# Patient Record
Sex: FEMALE | Race: BLACK OR AFRICAN AMERICAN | NOT HISPANIC OR LATINO | Employment: OTHER | URBAN - METROPOLITAN AREA
[De-identification: names, ages, dates, MRNs, and addresses within clinical notes are randomized per-mention and may not be internally consistent; named-entity substitution may affect disease eponyms.]

---

## 2017-01-03 ENCOUNTER — THERAPY VISIT (OUTPATIENT)
Dept: PHYSICAL THERAPY | Facility: CLINIC | Age: 62
End: 2017-01-03
Payer: COMMERCIAL

## 2017-01-03 ENCOUNTER — OFFICE VISIT (OUTPATIENT)
Dept: INTERNAL MEDICINE | Facility: CLINIC | Age: 62
End: 2017-01-03

## 2017-01-03 VITALS
WEIGHT: 202.3 LBS | SYSTOLIC BLOOD PRESSURE: 124 MMHG | DIASTOLIC BLOOD PRESSURE: 81 MMHG | BODY MASS INDEX: 35.84 KG/M2 | HEART RATE: 75 BPM

## 2017-01-03 DIAGNOSIS — F51.01 PRIMARY INSOMNIA: ICD-10-CM

## 2017-01-03 DIAGNOSIS — Z13.5 SCREENING FOR DIABETIC RETINOPATHY: Primary | ICD-10-CM

## 2017-01-03 DIAGNOSIS — M54.42 BILATERAL LOW BACK PAIN WITH LEFT-SIDED SCIATICA: Primary | ICD-10-CM

## 2017-01-03 PROCEDURE — 97110 THERAPEUTIC EXERCISES: CPT | Mod: GP | Performed by: PHYSICAL THERAPIST

## 2017-01-03 PROCEDURE — 97140 MANUAL THERAPY 1/> REGIONS: CPT | Mod: GP | Performed by: PHYSICAL THERAPIST

## 2017-01-03 ASSESSMENT — PAIN SCALES - GENERAL: PAINLEVEL: NO PAIN (0)

## 2017-01-03 NOTE — MR AVS SNAPSHOT
After Visit Summary   1/3/2017    Kian Cortez    MRN: [id number]           Patient Information     Date Of Birth          1955        Visit Information        Provider Department      1/3/2017 12:45 PM Tori Ramirez APRN CNP; [facility]        Today's Diagnoses     Screening for diabetic retinopathy    -  1     Primary insomnia           Care Instructions    Primary Care Center Medication Refill Request Information:  * Please contact your pharmacy regarding ANY request for medication refills.  ** [facility] Prescription Fax = [phone]  * Please allow 3 business days for routine medication refills.  * Please allow 5 business days for controlled substance medication refills.     Primary Care Center Test Result notification information:  *You will be notified with in 7-10 days of your appointment day regarding the results of your test.  If you are on MyChart you will be notified as soon as the provider has reviewed the results and signed off on them.      Ophthalmology (Eye) [phone]       Insomnia  Insomnia refers to a difficulty going to sleep or staying asleep, or both. Insomnia has many causes, including anxiety, stress, depression, chronic pain, sleeping cycles out of balance due to working night shifts or excess napping during the day, and a condition called  sleep apnea . Insomnia can be a side effect from stimulant medicines such as decongestants, asthma inhalers and pills, diet pills, and illegal drugs such as speed, crank, crack, and PCP.  Home Care:  1. Review your medicines with your doctor or pharmacist to find out if they can cause insomnia.  2. Caffeine, smoking and alcohol also affect sleep. Limit your daily use and do not use these before bedtime. Alcohol may make you sleepy at first, but as its effects wear off, you may awaken a few hours later and have trouble returning to sleep.  3. Do not exercise, eat or drink large  amounts of liquid within 2 hours of your bedtime.  4. Improve your sleep habits. Have a fixed bed and wake-up time. Try to keep noise, light and heat in your bedroom at a comfortable level. Try using earplugs or eyeshades if needed.   5. Avoid watching TV in bed.  6. If you do not fall asleep within 30 minutes, try to relax by reading or listening to soft music.  7. Limit daytime napping to one 30 minute period, early in the day.  8. Get regular exercise. Find other ways to lessen your stress level.  9. If a medicine was prescribed to help reset your sleep patterns, take it as directed. Sleeping pills are intended for short-term use, only. If taken for too long, the effect wears off while the risk of physical addiction and psychological dependence increases.  Follow-Up  with your doctor or as directed by our staff if you feel that your insomnia is not responding to the above measures.  Get Prompt Medical Attention  if any of the following occur:    Extreme restlessness or irritability    Confusion or hallucinations (seeing or hearing things that are not there)    Anxiety, depression    Several days without sleeping    1872-2921 [employer]. [address] [place] [address]. All rights reserved. This information is not intended as a substitute for professional medical care. Always follow your healthcare professional's instructions.              Follow-ups after your visit        Additional Services     OPHTHALMOLOGY ADULT REFERRAL       Your provider has referred you to: [facility]: Eye Clinic [facility] [phone]   [email or web address]    Please be aware that coverage of these services is subject to the terms and limitations of your health insurance plan.  Call member services at your health plan with any benefit or coverage questions.      Please bring the following with you to your appointment:    (1) Any X-Rays, CTs or MRIs which have been performed.  Contact the  facility where they were done to arrange for  prior to your scheduled appointment.    (2) List of current medications  (3) This referral request   (4) Any documents/labs given to you for this referral                  Your next 10 appointments already scheduled     Jan 03, 2017  3:30 PM   EMELY Spine with Pato Orr, PT   [facility] ([facility])    [address] [facility] [place] [place]  [address]  [facility] [address]   [phone]            Jan 09, 2017  3:20 PM   EMELY Extremity with Gregory Irene, PT   [facility] ([facility])    [address] [facility] [address]   [phone]            Jan 12, 2017  9:20 AM   (Arrive by 9:05 AM)   TORO FLORES with Tammie Valero OD   [facility] ([facility])    [address] [place] [id number] [phone]              Who to contact     Please call your clinic at [phone] to:    Ask questions about your health    Make or cancel appointments    Discuss your medicines    Learn about your test results    Speak to your doctor   If you have compliments or concerns about an experience at your clinic, or if you wish to file a complaint, please contact [facility] Patient Relations at [phone] or email us at [email or web address]         Additional Information About Your Visit        MyChart Information     [employer] is an electronic gateway that provides easy, online access to your medical records. With [employer], you can request a clinic appointment, read your test results, renew a prescription or communicate with your care team.     To sign up for [employer] visit the website at [email or web address]   You will be asked to enter the access code listed below, as well as some personal information. Please follow the directions to create your username and password.     Your access code is:  [email or web address]  Expires: 3/2/2017  9:38 AM     Your access code will  in 90 days. If you need help or a new code, please contact your [facility] or call [phone] for assistance.        Care EveryWhere ID     This is your Care EveryWhere ID. This could be used by other organizations to access your [facility] medical records  [id number]        Your Vitals Were     Pulse Breastfeeding?                75 No           Blood Pressure from Last 3 Encounters:   17 124/81   16 115/78   16 106/56    Weight from Last 3 Encounters:   17 91.763 kg (202 lb 4.8 oz)   16 93.713 kg (206 lb 9.6 oz)   16 92.08 kg (203 lb)              We Performed the Following     OPHTHALMOLOGY ADULT REFERRAL          Today's Medication Changes          These changes are accurate as of: 1/3/17  1:52 PM.  If you have any questions, ask your nurse or doctor.               Start taking these medicines.        Dose/Directions    melatonin 3 MG Caps   Used for:  Primary insomnia   Started by:  Tori Ramirez APRN CNP        Dose:  3 mg   Take 3 mg by mouth At Bedtime   Quantity:  60 capsule   Refills:  1            Where to get your medicines      These medications were sent to [facility] [place], [place] - [address]  [address], [facility] [address]     Phone:  [phone]    - melatonin 3 MG Caps             Primary Care Provider    [facility] Other Clinic                Thank you!     Thank you for choosing [facility]  for your care. Our goal is always to provide you with excellent care. Hearing back from our patients is one way we can continue to improve our services. Please take a few minutes to complete the written survey that you may receive in the mail after your visit with us. Thank you!             Your Updated Medication List - Protect others around you: Learn how to safely use, store and throw  away your medicines at www.disposemymeds.org.          This list is accurate as of: 1/3/17  1:52 PM.  Always use your most recent med list.                   Brand Name Dispense Instructions for use    calcium carbonate 500 MG chewable tablet    TUMS    180 tablet    Take 1 tablet (500 mg) by mouth 2 times daily       CANE, ANY MATERIAL     1 Device    Use with ambulation       diclofenac 1 % Gel topical gel    VOLTAREN    100 g    Apply 4 grams to knees four times daily using enclosed dosing card.       FISH OIL + D3 PO      Take  by mouth daily.       meclizine 12.5 MG tablet    ANTIVERT    30 tablet    Take 4 tablets (50 mg) by mouth 4 times daily as needed for dizziness       melatonin 3 MG Caps     60 capsule    Take 3 mg by mouth At Bedtime       omeprazole 20 MG CR capsule    priLOSEC    60 capsule    Take 1 capsule (20 mg) by mouth daily       polyethylene glycol powder    MIRALAX    500 g    Take 17 g (1 capful) by mouth daily       vitamin D 1000 UNITS capsule      Take 1 capsule by mouth daily.

## 2017-01-03 NOTE — PATIENT INSTRUCTIONS
Primary Care Center Medication Refill Request Information:  * Please contact your pharmacy regarding ANY request for medication refills.  ** [facility] Prescription Fax = [phone]  * Please allow 3 business days for routine medication refills.  * Please allow 5 business days for controlled substance medication refills.     Primary Care Center Test Result notification information:  *You will be notified with in 7-10 days of your appointment day regarding the results of your test.  If you are on MyChart you will be notified as soon as the provider has reviewed the results and signed off on them.      Ophthalmology (Eye) [phone]       Insomnia  Insomnia refers to a difficulty going to sleep or staying asleep, or both. Insomnia has many causes, including anxiety, stress, depression, chronic pain, sleeping cycles out of balance due to working night shifts or excess napping during the day, and a condition called  sleep apnea . Insomnia can be a side effect from stimulant medicines such as decongestants, asthma inhalers and pills, diet pills, and illegal drugs such as speed, crank, crack, and PCP.  Home Care:  1. Review your medicines with your doctor or pharmacist to find out if they can cause insomnia.  2. Caffeine, smoking and alcohol also affect sleep. Limit your daily use and do not use these before bedtime. Alcohol may make you sleepy at first, but as its effects wear off, you may awaken a few hours later and have trouble returning to sleep.  3. Do not exercise, eat or drink large amounts of liquid within 2 hours of your bedtime.  4. Improve your sleep habits. Have a fixed bed and wake-up time. Try to keep noise, light and heat in your bedroom at a comfortable level. Try using earplugs or eyeshades if needed.   5. Avoid watching TV in bed.  6. If you do not fall asleep within 30 minutes, try to relax by reading or listening to soft music.  7. Limit daytime napping to one 30 minute period, early in the day.  8. Get  regular exercise. Find other ways to lessen your stress level.  9. If a medicine was prescribed to help reset your sleep patterns, take it as directed. Sleeping pills are intended for short-term use, only. If taken for too long, the effect wears off while the risk of physical addiction and psychological dependence increases.  Follow-Up  with your doctor or as directed by our staff if you feel that your insomnia is not responding to the above measures.  Get Prompt Medical Attention  if any of the following occur:    Extreme restlessness or irritability    Confusion or hallucinations (seeing or hearing things that are not there)    Anxiety, depression    Several days without sleeping    [id number] [employer]. [address] [place] [address], [place] [address]. All rights reserved. This information is not intended as a substitute for professional medical care. Always follow your healthcare professional's instructions.

## 2017-01-03 NOTE — PROGRESS NOTES
Kian Cortez is a 61 year old female who comes in for    CC: Insomnia  HPI:  Ms. Cortez reports trouble with insomnia, x4 days. Instead of feeling tired, she feels energetic. Tries to sleep with closing her eyes, dozes briefly and then wakes up from dreams. When she starts to feel sleepy, then she suddenly feels awake, out of nowhere.   Denies change in environment--no new lights, sounds, or bed/bedding. Sleeps with --no changes, he is not keeping her awake. She tried sleeping in a different room, but this did not make any difference.  Lays in bed for hours before falling asleep.   Caffeine--1 cup of coffee or tea in the morning.   Denies anxiety or stress--no major changes, but is moving and selling the house, but is excited about this and has been enjoying this process. She was worried about the dizziness that she had been experiencing, but this has improved and she has stopped taking the Meclizine.  Is wondering if she should keep her PT appointment.    Other issues discussed today:     Patient Active Problem List   Diagnosis     Advanced directives, counseling/discussion     CARDIOVASCULAR SCREENING; LDL GOAL LESS THAN 130     Pseudoexfoliation syndrome, right eye     GERD (gastroesophageal reflux disease)     Osteopenia     Constipation, unspecified constipation type     Morbid obesity, unspecified obesity type (H)     Vertigo       Current Outpatient Prescriptions   Medication Sig Dispense Refill     polyethylene glycol (MIRALAX) powder Take 17 g (1 capful) by mouth daily 500 g 1     meclizine (ANTIVERT) 12.5 MG tablet Take 4 tablets (50 mg) by mouth 4 times daily as needed for dizziness 30 tablet 0     calcium carbonate (TUMS) 500 MG chewable tablet Take 1 tablet (500 mg) by mouth 2 times daily 180 tablet 0     omeprazole (PRILOSEC) 20 MG CR capsule Take 1 capsule (20 mg) by mouth daily 60 capsule 1     diclofenac (VOLTAREN) 1 % GEL topical gel Apply 4 grams to knees four times daily using enclosed  dosing card. 100 g 1     CANE, ANY MATERIAL Use with ambulation 1 Device 0     Cholecalciferol (VITAMIN D) 1000 UNITS capsule Take 1 capsule by mouth daily.       Fish Oil-Cholecalciferol (FISH OIL + D3 PO) Take  by mouth daily.           ALLERGIES: Review of patient's allergies indicates no known allergies.    PAST MEDICAL HX:   Past Medical History   Diagnosis Date     Osteopenia 4/27/2015     Osteoarthritis of right knee        PAST SURGICAL HX:   Past Surgical History   Procedure Laterality Date     Eye surgery       eye duct repair 10+ years  ago       IMMUNIZATION HX:   Immunization History   Administered Date(s) Administered     IPV 01/27/2006     Influenza (IIV3) 01/27/2006, 02/22/2012, 02/15/2013, 12/30/2016     MMR 12/19/1997     Meningococcal (Menomune ) 01/27/2006     TD (ADULT, 7+) 12/19/1997, 01/27/2006     TDAP (BOOSTRIX AGES 10-64) 05/02/2016     Typhoid IM 01/27/2006     Yellow Fever 01/27/2006       SOCIAL HX:   Social History     Social History Narrative    Originally from [facility].       ROS:   CONSTITUTIONAL: no fatigue, no unexpected change in weight  SKIN: no worrisome rashes, no worrisome moles, no worrisome lesions  EYES: no acute vision problems or changes  ENT: no ear problems, no mouth problems, no throat problems  RESP: no significant cough, no shortness of breath  CV: no chest pain, no palpitations, no new or worsening peripheral edema  GI: no nausea, no vomiting, no constipation, no diarrhea  NEURO: no weakness, no dizziness, no syncope, mild rare headaches    OBJECTIVE:  /81 mmHg  Pulse 75  Wt 91.763 kg (202 lb 4.8 oz)  Breastfeeding? No   Wt Readings from Last 1 Encounters:   01/03/17 91.763 kg (202 lb 4.8 oz)     Constitutional: no distress, comfortable, pleasant, well-groomed  Cardiovascular: regular rate and rhythm, normal S1 and S2, no murmurs, rubs or gallops  Respiratory: clear to auscultation with good air movement bilaterally, no wheezes or crackles,  non-labored  Neurological: cranial nerves grossly intact, gait is steady with intact balance, speech is clear, no tremor   Psychological: appropriate mood, demonstrates intact judgment and logical thought process      ASSESSMENT/PLAN:    1. Screening for diabetic retinopathy  Will schedule eye exam today.  - OPHTHALMOLOGY ADULT REFERRAL    2. Primary insomnia  Reviewed good sleep hygiene techniques. Recommended quiet activity 30-60 min before bedtime, no TV/cellphone in the bedroom, bed is only for sleep and sex, and to get out of bed if having difficulty falling asleep. Maintain a comfortable room temperature, dark and quiet environment. No day-time napping. Will try melatonin to help regulate sleep patterns. Reviewed no caffeine in the afternoon.   - melatonin 3 MG CAPS; Take 3 mg by mouth At Bedtime  Dispense: 60 capsule; Refill: 1    FOLLOW UP: If not improving or if worsening, or as needed for any concerns.    EFRAIN Jesus CNP

## 2017-01-08 ENCOUNTER — PRE VISIT (OUTPATIENT)
Dept: GASTROENTEROLOGY | Facility: CLINIC | Age: 62
End: 2017-01-08

## 2017-01-10 ENCOUNTER — THERAPY VISIT (OUTPATIENT)
Dept: PHYSICAL THERAPY | Facility: CLINIC | Age: 62
End: 2017-01-10
Payer: COMMERCIAL

## 2017-01-10 DIAGNOSIS — M54.42 BILATERAL LOW BACK PAIN WITH LEFT-SIDED SCIATICA: Primary | ICD-10-CM

## 2017-01-10 DIAGNOSIS — H81.10 BPPV (BENIGN PAROXYSMAL POSITIONAL VERTIGO), UNSPECIFIED LATERALITY: Primary | ICD-10-CM

## 2017-01-10 PROCEDURE — 95992 CANALITH REPOSITIONING PROC: CPT | Mod: GP | Performed by: PHYSICAL THERAPIST

## 2017-01-10 PROCEDURE — 97161 PT EVAL LOW COMPLEX 20 MIN: CPT | Mod: GP | Performed by: PHYSICAL THERAPIST

## 2017-01-10 PROCEDURE — 97110 THERAPEUTIC EXERCISES: CPT | Mod: GP | Performed by: PHYSICAL THERAPIST

## 2017-01-10 NOTE — PROGRESS NOTES
[facility] Initial Evaluation        S:  Kian Cortez is a 62 year old patient complaining of spinning and nausea.  Onset of symptoms: 1 week.  Woke up with sx.  Reports no sx for the last week.  MD referral 12/30/2016.  Is this a recurrent problem: 1 year ago, but very slight at that time.  Progression since onset: Past week has been good  Frequency of episodes/time of day: none recently  Duration of episodes: 30 sec to 1 min  Exacerbating factors: moving head  Relieving factors: hold head still  Previous treatments:  meclizine  Special tests/diagnostics performed: last year - all negative  Significant medical hx: Knee OA, over weight, migraines  Meds: see in chart  Occupation: none   Work requirements: general housework  General health reported by patient: good  Red Flags: none    Eye surgery 15 years ago      O:  Cervical ROM screen: full CROM without sx  Oculomotor/gaze stability screen: all tests negative for nystagmus - slight sx only but not spinning  Vertebral artery test: negative  Hallpike-Isma maneuver:  R: negative  L: negative  Horizontal canal test:  R: NT  L: NT  Balance testing:  NT        Subjective:    HPI                    Objective:    System    Physical Exam    General     ROS    Assessment/Plan:      Patient is a 62 year old female with dizziness complaints.    Patient has the following significant findings with corresponding treatment plan.                Diagnosis 1:  BPPV  Decreased proprioception - neuro re-education and therapeutic activities  Decreased function - therapeutic activities    Therapy Evaluation Codes:   1) History comprised of:   Personal factors that impact the plan of care:      None.    Comorbidity factors that impact the plan of care are:      None.     Medications impacting care: meclizine.  2) Examination of Body Systems comprised of:   Body structures and functions that impact the plan of care:      dizziness.   Activity limitations that impact the  plan of care are:      Laying down.  3) Clinical presentation characteristics are:   Stable/Uncomplicated.  4) Decision-Making    Low complexity using standardized patient assessment instrument and/or measureable assessment of functional outcome.  Cumulative Therapy Evaluation is: Low complexity.    Previous and current functional limitations:  No previous reports of dizziness.  Currently has no dizzy sx   Short term and Long term goals: Pt will call if sx increase again.      Communication ability:  Patient appears to be able to clearly communicate and understand verbal and written communication and follow directions correctly.  Treatment Explanation - The following has been discussed with the patient:   RX ordered/plan of care  Anticipated outcomes  Possible risks and side effects  This patient would benefit from PT intervention to resume normal activities.   Rehab potential is good.    Frequency:  1 X week, once daily  Duration:  for 1 weeks  Discharge Plan:  Achieve all LTG.  Independent in home treatment program.  Reach maximal therapeutic benefit.    Pt will call to schedule additional visits if sx return.    Please refer to the daily flowsheet for treatment today, total treatment time and time spent performing 1:1 timed codes.

## 2017-02-09 DIAGNOSIS — M17.0 PRIMARY OSTEOARTHRITIS OF BOTH KNEES: ICD-10-CM

## 2017-02-09 DIAGNOSIS — M25.552 HIP PAIN, LEFT: Primary | ICD-10-CM

## 2017-02-09 RX ORDER — NAPROXEN SODIUM 220 MG
220 TABLET ORAL 2 TIMES DAILY WITH MEALS
Qty: 60 TABLET | Refills: 1 | Status: SHIPPED | OUTPATIENT
Start: 2017-02-09 | End: 2017-05-16

## 2017-03-10 ENCOUNTER — OFFICE VISIT (OUTPATIENT)
Dept: INTERNAL MEDICINE | Facility: CLINIC | Age: 62
End: 2017-03-10

## 2017-03-10 VITALS — RESPIRATION RATE: 18 BRPM | DIASTOLIC BLOOD PRESSURE: 83 MMHG | HEART RATE: 79 BPM | SYSTOLIC BLOOD PRESSURE: 136 MMHG

## 2017-03-10 DIAGNOSIS — G89.29 CHRONIC LEFT HIP PAIN: Primary | ICD-10-CM

## 2017-03-10 DIAGNOSIS — E66.01 MORBID OBESITY DUE TO EXCESS CALORIES (H): ICD-10-CM

## 2017-03-10 DIAGNOSIS — M25.552 CHRONIC LEFT HIP PAIN: Primary | ICD-10-CM

## 2017-03-10 ASSESSMENT — PAIN SCALES - GENERAL: PAINLEVEL: SEVERE PAIN (6)

## 2017-03-10 NOTE — NURSING NOTE
Chief Complaint   Patient presents with     Leg Pain     Patient is here to discuss leg pain     Kristy Barboza CMA 1:50 PM on 3/10/2017.

## 2017-03-10 NOTE — PATIENT INSTRUCTIONS
Primary Care Center Medication Refill Request Information:  * Please contact your pharmacy regarding ANY request for medication refills.  ** [facility] Prescription Fax = [phone]  * Please allow 3 business days for routine medication refills.  * Please allow 5 business days for controlled substance medication refills.     Primary Care Center Test Result notification information:  *You will be notified with in 7-10 days of your appointment day regarding the results of your test.  If you are on MyChart you will be notified as soon as the provider has reviewed the results and signed off on them.      4 Steps for Eating Healthier  Changing the way you eat can improve your health. It can lower your cholesterol and blood pressure, and help you stay at a healthy weight. Your diet doesn t have to be bland and boring to be healthy. Just watch your calories and follow these steps:    1. Eat fewer unhealthy fats    Choose more fish and lean meats instead of fatty cuts of meat.    Skip butter and lard, and use less margarine.    Pass on foods that have palm, coconut, or hydrogenated oils.    Eat fewer high-fat dairy foods like cheese, ice cream, and whole milk.    Get a heart-healthy cookbook and try some low-fat recipes.  2. Go light on salt    Keep the saltshaker off the table.    Limit high-salt ingredients, such as soy sauce, bouillon, and garlic salt.    Instead of adding salt when cooking, season your food with herbs and flavorings. Try lemon, garlic, and onion.    Limit convenience foods, such as boxed or canned foods and restaurant food.    Read food labels and choose lower-sodium options.  3. Limit sugar    Pause before you add sugars to pancakes, cereal, coffee, or tea. This includes white and brown table sugar, syrup, honey, and molasses. Cut your usual amount by half.    Use non-sugar sweeteners. Stevia, aspartame, and sucralose can satisfy a sweet tooth without adding calories.    Swap out sugar-filled soda and other  drinks. Buy sugar-free or low-calorie beverages. Remember water is always the best choice.    Read labels and choose foods with less added sugar. Keep in mind that dairy foods and foods with fruit will have some natural sugar.    Cut the sugar in recipes by 1/3 to 1/2. Boost the flavor with extracts like almond, vanilla, or orange. Or add spices such as cinnamon or nutmeg.  4. Eat more fiber    Eat fresh fruits and vegetables every day.    Boost your diet with whole grains. Go for oats, whole-grain rice, and bran.    Add beans and lentils to your meals.    Drink more water to match your fiber increase. This is to help prevent constipation.    9760-9663 [employer]. [address] [place] [address], [place] [address]. All rights reserved. This information is not intended as a substitute for professional medical care. Always follow your healthcare professional's instructions.

## 2017-03-10 NOTE — MR AVS SNAPSHOT
After Visit Summary   3/10/2017    Kian Cortez    MRN: [id number]           Patient Information     Date Of Birth          1955        Visit Information        Provider Department      3/10/2017 1:15 PM Open, Assignments; Tori Ramirez APRN [facility]        Today's Diagnoses     Chronic left hip pain    -  1    Morbid obesity due to excess calories (H)          Care Instructions    Primary Care Center Medication Refill Request Information:  * Please contact your pharmacy regarding ANY request for medication refills.  ** [facility] Prescription Fax = [phone]  * Please allow 3 business days for routine medication refills.  * Please allow 5 business days for controlled substance medication refills.     Primary Care Center Test Result notification information:  *You will be notified with in 7-10 days of your appointment day regarding the results of your test.  If you are on MyChart you will be notified as soon as the provider has reviewed the results and signed off on them.      4 Steps for Eating Healthier  Changing the way you eat can improve your health. It can lower your cholesterol and blood pressure, and help you stay at a healthy weight. Your diet doesn t have to be bland and boring to be healthy. Just watch your calories and follow these steps:    1. Eat fewer unhealthy fats    Choose more fish and lean meats instead of fatty cuts of meat.    Skip butter and lard, and use less margarine.    Pass on foods that have palm, coconut, or hydrogenated oils.    Eat fewer high-fat dairy foods like cheese, ice cream, and whole milk.    Get a heart-healthy cookbook and try some low-fat recipes.  2. Go light on salt    Keep the saltshaker off the table.    Limit high-salt ingredients, such as soy sauce, bouillon, and garlic salt.    Instead of adding salt when cooking, season your food with herbs and flavorings. Try lemon, garlic, and onion.    Limit convenience foods, such  as boxed or canned foods and restaurant food.    Read food labels and choose lower-sodium options.  3. Limit sugar    Pause before you add sugars to pancakes, cereal, coffee, or tea. This includes white and brown table sugar, syrup, honey, and molasses. Cut your usual amount by half.    Use non-sugar sweeteners. Stevia, aspartame, and sucralose can satisfy a sweet tooth without adding calories.    Swap out sugar-filled soda and other drinks. Buy sugar-free or low-calorie beverages. Remember water is always the best choice.    Read labels and choose foods with less added sugar. Keep in mind that dairy foods and foods with fruit will have some natural sugar.    Cut the sugar in recipes by 1/3 to 1/2. Boost the flavor with extracts like almond, vanilla, or orange. Or add spices such as cinnamon or nutmeg.  4. Eat more fiber    Eat fresh fruits and vegetables every day.    Boost your diet with whole grains. Go for oats, whole-grain rice, and bran.    Add beans and lentils to your meals.    Drink more water to match your fiber increase. This is to help prevent constipation.    1108-3287 [employer]. [address] [place] [address]. All rights reserved. This information is not intended as a substitute for professional medical care. Always follow your healthcare professional's instructions.              Follow-ups after your visit        Additional Services     ORTHOPEDICS ADULT REFERRAL       Your provider has referred you to: [facility] [place]: Orthopaedic Clinic [facility] [phone]   [email or web address]      Please be aware that coverage of these services is subject to the terms and limitations of your health insurance plan.  Call member services at your health plan with any benefit or coverage questions.      Please bring the following to your appointment:    >>   Any x-rays, CTs or MRIs which have been performed.  Contact the facility where they were done to  arrange for  prior to your scheduled appointment.    >>   List of current medications   >>   This referral request   >>   Any documents/labs given to you for this referral            WEIGHT MANAGEMENT/ [facility] LIFESTYLE PROGRAM REFERRAL       To schedule an appointment, please call the [facility] at [phone].                  Your next 10 appointments already scheduled     Mar 13, 2017  2:30 PM CDT   (Arrive by 2:15 PM)   New Patient Visit with Tan Hodges MD   [facility] Sports Medicine ([facility])    [address] [place] [address]  [address] [facility] [address] [phone]            Mar 21, 2017  2:20 PM CDT   (Arrive by 2:05 PM)   New Patient Visit with Lora Land MD   [facility] Weight Management ([facility])    [address] [place] [address]  4th [facility] [address] [phone]              Who to contact     Please call your clinic at [phone] to:    Ask questions about your health    Make or cancel appointments    Discuss your medicines    Learn about your test results    Speak to your doctor   If you have compliments or concerns about an experience at your clinic, or if you wish to file a complaint, please contact [facility] Patient Relations at [phone] or email us at [email or web address]         Additional Information About Your Visit        MyChart Information     [employer] is an electronic gateway that provides easy, online access to your medical records. With [employer], you can request a clinic appointment, read your test results, renew a prescription or communicate with your care team.     To sign up for [employer]101[employer] visit the website at [email or web address]   You will be asked to enter the access code listed below, as well as some personal information. Please follow the directions to create your username and password.     Your access code is:  [email or web address]  Expires: 2017  6:30 AM     Your access code will  in 90 days. If you need help or a new code, please contact your [facility] or call [phone] for assistance.        Care EveryWhere ID     This is your Care EveryWhere ID. This could be used by other organizations to access your [place] medical records  [id number]        Your Vitals Were     Pulse Respirations Breastfeeding?             79 18 No          Blood Pressure from Last 3 Encounters:   03/10/17 136/83   17 124/81   16 115/78    Weight from Last 3 Encounters:   17 91.8 kg (202 lb 4.8 oz)   16 93.7 kg (206 lb 9.6 oz)   16 92.1 kg (203 lb)              We Performed the Following     ORTHOPEDICS ADULT REFERRAL     WEIGHT MANAGEMENT/ [facility] LIFESTYLE PROGRAM REFERRAL        Primary Care Provider    Md Other Clinic                Thank you!     Thank you for choosing [facility]  for your care. Our goal is always to provide you with excellent care. Hearing back from our patients is one way we can continue to improve our services. Please take a few minutes to complete the written survey that you may receive in the mail after your visit with us. Thank you!             Your Updated Medication List - Protect others around you: Learn how to safely use, store and throw away your medicines at www.disposemymeds.org.          This list is accurate as of: 3/10/17  2:40 PM.  Always use your most recent med list.                   Brand Name Dispense Instructions for use    calcium carbonate 500 MG chewable tablet    TUMS    180 tablet    Take 1 tablet (500 mg) by mouth 2 times daily       CANE, ANY MATERIAL     1 Device    Use with ambulation       diclofenac 1 % Gel topical gel    VOLTAREN    100 g    Apply 4 grams to knees four times daily using enclosed dosing card.       FISH OIL + D3 PO      Take  by mouth daily.       meclizine 12.5 MG tablet    ANTIVERT    30 tablet     Take 4 tablets (50 mg) by mouth 4 times daily as needed for dizziness       melatonin 3 MG Caps     60 capsule    Take 3 mg by mouth At Bedtime       naproxen sodium 220 MG tablet    ANAPROX    60 tablet    Take 1 tablet (220 mg) by mouth 2 times daily (with meals)       omeprazole 20 MG CR capsule    priLOSEC    60 capsule    Take 1 capsule (20 mg) by mouth daily       polyethylene glycol powder    MIRALAX    500 g    Take 17 g (1 capful) by mouth daily       vitamin D 1000 UNITS capsule      Take 1 capsule by mouth daily.

## 2017-03-13 ENCOUNTER — OFFICE VISIT (OUTPATIENT)
Dept: ORTHOPEDICS | Facility: CLINIC | Age: 62
End: 2017-03-13
Attending: NURSE PRACTITIONER

## 2017-03-13 VITALS
DIASTOLIC BLOOD PRESSURE: 76 MMHG | BODY MASS INDEX: 38.16 KG/M2 | SYSTOLIC BLOOD PRESSURE: 134 MMHG | WEIGHT: 207.4 LBS | HEIGHT: 62 IN

## 2017-03-13 DIAGNOSIS — G89.29 CHRONIC LEFT-SIDED LOW BACK PAIN WITH LEFT-SIDED SCIATICA: ICD-10-CM

## 2017-03-13 DIAGNOSIS — M54.42 CHRONIC LEFT-SIDED LOW BACK PAIN WITH LEFT-SIDED SCIATICA: ICD-10-CM

## 2017-03-13 DIAGNOSIS — M54.10 RADICULAR SYNDROME OF LEFT LEG: Primary | ICD-10-CM

## 2017-03-13 NOTE — MR AVS SNAPSHOT
After Visit Summary   3/13/2017    Kian Cortez    MRN: [id number]           Patient Information     Date Of Birth          1955        Visit Information        Provider Department      3/13/2017 2:15 PM Tan Hodges MD; [employer] [facility] Sports Medicine        Today's Diagnoses     Radicular syndrome of left leg    -  1    Chronic left-sided low back pain with left-sided sciatica           Follow-ups after your visit        Your next 10 appointments already scheduled     Mar 21, 2017  2:20 PM CDT   (Arrive by 2:05 PM)   New Patient Visit with Lora Land MD   [facility] Medical Weight Management ([facility])    [address]  4th Floor  [facility] [address] [phone]            Mar 21, 2017  3:15 PM CDT   (Arrive by 3:00 PM)   MR LUMBAR SPINE W/O CONTRAST with [id number]   [facility] MRI ([facility])    [address]  [address]st [facility] [address] [phone]           Take your medicines as usual, unless your doctor tells you not to. Bring a list of your current medicines to your exam (including vitamins, minerals and over-the-counter drugs). Also bring the results of similar scans you may have had.  Please remove any body piercings and hair extensions before you arrive.  Follow your doctor s orders. If you do not, we may have to postpone your exam.  You will not have contrast for this exam. You do not need to do anything special to prepare.  The MRI machine uses a strong magnet. Please wear clothes without metal (snaps, zippers). A sweatsuit works well, or we may give you a hospital gown.   **IMPORTANT** THE INSTRUCTIONS BELOW ARE ONLY FOR THOSE PATIENTS WHO HAVE BEEN TOLD THEY WILL RECEIVE SEDATION OR GENERAL ANESTHESIA DURING THEIR MRI PROCEDURE:  IF YOU WILL RECEIVE SEDATION (take medicine to help you relax during your exam):   You must get the medicine from your  doctor before you arrive. Bring the medicine to the exam. Do not take it at home.   Arrive one hour early. Bring someone who can take you home after the test. Your medicine will make you sleepy. After the exam, you may not drive, take a bus or take a taxi by yourself.   No eating 8 hours before your exam. You may have clear liquids up until 4 hours before your exam. (Clear liquids include water, clear tea, black coffee and fruit juice without pulp.)  IF YOU WILL RECEIVE ANESTHESIA (be asleep for your exam):   Arrive 1 1/2 hours early. Bring someone who can take you home after the test. You may not drive, take a bus or take a taxi by yourself.   No eating 8 hours before your exam. You may have clear liquids up until 4 hours before your exam. (Clear liquids include water, clear tea, black coffee and fruit juice without pulp.)   You will spend four to five hours in the recovery room.  Please call the Imaging Department at your exam site with any questions.            Mar 23, 2017 11:30 AM CDT   (Arrive by 11:15 AM)   Return Visit with Tan Hodges MD   [facility] Sports Medicine ([facility])    [address] [place] [address] [phone]              Future tests that were ordered for you today     Open Future Orders        Priority Expected Expires Ordered    MRI Lumbar spine w/o contrast Routine  3/13/2018 3/13/2017            Who to contact     Please call your clinic at [phone] to:    Ask questions about your health    Make or cancel appointments    Discuss your medicines    Learn about your test results    Speak to your doctor   If you have compliments or concerns about an experience at your clinic, or if you wish to file a complaint, please contact [facility] Patient Relations at [phone] or email us at [email or web address]         Additional Information About Your Visit        MyChart Information      "[employer] is an electronic gateway that provides easy, online access to your medical records. With [employer], you can request a clinic appointment, read your test results, renew a prescription or communicate with your care team.     To sign up for [employer] visit the website at [email or web address]   You will be asked to enter the access code listed below, as well as some personal information. Please follow the directions to create your username and password.     Your access code is: [email or web address]  Expires: 2017  7:30 AM     Your access code will  in 90 days. If you need help or a new code, please contact your [facility] or call [phone] for assistance.        Care EveryWhere ID     This is your Care EveryWhere ID. This could be used by other organizations to access your [place] medical records  [id number]        Your Vitals Were     Height BMI (Body Mass Index)                5' 2\" (1.575 m) 37.93 kg/m2           Blood Pressure from Last 3 Encounters:   17 134/76   03/10/17 136/83   17 124/81    Weight from Last 3 Encounters:   17 207 lb 6.4 oz (94.1 kg)   17 202 lb 4.8 oz (91.8 kg)   16 206 lb 9.6 oz (93.7 kg)               Primary Care Provider    Md Other Clinic                Thank you!     Thank you for choosing [facility]  for your care. Our goal is always to provide you with excellent care. Hearing back from our patients is one way we can continue to improve our services. Please take a few minutes to complete the written survey that you may receive in the mail after your visit with us. Thank you!             Your Updated Medication List - Protect others around you: Learn how to safely use, store and throw away your medicines at www.disposemymeds.org.          This list is accurate as of: 3/13/17  2:54 PM.  Always use your most recent med list.                   Brand Name Dispense Instructions for use    calcium " carbonate 500 MG chewable tablet    TUMS    180 tablet    Take 1 tablet (500 mg) by mouth 2 times daily       CANE, ANY MATERIAL     1 Device    Use with ambulation       diclofenac 1 % Gel topical gel    VOLTAREN    100 g    Apply 4 grams to knees four times daily using enclosed dosing card.       FISH OIL + D3 PO      Take  by mouth daily.       meclizine 12.5 MG tablet    ANTIVERT    30 tablet    Take 4 tablets (50 mg) by mouth 4 times daily as needed for dizziness       melatonin 3 MG Caps     60 capsule    Take 3 mg by mouth At Bedtime       naproxen sodium 220 MG tablet    ANAPROX    60 tablet    Take 1 tablet (220 mg) by mouth 2 times daily (with meals)       omeprazole 20 MG CR capsule    priLOSEC    60 capsule    Take 1 capsule (20 mg) by mouth daily       polyethylene glycol powder    MIRALAX    500 g    Take 17 g (1 capful) by mouth daily       vitamin D 1000 UNITS capsule      Take 1 capsule by mouth daily.

## 2017-03-13 NOTE — LETTER
"  3/13/2017      RE: Kian Cortez  [address]  [facility] [address]       Sports Medicine Clinic Visit    PCP: [facility]    Kian Cortez is a 62 year old female who is seen  as self referral presenting with left hip pain. She reports the hip pain started 6 months ago. The pain initially started in her lower back.  Seen at request of Tori Ramirez CNP in consultation.    Injury: NA    Location of Pain: left hip  Duration of Pain: 4-5 month(s)  Rating of Pain: 5/10  Pain is better with: Naproxen  Pain is worse with: walking, nights  Additional Features: NA  Treatment so far consists of: Nothing  Prior History of related problems:     /76  Ht 5' 2\" (1.575 m)  Wt 207 lb 6.4 oz (94.1 kg)  BMI 37.93 kg/m2       PMH:  Past Medical History   Diagnosis Date     Osteoarthritis of right knee      Osteopenia 4/27/2015       Active problem list:  Patient Active Problem List   Diagnosis     Advanced directives, counseling/discussion     CARDIOVASCULAR SCREENING; LDL GOAL LESS THAN 130     Pseudoexfoliation syndrome, right eye     GERD (gastroesophageal reflux disease)     Osteopenia     Constipation, unspecified constipation type     Morbid obesity, unspecified obesity type (H)     Vertigo     BPPV (benign paroxysmal positional vertigo), unspecified laterality       FH:  Family History   Problem Relation Age of Onset     Other Cancer Brother      CANCER Brother      Glaucoma No family hx of      Macular Degeneration No family hx of      DIABETES No family hx of      Hypertension No family hx of      CEREBROVASCULAR DISEASE No family hx of      Thyroid Disease No family hx of        SH:  Social History     Social History     Marital status:      Spouse name: N/A     Number of children: N/A     Years of education: N/A     Occupational History     Not on file.     Social History Main Topics     Smoking status: Never Smoker     Smokeless tobacco: Never Used     Alcohol use No     Drug use: No     Sexual activity: " Yes     Partners: Male     Other Topics Concern     Parent/Sibling W/ Cabg, Mi Or Angioplasty Before 65f 55m? No     Social History Narrative    Originally from [place].       MEDS:  See EMR, reviewed  ALL:  See EMR, reviewed    REVIEW OF SYSTEMS:  CONSTITUTIONAL:NEGATIVE for fever, chills, change in weight  INTEGUMENTARY/SKIN: NEGATIVE for worrisome rashes, moles or lesions  EYES: NEGATIVE for vision changes or irritation  ENT/MOUTH: NEGATIVE for ear, mouth and throat problems  RESP:NEGATIVE for significant cough or SOB  BREAST: NEGATIVE for masses, tenderness or discharge  CV: NEGATIVE for chest pain, palpitations or peripheral edema  GI: NEGATIVE for nausea, abdominal pain, heartburn, or change in bowel habits  :NEGATIVE for frequency, dysuria, or hematuria  :NEGATIVE for frequency, dysuria, or hematuria  NEURO: NEGATIVE for weakness, dizziness or paresthesias  ENDOCRINE: NEGATIVE for temperature intolerance, skin/hair changes  HEME/ALLERGY/IMMUNE: NEGATIVE for bleeding problems  PSYCHIATRIC: NEGATIVE for changes in mood or affect          SUBJECTIVE:  Over the last 6 months, this 62-year-old [place] female has been seeing Physical Therapy consistently for what they felt was radicular discomfort into her leg.  Physical therapy has not helped.  She will notice centralized low back discomfort that she feels to the left buttock, down the left thigh, especially about the lateral aspect towards the knee.  It is associated with a numb and tingling sensation and the longer she stands and the more she walks the worse the back pain and the left leg become.  It does not bother her with long periods of sitting.  She can think of no recent injury.  It does not extend below the knee to the foot.  When she looks back over the last 5 years, she has had a tendency to have multiple episodes of back pain but they have never lasted this long.  Many of the interventions in Physical Therapy were poorly tolerated and they just  did not seem to help the problem.  She has had x-rays of the hips that show no bony abnormality.      OBJECTIVE:  Above ideal weight.  Internal and external rotation of the hips is well tolerated.  Abduction is well tolerated.  She is nontender over the greater trochanter on the left, nontender over the sacroiliac joints.  Strength is intact bilaterally at the hip, knee, ankle and foot including toe strength and foot evertor strength.  Distal pulses and sensation are intact.  Abdomen is obese and difficult to examine.  She is without tenderness in the lower lumbar spine.      ASSESSMENT:  Left radicular leg discomfort, suspect degenerative disk disease of the lumbar spine.      PLAN:  She is having pain even at night.  She has not improved with physical therapy.  An MRI is pending to rule out impingement involving the left leg.  She will follow up with me after the MRI.  We talked through the  about the possibility of an epidural steroid injection if appropriate according to the MRI.       Tan Hodges MD

## 2017-03-13 NOTE — PROGRESS NOTES
"Sports Medicine Clinic Visit    PCP: Other Clinic, [facility]E Cortez is a 62 year old female who is seen  as self referral presenting with left hip pain. She reports the hip pain started 6 months ago. The pain initially started in her lower back.  Seen at request of Tori Ramirez CNP in consultation.    Injury: NA    Location of Pain: left hip  Duration of Pain: 4-5 month(s)  Rating of Pain: 5/10  Pain is better with: Naproxen  Pain is worse with: walking, nights  Additional Features: NA  Treatment so far consists of: Nothing  Prior History of related problems:     /76  Ht 5' 2\" (1.575 m)  Wt 207 lb 6.4 oz (94.1 kg)  BMI 37.93 kg/m2       PMH:  Past Medical History   Diagnosis Date     Osteoarthritis of right knee      Osteopenia 4/27/2015       Active problem list:  Patient Active Problem List   Diagnosis     Advanced directives, counseling/discussion     CARDIOVASCULAR SCREENING; LDL GOAL LESS THAN 130     Pseudoexfoliation syndrome, right eye     GERD (gastroesophageal reflux disease)     Osteopenia     Constipation, unspecified constipation type     Morbid obesity, unspecified obesity type (H)     Vertigo     BPPV (benign paroxysmal positional vertigo), unspecified laterality       FH:  Family History   Problem Relation Age of Onset     Other Cancer Brother      CANCER Brother      Glaucoma No family hx of      Macular Degeneration No family hx of      DIABETES No family hx of      Hypertension No family hx of      CEREBROVASCULAR DISEASE No family hx of      Thyroid Disease No family hx of        SH:  Social History     Social History     Marital status:      Spouse name: N/A     Number of children: N/A     Years of education: N/A     Occupational History     Not on file.     Social History Main Topics     Smoking status: Never Smoker     Smokeless tobacco: Never Used     Alcohol use No     Drug use: No     Sexual activity: Yes     Partners: Male     Other Topics Concern     Parent/Sibling W/ " Cabg, Mi Or Angioplasty Before 65f 55m? No     Social History Narrative    Originally from [facility].       MEDS:  See EMR, reviewed  ALL:  See EMR, reviewed    REVIEW OF SYSTEMS:  CONSTITUTIONAL:NEGATIVE for fever, chills, change in weight  INTEGUMENTARY/SKIN: NEGATIVE for worrisome rashes, moles or lesions  EYES: NEGATIVE for vision changes or irritation  ENT/MOUTH: NEGATIVE for ear, mouth and throat problems  RESP:NEGATIVE for significant cough or SOB  BREAST: NEGATIVE for masses, tenderness or discharge  CV: NEGATIVE for chest pain, palpitations or peripheral edema  GI: NEGATIVE for nausea, abdominal pain, heartburn, or change in bowel habits  :NEGATIVE for frequency, dysuria, or hematuria  :NEGATIVE for frequency, dysuria, or hematuria  NEURO: NEGATIVE for weakness, dizziness or paresthesias  ENDOCRINE: NEGATIVE for temperature intolerance, skin/hair changes  HEME/ALLERGY/IMMUNE: NEGATIVE for bleeding problems  PSYCHIATRIC: NEGATIVE for changes in mood or affect          SUBJECTIVE:  Over the last 6 months, this 62-year-old [place] female has been seeing Physical Therapy consistently for what they felt was radicular discomfort into her leg.  Physical therapy has not helped.  She will notice centralized low back discomfort that she feels to the left buttock, down the left thigh, especially about the lateral aspect towards the knee.  It is associated with a numb and tingling sensation and the longer she stands and the more she walks the worse the back pain and the left leg become.  It does not bother her with long periods of sitting.  She can think of no recent injury.  It does not extend below the knee to the foot.  When she looks back over the last 5 years, she has had a tendency to have multiple episodes of back pain but they have never lasted this long.  Many of the interventions in Physical Therapy were poorly tolerated and they just did not seem to help the problem.  She has had x-rays of the hips that  show no bony abnormality.      OBJECTIVE:  Above ideal weight.  Internal and external rotation of the hips is well tolerated.  Abduction is well tolerated.  She is nontender over the greater trochanter on the left, nontender over the sacroiliac joints.  Strength is intact bilaterally at the hip, knee, ankle and foot including toe strength and foot evertor strength.  Distal pulses and sensation are intact.  Abdomen is obese and difficult to examine.  She is without tenderness in the lower lumbar spine.      ASSESSMENT:  Left radicular leg discomfort, suspect degenerative disk disease of the lumbar spine.      PLAN:  She is having pain even at night.  She has not improved with physical therapy.  An MRI is pending to rule out impingement involving the left leg.  She will follow up with me after the MRI.  We talked through the  about the possibility of an epidural steroid injection if appropriate according to the MRI.

## 2017-03-13 NOTE — Clinical Note
Thank you for allowing me to see your patient in Sports Medicine Clinic.  Please see the attached copy of our visit.  Sincerely,  Tan Hodges MD

## 2017-03-21 ENCOUNTER — OFFICE VISIT (OUTPATIENT)
Dept: ENDOCRINOLOGY | Facility: CLINIC | Age: 62
End: 2017-03-21
Attending: NURSE PRACTITIONER

## 2017-03-21 VITALS
BODY MASS INDEX: 37.06 KG/M2 | HEIGHT: 62 IN | TEMPERATURE: 97.4 F | OXYGEN SATURATION: 98 % | WEIGHT: 201.4 LBS | DIASTOLIC BLOOD PRESSURE: 66 MMHG | SYSTOLIC BLOOD PRESSURE: 128 MMHG | HEART RATE: 73 BPM

## 2017-03-21 DIAGNOSIS — E66.9 OBESITY, UNSPECIFIED OBESITY SEVERITY, UNSPECIFIED OBESITY TYPE: Primary | ICD-10-CM

## 2017-03-21 ASSESSMENT — ENCOUNTER SYMPTOMS: ARTHRALGIAS: 1

## 2017-03-21 ASSESSMENT — PAIN SCALES - GENERAL: PAINLEVEL: SEVERE PAIN (6)

## 2017-03-21 NOTE — MR AVS SNAPSHOT
After Visit Summary   3/21/2017    Kian Cortez    MRN: [id number]           Patient Information     Date Of Birth          1955        Visit Information        Provider Department      3/21/2017 2:05 PM Parker Humphries; Lora Land MD [facility]        Care Instructions    1,000 calorie meal plan  Use meal replacements such as Selina's meals, Lean Cuisines, Healthy Choice, Smart Ones, Weight Watchers Meals, and Slim Fast and Glucerna shakes and supplement with fresh fruits and vegetables  Please drink a lot of water daily. Most people typically need about 2 liters of water daily and more if they are exercising, have a large weight, or have a fever or illness. Add Crystal Light for flavoring if desired. But no pop with calories in it.  Please keep a food journal of what you eat, calories in what you eat, and mood and bring the journal with you to your next appointment.  Consider using applications for smart phones such as Pro.com, SecondMarket, Next Thing CoRecipes, LoseIt, Tap&Track, and RelaxM.  Focus on wet volumetrics, meaning, eat more foods that are high in water and fiber such as fruits and vegetables in order to get that full feeling. These are also good for your overall health as well.  Check out Dr. Netta Dawson from [facility] - she has cookbooks with low calorie volumetric recipes  You can try Let's Dish to help you prepare meals for you and your family. Often times, the caloric and nutrition data and serving sizes are available for this food. This can be a time saving maneuver. The website can give you more information http://www.[employer]/  [employer] has Let's Dish & fresh low calorie salads  Check out Hello Fresh at https://www.[employer]/food-boxes/classic-box/  Try Cooking Light recipes for low calorie meal preparation and planning  Other food plan options you can search for on the internet and check out include: Amira DASILVA, [place]  Link  Please consider health psychologist to discuss how depression and/or anxiety impact your eating.  Progressively increase physical activity to 60 minutes, including combination of cardio & resistance training x 5 days weekly.  Plan to go to the [place] [facility] to get an exercise assessment and recommendation. You can either plan to complete the entire program there or take your new skills to the gym of your choice. If you do not hear from an exercise professional from the program within a week, please call our clinic nurse at [phone].  Consider hiring a  to develop a structured progressive workout plan that includes both cardio and resistance training. Obesity is a disease according to the IRS, so you may be able to use some pre-tax dollars from your medical flexible spending account if you get a letter from your doctor and/or fill out a plan-specific form.          Follow-ups after your visit        Follow-up notes from your care team     Return in about 3 months (around 6/21/2017).      Your next 10 appointments already scheduled     Mar 21, 2017  3:00 PM CDT   MR LUMBAR SPINE W/O CONTRAST with Parker Humphries [id number]   [facility] ([facility])    [address] [address] [phone]           Take your medicines as usual, unless your doctor tells you not to. Bring a list of your current medicines to your exam (including vitamins, minerals and over-the-counter drugs). Also bring the results of similar scans you may have had.  Please remove any body piercings and hair extensions before you arrive.  Follow your doctor s orders. If you do not, we may have to postpone your exam.  You will not have contrast for this exam. You do not need to do anything special to prepare.  The MRI machine uses a strong magnet. Please wear clothes without metal (snaps, zippers). A sweatsuit works well, or we may  give you a hospital gown.   **IMPORTANT** THE INSTRUCTIONS BELOW ARE ONLY FOR THOSE PATIENTS WHO HAVE BEEN TOLD THEY WILL RECEIVE SEDATION OR GENERAL ANESTHESIA DURING THEIR MRI PROCEDURE:  IF YOU WILL RECEIVE SEDATION (take medicine to help you relax during your exam):   You must get the medicine from your doctor before you arrive. Bring the medicine to the exam. Do not take it at home.   Arrive one hour early. Bring someone who can take you home after the test. Your medicine will make you sleepy. After the exam, you may not drive, take a bus or take a taxi by yourself.   No eating 8 hours before your exam. You may have clear liquids up until 4 hours before your exam. (Clear liquids include water, clear tea, black coffee and fruit juice without pulp.)  IF YOU WILL RECEIVE ANESTHESIA (be asleep for your exam):   Arrive 1 1/2 hours early. Bring someone who can take you home after the test. You may not drive, take a bus or take a taxi by yourself.   No eating 8 hours before your exam. You may have clear liquids up until 4 hours before your exam. (Clear liquids include water, clear tea, black coffee and fruit juice without pulp.)   You will spend four to five hours in the recovery room.  Please call the Imaging Department at your exam site with any questions.            Mar 23, 2017 11:15 AM CDT   Return Visit with Tan Hodges MD, [place] [employer]   [facility] ([facility])    [address] [place] [address] [id number] [phone]              Who to contact     Please call your clinic at [phone] to:    Ask questions about your health    Make or cancel appointments    Discuss your medicines    Learn about your test results    Speak to your doctor   If you have compliments or concerns about an experience at your clinic, or if you wish to file a complaint, please contact [facility] Patient Relations at [phone]  "or email us at [email or web address]         Additional Information About Your Visit        Dotflux Information     Dotflux is an electronic gateway that provides easy, online access to your medical records. With Dotflux, you can request a clinic appointment, read your test results, renew a prescription or communicate with your care team.     To sign up for Dotflux visit the website at [email or web address]   You will be asked to enter the access code listed below, as well as some personal information. Please follow the directions to create your username and password.     Your access code is: [email or web address]  Expires: 2017  7:30 AM     Your access code will  in 90 days. If you need help or a new code, please contact your [facility] or call [phone] for assistance.        Care EveryWhere ID     This is your Care EveryWhere ID. This could be used by other organizations to access your [facility] medical records  [id number]        Your Vitals Were     Pulse Temperature Height Pulse Oximetry BMI (Body Mass Index)       73 97.4  F (36.3  C) 1.575 m (5' 2\") 98% 36.84 kg/m2        Blood Pressure from Last 3 Encounters:   17 128/66   17 134/76   03/10/17 136/83    Weight from Last 3 Encounters:   17 91.4 kg (201 lb 6.4 oz)   17 94.1 kg (207 lb 6.4 oz)   17 91.8 kg (202 lb 4.8 oz)              Today, you had the following     No orders found for display       Primary Care Provider    [facility] Other Clinic                Thank you!     Thank you for choosing [facility]  for your care. Our goal is always to provide you with excellent care. Hearing back from our patients is one way we can continue to improve our services. Please take a few minutes to complete the written survey that you may receive in the mail after your visit with us. Thank you!             Your Updated Medication List - Protect others around you: Learn " how to safely use, store and throw away your medicines at www.disposemymeds.org.          This list is accurate as of: 3/21/17  2:45 PM.  Always use your most recent med list.                   Brand Name Dispense Instructions for use    calcium carbonate 500 MG chewable tablet    TUMS    180 tablet    Take 1 tablet (500 mg) by mouth 2 times daily       CANE, ANY MATERIAL     1 Device    Use with ambulation       diclofenac 1 % Gel topical gel    VOLTAREN    100 g    Apply 4 grams to knees four times daily using enclosed dosing card.       FISH OIL + D3 PO      Take  by mouth daily.       meclizine 12.5 MG tablet    ANTIVERT    30 tablet    Take 4 tablets (50 mg) by mouth 4 times daily as needed for dizziness       melatonin 3 MG Caps     60 capsule    Take 3 mg by mouth At Bedtime       naproxen sodium 220 MG tablet    ANAPROX    60 tablet    Take 1 tablet (220 mg) by mouth 2 times daily (with meals)       omeprazole 20 MG CR capsule    priLOSEC    60 capsule    Take 1 capsule (20 mg) by mouth daily       polyethylene glycol powder    MIRALAX    500 g    Take 17 g (1 capful) by mouth daily       vitamin D 1000 UNITS capsule      Take 1 capsule by mouth daily.

## 2017-03-21 NOTE — LETTER
"3/21/2017       RE: Kian Cortez  [address]  [facility] [address]     Dear Colleague,    Thank you for referring your patient, Kian Cortez, to the [facility] MEDICAL WEIGHT MANAGEMENT at [facility]. Please see a copy of my visit note below.        New Medical Weight Management Consult    PATIENT:  Kian Cortez  MRN:         [id number]  :         1955  MASSIMO:         3/21/2017    Dear Other Clinic, Md,    I had the pleasure of seeing your patient, Kian Cortez.  Full intake/assessment done to determine barriers to weight loss success and develop a treatment plan.  Kian Cortez is a 62 year old female interested in treatment of medical problems associated with weight.  Her weight today is 201 lbs 6.4 oz, Body mass index is 36.84 kg/(m^2)., and she has the following co-morbidities:     3/21/2017   I have the following co-morbidities associated with obesity: Weight Bearing Joint Pain       Patient Goals Reviewed With Patient 3/21/2017   I am interested in attaining a healthier weight to diminish current health problems related to co-morbid conditions: Yes       Referring Provider 3/21/2017   Please name the provider who referred you to Medical Weight Management.  If you do not know, please answer: \"I Don't Know\". I don't know.       Wt Readings from Last 4 Encounters:   17 91.4 kg (201 lb 6.4 oz)   17 94.1 kg (207 lb 6.4 oz)   17 91.8 kg (202 lb 4.8 oz)   16 93.7 kg (206 lb 9.6 oz)       Weight History Reviewed With Patient 3/21/2017   How concerned are you about your weight? Very Concerned   Would you describe your weight gain as gradual? Yes   I became overweight: As an Adult   I have tried the following methods to lose weight: Watching Portions or Calories, Exercise   I have the following family history of obesity/being overweight:  My mother is overwieght, One or more of my siblings are overweight   Has anyone in your family had weight loss surgery? No "       Diet Recall Reviewed With Patient 3/21/2017   How many glasses of juice do you drink in a typical day? 0   How many of glasses of milk do you drink in a typical day? 1   How many 8oz glasses of sugar containing drinks such as Maciej-Aid/sweet tea do you drink in a day? 1   How many cans/bottles of sugar pop/soda/tea/sports drinks do you drink in a day? 0   How many cans/bottles of diet pop/soda/tea or sports drink do you drink in a day? 0   How often do you have a drink of alcohol? Never       Eating Habits Reviewed With Patient 3/21/2017   Generally, my meals include foods like these: bread, pasta, rice, potatoes, corn, crackers, sweet dessert, pop, or juice. Never   Generally, my meals include foods like these: fried meats, brats, burgers, french fries, pizza, cheese, chips, or ice cream. Never   Eat fast food (like Medopad, UberMedia, Taco Bell). Never   Eat at a buffet or sit-down restaurant. A Few Times a Week   Eat most of my meals in front of the TV or computer. Never   Often skip meals, eat at random times, have no regular eating times. A Few Times a Week   Rarely sit down for a meal but snack or graze throughout.  Never   Eat extra snacks between meals. A Few Times a Week   Eat most of my food at the end of the day. Never   Eat in the middle of the night or wake up at night to eat. Never   Eat extra snacks to prevent or correct low blood sugar. Never   Eat to prevent acid reflux or stomach pain. Never   Worry about not having enough food to eat. Never   Have you been to the food shelf at least a few times this year? No   I eat when I am depressed, stressed, anxious, or bored. Never   I eat when I am happy or as a reward. Never   I feel hungry all the time even if I just have eaten. Never   Feeling full is important to me. Never   Once I start eating, it is hard to stop. Never   I finish all the food on my plate even if I am already full. Half of the Week   I can't resist eating delicious food or  walk past the good food/smell. Never   I eat/snack without noticing that I am eating. Never   I eat when I am preparing the meal. A Few Times a Week   I eat more than usual when I see others eating. Never   I have trouble not eating sweets, ice cream, cookies, or chips if they are around the house. Never   I think about food all day. Never   Please list any other foods you crave? none   I feel out of control when eating. Never   I eat a large amount of food, like a loaf of bread, a box of cookies, a pint/quart of ice cream, all at once. Never   I eat a large amount of food even when I am not hungry. Never   I eat rapidly. Never   I eat alone because I feel embarrassed and do not want others to see how much I have eaten. Never   I eat until I am uncomfortably full. Never   I feel bad, disgusted, or guilty after I overeat. Never   I make myself vomit what I have eaten or use laxatives to get rid of food. Never       Activity/Exercise History Reviewed With Patient 3/21/2017   How much of a typical 12 hour day do you spend sitting? Half the Day   How much of a typical 12 hour day do you spend lying down? Less Than Half the Day   How much of a typical day do you spend walking/standing? Half the Day   How many hours (not including work) do you spend on the TV/Video Games/Computer/Tablet/Phone? 1 Hour or Less   How many times a week are you active for the purpose of exercise? 2-3 Times a Week   How many total minutes do you spend doing some activity for the purpose of exercising when you exercise? 15-30 Minutes   What keeps you from being more active? Pain, Too tired       ROS    PAST MEDICAL HISTORY:  Past Medical History   Diagnosis Date     Osteoarthritis of right knee      Osteopenia 4/27/2015       Work/Social History Reviewed With Patient 3/21/2017   My employment status is: Unemployed   What is your marital status? /In a Relationship   If in a relationship, is your significant other overweight? No   Do you  have children? Yes   If you have children, are they overweight? No       Mental Health History Reviewed With Patient 3/21/2017   Have you ever been physically or sexually abused? No   How often in the past 2 weeks have you felt little interest or pleasure in doing things? Not at all   Over the past 2 weeks how often have you felt down, depressed, or hopeless? Not at all       Sleep History Reviewed With Patient 3/21/2017   How many hours do you sleep at night? 6   Do you think that you snore loudly or has anybody ever heard you snore loudly (louder than talking or so loud it can be heard behind a shut door)? No   Has anyone seen or heard you stop breathing during your sleep? No   Do you often feel tired, fatigued, or sleepy during the day? No       MEDICATIONS:   Current Outpatient Prescriptions   Medication Sig Dispense Refill     naproxen sodium (ANAPROX) 220 MG tablet Take 1 tablet (220 mg) by mouth 2 times daily (with meals) 60 tablet 1     melatonin 3 MG CAPS Take 3 mg by mouth At Bedtime 60 capsule 1     polyethylene glycol (MIRALAX) powder Take 17 g (1 capful) by mouth daily 500 g 1     meclizine (ANTIVERT) 12.5 MG tablet Take 4 tablets (50 mg) by mouth 4 times daily as needed for dizziness 30 tablet 0     calcium carbonate (TUMS) 500 MG chewable tablet Take 1 tablet (500 mg) by mouth 2 times daily 180 tablet 0     omeprazole (PRILOSEC) 20 MG CR capsule Take 1 capsule (20 mg) by mouth daily 60 capsule 1     diclofenac (VOLTAREN) 1 % GEL topical gel Apply 4 grams to knees four times daily using enclosed dosing card. 100 g 1     CANE, ANY MATERIAL Use with ambulation 1 Device 0     Cholecalciferol (VITAMIN D) 1000 UNITS capsule Take 1 capsule by mouth daily.       Fish Oil-Cholecalciferol (FISH OIL + D3 PO) Take  by mouth daily.         ALLERGIES:   No Known Allergies    PHYSICAL EXAM:  /66 (BP Location: Left arm, Patient Position: Chair, Cuff Size: Adult Large)  Pulse 73  Temp 97.4  F (36.3  C)  Ht 1.575  "m (5' 2\")  Wt 91.4 kg (201 lb 6.4 oz)  SpO2 98%  BMI 36.84 kg/m2   A & O x 3  HEENT: NCAT, mucous membranes moist  Respirations unlabored  Location of obesity: Mixed Obesity    ASSESSMENT:  Kian is a patient with mature onset obesity with significant element of familial/genetic influence and with current health consequences. She does need aggressive weight loss plan due to BMI>35 and co-morbid condition of pain in weight bearing joints.  Kian Cortez has a disorganized meal pattern.    Her problem is complicated by poor lifestyle choices    Her ability to lose weight is impacted by lack of education on nutrition and dietary needs.    PLAN:    Programmatic/Healthy Living  Ancillary testing:  N/A.  Food Plan:  Volumetrics.  Activity Plan:  Exercise after meals.  Supplementary:   N/A.    Medication:  None.    1,000 calorie meal plan  Use meal replacements such as Selina's meals, Lean Cuisines, Healthy Choice, Smart Ones, Weight Watchers Meals, and Slim Fast and Glucerna shakes and supplement with fresh fruits and vegetables  Please drink a lot of water daily. Most people typically need about 2 liters of water daily and more if they are exercising, have a large weight, or have a fever or illness. Add Crystal Light for flavoring if desired. But no pop with calories in it.  Please keep a food journal of what you eat, calories in what you eat, and mood and bring the journal with you to your next appointment.  Consider using applications for smart phones such as Innovaci, Juesheng.com, Blackberry, LoseIt, Tap&Track, and RelaxM.  Focus on wet volumetrics, meaning, eat more foods that are high in water and fiber such as fruits and vegetables in order to get that full feeling. These are also good for your overall health as well.  Check out Dr. Netta Dawson from [facility] - she has cookbooks with low calorie volumetric recipes  You can try Let's Dish to help you prepare meals for you and your family. Often times, the caloric " and nutrition data and serving sizes are available for this food. This can be a time saving maneuver. The website can give you more information http://www.Unique [employer].Pryv/  [employer] has Let's Dish & fresh low calorie salads  Check out Hello Fresh at https://www.Cinegif/food-boxes/classic-box/  Try Cooking Light recipes for low calorie meal preparation and planning  Other food plan options you can search for on the internet and check out include: Amira DASILVA, [facility]  Please consider health psychologist to discuss how depression and/or anxiety impact your eating.  Progressively increase physical activity to 60 minutes, including combination of cardio & resistance training x 5 days weekly.  Plan to go to the [facility] to get an exercise assessment and recommendation. You can either plan to complete the entire program there or take your new skills to the gym of your choice. If you do not hear from an exercise professional from the program within a week, please call our clinic nurse at [phone].  Consider hiring a  to develop a structured progressive workout plan that includes both cardio and resistance training. Obesity is a disease according to the IRS, so you may be able to use some pre-tax dollars from your medical flexible spending account if you get a letter from your doctor and/or fill out a plan-specific form.    RTC:    3 months    TIME: 30 min spent on evaluation, management, counseling, education, & motivational interviewing with greater than 50 % of the total time was spent on counseling and coordinating care    Sincerely,    Lora Land MD

## 2017-03-21 NOTE — NURSING NOTE
"Chief Complaint   Patient presents with     Consult     new consultation       Vitals:    03/21/17 1417   BP: 128/66   BP Location: Left arm   Patient Position: Chair   Cuff Size: Adult Large   Pulse: 73   Temp: 97.4  F (36.3  C)   SpO2: 98%   Weight: 91.4 kg (201 lb 6.4 oz)   Height: 1.575 m (5' 2\")       Body mass index is 36.84 kg/(m^2).    Yoly Katz"

## 2017-03-21 NOTE — PROGRESS NOTES
"    New Medical Weight Management Consult    PATIENT:  Kian Cortez  MRN:         [id number]  :         1955  MASSIMO:         3/21/2017    Dear Other Clinic, Md,    I had the pleasure of seeing your patient, Kian Cortez.  Full intake/assessment done to determine barriers to weight loss success and develop a treatment plan.  Kian Cortez is a 62 year old female interested in treatment of medical problems associated with weight.  Her weight today is 201 lbs 6.4 oz, Body mass index is 36.84 kg/(m^2)., and she has the following co-morbidities:     3/21/2017   I have the following co-morbidities associated with obesity: Weight Bearing Joint Pain       Patient Goals Reviewed With Patient 3/21/2017   I am interested in attaining a healthier weight to diminish current health problems related to co-morbid conditions: Yes       Referring Provider 3/21/2017   Please name the provider who referred you to Medical Weight Management.  If you do not know, please answer: \"I Don't Know\". I don't know.       Wt Readings from Last 4 Encounters:   17 91.4 kg (201 lb 6.4 oz)   17 94.1 kg (207 lb 6.4 oz)   17 91.8 kg (202 lb 4.8 oz)   16 93.7 kg (206 lb 9.6 oz)       Weight History Reviewed With Patient 3/21/2017   How concerned are you about your weight? Very Concerned   Would you describe your weight gain as gradual? Yes   I became overweight: As an Adult   I have tried the following methods to lose weight: Watching Portions or Calories, Exercise   I have the following family history of obesity/being overweight:  My mother is overwieght, One or more of my siblings are overweight   Has anyone in your family had weight loss surgery? No       Diet Recall Reviewed With Patient 3/21/2017   How many glasses of juice do you drink in a typical day? 0   How many of glasses of milk do you drink in a typical day? 1   How many 8oz glasses of sugar containing drinks such as Maciej-Aid/sweet tea do you drink in a day? 1 "   How many cans/bottles of sugar pop/soda/tea/sports drinks do you drink in a day? 0   How many cans/bottles of diet pop/soda/tea or sports drink do you drink in a day? 0   How often do you have a drink of alcohol? Never       Eating Habits Reviewed With Patient 3/21/2017   Generally, my meals include foods like these: bread, pasta, rice, potatoes, corn, crackers, sweet dessert, pop, or juice. Never   Generally, my meals include foods like these: fried meats, brats, burgers, french fries, pizza, cheese, chips, or ice cream. Never   Eat fast food (like Souktel, Zoodak, Taco Bell). Never   Eat at a buffet or sit-down restaurant. A Few Times a Week   Eat most of my meals in front of the TV or computer. Never   Often skip meals, eat at random times, have no regular eating times. A Few Times a Week   Rarely sit down for a meal but snack or graze throughout.  Never   Eat extra snacks between meals. A Few Times a Week   Eat most of my food at the end of the day. Never   Eat in the middle of the night or wake up at night to eat. Never   Eat extra snacks to prevent or correct low blood sugar. Never   Eat to prevent acid reflux or stomach pain. Never   Worry about not having enough food to eat. Never   Have you been to the food shelf at least a few times this year? No   I eat when I am depressed, stressed, anxious, or bored. Never   I eat when I am happy or as a reward. Never   I feel hungry all the time even if I just have eaten. Never   Feeling full is important to me. Never   Once I start eating, it is hard to stop. Never   I finish all the food on my plate even if I am already full. Half of the Week   I can't resist eating delicious food or walk past the good food/smell. Never   I eat/snack without noticing that I am eating. Never   I eat when I am preparing the meal. A Few Times a Week   I eat more than usual when I see others eating. Never   I have trouble not eating sweets, ice cream, cookies, or chips if they  are around the house. Never   I think about food all day. Never   Please list any other foods you crave? none   I feel out of control when eating. Never   I eat a large amount of food, like a loaf of bread, a box of cookies, a pint/quart of ice cream, all at once. Never   I eat a large amount of food even when I am not hungry. Never   I eat rapidly. Never   I eat alone because I feel embarrassed and do not want others to see how much I have eaten. Never   I eat until I am uncomfortably full. Never   I feel bad, disgusted, or guilty after I overeat. Never   I make myself vomit what I have eaten or use laxatives to get rid of food. Never       Activity/Exercise History Reviewed With Patient 3/21/2017   How much of a typical 12 hour day do you spend sitting? Half the Day   How much of a typical 12 hour day do you spend lying down? Less Than Half the Day   How much of a typical day do you spend walking/standing? Half the Day   How many hours (not including work) do you spend on the TV/Video Games/Computer/Tablet/Phone? 1 Hour or Less   How many times a week are you active for the purpose of exercise? 2-3 Times a Week   How many total minutes do you spend doing some activity for the purpose of exercising when you exercise? 15-30 Minutes   What keeps you from being more active? Pain, Too tired       ROS    PAST MEDICAL HISTORY:  Past Medical History   Diagnosis Date     Osteoarthritis of right knee      Osteopenia 4/27/2015       Work/Social History Reviewed With Patient 3/21/2017   My employment status is: Unemployed   What is your marital status? /In a Relationship   If in a relationship, is your significant other overweight? No   Do you have children? Yes   If you have children, are they overweight? No       Mental Health History Reviewed With Patient 3/21/2017   Have you ever been physically or sexually abused? No   How often in the past 2 weeks have you felt little interest or pleasure in doing things? Not at  "all   Over the past 2 weeks how often have you felt down, depressed, or hopeless? Not at all       Sleep History Reviewed With Patient 3/21/2017   How many hours do you sleep at night? 6   Do you think that you snore loudly or has anybody ever heard you snore loudly (louder than talking or so loud it can be heard behind a shut door)? No   Has anyone seen or heard you stop breathing during your sleep? No   Do you often feel tired, fatigued, or sleepy during the day? No       MEDICATIONS:   Current Outpatient Prescriptions   Medication Sig Dispense Refill     naproxen sodium (ANAPROX) 220 MG tablet Take 1 tablet (220 mg) by mouth 2 times daily (with meals) 60 tablet 1     melatonin 3 MG CAPS Take 3 mg by mouth At Bedtime 60 capsule 1     polyethylene glycol (MIRALAX) powder Take 17 g (1 capful) by mouth daily 500 g 1     meclizine (ANTIVERT) 12.5 MG tablet Take 4 tablets (50 mg) by mouth 4 times daily as needed for dizziness 30 tablet 0     calcium carbonate (TUMS) 500 MG chewable tablet Take 1 tablet (500 mg) by mouth 2 times daily 180 tablet 0     omeprazole (PRILOSEC) 20 MG CR capsule Take 1 capsule (20 mg) by mouth daily 60 capsule 1     diclofenac (VOLTAREN) 1 % GEL topical gel Apply 4 grams to knees four times daily using enclosed dosing card. 100 g 1     CANE, ANY MATERIAL Use with ambulation 1 Device 0     Cholecalciferol (VITAMIN D) 1000 UNITS capsule Take 1 capsule by mouth daily.       Fish Oil-Cholecalciferol (FISH OIL + D3 PO) Take  by mouth daily.         ALLERGIES:   No Known Allergies    PHYSICAL EXAM:  /66 (BP Location: Left arm, Patient Position: Chair, Cuff Size: Adult Large)  Pulse 73  Temp 97.4  F (36.3  C)  Ht 1.575 m (5' 2\")  Wt 91.4 kg (201 lb 6.4 oz)  SpO2 98%  BMI 36.84 kg/m2   A & O x 3  HEENT: NCAT, mucous membranes moist  Respirations unlabored  Location of obesity: Mixed Obesity    ASSESSMENT:  Kian is a patient with mature onset obesity with significant element of familial/genetic " influence and with current health consequences. She does need aggressive weight loss plan due to BMI>35 and co-morbid condition of pain in weight bearing joints.  Kian Cortez has a disorganized meal pattern.    Her problem is complicated by poor lifestyle choices    Her ability to lose weight is impacted by lack of education on nutrition and dietary needs.    PLAN:    Programmatic/Healthy Living  Ancillary testing:  N/A.  Food Plan:  Volumetrics.  Activity Plan:  Exercise after meals.  Supplementary:   N/A.    Medication:  None.    1,000 calorie meal plan  Use meal replacements such as Selina's meals, Lean Cuisines, Healthy Choice, Smart Ones, Weight Watchers Meals, and Slim Fast and Glucerna shakes and supplement with fresh fruits and vegetables  Please drink a lot of water daily. Most people typically need about 2 liters of water daily and more if they are exercising, have a large weight, or have a fever or illness. Add Crystal Light for flavoring if desired. But no pop with calories in it.  Please keep a food journal of what you eat, calories in what you eat, and mood and bring the journal with you to your next appointment.  Consider using applications for smart phones such as A[employer] System Integration, FiveStars, Mercury Continuity, LoseIt, Tap&Track, and RelaxM.  Focus on wet volumetrics, meaning, eat more foods that are high in water and fiber such as fruits and vegetables in order to get that full feeling. These are also good for your overall health as well.  Check out Dr. Netta Dawson from [facility] - she has cookbooks with low calorie volumetric recipes  You can try Let's Dish to help you prepare meals for you and your family. Often times, the caloric and nutrition data and serving sizes are available for this food. This can be a time saving maneuver. The website can give you more information http://www.[employer].All4Staff/  [employer] has Let's Dish & fresh low calorie salads  Check out Hello Fresh at  https://www.[employer] Butler/food-boxes/classic-box/  Try Cooking Light recipes for low calorie meal preparation and planning  Other food plan options you can search for on the internet and check out include: Amira DASILVA, [facility]  Please consider health psychologist to discuss how depression and/or anxiety impact your eating.  Progressively increase physical activity to 60 minutes, including combination of cardio & resistance training x 5 days weekly.  Plan to go to the [facility] to get an exercise assessment and recommendation. You can either plan to complete the entire program there or take your new skills to the gym of your choice. If you do not hear from an exercise professional from the program within a week, please call our clinic nurse at [phone].  Consider hiring a  to develop a structured progressive workout plan that includes both cardio and resistance training. Obesity is a disease according to the IRS, so you may be able to use some pre-tax dollars from your medical flexible spending account if you get a letter from your doctor and/or fill out a plan-specific form.    RTC:    3 months    TIME: 30 min spent on evaluation, management, counseling, education, & motivational interviewing with greater than 50 % of the total time was spent on counseling and coordinating care    Sincerely,    Lora Land MD

## 2017-03-21 NOTE — PATIENT INSTRUCTIONS
1,000 calorie meal plan  Use meal replacements such as Selina's meals, Lean Cuisines, Healthy Choice, Smart Ones, Weight Watchers Meals, and Slim Fast and Glucerna shakes and supplement with fresh fruits and vegetables  Please drink a lot of water daily. Most people typically need about 2 liters of water daily and more if they are exercising, have a large weight, or have a fever or illness. Add Crystal Light for flavoring if desired. But no pop with calories in it.  Please keep a food journal of what you eat, calories in what you eat, and mood and bring the journal with you to your next appointment.  Consider using applications for smart phones such as RelayFoods, Worlize, LimboReciPreo, LoseIt, Tap&Track, and RelaxM.  Focus on wet volumetrics, meaning, eat more foods that are high in water and fiber such as fruits and vegetables in order to get that full feeling. These are also good for your overall health as well.  Check out Dr. Netta Dawson from [facility] - she has cookbooks with low calorie volumetric recipes  You can try Let's Dish to help you prepare meals for you and your family. Often times, the caloric and nutrition data and serving sizes are available for this food. This can be a time saving maneuver. The website can give you more information http://www.[employer]/  Cob[employer] Delivers has Let's Dish & fresh low calorie salads  Check out Hello Fresh at https://www.STEARCLEAR.Moglue/food-boxes/classic-box/  Try Cooking Light recipes for low calorie meal preparation and planning  Other food plan options you can search for on the internet and check out include: Amira DASILVA, [place]  Please consider health psychologist to discuss how depression and/or anxiety impact your eating.  Progressively increase physical activity to 60 minutes, including combination of cardio & resistance training x 5 days weekly.  Plan to go to the [place] [facility] to get an exercise assessment and recommendation. You can  either plan to complete the entire program there or take your new skills to the gym of your choice. If you do not hear from an exercise professional from the program within a week, please call our clinic nurse at [phone].  Consider hiring a  to develop a structured progressive workout plan that includes both cardio and resistance training. Obesity is a disease according to the IRS, so you may be able to use some pre-tax dollars from your medical flexible spending account if you get a letter from your doctor and/or fill out a plan-specific form.

## 2017-03-23 ENCOUNTER — OFFICE VISIT (OUTPATIENT)
Dept: ORTHOPEDICS | Facility: CLINIC | Age: 62
End: 2017-03-23

## 2017-03-23 VITALS — HEIGHT: 62 IN | BODY MASS INDEX: 36.99 KG/M2 | WEIGHT: 201 LBS

## 2017-03-23 DIAGNOSIS — G57.12 MERALGIA PARESTHETICA OF LEFT SIDE: Primary | ICD-10-CM

## 2017-03-23 DIAGNOSIS — R93.89 THICKENED ENDOMETRIUM: ICD-10-CM

## 2017-03-23 RX ORDER — GABAPENTIN 100 MG/1
CAPSULE ORAL
Qty: 90 CAPSULE | Refills: 1 | Status: SHIPPED | OUTPATIENT
Start: 2017-03-23 | End: 2017-05-23 | Stop reason: DRUGHIGH

## 2017-03-23 NOTE — PROGRESS NOTES
March 23, 2017: Kian Cortez is a 62 year old female who is seen in f/ up for her lumbar MRI that was performed on 3/21/17. She still reports discomfort and numbness and tingling into the left leg.         PMH:  Past Medical History:   Diagnosis Date     Osteoarthritis of right knee      Osteopenia 4/27/2015       Active problem list:  Patient Active Problem List   Diagnosis     Advanced directives, counseling/discussion     CARDIOVASCULAR SCREENING; LDL GOAL LESS THAN 130     Pseudoexfoliation syndrome, right eye     GERD (gastroesophageal reflux disease)     Osteopenia     Constipation, unspecified constipation type     Morbid obesity, unspecified obesity type (H)     Vertigo     BPPV (benign paroxysmal positional vertigo), unspecified laterality       FH:  Family History   Problem Relation Age of Onset     Other Cancer Brother      CANCER Brother      Glaucoma No family hx of      Macular Degeneration No family hx of      DIABETES No family hx of      Hypertension No family hx of      CEREBROVASCULAR DISEASE No family hx of      Thyroid Disease No family hx of        SH:  Social History     Social History     Marital status:      Spouse name: N/A     Number of children: N/A     Years of education: N/A     Occupational History     Not on file.     Social History Main Topics     Smoking status: Never Smoker     Smokeless tobacco: Never Used     Alcohol use No     Drug use: No     Sexual activity: Yes     Partners: Male     Other Topics Concern     Parent/Sibling W/ Cabg, Mi Or Angioplasty Before 65f 55m? No     Social History Narrative    Originally from [place].       MEDS:  See EMR, reviewed  ALL:  See EMR, reviewed    REVIEW OF SYSTEMS:  CONSTITUTIONAL:NEGATIVE for fever, chills, change in weight  INTEGUMENTARY/SKIN: NEGATIVE for worrisome rashes, moles or lesions  EYES: NEGATIVE for vision changes or irritation  ENT/MOUTH: NEGATIVE for ear, mouth and throat problems  RESP:NEGATIVE for significant cough  or SOB  BREAST: NEGATIVE for masses, tenderness or discharge  CV: NEGATIVE for chest pain, palpitations or peripheral edema  GI: NEGATIVE for nausea, abdominal pain, heartburn, or change in bowel habits  :NEGATIVE for frequency, dysuria, or hematuria  :NEGATIVE for frequency, dysuria, or hematuria  NEURO: NEGATIVE for weakness, dizziness or paresthesias  ENDOCRINE: NEGATIVE for temperature intolerance, skin/hair changes  HEME/ALLERGY/IMMUNE: NEGATIVE for bleeding problems  PSYCHIATRIC: NEGATIVE for changes in mood or affect              SUBJECTIVE:  This 62-year-old Vincentian female is seen in followup for her MRI results.  The MRI showed no signs of significant degenerative joint disease in the lumbar spine and no specific left-sided disk impingement.  There were no signs of any significant foraminal or central stenosis.  She has also had a normal x-ray of the hips.  She is able to be more specific about her pain today.  She draws an area on the lateral side of her left hip and the anterior thigh in the distribution of meralgia paresthetica.  She talks about that the pain has in fact been present for 5 years or longer and it is the thing that caused her to discontinue a job that she was employed in about 4 years ago.  That job was sitting all the time.  She will notice numbness and tingling and burning sensation in this distribution as well as discomfort.  It does not go below the knee.  She denies it directly at the buttock today.  We looked online at pictures of the distribution of meralgia paresthetica and she confirms through the  that this is her area of distribution of pain.      OBJECTIVE:  She has normal range of motion at the hip.  Strength is intact at the hip, knee, ankle and foot to flexion and extension.  Distal pulses are intact.  Sensation is intact.  She is nontender over the sacroiliac joints.      ASSESSMENT:  Meralgia paresthetica.      PLAN:  We talked about options.  She  understands that conservative options including avoid constricting garments especially undergarments that crease across the inguinal area and avoid excessive long periods of sitting.  She indicates that out of a normal day she might spend 4 hours sitting.  She does so often working on knitting.  We talked about doing this on a stool or breaking up her sessions into smaller sessions where she is not sitting for extended periods of time.  We talked about doing more physical activity like walking or being in a pool and she declined these options.  If necessary, she knows that an injection could be done by the radiologist at the pelvic brim for the ilioinguinal nerve branch to see if it would relieve her symptoms at least for a short period of time.  Sometimes Neurontin can be helpful.  We talked about this medicine and possible side effects including intolerance due to sleepiness or cognitive intolerance.  Of these options, she wanted to try avoiding restrictive apartments, changing her seated positions, doing more walking and trying the Neurontin in a graduated dose.  We will start with a low dose of 100 mg each day at bedtime and increase to 300 mg 3 times a day.  Potentially higher doses could be considered but first she needs to see if she tolerates it.  She will notify me if she wants to proceed with an injection.  Finally, the MRI suggested thickening of her endometrium and possible adenomyosis.  I discussed this with her through the .  She did have a normal Pap smear and pelvic exam within the last year and a CT scan of the abdomen and pelvis in 2015 showed a normal-appearing womb.  She does not have bleeding currently.  I asked her to go back to her primary provider, Karma Burger, who just did her pelvic exam within the last year and explain that the next step would be confirming that this is not a worrisome finding by doing an ultrasound of the womb which would be more specific.  The patient  agrees to do so.

## 2017-03-23 NOTE — Clinical Note
Karma, this patient agrees to come back to see you regarding the endometrial thickening and possible adenomyosis seen on her lumbar spine MRI imaging.  She knows she may need an ultrasound study.    Thanks  Dr. Hodges

## 2017-03-23 NOTE — MR AVS SNAPSHOT
After Visit Summary   3/23/2017    Kian Cortez    MRN: [id number]           Patient Information     Date Of Birth          1955        Visit Information        Provider Department      3/23/2017 11:15 AM Tan Hodges MD; [place] [employer] [facility]        Today's Diagnoses     Meralgia paresthetica of left side    -  1    Thickened endometrium           Follow-ups after your visit        Your next 10 appointments already scheduled     Mar 31, 2017  1:00 PM CDT   US PELVIC COMPLETE W TRANSVAGINAL with FKUS1   [facility]    [address] [place] [id number]   [phone]           Please bring a list of your medicines (including vitamins, minerals and over-the-counter drugs). Also, tell your doctor about any allergies you may have. Wear comfortable clothes and leave your valuables at home.  Adults: Drink six 8-ounce glasses of fluid one hour before your exam. Do NOT empty your bladder.  If you need to empty your bladder before your exam, try to release only a little bit of urine. Then, drink another 8oz glass of fluid.  Children: Children who are potty trained should drink at least 4 cups (32 oz) of liquid 45 minutes to one hour prior to the exam. The child s bladder must be full in order to achieve a diagnostic exam. If your child is very uncomfortable or has an urgent need to pee, please notify a technologist; they will try to find out how much longer the wait may be and provide instructions to help relieve the pressure. Occasionally it is medically necessary to insert a urinary catheter to fill the bladder.  Please call the Imaging Department at your exam site with any questions.            Mar 31, 2017  1:45 PM CDT   MA SCREENING DIGITAL BILATERAL with FKMA1   [facility]    [address] [place] [id number] [phone]           Do not use any  powder, lotion or deodorant under your arms or on your breast. If you do, we will ask you to remove it before your exam.  Wear comfortable, two-piece clothing.  If you have any allergies, tell your care team.  Bring any previous mammograms from other facilities or have them mailed to the breast center.            May 18, 2017  2:15 PM CDT   (Arrive by 2:00 PM)   Return Weight Management Visit with Deanna Hess PA-C   [facility] ([facility])    [address] [place] [id number] [phone]            May 22, 2017 11:20 AM CDT   (Arrive by 11:05 AM)   Return Visit with Lora aLnd MD   [facility])    [address] [place] [id number] [phone]              Who to contact     Please call your clinic at [phone] to:    Ask questions about your health    Make or cancel appointments    Discuss your medicines    Learn about your test results    Speak to your doctor   If you have compliments or concerns about an experience at your clinic, or if you wish to file a complaint, please contact [facility] Patient Relations at [phone] or email us at [email or web address]         Additional Information About Your Visit        MyChart Information     60mot is an electronic gateway that provides easy, online access to your medical records. With Boond, you can request a clinic appointment, read your test results, renew a prescription or communicate with your care team.     To sign up for 60mot visit the website at [email or web address]   You will be asked to enter the access code listed below, as well as some personal information. Please follow the directions to create your username and password.     Your access code is: [email or web address]  Expires: 2017  7:30 AM     Your access code will   "in 90 days. If you need help or a new code, please contact your [facility] or call [phone] for assistance.        Care EveryWhere ID     This is your Care EveryWhere ID. This could be used by other organizations to access your [place] medical records  [id number]        Your Vitals Were     Height BMI (Body Mass Index)                5' 2\" (1.575 m) 36.76 kg/m2           Blood Pressure from Last 3 Encounters:   03/24/17 114/75   03/21/17 128/66   03/13/17 134/76    Weight from Last 3 Encounters:   03/24/17 199 lb (90.3 kg)   03/23/17 201 lb (91.2 kg)   03/21/17 201 lb 6.4 oz (91.4 kg)              Today, you had the following     No orders found for display         Today's Medication Changes          These changes are accurate as of: 3/23/17 11:59 PM.  If you have any questions, ask your nurse or doctor.               Start taking these medicines.        Dose/Directions    gabapentin 100 MG capsule   Commonly known as:  NEURONTIN   Used for:  Meralgia paresthetica of left side        Start at low dose (100 mg one pill before bed) and slowly increase to dose of 300 mg three times daily over the course of 2-3 weeks:   day 1-2: 100 mg nightly; day 3-4: 100 mg twice daily; day 5-7: 100 mg three times daily; day 8 onwards: 200 mg three times daily and the following week 300 mg three times daily, as tolerated   Quantity:  90 capsule   Refills:  1            Where to get your medicines      Some of these will need a paper prescription and others can be bought over the counter.  Ask your nurse if you have questions.     Bring a paper prescription for each of these medications     gabapentin 100 MG capsule                Primary Care Provider    [facility] Other Clinic                Thank you!     Thank you for choosing [facility]  for your care. Our goal is always to provide you with excellent care. Hearing back from our patients is one way we can continue to improve our " services. Please take a few minutes to complete the written survey that you may receive in the mail after your visit with us. Thank you!             Your Updated Medication List - Protect others around you: Learn how to safely use, store and throw away your medicines at www.disposemymeds.org.          This list is accurate as of: 3/23/17 11:59 PM.  Always use your most recent med list.                   Brand Name Dispense Instructions for use    calcium carbonate 500 MG chewable tablet    TUMS    180 tablet    Take 1 tablet (500 mg) by mouth 2 times daily       CANE, ANY MATERIAL     1 Device    Use with ambulation       FISH OIL + D3 PO      Take  by mouth daily.       gabapentin 100 MG capsule    NEURONTIN    90 capsule    Start at low dose (100 mg one pill before bed) and slowly increase to dose of 300 mg three times daily over the course of 2-3 weeks:   day 1-2: 100 mg nightly; day 3-4: 100 mg twice daily; day 5-7: 100 mg three times daily; day 8 onwards: 200 mg three times daily and the following week 300 mg three times daily, as tolerated       naproxen sodium 220 MG tablet    ANAPROX    60 tablet    Take 1 tablet (220 mg) by mouth 2 times daily (with meals)       omeprazole 20 MG CR capsule    priLOSEC    60 capsule    Take 1 capsule (20 mg) by mouth daily       polyethylene glycol powder    MIRALAX    500 g    Take 17 g (1 capful) by mouth daily       vitamin D 1000 UNITS capsule      Take 1 capsule by mouth daily.

## 2017-03-24 ENCOUNTER — OFFICE VISIT (OUTPATIENT)
Dept: FAMILY MEDICINE | Facility: CLINIC | Age: 62
End: 2017-03-24
Payer: COMMERCIAL

## 2017-03-24 VITALS
DIASTOLIC BLOOD PRESSURE: 75 MMHG | TEMPERATURE: 97.7 F | WEIGHT: 199 LBS | HEIGHT: 60 IN | BODY MASS INDEX: 39.07 KG/M2 | HEART RATE: 70 BPM | SYSTOLIC BLOOD PRESSURE: 114 MMHG | OXYGEN SATURATION: 98 %

## 2017-03-24 DIAGNOSIS — Z12.31 VISIT FOR SCREENING MAMMOGRAM: ICD-10-CM

## 2017-03-24 DIAGNOSIS — R93.89 THICKENED ENDOMETRIUM: Primary | ICD-10-CM

## 2017-03-24 PROCEDURE — 99213 OFFICE O/P EST LOW 20 MIN: CPT | Performed by: PHYSICIAN ASSISTANT

## 2017-03-24 NOTE — NURSING NOTE
"No chief complaint on file.      Initial /75 (BP Location: Right arm, Patient Position: Chair, Cuff Size: Adult Large)  Pulse 70  Temp 97.7  F (36.5  C) (Oral)  Ht 4' 11.92\" (1.522 m)  Wt 199 lb (90.3 kg)  SpO2 98%  Breastfeeding? No  BMI 38.97 kg/m2 Estimated body mass index is 38.97 kg/(m^2) as calculated from the following:    Height as of this encounter: 4' 11.92\" (1.522 m).    Weight as of this encounter: 199 lb (90.3 kg).  Medication Reconciliation: complete   Cassy Da Silva CMA      "

## 2017-03-24 NOTE — MR AVS SNAPSHOT
After Visit Summary   3/24/2017    Kian Cortez    MRN: [id number]           Patient Information     Date Of Birth          1955        Visit Information        Provider Department      3/24/2017 1:20 PM Karma Burger PA-C; [place] [employer] [facility]        Today's Diagnoses     Thickened endometrium    -  1    Visit for screening mammogram           Follow-ups after your visit        Your next 10 appointments already scheduled     Mar 31, 2017  1:00 PM CDT   US PELVIC COMPLETE W TRANSVAGINAL with FKUS[facility]    [address] [place] [phone]   [phone]           Please bring a list of your medicines (including vitamins, minerals and over-the-counter drugs). Also, tell your doctor about any allergies you may have. Wear comfortable clothes and leave your valuables at home.  Adults: Drink six 8-ounce glasses of fluid one hour before your exam. Do NOT empty your bladder.  If you need to empty your bladder before your exam, try to release only a little bit of urine. Then, drink another 8oz glass of fluid.  Children: Children who are potty trained should drink at least 4 cups (32 oz) of liquid 45 minutes to one hour prior to the exam. The child s bladder must be full in order to achieve a diagnostic exam. If your child is very uncomfortable or has an urgent need to pee, please notify a technologist; they will try to find out how much longer the wait may be and provide instructions to help relieve the pressure. Occasionally it is medically necessary to insert a urinary catheter to fill the bladder.  Please call the Imaging Department at your exam site with any questions.            Mar 31, 2017  1:45 PM CDT   MA SCREENING DIGITAL BILATERAL with FKMA1   [facility]    [address] [place] [id number] [phone]           Do not  use any powder, lotion or deodorant under your arms or on your breast. If you do, we will ask you to remove it before your exam.  Wear comfortable, two-piece clothing.  If you have any allergies, tell your care team.  Bring any previous mammograms from other facilities or have them mailed to the breast center.            May 18, 2017  2:15 PM CDT   (Arrive by 2:00 PM)   Return Weight Management Visit with Deanna Hess PA-C   [facility] ([facility])    [address] [place] [phone]   [phone]            May 22, 2017 11:20 AM CDT   (Arrive by 11:05 AM)   Return Visit with Lora Land MD   [facility])    [address] [place] [phone]   [phone]              Future tests that were ordered for you today     Open Future Orders        Priority Expected Expires Ordered    US Pelvic Complete with Transvaginal Routine 6/22/2017 9/20/2017 3/24/2017    MA Screening Digital Bilateral Routine  3/24/2018 3/24/2017            Who to contact     If you have questions or need follow up information about today's clinic visit or your schedule please contact [facility] directly at [phone].  Normal or non-critical lab and imaging results will be communicated to you by [employer] SEOhart, letter or phone within 4 business days after the clinic has received the results. If you do not hear from us within 7 days, please contact the clinic through [employer] or phone. If you have a critical or abnormal lab result, we will notify you by phone as soon as possible.  Submit refill requests through [employer] or call your pharmacy and they will forward the refill request to us. Please allow 3 business days for your refill to be completed.          Additional Information About Your Visit        [employer] Information     [employer] lets you send  "messages to your doctor, view your test results, renew your prescriptions, schedule appointments and more. To sign up, go to [email or web address] . Click on \"Log in\" on the left side of the screen, which will take you to the Welcome page. Then click on \"Sign up Now\" on the right side of the page.     You will be asked to enter the access code listed below, as well as some personal information. Please follow the directions to create your username and password.     Your access code is: [email or web address]  Expires: 2017  7:30 AM     Your access code will  in 90 days. If you need help or a new code, please call your [place] clinic or [phone].        Care EveryWhere ID     This is your Care EveryWhere ID. This could be used by other organizations to access your [place] medical records  [id number]        Your Vitals Were     Pulse Temperature Height Pulse Oximetry Breastfeeding? BMI (Body Mass Index)    70 97.7  F (36.5  C) (Oral) 4' 11.92\" (1.522 m) 98% No 38.97 kg/m2       Blood Pressure from Last 3 Encounters:   17 114/75   17 128/66   17 134/76    Weight from Last 3 Encounters:   17 199 lb (90.3 kg)   17 201 lb (91.2 kg)   17 201 lb 6.4 oz (91.4 kg)               Primary Care Provider    M[facility] Other Clinic                Thank you!     Thank you for choosing [facility]  for your care. Our goal is always to provide you with excellent care. Hearing back from our patients is one way we can continue to improve our services. Please take a few minutes to complete the written survey that you may receive in the mail after your visit with us. Thank you!             Your Updated Medication List - Protect others around you: Learn how to safely use, store and throw away your medicines at www.disposemymeds.org.          This list is accurate as of: 3/24/17  2:08 PM.  Always use your most recent med list.                   Brand Name Dispense Instructions for " use    calcium carbonate 500 MG chewable tablet    TUMS    180 tablet    Take 1 tablet (500 mg) by mouth 2 times daily       CANE, ANY MATERIAL     1 Device    Use with ambulation       FISH OIL + D3 PO      Take  by mouth daily.       gabapentin 100 MG capsule    NEURONTIN    90 capsule    Start at low dose (100 mg one pill before bed) and slowly increase to dose of 300 mg three times daily over the course of 2-3 weeks:   day 1-2: 100 mg nightly; day 3-4: 100 mg twice daily; day 5-7: 100 mg three times daily; day 8 onwards: 200 mg three times daily and the following week 300 mg three times daily, as tolerated       naproxen sodium 220 MG tablet    ANAPROX    60 tablet    Take 1 tablet (220 mg) by mouth 2 times daily (with meals)       omeprazole 20 MG CR capsule    priLOSEC    60 capsule    Take 1 capsule (20 mg) by mouth daily       polyethylene glycol powder    MIRALAX    500 g    Take 17 g (1 capful) by mouth daily       vitamin D 1000 UNITS capsule      Take 1 capsule by mouth daily.

## 2017-03-24 NOTE — PROGRESS NOTES
SUBJECTIVE:                                                    Kian Cortez is a 62 year old female who presents to clinic today for the following health issues:      Pt states she was seen at [facility] 3/21/17  for left sided lumbar sciatic pain had a MRI. Per the recommendations of the physican she seen patient was to see PCP for an order to have a ultrasound done.  MRI showed a thickened endometrium.    Pt saw sports medicine for her back/side pain and they informed her that the MRI of her back did show thickened endometrium.    No period over 10 years.  No vaginal bleeding.    No family history of ovarian or uterine cancer.  No pelvic pain.          Problem list and histories reviewed & adjusted, as indicated.  Additional history: as documented    Patient Active Problem List   Diagnosis     Advanced directives, counseling/discussion     CARDIOVASCULAR SCREENING; LDL GOAL LESS THAN 130     Pseudoexfoliation syndrome, right eye     GERD (gastroesophageal reflux disease)     Osteopenia     Constipation, unspecified constipation type     Morbid obesity, unspecified obesity type (H)     Vertigo     BPPV (benign paroxysmal positional vertigo), unspecified laterality     Past Surgical History:   Procedure Laterality Date     EYE SURGERY      eye duct repair 10+ years  ago       Social History   Substance Use Topics     Smoking status: Never Smoker     Smokeless tobacco: Never Used     Alcohol use No     Family History   Problem Relation Age of Onset     Other Cancer Brother      CANCER Brother      Glaucoma No family hx of      Macular Degeneration No family hx of      DIABETES No family hx of      Hypertension No family hx of      CEREBROVASCULAR DISEASE No family hx of      Thyroid Disease No family hx of            Reviewed and updated as needed this visit by clinical staff  Tobacco  Allergies  Med Hx  Surg Hx  Fam Hx  Soc Hx      Reviewed and updated as needed this visit by Provider         ROS:  As  "above    OBJECTIVE:                                                    /75 (BP Location: Right arm, Patient Position: Chair, Cuff Size: Adult Large)  Pulse 70  Temp 97.7  F (36.5  C) (Oral)  Ht 4' 11.92\" (1.522 m)  Wt 199 lb (90.3 kg)  SpO2 98%  Breastfeeding? No  BMI 38.97 kg/m2  Body mass index is 38.97 kg/(m^2).  GENERAL: healthy, alert and no distress  RESP: lungs clear to auscultation - no rales, rhonchi or wheezes  CV: regular rates and rhythm, normal S1 S2, no S3 or S4 and no murmur, click or rub  ABDOMEN: soft, nontender, without hepatosplenomegaly or masses and bowel sounds normal    Diagnostic Test Results:  none      ASSESSMENT/PLAN:                                                    1. Thickened endometrium  Get ultrasound to further evaluated  - US Pelvic Complete with Transvaginal; Future    2. Visit for screening mammogram  Follow up as needed  - MA Screening Digital Bilateral; Future    Will call with results.     Karma Burger PA-C  [facility]    "

## 2017-03-31 ENCOUNTER — RADIANT APPOINTMENT (OUTPATIENT)
Dept: ULTRASOUND IMAGING | Facility: CLINIC | Age: 62
End: 2017-03-31
Attending: PHYSICIAN ASSISTANT
Payer: COMMERCIAL

## 2017-03-31 ENCOUNTER — RADIANT APPOINTMENT (OUTPATIENT)
Dept: MAMMOGRAPHY | Facility: CLINIC | Age: 62
End: 2017-03-31
Attending: PHYSICIAN ASSISTANT
Payer: COMMERCIAL

## 2017-03-31 DIAGNOSIS — Z12.31 VISIT FOR SCREENING MAMMOGRAM: ICD-10-CM

## 2017-03-31 DIAGNOSIS — R93.89 THICKENED ENDOMETRIUM: ICD-10-CM

## 2017-03-31 PROCEDURE — 76830 TRANSVAGINAL US NON-OB: CPT

## 2017-03-31 PROCEDURE — 76856 US EXAM PELVIC COMPLETE: CPT

## 2017-03-31 PROCEDURE — G0202 SCR MAMMO BI INCL CAD: HCPCS | Mod: TC

## 2017-04-03 ENCOUNTER — TELEPHONE (OUTPATIENT)
Dept: FAMILY MEDICINE | Facility: CLINIC | Age: 62
End: 2017-04-03

## 2017-04-03 DIAGNOSIS — R93.89 THICKENED ENDOMETRIUM: Primary | ICD-10-CM

## 2017-04-03 NOTE — TELEPHONE ENCOUNTER
Called via [facility] , advised of message as below.  She agrees and understands plan of care.  She will call and schedule appt.    Cassandra Holcomb RN  [facility]

## 2017-04-03 NOTE — TELEPHONE ENCOUNTER
Please call pt and let her know her endometrium is thickened and needs to see gyn for a biopsy.    Karma Burger PA-C

## 2017-04-19 ENCOUNTER — OFFICE VISIT (OUTPATIENT)
Dept: OBGYN | Facility: CLINIC | Age: 62
End: 2017-04-19
Payer: COMMERCIAL

## 2017-04-19 VITALS
SYSTOLIC BLOOD PRESSURE: 127 MMHG | BODY MASS INDEX: 38.93 KG/M2 | HEART RATE: 70 BPM | WEIGHT: 198.8 LBS | OXYGEN SATURATION: 99 % | DIASTOLIC BLOOD PRESSURE: 78 MMHG

## 2017-04-19 DIAGNOSIS — R93.89 ENDOMETRIAL THICKENING ON ULTRA SOUND: Primary | ICD-10-CM

## 2017-04-19 PROCEDURE — 88305 TISSUE EXAM BY PATHOLOGIST: CPT | Performed by: OBSTETRICS & GYNECOLOGY

## 2017-04-19 PROCEDURE — 58100 BIOPSY OF UTERUS LINING: CPT | Performed by: OBSTETRICS & GYNECOLOGY

## 2017-04-19 PROCEDURE — 99243 OFF/OP CNSLTJ NEW/EST LOW 30: CPT | Mod: 25 | Performed by: OBSTETRICS & GYNECOLOGY

## 2017-04-19 NOTE — MR AVS SNAPSHOT
After Visit Summary   4/19/2017    Kian Cortez    MRN: [id number]           Patient Information     Date Of Birth          1955        Visit Information        Provider Department      4/19/2017 2:30 PM Srinath Campbell MD; [employer] [facility]        Today's Diagnoses     Endometrial thickening on ultra sound    -  1       Follow-ups after your visit        Your next 10 appointments already scheduled     May 18, 2017  2:15 PM CDT   (Arrive by 2:00 PM)   Return Weight Management Visit with Deanna Hess PA-C   [facility] ([facility])    [address] [place] [id number] [phone]            May 22, 2017 11:20 AM CDT   (Arrive by 11:05 AM)   Return Visit with Lora Land MD   [facility] ([facility])    [address] [place] [id number] [phone]              Who to contact     If you have questions or need follow up information about today's clinic visit or your schedule please contact [facility] directly at [phone].  Normal or non-critical lab and imaging results will be communicated to you by MyChart, letter or phone within 4 business days after the clinic has received the results. If you do not hear from us within 7 days, please contact the clinic through MyChart or phone. If you have a critical or abnormal lab result, we will notify you by phone as soon as possible.  Submit refill requests through [employer] or call your pharmacy and they will forward the refill request to us. Please allow 3 business days for your refill to be completed.          Additional Information About Your Visit        [employer] Information     [employer] lets you send messages to your doctor, view your test results, renew your prescriptions, schedule appointments and more. To sign up, go to  [email or web address] . Click on \"Log in\" on the left side of the screen, which will take you to the Welcome page. Then click on \"Sign up Now\" on the right side of the page.     You will be asked to enter the access code listed below, as well as some personal information. Please follow the directions to create your username and password.     Your access code is: [email or web address]  Expires: 2017  7:30 AM     Your access code will  in 90 days. If you need help or a new code, please call your [place] clinic or [phone].        Care EveryWhere ID     This is your Care EveryWhere ID. This could be used by other organizations to access your [place] medical records  [id number]        Your Vitals Were     Pulse Last Period Pulse Oximetry BMI (Body Mass Index)          70 2006 99% 38.93 kg/m2         Blood Pressure from Last 3 Encounters:   17 127/78   17 114/75   17 128/66    Weight from Last 3 Encounters:   17 198 lb 12.8 oz (90.2 kg)   17 199 lb (90.3 kg)   17 201 lb (91.2 kg)              We Performed the Following     ENDOMETRIAL BIOPSY W/O CERVICAL DILATION     Surgical pathology exam        Primary Care Provider    [facility] Other Clinic                Thank you!     Thank you for choosing [facility]  for your care. Our goal is always to provide you with excellent care. Hearing back from our patients is one way we can continue to improve our services. Please take a few minutes to complete the written survey that you may receive in the mail after your visit with us. Thank you!             Your Updated Medication List - Protect others around you: Learn how to safely use, store and throw away your medicines at www.disposemymeds.org.          This list is accurate as of: 17 11:59 PM.  Always use your most recent med list.                   Brand Name Dispense Instructions for use    calcium carbonate 500 MG chewable tablet    TUMS    180 tablet    Take 1 " tablet (500 mg) by mouth 2 times daily       CANE, ANY MATERIAL     1 Device    Use with ambulation       FISH OIL + D3 PO      Take  by mouth daily.       gabapentin 100 MG capsule    NEURONTIN    90 capsule    Start at low dose (100 mg one pill before bed) and slowly increase to dose of 300 mg three times daily over the course of 2-3 weeks:   day 1-2: 100 mg nightly; day 3-4: 100 mg twice daily; day 5-7: 100 mg three times daily; day 8 onwards: 200 mg three times daily and the following week 300 mg three times daily, as tolerated       naproxen sodium 220 MG tablet    ANAPROX    60 tablet    Take 1 tablet (220 mg) by mouth 2 times daily (with meals)       omeprazole 20 MG CR capsule    priLOSEC    60 capsule    Take 1 capsule (20 mg) by mouth daily       polyethylene glycol powder    MIRALAX    500 g    Take 17 g (1 capful) by mouth daily       vitamin D 1000 UNITS capsule      Take 1 capsule by mouth daily.

## 2017-04-19 NOTE — NURSING NOTE
"Chief Complaint   Patient presents with     Consult     Thickened endometrium seen on MRI and US per Dr. Burger needs EMB       Initial /78 (BP Location: Right arm, Cuff Size: Adult Regular)  Pulse 70  Wt 198 lb 12.8 oz (90.2 kg)  LMP 04/19/2006  SpO2 99%  BMI 38.93 kg/m2 Estimated body mass index is 38.93 kg/(m^2) as calculated from the following:    Height as of 3/24/17: 4' 11.92\" (1.522 m).    Weight as of this encounter: 198 lb 12.8 oz (90.2 kg).  Medication Reconciliation: complete   GLENIS Morocho 4/19/2017         "

## 2017-04-19 NOTE — PROGRESS NOTES
Kian is a 62 year old  female .  I have been asked to see patient in consultation by Karma Burger regarding endometrial thickening on CT scan and on ultrasound. She is unaware of how long she might have had it.    She has not had any bleeding.  She has not had any pelvic pain.    She knows on no aggravating or alleviating factors.      The patient and I reviewed the ultrasound films and the ultrasound report.     PELVIC ULTRASOUND WITH ENDOVAGINAL TRANSDUCER 3/31/2017 1:28 PM   HISTORY: Abnormal findings on diagnostic imaging of other specified body structures.   TECHNIQUE: Endovaginal sonography was added to the transabdominal exam to better evaluate the uterus and ovaries.  COMPARISON: None.  FINDINGS: Endometrium is 18 mm thick. This appears focally thickened distally. Uterus is 7.4 x 4.5 x 4.4 cm. No focal myometrial lesion is otherwise identified. The right ovary appears normal. The left ovary is not able to be clearly identified. There is no free fluid.  IMPRESSION:  1. Abnormal focal thickening of the distal endometrium that is 18 mm.Cannot exclude an underlying neoplasm. Recommend consideration for  endometrial sampling.  2. Left ovary not seen and therefore cannot be assessed. The right ovary appears normal.       MR LUMBAR SPINE W/O CONTRAST 3/21/2017 3:47 PM   History: Left-sided lumbar sciatic pain  Comparison: Possible study to compare findings  Impression:   1. Disc bulge with a posterior annular fissure at L5-S1 with mild indentation on the ventral thecal sac. There is no significant evidence of neural impingement.  2. Thickened appearance of the endometrium. This could be within normal limits for a menstruating female near the end of her cycle, but if this patient is postmenopausal, dedicated ultrasonographic evaluation of the pelvis is recommended.   3. Adenomyosis         Past Medical History:   Diagnosis Date     Diverticulosis 2015    on CT scan     Osteoarthritis of right knee       Osteopenia 2015       Past Surgical History:   Procedure Laterality Date     DILATION AND CURETTAGE SUCTION       EYE SURGERY      eye duct repair 10+ years  ago       Obstetric History       T0      TAB2   SAB0   E0   M0   L7       # Outcome Date GA Lbr Jagdish/2nd Weight Sex Delivery Anes PTL Lv   9             8             7             6             5             4             3             2 TAB            1 TAB               Obstetric Comments   All        Gynecological History         Patient's last menstrual period was 2006.     no STD/no PID/she has had an IUD in the pastd    no abnormal pap smear       see above HPI        No Known Allergies      Current Outpatient Prescriptions   Medication Sig Dispense Refill     gabapentin (NEURONTIN) 100 MG capsule Start at low dose (100 mg one pill before bed) and slowly increase to dose of 300 mg three times daily over the course of 2-3 weeks:   day 1-2: 100 mg nightly; day 3-4: 100 mg twice daily; day 5-7: 100 mg three times daily; day 8 onwards: 200 mg three times daily and the following week 300 mg three times daily, as tolerated 90 capsule 1     naproxen sodium (ANAPROX) 220 MG tablet Take 1 tablet (220 mg) by mouth 2 times daily (with meals) 60 tablet 1     polyethylene glycol (MIRALAX) powder Take 17 g (1 capful) by mouth daily 500 g 1     calcium carbonate (TUMS) 500 MG chewable tablet Take 1 tablet (500 mg) by mouth 2 times daily 180 tablet 0     omeprazole (PRILOSEC) 20 MG CR capsule Take 1 capsule (20 mg) by mouth daily 60 capsule 1     CANE, ANY MATERIAL Use with ambulation 1 Device 0     Cholecalciferol (VITAMIN D) 1000 UNITS capsule Take 1 capsule by mouth daily.       Fish Oil-Cholecalciferol (FISH OIL + D3 PO) Take  by mouth daily.         Social History     Social History     Marital status:      Spouse name: N/A     Number of children: 7     Years of education: N/A      Occupational History     Not on file.     Social History Main Topics     Smoking status: Never Smoker     Smokeless tobacco: Never Used     Alcohol use No     Drug use: No     Sexual activity: Yes     Partners: Male     Other Topics Concern     Parent/Sibling W/ Cabg, Mi Or Angioplasty Before 65f 55m? No     Social History Narrative    Originally from [place].       Family History   Problem Relation Age of Onset     Other Cancer Brother      CANCER Brother      Glaucoma No family hx of      Macular Degeneration No family hx of      DIABETES No family hx of      Hypertension No family hx of      CEREBROVASCULAR DISEASE No family hx of      Thyroid Disease No family hx of        Review of Systems:  10 point ROS of systems including Constitutional, Eyes, Respiratory, Cardiovascular, Gastroenterology, Genitourinary, Integumentary, Muscularskeletal, Psychiatric were all negative except for pertinent positives noted in my HPI and in the PMH.        EXAM:  /78 (BP Location: Right arm, Cuff Size: Adult Regular)  Pulse 70  Wt 198 lb 12.8 oz (90.2 kg)  LMP 04/19/2006  SpO2 99%  BMI 38.93 kg/m2  Body mass index is 38.93 kg/(m^2).  General:  WNWD female, NAD  Alert  Oriented x 3  Gait:  Normal  Skin:  Normal skin turgor  HEENT:  NC/AT, EOMI  Neck:  No masses  Lungs:  Good respiratory effort   Abdomen:  Non-tender, non-distended.  Vulva: No external lesions, normal hair distribution, no adenopathy  BUS:  Normal, no masses noted  Urethra:  No hypermobility noted   Urethral meatus:  No masses or lesions seen.  Vagina: Moist, pink, no abnormal discharge, well rugated, no lesions  Cervix: Smooth, pink, no visible lesions  Uterus: Normal size, anteverted, non-tender, mobile  Ovaries: No mass, non-tender, mobile  Perianal: no masses or lesions seen.    Extremities:  No clubbing, cyanosis or edema.       ASSESSMENT:  Endometrial thickening on ultrasound.       PLAN  We discussed the ultrasound findings, in the absence  of bleeding.  The endometrial thickening does not always need evaluation if there is no bleeding.  She does have a risk factor with the increased BMI.  The endometrium was 18 mm thick.  She would like further evaluation.  We reviewed the options.  After the discussion, with the  present, she decided that she would like to have the endometrial biopsy.   Depending upon results, further management might be needed.     Srinath Campbell MD      PROCEDURE NOTE:  The procedure was reviewed with patient.  After consenting to the procedure she was placed into the dorsal lithotomy position.  The examination was performed.  The speculum was placed into the vagina.  The cervix was prepped with Betadine.  The anterior lip of the cervix was grasped with the Allis tenaculum.  The uterus was sounded to 8 cm.  The Pipelle was used to sample the endometrium with 3 passes.     Instruments were removed and the specimen was sent to pathology.  Results to the patient in 1-2 weeks when they are returned.    Srinath Campbell MD

## 2017-04-24 LAB — COPATH REPORT: NORMAL

## 2017-05-16 ENCOUNTER — OFFICE VISIT (OUTPATIENT)
Dept: URGENT CARE | Facility: URGENT CARE | Age: 62
End: 2017-05-16
Payer: COMMERCIAL

## 2017-05-16 VITALS
TEMPERATURE: 97.8 F | SYSTOLIC BLOOD PRESSURE: 134 MMHG | WEIGHT: 197.6 LBS | HEART RATE: 88 BPM | BODY MASS INDEX: 38.69 KG/M2 | OXYGEN SATURATION: 98 % | DIASTOLIC BLOOD PRESSURE: 86 MMHG

## 2017-05-16 DIAGNOSIS — M54.50 ACUTE LEFT-SIDED LOW BACK PAIN WITHOUT SCIATICA: Primary | ICD-10-CM

## 2017-05-16 DIAGNOSIS — G57.12 MERALGIA PARESTHETICA, LEFT: ICD-10-CM

## 2017-05-16 PROCEDURE — 99214 OFFICE O/P EST MOD 30 MIN: CPT | Performed by: PHYSICIAN ASSISTANT

## 2017-05-16 RX ORDER — NAPROXEN 500 MG/1
500 TABLET ORAL 2 TIMES DAILY PRN
Qty: 30 TABLET | Refills: 1 | Status: SHIPPED | OUTPATIENT
Start: 2017-05-16 | End: 2017-05-23 | Stop reason: SINTOL

## 2017-05-16 RX ORDER — GABAPENTIN 100 MG/1
100 CAPSULE ORAL 3 TIMES DAILY
Qty: 90 CAPSULE | Refills: 0 | Status: SHIPPED | OUTPATIENT
Start: 2017-05-16 | End: 2017-05-23

## 2017-05-16 NOTE — MR AVS SNAPSHOT
"              After Visit Summary   2017    Kian Cortez    MRN: [id number]           Patient Information     Date Of Birth          1955        Visit Information        Provider Department      2017 5:10 PM Mabel Rosenberg PA-C [facility]        Today's Diagnoses     Acute left-sided low back pain without sciatica    -  1    Meralgia paresthetica, left           Follow-ups after your visit        Who to contact     If you have questions or need follow up information about today's clinic visit or your schedule please contact [facility] directly at [phone].  Normal or non-critical lab and imaging results will be communicated to you by In[employer]ReD[employer], letter or phone within 4 business days after the clinic has received the results. If you do not hear from us within 7 days, please contact the clinic through In[employer] or phone. If you have a critical or abnormal lab result, we will notify you by phone as soon as possible.  Submit refill requests through [employer] or call your pharmacy and they will forward the refill request to us. Please allow 3 business days for your refill to be completed.          Additional Information About Your Visit        MyChart Information     [employer] lets you send messages to your doctor, view your test results, renew your prescriptions, schedule appointments and more. To sign up, go to [email or web address] . Click on \"Log in\" on the left side of the screen, which will take you to the Welcome page. Then click on \"Sign up Now\" on the right side of the page.     You will be asked to enter the access code listed below, as well as some personal information. Please follow the directions to create your username and password.     Your access code is: [email or web address]  Expires: 2017  7:30 AM     Your access code will  in 90 days. If you need help or a new code, please call your [facility] or [phone].        Care EveryWhere ID     This is " your Care EveryWhere ID. This could be used by other organizations to access your [place] medical records  [id number]        Your Vitals Were     Pulse Temperature Last Period Pulse Oximetry BMI (Body Mass Index)       88 97.8  F (36.6  C) (Oral) 04/19/2006 98% 38.69 kg/m2        Blood Pressure from Last 3 Encounters:   05/16/17 134/86   04/19/17 127/78   03/24/17 114/75    Weight from Last 3 Encounters:   05/16/17 197 lb 9.6 oz (89.6 kg)   04/19/17 198 lb 12.8 oz (90.2 kg)   03/24/17 199 lb (90.3 kg)              Today, you had the following     No orders found for display         Today's Medication Changes          These changes are accurate as of: 5/16/17 10:02 PM.  If you have any questions, ask your nurse or doctor.               Start taking these medicines.        Dose/Directions    naproxen 500 MG tablet   Commonly known as:  NAPROSYN   Used for:  Acute left-sided low back pain without sciatica   Started by:  Mabel Rosenberg PA-C        Dose:  500 mg   Take 1 tablet (500 mg) by mouth 2 times daily as needed for moderate pain   Quantity:  30 tablet   Refills:  1         These medicines have changed or have updated prescriptions.        Dose/Directions    * gabapentin 100 MG capsule   Commonly known as:  NEURONTIN   This may have changed:  Another medication with the same name was added. Make sure you understand how and when to take each.   Used for:  Meralgia paresthetica of left side   Changed by:  Tan Hodges MD        Start at low dose (100 mg one pill before bed) and slowly increase to dose of 300 mg three times daily over the course of 2-3 weeks:   day 1-2: 100 mg nightly; day 3-4: 100 mg twice daily; day 5-7: 100 mg three times daily; day 8 onwards: 200 mg three times daily and the following week 300 mg three times daily, as tolerated   Quantity:  90 capsule   Refills:  1       * gabapentin 100 MG capsule   Commonly known as:  NEURONTIN   This may have changed:  You were already taking  a medication with the same name, and this prescription was added. Make sure you understand how and when to take each.   Used for:  Acute left-sided low back pain without sciatica   Changed by:  Mabel Rosenberg PA-C        Dose:  100 mg   Take 1 capsule (100 mg) by mouth 3 times daily   Quantity:  90 capsule   Refills:  0       * Notice:  This list has 2 medication(s) that are the same as other medications prescribed for you. Read the directions carefully, and ask your doctor or other care provider to review them with you.         Where to get your medicines      These medications were sent to [facility] [address] [facility] [address]  [address] [address] [address]     Phone:  [phone]     gabapentin 100 MG capsule    naproxen 500 MG tablet                Primary Care Provider    M[facility] Other Clinic                Thank you!     Thank you for choosing [facility]  for your care. Our goal is always to provide you with excellent care. Hearing back from our patients is one way we can continue to improve our services. Please take a few minutes to complete the written survey that you may receive in the mail after your visit with us. Thank you!             Your Updated Medication List - Protect others around you: Learn how to safely use, store and throw away your medicines at www.disposemymeds.org.          This list is accurate as of: 5/16/17 10:02 PM.  Always use your most recent med list.                   Brand Name Dispense Instructions for use    calcium carbonate 500 MG chewable tablet    TUMS    180 tablet    Take 1 tablet (500 mg) by mouth 2 times daily       CANE, ANY MATERIAL     1 Device    Use with ambulation       FISH OIL + D3 PO      Take  by mouth daily.       * gabapentin 100 MG capsule    NEURONTIN    90 capsule    Start at low dose (100 mg one pill before bed) and slowly increase to dose of 300 mg three times daily over the course of 2-3  weeks:   day 1-2: 100 mg nightly; day 3-4: 100 mg twice daily; day 5-7: 100 mg three times daily; day 8 onwards: 200 mg three times daily and the following week 300 mg three times daily, as tolerated       * gabapentin 100 MG capsule    NEURONTIN    90 capsule    Take 1 capsule (100 mg) by mouth 3 times daily       naproxen 500 MG tablet    NAPROSYN    30 tablet    Take 1 tablet (500 mg) by mouth 2 times daily as needed for moderate pain       omeprazole 20 MG CR capsule    priLOSEC    60 capsule    Take 1 capsule (20 mg) by mouth daily       polyethylene glycol powder    MIRALAX    500 g    Take 17 g (1 capful) by mouth daily       vitamin D 1000 UNITS capsule      Take 1 capsule by mouth daily.       * Notice:  This list has 2 medication(s) that are the same as other medications prescribed for you. Read the directions carefully, and ask your doctor or other care provider to review them with you.

## 2017-05-16 NOTE — NURSING NOTE
"Chief Complaint   Patient presents with     Back Pain       Initial /86  Pulse 88  Temp 97.8  F (36.6  C) (Oral)  Wt 197 lb 9.6 oz (89.6 kg)  LMP 04/19/2006  SpO2 98%  BMI 38.69 kg/m2 Estimated body mass index is 38.69 kg/(m^2) as calculated from the following:    Height as of 3/24/17: 4' 11.92\" (1.522 m).    Weight as of this encounter: 197 lb 9.6 oz (89.6 kg).  BP completed using cuff size: keyon [place] CMA ([facility])  5:15 PM 5/16/2017    "

## 2017-05-16 NOTE — PROGRESS NOTES
SUBJECTIVE:                                                    Kian Cortez is a 62 year old female who presents to clinic today for the following health issues:      Back Pain      Duration: 1 day        Specific cause: none    Description:   Location of pain: low back left  Character of pain: sharp and dull ache  Pain radiation:none  New numbness or weakness in legs, not attributed to pain:  no     Intensity: Currently 9/10    History:   Pain interferes with job: No,   History of back problems: no prior back problems  Any previous MRI or X-rays: None  Sees a specialist for back pain:  No  Therapies tried without relief: none    Alleviating factors:   Improved by: none      Precipitating factors:  Worsened by: Nothing    Functional and Psychosocial Screen (Janelle STarT Back):      Not performed today        Chart reviewed.  Chronic left hip pain/paresthesia. Recurrent flare ups of left LBP with it. Seen by Ortho and diagnosed with meralgia paresthetica. Had lumbar MRI - DDD L5-S1 with disc bulge no nerve impingementet. Hips xrays were nl. Onset left low back pain after moving a treadmill last night. No bowel/bladder trouble. Had pelvic US and negative endometrial bx as part of work up too. Given Neurontin but does not take it.          No Known Allergies    Past Medical History:   Diagnosis Date     Diverticulosis 11/2015    on CT scan     Osteoarthritis of right knee      Osteopenia 4/27/2015         Current Outpatient Prescriptions on File Prior to Visit:  gabapentin (NEURONTIN) 100 MG capsule Start at low dose (100 mg one pill before bed) and slowly increase to dose of 300 mg three times daily over the course of 2-3 weeks:   day 1-2: 100 mg nightly; day 3-4: 100 mg twice daily; day 5-7: 100 mg three times daily; day 8 onwards: 200 mg three times daily and the following week 300 mg three times daily, as tolerated   calcium carbonate (TUMS) 500 MG chewable tablet Take 1 tablet (500 mg) by mouth 2 times daily    omeprazole (PRILOSEC) 20 MG CR capsule Take 1 capsule (20 mg) by mouth daily   Cholecalciferol (VITAMIN D) 1000 UNITS capsule Take 1 capsule by mouth daily.   Fish Oil-Cholecalciferol (FISH OIL + D3 PO) Take  by mouth daily.   polyethylene glycol (MIRALAX) powder Take 17 g (1 capful) by mouth daily (Patient not taking: Reported on 5/16/2017)   CANE, ANY MATERIAL Use with ambulation     No current facility-administered medications on file prior to visit.     Past Surgical History:   Procedure Laterality Date     DILATION AND CURETTAGE SUCTION       EYE SURGERY      eye duct repair 10+ years  ago       Social History     Social History     Marital status:      Spouse name: N/A     Number of children: 7     Years of education: N/A     Occupational History     Not on file.     Social History Main Topics     Smoking status: Never Smoker     Smokeless tobacco: Never Used     Alcohol use No     Drug use: No     Sexual activity: Yes     Partners: Male     Other Topics Concern     Parent/Sibling W/ Cabg, Mi Or Angioplasty Before 65f 55m? No     Social History Narrative    Originally from [facility].       REVIEW OF SYSTEMS  General: negative for fever  Resp: negative for chest pain   CV: negative for chest pain  : negative for dysuria , incontinence, frequency  Musculoskeletal: as above  Neurologic: negative for ataxia, saddle anesthesia, fecal incontinence, one sided weakness,  paresthesias    Physical Exam:  Vitals: /86  Pulse 88  Temp 97.8  F (36.6  C) (Oral)  Wt 197 lb 9.6 oz (89.6 kg)  LMP 04/19/2006  SpO2 98%  BMI 38.69 kg/m2  BMI= Body mass index is 38.69 kg/(m^2).  Constitutional: healthy, alert. Slow antalgic gait.   CARDIO: RRR, no MRG  RESP: lungs CTA, NAD  NEURO: Patellar reflexes intact and equal b/l  BACK:  Mild left lumbar muscle tenderness over post. Sup. Iliac spine. Left trochanteric area tender but daughter says it always is. Strength intact and equal b/l lower  extremities  Psychiatric: mentation appears normal and affect normal/bright      Impression:     ICD-10-CM    1. Acute left-sided low back pain without sciatica M54.5 naproxen (NAPROSYN) 500 MG tablet     gabapentin (NEURONTIN) 100 MG capsule       Plan: Try Neurontin, start out one at bedtime and gradually increase. Take Naprosyn. Ice. If no improvement f/u Primary Care Provider. Consider injection for meralgia of the pelvic rim as Ortho discussed      Mabel Rosenberg PA-C

## 2017-05-23 ENCOUNTER — OFFICE VISIT (OUTPATIENT)
Dept: FAMILY MEDICINE | Facility: CLINIC | Age: 62
End: 2017-05-23
Payer: COMMERCIAL

## 2017-05-23 VITALS — BODY MASS INDEX: 39.46 KG/M2 | HEIGHT: 60 IN | WEIGHT: 201 LBS | TEMPERATURE: 97.3 F

## 2017-05-23 DIAGNOSIS — M54.50 ACUTE LEFT-SIDED LOW BACK PAIN WITHOUT SCIATICA: ICD-10-CM

## 2017-05-23 DIAGNOSIS — M17.0 PRIMARY OSTEOARTHRITIS OF BOTH KNEES: ICD-10-CM

## 2017-05-23 DIAGNOSIS — K21.9 GASTROESOPHAGEAL REFLUX DISEASE WITHOUT ESOPHAGITIS: Primary | ICD-10-CM

## 2017-05-23 DIAGNOSIS — K59.00 CONSTIPATION, UNSPECIFIED CONSTIPATION TYPE: ICD-10-CM

## 2017-05-23 PROCEDURE — 99214 OFFICE O/P EST MOD 30 MIN: CPT | Performed by: PHYSICIAN ASSISTANT

## 2017-05-23 RX ORDER — NAPROXEN SODIUM 220 MG
220 TABLET ORAL 2 TIMES DAILY WITH MEALS
Qty: 60 TABLET | Refills: 1 | Status: SHIPPED | OUTPATIENT
Start: 2017-05-23 | End: 2017-11-28

## 2017-05-23 RX ORDER — GABAPENTIN 100 MG/1
100 CAPSULE ORAL 3 TIMES DAILY
Qty: 90 CAPSULE | Refills: 1 | Status: SHIPPED | OUTPATIENT
Start: 2017-05-23 | End: 2017-11-28

## 2017-05-23 RX ORDER — METHOCARBAMOL 500 MG/1
1000 TABLET, FILM COATED ORAL 3 TIMES DAILY PRN
Qty: 60 TABLET | Refills: 1 | Status: SHIPPED | OUTPATIENT
Start: 2017-05-23 | End: 2017-11-28

## 2017-05-23 NOTE — MR AVS SNAPSHOT
After Visit Summary   5/23/2017    Kian Cortez    MRN: [id number]           Patient Information     Date Of Birth          1955        Visit Information        Provider Department      5/23/2017 3:40 PM Karma Burger PA-C; [employer] [facility]        Today's Diagnoses     Gastroesophageal reflux disease without esophagitis    -  1    Acute left-sided low back pain without sciatica        Constipation, unspecified constipation type        Primary osteoarthritis of both knees          Care Instructions    Try stopping the omeprazole.  Take every other day for a week then every few days then stop   Use zantac as needed if you get heartburn.    Follow up when you return for [place]   Avoid oily food and naproxen if able   Schedule endoscopy when you return from your trip if still having heartburn   Try robaxin for your back-can make you drowsy         Follow-ups after your visit        Additional Services     GASTROENTEROLOGY ADULT REF PROCEDURE ONLY       Last Lab Result: Creatinine (mg/dL)       Date                     Value                 12/25/2016               0.70             ----------  Body mass index is 39.36 kg/(m^2).      Patient will be contacted to schedule procedure.     Please be aware that coverage of these services is subject to the terms and limitations of your health insurance plan.  Call member services at your health plan with any benefit or coverage questions.  Any procedures must be performed at a [facility] facility OR coordinated by your clinic's referral office.    Please bring the following with you to your appointment:    (1) Any X-Rays, CTs or MRIs which have been performed.  Contact the facility where they were done to arrange for  prior to your scheduled appointment.    (2) List of current medications   (3) This referral request   (4) Any documents/labs given to you for this referral                  Who to  "contact     If you have questions or need follow up information about today's clinic visit or your schedule please contact [facility] directly at [phone].  Normal or non-critical lab and imaging results will be communicated to you by MyChart, letter or phone within 4 business days after the clinic has received the results. If you do not hear from us within 7 days, please contact the clinic through MyChart or phone. If you have a critical or abnormal lab result, we will notify you by phone as soon as possible.  Submit refill requests through [employer] or call your pharmacy and they will forward the refill request to us. Please allow 3 business days for your refill to be completed.          Additional Information About Your Visit        [employer] Information     [employer] lets you send messages to your doctor, view your test results, renew your prescriptions, schedule appointments and more. To sign up, go to [email or web address] . Click on \"Log in\" on the left side of the screen, which will take you to the Welcome page. Then click on \"Sign up Now\" on the right side of the page.     You will be asked to enter the access code listed below, as well as some personal information. Please follow the directions to create your username and password.     Your access code is: [email or web address]  Expires: 2017  7:30 AM     Your access code will  in 90 days. If you need help or a new code, please call your [place] clinic or [phone].        Care EveryWhere ID     This is your Care EveryWhere ID. This could be used by other organizations to access your [facility] medical records  [id number]        Your Vitals Were     Temperature Height Last Period Breastfeeding? BMI (Body Mass Index)       97.3  F (36.3  C) (Oral) 4' 11.92\" (1.522 m) 2006 No 39.36 kg/m2        Blood Pressure from Last 3 Encounters:   17 134/86   17 127/78   17 114/75    Weight from Last 3 Encounters: "   05/23/17 201 lb (91.2 kg)   05/16/17 197 lb 9.6 oz (89.6 kg)   04/19/17 198 lb 12.8 oz (90.2 kg)              We Performed the Following     GASTROENTEROLOGY ADULT REF PROCEDURE ONLY          Today's Medication Changes          These changes are accurate as of: 5/23/17  4:35 PM.  If you have any questions, ask your nurse or doctor.               Start taking these medicines.        Dose/Directions    diclofenac 1 % Gel topical gel   Commonly known as:  VOLTAREN   Used for:  Primary osteoarthritis of both knees        Apply 4 grams to knees four times daily using enclosed dosing card.   Quantity:  100 g   Refills:  1       methocarbamol 500 MG tablet   Commonly known as:  ROBAXIN   Used for:  Acute left-sided low back pain without sciatica        Dose:  1000 mg   Take 2 tablets (1,000 mg) by mouth 3 times daily as needed for muscle spasms   Quantity:  60 tablet   Refills:  1       ranitidine 150 MG tablet   Commonly known as:  ZANTAC   Used for:  Gastroesophageal reflux disease without esophagitis        Dose:  150 mg   Take 1 tablet (150 mg) by mouth 2 times daily   Quantity:  60 tablet   Refills:  1         These medicines have changed or have updated prescriptions.        Dose/Directions    gabapentin 100 MG capsule   Commonly known as:  NEURONTIN   This may have changed:    - additional instructions  - Another medication with the same name was removed. Continue taking this medication, and follow the directions you see here.   Used for:  Acute left-sided low back pain without sciatica        Dose:  100 mg   Take 1 capsule (100 mg) by mouth 3 times daily Ok to fill early-pt is leaving on vacation for 2 months   Quantity:  90 capsule   Refills:  1         Stop taking these medicines if you haven't already. Please contact your care team if you have questions.     naproxen 500 MG tablet   Commonly known as:  NAPROSYN                Where to get your medicines      Some of these will need a paper prescription and  others can be bought over the counter.  Ask your nurse if you have questions.     Bring a paper prescription for each of these medications     diclofenac 1 % Gel topical gel    gabapentin 100 MG capsule    methocarbamol 500 MG tablet    naproxen sodium 220 MG tablet    ranitidine 150 MG tablet                Primary Care Provider    [facility] Other Clinic                Thank you!     Thank you for choosing [facility]  for your care. Our goal is always to provide you with excellent care. Hearing back from our patients is one way we can continue to improve our services. Please take a few minutes to complete the written survey that you may receive in the mail after your visit with us. Thank you!             Your Updated Medication List - Protect others around you: Learn how to safely use, store and throw away your medicines at www.disposemymeds.org.          This list is accurate as of: 5/23/17  4:35 PM.  Always use your most recent med list.                   Brand Name Dispense Instructions for use    calcium carbonate 500 MG chewable tablet    TUMS    180 tablet    Take 1 tablet (500 mg) by mouth 2 times daily       CANE, ANY MATERIAL     1 Device    Use with ambulation       diclofenac 1 % Gel topical gel    VOLTAREN    100 g    Apply 4 grams to knees four times daily using enclosed dosing card.       FISH OIL + D3 PO      Take  by mouth daily.       gabapentin 100 MG capsule    NEURONTIN    90 capsule    Take 1 capsule (100 mg) by mouth 3 times daily Ok to fill early-pt is leaving on vacation for 2 months       methocarbamol 500 MG tablet    ROBAXIN    60 tablet    Take 2 tablets (1,000 mg) by mouth 3 times daily as needed for muscle spasms       naproxen sodium 220 MG tablet    ALEVE    60 tablet    Take 1 tablet (220 mg) by mouth 2 times daily (with meals)       omeprazole 20 MG CR capsule    priLOSEC    60 capsule    Take 1 capsule (20 mg) by mouth daily       polyethylene glycol powder     MIRALAX    500 g    Take 17 g (1 capful) by mouth daily       ranitidine 150 MG tablet    ZANTAC    60 tablet    Take 1 tablet (150 mg) by mouth 2 times daily       vitamin D 1000 UNITS capsule      Take 1 capsule by mouth daily.

## 2017-05-23 NOTE — PROGRESS NOTES
SUBJECTIVE:                                                    Kian Cortez is a 62 year old female who presents to clinic today for the following health issues:      Back Pain Follow Up       Description:   Location of pain:  Left low back  Character of pain: cramping  Pain radiation: Does not radiate  Since last visit, pain is:  improved  New numbness or weakness in legs, not attributed to pain:  no     Intensity: Currently 4/10    History:   Pain interferes with job: Not applicable  Therapies tried without relief: none  Therapies tried with relief: Gabapentin helped hip pain. Not taking the Naproxen 500 because it caused an upset stomach, aleve 220 ok.  Restarted gabapentin last week for hip    Started last week after moving a treadmill.    No red flags of weight loss, loss of bladder or bowel control and numbness in groin         Amount of exercise or physical activity: None    Problems taking medications regularly: No    Medication side effects: none    Diet: regular (no restrictions)    Has burning in chest and pain in am but taking omeprazole makes her eat a lot.  Has been on it for more than 2 years.  If she takes medications she has normal bm.  Last bm was today.   Hasn't noticed any change with GERD with constipation.  Even before taking nsaids had problems with her GERD.  No vomiting.  No weight loss.  Oily food makes it worse.          Problem list and histories reviewed & adjusted, as indicated.  Additional history: as documented    Patient Active Problem List   Diagnosis     Advanced directives, counseling/discussion     CARDIOVASCULAR SCREENING; LDL GOAL LESS THAN 130     Pseudoexfoliation syndrome, right eye     GERD (gastroesophageal reflux disease)     Osteopenia     Constipation, unspecified constipation type     Morbid obesity, unspecified obesity type (H)     Vertigo     BPPV (benign paroxysmal positional vertigo), unspecified laterality     Past Surgical History:   Procedure Laterality Date  "    DILATION AND CURETTAGE SUCTION       EYE SURGERY      eye duct repair 10+ years  ago       Social History   Substance Use Topics     Smoking status: Never Smoker     Smokeless tobacco: Never Used     Alcohol use No     Family History   Problem Relation Age of Onset     Other Cancer Brother      CANCER Brother      Glaucoma No family hx of      Macular Degeneration No family hx of      DIABETES No family hx of      Hypertension No family hx of      CEREBROVASCULAR DISEASE No family hx of      Thyroid Disease No family hx of            Reviewed and updated as needed this visit by clinical staff  Allergies       Reviewed and updated as needed this visit by Provider         ROS:  As above    OBJECTIVE:                                                    Temp 97.3  F (36.3  C) (Oral)  Ht 4' 11.92\" (1.522 m)  Wt 201 lb (91.2 kg)  LMP 04/19/2006  Breastfeeding? No  BMI 39.36 kg/m2  Body mass index is 39.36 kg/(m^2).  GENERAL: alert, no distress and obese  RESP: lungs clear to auscultation - no rales, rhonchi or wheezes  CV: regular rates and rhythm, normal S1 S2, no S3 or S4 and no murmur, click or rub  ABDOMEN: soft, nontender, no hepatosplenomegaly, no masses and bowel sounds normal  Comprehensive back pain exam:  No tenderness, Range of motion not limited by pain, Lower extremity strength functional and equal on both sides, diminished patellar reflexes bilateraly  and Straight leg raise negative bilaterally    Diagnostic Test Results:  none      ASSESSMENT/PLAN:                                                      1. Gastroesophageal reflux disease without esophagitis  Attempt to get her off this medication.  Follow up when she is back from her trip in 2 months.  Consider scope when she returns  - ranitidine (ZANTAC) 150 MG tablet; Take 1 tablet (150 mg) by mouth 2 times daily  Dispense: 60 tablet; Refill: 1  - GASTROENTEROLOGY ADULT REF PROCEDURE ONLY    2. Acute left-sided low back pain without sciatica  Try " muscle relaxant.  Avoid nsaids if able.    - methocarbamol (ROBAXIN) 500 MG tablet; Take 2 tablets (1,000 mg) by mouth 3 times daily as needed for muscle spasms  Dispense: 60 tablet; Refill: 1  - naproxen sodium (ALEVE) 220 MG tablet; Take 1 tablet (220 mg) by mouth 2 times daily (with meals)  Dispense: 60 tablet; Refill: 1  - gabapentin (NEURONTIN) 100 MG capsule; Take 1 capsule (100 mg) by mouth 3 times daily Ok to fill early-pt is leaving on vacation for 2 months  Dispense: 90 capsule; Refill: 1    3. Constipation, unspecified constipation type  Continue to keep herself regular    4. Primary osteoarthritis of both knees  Refilled if insurance will cover  - diclofenac (VOLTAREN) 1 % GEL topical gel; Apply 4 grams to knees four times daily using enclosed dosing card.  Dispense: 100 g; Refill: 1    Patient Instructions   Try stopping the omeprazole.  Take every other day for a week then every few days then stop   Use zantac as needed if you get heartburn.    Follow up when you return for [place]   Avoid oily food and naproxen if able   Schedule endoscopy when you return from your trip if still having heartburn   Try robaxin for your back-can make you drowsy       Karma Burger PA-C  [facility]

## 2017-05-23 NOTE — NURSING NOTE
"Chief Complaint   Patient presents with     Back Pain       Initial Temp 97.3  F (36.3  C) (Oral)  Ht 4' 11.92\" (1.522 m)  Wt 201 lb (91.2 kg)  LMP 04/19/2006  Breastfeeding? No  BMI 39.36 kg/m2 Estimated body mass index is 39.36 kg/(m^2) as calculated from the following:    Height as of this encounter: 4' 11.92\" (1.522 m).    Weight as of this encounter: 201 lb (91.2 kg).  Medication Reconciliation: complete   Kristy Hussein CMA       "

## 2017-05-23 NOTE — PATIENT INSTRUCTIONS
Try stopping the omeprazole.  Take every other day for a week then every few days then stop   Use zantac as needed if you get heartburn.    Follow up when you return for [place]   Avoid oily food and naproxen if able   Schedule endoscopy when you return from your trip if still having heartburn   Try robaxin for your back-can make you drowsy

## 2017-10-17 ENCOUNTER — OFFICE VISIT (OUTPATIENT)
Dept: INTERNAL MEDICINE | Facility: CLINIC | Age: 62
End: 2017-10-17

## 2017-10-17 VITALS
HEART RATE: 73 BPM | BODY MASS INDEX: 39.55 KG/M2 | OXYGEN SATURATION: 97 % | WEIGHT: 202 LBS | SYSTOLIC BLOOD PRESSURE: 117 MMHG | TEMPERATURE: 97.9 F | RESPIRATION RATE: 16 BRPM | DIASTOLIC BLOOD PRESSURE: 80 MMHG

## 2017-10-17 DIAGNOSIS — K21.9 GASTROESOPHAGEAL REFLUX DISEASE, ESOPHAGITIS PRESENCE NOT SPECIFIED: ICD-10-CM

## 2017-10-17 DIAGNOSIS — Z00.00 PREVENTATIVE HEALTH CARE: Primary | ICD-10-CM

## 2017-10-17 ASSESSMENT — ENCOUNTER SYMPTOMS
COUGH: 0
SPUTUM PRODUCTION: 1
CHILLS: 0
FEVER: 0
WEIGHT LOSS: 0
DIARRHEA: 0
CONSTIPATION: 1
BLOOD IN STOOL: 0

## 2017-10-17 ASSESSMENT — PAIN SCALES - GENERAL: PAINLEVEL: EXTREME PAIN (8)

## 2017-10-17 NOTE — PROGRESS NOTES
PRIMARY CARE CENTER         HPI:       Kian Cortez is a 62 year old female with PMHx of GERD, constipation, and obesity who presents for burning abdominal pain and nausea and vomiting that is worsened with eating.     The patient is having burning and vomiting with eating. She says that whenever she eats fatty food, she has more aggressive pain, she feels nausea whenever she eats something with cramping. She says this happens mostly with fatty foods, but occurs with any type of food. This has been going on for 10 years, but it is more persistent now, for the past year. She says that when she takes the ranitidine prior to meals it causes her to eat more, and she has gained more weight, so she stopped taking it. The pain is epigastric and is burning pain with nausea. She rates the pain a 7-8/10, eating makes it worse, and eating oatmeal or not eating makes it better. She is not taking any medications right now. She does not eat vegetables or fruit since she cannot digest (she thinks it stays longer in her stomach and she feels like either the food is refluxing or she will have vomiting. The following foods make her symptoms worse: high fatty foods, meats, vegetables and fruit. She vomits daily. And she spits up food daily. This usually happens 50 minutes after eating. She has not lost weight. No fevers or chills. No cough. No diarrhea. She is normally constipated. She has one bowel movement per day. She does not take any medications for constipation. She does not have dysphagia.     She has not taken any medications for three months. The vomiting is worse since she stopped taking the omeprazole and ranitidine.     She drinks coffee, one cup per day. She does not eat chocolate, or tomatoes, or oranges. She sometimes will have peppermint.   Sometimes it is worse with laying down at night. And it will wake her up at times, but she has not woken up to vomit.   She does not have blood in vomit or blood in stools.     No  past abdominal surgeries. She had a colonoscopy in 2011 that she reports was normal. She has not had an EGD in the past.    Problem, Medication and Allergy Lists were reviewed and are current.  Patient is an established patient of this clinic.         Review of Systems:   Review of Systems     Constitutional:  Negative for fever, chills and weight loss.   Respiratory:   Positive for sputum production. Negative for cough.    Gastrointestinal:  Positive for constipation. Negative for diarrhea, blood in stool and change in stool.     I have personally reviewed and updated the complete ROS on the day of the visit.           Physical Exam:   /80 (BP Location: Right arm, Patient Position: Chair, Cuff Size: Adult Regular)  Pulse 73  Temp 97.9  F (36.6  C) (Oral)  Resp 16  Wt 91.6 kg (202 lb)  LMP 04/19/2006  SpO2 97%  Breastfeeding? No  BMI 39.55 kg/m2  Body mass index is 39.55 kg/(m^2).  Vitals were reviewed       GENERAL APPEARANCE: healthy, alert and no distress     EYES: EOMI, PERRL     HENT: Moist mouth without ulcers or lesions     NECK: normal ROM     RESP: lungs clear to auscultation - no rales, rhonchi or wheezes     CV: regular rates and rhythm, normal S1 S2, no S3 or S4 and no murmur, click or rub     ABDOMEN:  soft, tender to palpation epigastrically but not in the other quadrants, no HSM or masses and bowel sounds normal     MS: extremities normal- no gross deformities noted, FROM in all extremities.     SKIN: no suspicious lesions or rashes     NEURO: Normal strength and tone, sensory exam grossly normal, mentation intact and speech normal     PSYCH: mentation appears normal. and affect normal/bright      Results:   No lab results within the past 24 hours.    Assessment and Plan   Kian Cortez is a 62 year old female with PMHx of GERD, constipation, and obesity who presents for burning abdominal pain and nausea and vomiting that is worsened with eating.     Differential diagnosis includes GERD  and/or H. pylori infection vs pancreatitis or pain due to gallstones.  Pt's story is consistent with GERD symptoms: epigastric pain/burning worsened with eating with vomiting (both spitting up food and nadege vomiting. The patient has not had weight loss, but has had GERD for close to 10 years. She does not have fevers or chills or night sweats. She does not complain of dysphagia. Pain more consistent with GERD than with pancreatitis or gallstone pain.    Plan:  -EGD to assess for both H. Pylori and or other cause for ulcer and for inflammatory change due to persistent GERD; this can also assess for anatomic abnormality that could predispose her to more severe GERD, such as hiatal hernia.  -start omeprazole 20 mg daily starting today today, and obtain EGD as soon as possible. Although it would be better to abstain from PPI for H pylori testing, this will not treat the infection, therefore, she should still have H. Pylor present on biopsy if she has an active infection.   -Pt will f/u within the month to assess for symptom change and to go over results of EGD    She obtained the influenza vaccine in clinic today.    Options for treatment and follow-up care were reviewed with the patient. Kian Cortez engaged in the decision making process and verbalized understanding of the options discussed and agreed with the final plan.    Violeta Bell MD  Oct 17, 2017    Pt was seen and plan of care discussed with Dr. Ruiz.     Attending Addendum:  Patient seen and examined with resident in clinic today.  Pertinent portions of history and exam were independently verified by myself.  I agree with the exam and plan as outlined above with the following modifications: none.  Manuela Gimenez MD  Internal Medicine

## 2017-10-17 NOTE — MR AVS SNAPSHOT
After Visit Summary   10/17/2017    Kian Cortez    MRN: [id number]           Patient Information     Date Of Birth          1955        Visit Information        Provider Department      10/17/2017 12:55 PM Violeta Bell MD; [employer] [facility]        Today's Diagnoses     Preventative health care    -  1    Gastroesophageal reflux disease, esophagitis presence not specified          Care Instructions    Primary Care Center Medication Refill Request Information:  * Please contact your pharmacy regarding ANY request for medication refills.  ** [facility] Prescription Fax = [phone]  * Please allow 3 business days for routine medication refills.  * Please allow 5 business days for controlled substance medication refills.     Primary Care Center Test Result notification information:  *You will be notified with in 7-10 days of your appointment day regarding the results of your test.  If you are on MyChart you will be notified as soon as the provider has reviewed the results and signed off on them.    Primary Care Center [phone]     Take Omeprazole 20 mg once per day.  Please schedule endoscopy.       Lifestyle Changes for Controlling GERD  When you have GERD, stomach acid feels as if it s backing up toward your mouth. Whether or not you take medicine to control your GERD, your symptoms can often be improved with lifestyle changes. Talk to your healthcare provider about the following suggestions. These suggestions may help you get relief from your symptoms.      Raise your head  Reflux is more likely to strike when you re lying down flat, because stomach fluid can flow backward more easily. Raising the head of your bed 4 to 6 inches can help. To do this:    Slide blocks or books under the legs at the head of your bed. Or, place a wedge under the mattress. [employer] can make a suitable wedge for you. The wedge should run from your waist to the top of  your head.    Don t just prop your head on several pillows. This increases pressure on your stomach. It can make GERD worse.  Watch your eating habits  Certain foods may increase the acid in your stomach or relax the lower esophageal sphincter. This makes GERD more likely. It s best to avoid the following if they cause you symptoms:    Coffee, tea, and carbonated drinks (with and without caffeine)    Fatty, fried, or spicy food    Mint, chocolate, onions, and tomatoes    Peppermint    Any other foods that seem to irritate your stomach or cause you pain  Relieve the pressure  Tips include the following:    Eat smaller meals, even if you have to eat more often.    Don t lie down right after you eat. Wait a few hours for your stomach to empty.    Avoid tight belts and tight-fitting clothes.    Lose excess weight.  Tobacco and alcohol  Avoid smoking tobacco and drinking alcohol. They can make GERD symptoms worse.  Date Last Reviewed: 7/1/2016 2000-2017 [employer]. [address] [place] [address]. All rights reserved. This information is not intended as a substitute for professional medical care. Always follow your healthcare professional's instructions.                Follow-ups after your visit        Additional Services     GASTROENTEROLOGY ADULT REFERRAL       Preferred Location: [facility] [phone]      Please be aware that coverage of these services is subject to the terms and limitations of your health insurance plan.  Call member services at your health plan with any benefit or coverage questions.  Any procedures must be performed at a [facility] facility OR coordinated by your clinic's referral office.    Please bring the following with you to your appointment:    (1) Any X-Rays, CTs or MRIs which have been performed.  Contact the facility where they were done to arrange for  prior to your scheduled appointment.    (2) List of current medications   (3) This referral  request   (4) Any documents/labs given to you for this referral                  Who to contact     Please call your clinic at [phone] to:    Ask questions about your health    Make or cancel appointments    Discuss your medicines    Learn about your test results    Speak to your doctor   If you have compliments or concerns about an experience at your clinic, or if you wish to file a complaint, please contact [facility] Patient Relations at [phone] or email us at [email or web address]         Additional Information About Your Visit        [employer] Information     [employer] is an electronic gateway that provides easy, online access to your medical records. With [employer], you can request a clinic appointment, read your test results, renew a prescription or communicate with your care team.     To sign up for [employer] visit the website at [email or web address]   You will be asked to enter the access code listed below, as well as some personal information. Please follow the directions to create your username and password.     Your access code is: [email or web address]  Expires: 2018  6:31 AM     Your access code will  in 90 days. If you need help or a new code, please contact your [facility] or call [phone] for assistance.        Care EveryWhere ID     This is your Care EveryWhere ID. This could be used by other organizations to access your [facility] medical records  [id number]        Your Vitals Were     Pulse Temperature Respirations Last Period Pulse Oximetry Breastfeeding?    73 97.9  F (36.6  C) (Oral) 16 2006 97% No    BMI (Body Mass Index)                   39.55 kg/m2            Blood Pressure from Last 3 Encounters:   10/17/17 117/80   17 134/86   17 127/78    Weight from Last 3 Encounters:   10/17/17 91.6 kg (202 lb)   17 91.2 kg (201 lb)   17 89.6 kg (197 lb 9.6 oz)              We Performed the  Following     FLU VACCINE HIGH DOSE PRESERVATIVE FREE, AGE =>65 YR     GASTROENTEROLOGY ADULT REFERRAL          Today's Medication Changes          These changes are accurate as of: 10/17/17  2:23 PM.  If you have any questions, ask your nurse or doctor.               These medicines have changed or have updated prescriptions.        Dose/Directions    * omeprazole 20 MG CR capsule   Commonly known as:  priLOSEC   This may have changed:  Another medication with the same name was added. Make sure you understand how and when to take each.   Used for:  Gastroesophageal reflux disease, esophagitis presence not specified   Changed by:  Tori Ramirez APRN CNP        Dose:  20 mg   Take 1 capsule (20 mg) by mouth daily   Quantity:  60 capsule   Refills:  1       * omeprazole 20 MG CR capsule   Commonly known as:  priLOSEC   This may have changed:  You were already taking a medication with the same name, and this prescription was added. Make sure you understand how and when to take each.   Used for:  Gastroesophageal reflux disease, esophagitis presence not specified   Changed by:  Violeta Bell MD        Dose:  20 mg   Take 1 capsule (20 mg) by mouth daily   Quantity:  30 capsule   Refills:  1       * Notice:  This list has 2 medication(s) that are the same as other medications prescribed for you. Read the directions carefully, and ask your doctor or other care provider to review them with you.         Where to get your medicines      These medications were sent to [address] [address]    Hours:  TRANSPLANT PHONE NUMBER [phone] Phone:  [phone]     omeprazole 20 MG CR capsule                Primary Care Provider    None Specified       No primary provider on file.        Equal Access to Services     [facility] KENIA AH: Beverly Vásquez, sally wasserman, rao ludwig  konrad jackfarnaz pineda'aan ah. [facility] [phone].    ATENCIÓN: Si mula valeri, tiene a bassett disposición servicios gratuitos de asistencia lingüística. Nazia al [phone].    We comply with applicable federal civil rights laws and [place] laws. We do not discriminate on the basis of race, color, national origin, age, disability, sex, sexual orientation, or gender identity.            Thank you!     Thank you for choosing [facility]  for your care. Our goal is always to provide you with excellent care. Hearing back from our patients is one way we can continue to improve our services. Please take a few minutes to complete the written survey that you may receive in the mail after your visit with us. Thank you!             Your Updated Medication List - Protect others around you: Learn how to safely use, store and throw away your medicines at www.disposemymeds.org.          This list is accurate as of: 10/17/17  2:23 PM.  Always use your most recent med list.                   Brand Name Dispense Instructions for use Diagnosis    calcium carbonate 500 MG chewable tablet    TUMS    180 tablet    Take 1 tablet (500 mg) by mouth 2 times daily    Gastroesophageal reflux disease, esophagitis presence not specified       CANE, ANY MATERIAL     1 Device    Use with ambulation    Right knee pain       diclofenac 1 % Gel topical gel    VOLTAREN    100 g    Apply 4 grams to knees four times daily using enclosed dosing card.    Primary osteoarthritis of both knees       FISH OIL + D3 PO      Take  by mouth daily.        gabapentin 100 MG capsule    NEURONTIN    90 capsule    Take 1 capsule (100 mg) by mouth 3 times daily Ok to fill early-pt is leaving on vacation for 2 months    Acute left-sided low back pain without sciatica       methocarbamol 500 MG tablet    ROBAXIN    60 tablet    Take 2 tablets (1,000 mg) by mouth 3 times daily as needed for muscle spasms    Acute left-sided  low back pain without sciatica       naproxen sodium 220 MG tablet    ALEVE    60 tablet    Take 1 tablet (220 mg) by mouth 2 times daily (with meals)    Acute left-sided low back pain without sciatica       * omeprazole 20 MG CR capsule    priLOSEC    60 capsule    Take 1 capsule (20 mg) by mouth daily    Gastroesophageal reflux disease, esophagitis presence not specified       * omeprazole 20 MG CR capsule    priLOSEC    30 capsule    Take 1 capsule (20 mg) by mouth daily    Gastroesophageal reflux disease, esophagitis presence not specified       polyethylene glycol powder    MIRALAX    500 g    Take 17 g (1 capful) by mouth daily    Slow transit constipation       ranitidine 150 MG tablet    ZANTAC    60 tablet    Take 1 tablet (150 mg) by mouth 2 times daily    Gastroesophageal reflux disease without esophagitis       vitamin D 1000 UNITS capsule      Take 1 capsule by mouth daily.    Osteoporosis, post-menopausal       * Notice:  This list has 2 medication(s) that are the same as other medications prescribed for you. Read the directions carefully, and ask your doctor or other care provider to review them with you.

## 2017-10-17 NOTE — PATIENT INSTRUCTIONS
[facility] Medication Refill Request Information:  * Please contact your pharmacy regarding ANY request for medication refills.  ** [facility] Prescription Fax = [phone]  * Please allow 3 business days for routine medication refills.  * Please allow 5 business days for controlled substance medication refills.     [facility] Test Result notification information:  *You will be notified with in 7-10 days of your appointment day regarding the results of your test.  If you are on MyChart you will be notified as soon as the provider has reviewed the results and signed off on them.    [facility] [phone]     Take Omeprazole 20 mg once per day.  Please schedule endoscopy.       Lifestyle Changes for Controlling GERD  When you have GERD, stomach acid feels as if it s backing up toward your mouth. Whether or not you take medicine to control your GERD, your symptoms can often be improved with lifestyle changes. Talk to your healthcare provider about the following suggestions. These suggestions may help you get relief from your symptoms.      Raise your head  Reflux is more likely to strike when you re lying down flat, because stomach fluid can flow backward more easily. Raising the head of your bed 4 to 6 inches can help. To do this:    Slide blocks or books under the legs at the head of your bed. Or, place a wedge under the mattress. [employer] can make a suitable wedge for you. The wedge should run from your waist to the top of your head.    Don t just prop your head on several pillows. This increases pressure on your stomach. It can make GERD worse.  Watch your eating habits  Certain foods may increase the acid in your stomach or relax the lower esophageal sphincter. This makes GERD more likely. It s best to avoid the following if they cause you symptoms:    Coffee, tea, and carbonated drinks (with and without caffeine)    Fatty, fried, or spicy food    Mint, chocolate, onions, and  tomatoes    Peppermint    Any other foods that seem to irritate your stomach or cause you pain  Relieve the pressure  Tips include the following:    Eat smaller meals, even if you have to eat more often.    Don t lie down right after you eat. Wait a few hours for your stomach to empty.    Avoid tight belts and tight-fitting clothes.    Lose excess weight.  Tobacco and alcohol  Avoid smoking tobacco and drinking alcohol. They can make GERD symptoms worse.  Date Last Reviewed: 7/1/2016 2000-2017 [employer]. [address] [place] [address], [place] [address]. All rights reserved. This information is not intended as a substitute for professional medical care. Always follow your healthcare professional's instructions.

## 2017-10-17 NOTE — NURSING NOTE
Chief Complaint   Patient presents with     Abdominal Pain     Here for GI abdominal pain     Nausea     Also here for Nausea     Rajiv Mckeon CMA (AAMA) at 1:25 PM on 10/17/2017

## 2017-10-17 NOTE — NURSING NOTE
"Injectable Influenza Immunization Documentation    1.  Has the patient received the information for the injectable influenza vaccine? YES     2. Is the patient 6 months of age or older? YES     3. Does the patient have any of the following contraindications?         Severe allergy to eggs? No     Severe allergic reaction to previous influenza vaccines? No   Severe allergy to latex? No       History of Guillain-Nazareth syndrome? No     Currently have a temperature greater than 100.4F? No        4.  Severely egg allergic patients should have flu vaccine eligibility assessed by an MD, RN, or pharmacist, and those who received flu vaccine should be observed for 15 min by an MD, RN, Pharmacist, Medical Technician, or member of clinic staff.\": YES    5. Latex-allergic patients should be given latex-free influenza vaccine Yes. Please reference the Vaccine latex table to determine if your clinic s product is latex-containing.       Administered Influenza Fluzone Quadrivalent (see Immunizations in Chart Review). Patient tolerated well.        Rajiv Mckeon CMA at 2:35 PM on 10/17/2017         "

## 2017-10-31 ENCOUNTER — TELEPHONE (OUTPATIENT)
Dept: GASTROENTEROLOGY | Facility: OUTPATIENT CENTER | Age: 62
End: 2017-10-31

## 2017-10-31 ENCOUNTER — TELEPHONE (OUTPATIENT)
Dept: INTERNAL MEDICINE | Facility: CLINIC | Age: 62
End: 2017-10-31

## 2017-10-31 NOTE — TELEPHONE ENCOUNTER
Patient taking any blood thinners ? no    Heart disease ? denies    Lung disease ? denies      Sleep apnea ? denies    Diabetic ? denies    Kidney disease ? denies    Dialysis ? n/a    Electronic implanted medical devices ? denies    Are you taking any narcotic pain medication ?no   What is your daily dosage ?    PTSD ? n/a    Prep instructions reviewed with patient ? Instructions,  policy, MAC sedation plan reviewed through [employer] . Advised patient to have someone stay with her post exam    Pharmacy : n/a    Indication for procedure : Gastroesophageal reflux disease without esophagitis [K21.9]     Referring provider :Karma Burger PA-C

## 2017-10-31 NOTE — TELEPHONE ENCOUNTER
I believe patient may have had incorrect order for GI referral, when we really wanted her to do an EGD.  Can our clinic or GI reach out to her about scheduling an EGD for her symptoms, as we discussed in clinic?  Thanks,  Manuela Gimenez MD  Internal Medicine

## 2017-11-01 ENCOUNTER — TELEPHONE (OUTPATIENT)
Dept: GASTROENTEROLOGY | Facility: CLINIC | Age: 62
End: 2017-11-01

## 2017-11-01 NOTE — TELEPHONE ENCOUNTER
Message left for patient through [employer]  to call back to schedule EGD. See order.    Manuela Guillory RN

## 2017-11-07 ENCOUNTER — DOCUMENTATION ONLY (OUTPATIENT)
Dept: GASTROENTEROLOGY | Facility: OUTPATIENT CENTER | Age: 62
End: 2017-11-07

## 2017-11-07 ENCOUNTER — TRANSFERRED RECORDS (OUTPATIENT)
Dept: HEALTH INFORMATION MANAGEMENT | Facility: CLINIC | Age: 62
End: 2017-11-07

## 2017-11-08 ENCOUNTER — APPOINTMENT (OUTPATIENT)
Dept: OPTOMETRY | Facility: CLINIC | Age: 62
End: 2017-11-08
Payer: COMMERCIAL

## 2017-11-08 ENCOUNTER — OFFICE VISIT (OUTPATIENT)
Dept: OPTOMETRY | Facility: CLINIC | Age: 62
End: 2017-11-08
Attending: NURSE PRACTITIONER
Payer: COMMERCIAL

## 2017-11-08 DIAGNOSIS — H52.03 HYPEROPIA OF BOTH EYES WITH ASTIGMATISM: Primary | ICD-10-CM

## 2017-11-08 DIAGNOSIS — H52.203 HYPEROPIA OF BOTH EYES WITH ASTIGMATISM: Primary | ICD-10-CM

## 2017-11-08 DIAGNOSIS — H25.13 NUCLEAR SCLEROSIS OF BOTH EYES: ICD-10-CM

## 2017-11-08 DIAGNOSIS — H52.4 PRESBYOPIA: ICD-10-CM

## 2017-11-08 DIAGNOSIS — H26.8 PSEUDOEXFOLIATION SYNDROME: ICD-10-CM

## 2017-11-08 PROCEDURE — 92341 FIT SPECTACLES BIFOCAL: CPT | Performed by: OPTOMETRIST

## 2017-11-08 PROCEDURE — 92014 COMPRE OPH EXAM EST PT 1/>: CPT | Performed by: OPTOMETRIST

## 2017-11-08 PROCEDURE — 92015 DETERMINE REFRACTIVE STATE: CPT | Performed by: OPTOMETRIST

## 2017-11-08 ASSESSMENT — VISUAL ACUITY
OS_CC+: -2
OD_SC: 20/150
OD_CC: 20/30
OS_CC: 20/30
OS_CC: 20/60
OD_CC+: -2
OS_SC: 20/150
METHOD: SNELLEN - LINEAR
OD_CC: 20/40
CORRECTION_TYPE: GLASSES

## 2017-11-08 ASSESSMENT — REFRACTION_MANIFEST
OS_AXIS: 005
OS_ADD: +2.25
OS_SPHERE: +1.50
OD_SPHERE: +1.25
OD_AXIS: 156
OD_ADD: +2.25
OS_SPHERE: +1.75
OS_CYLINDER: +0.25
METHOD_AUTOREFRACTION: 1
OD_SPHERE: +1.75
OD_CYLINDER: +1.00
OS_CYLINDER: +0.50
OD_AXIS: 164
OD_CYLINDER: +0.75
OS_AXIS: 35

## 2017-11-08 ASSESSMENT — REFRACTION_WEARINGRX
SPECS_TYPE: BIFOCAL
OS_SPHERE: +2.50
OD_AXIS: 170
OS_ADD: +2.50
OD_SPHERE: +2.50
OD_ADD: +2.50
OS_CYLINDER: +0.25
OD_CYLINDER: +0.50
OS_AXIS: 180

## 2017-11-08 ASSESSMENT — KERATOMETRY
OS_AXISANGLE_DEGREES: 92
OS_K2POWER_DIOPTERS: 45.12
OS_K1POWER_DIOPTERS: 44.50
METHOD_AUTO_MANUAL: AUTOMATED
OD_K2POWER_DIOPTERS: 45.00
OD_AXISANGLE_DEGREES: 101
OD_K1POWER_DIOPTERS: 44.75
OD_AXISANGLE2_DEGREES: 11
OS_AXISANGLE2_DEGREES: 2

## 2017-11-08 ASSESSMENT — EXTERNAL EXAM - RIGHT EYE: OD_EXAM: NORMAL

## 2017-11-08 ASSESSMENT — EXTERNAL EXAM - LEFT EYE: OS_EXAM: NORMAL

## 2017-11-08 ASSESSMENT — SLIT LAMP EXAM - LIDS
COMMENTS: NORMAL
COMMENTS: NORMAL

## 2017-11-08 ASSESSMENT — CONF VISUAL FIELD
OS_NORMAL: 1
OD_NORMAL: 1

## 2017-11-08 ASSESSMENT — CUP TO DISC RATIO
OD_RATIO: 0.35
OS_RATIO: 0.4

## 2017-11-08 ASSESSMENT — TONOMETRY: IOP_METHOD: APPLANATION

## 2017-11-08 NOTE — PROGRESS NOTES
Chief Complaint   Patient presents with     COMPREHENSIVE EYE EXAM     Routine        Last Eye Exam: 2yrs  Dilated Previously: Yes    What are you currently using to see?  glasses       Distance Vision Acuity: Satisfied with vision    Near Vision Acuity: Not satisfied     Eye Comfort: good  Do you use eye drops? : No  Occupation or Hobbies: Everyday              Medical, surgical and family histories reviewed and updated 11/8/2017.       OBJECTIVE: See Ophthalmology exam    ASSESSMENT:    ICD-10-CM    1. Hyperopia of both eyes with astigmatism H52.03     H52.203    2. Presbyopia H52.4    3. Nuclear sclerosis of both eyes H25.13    4. 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

## 2017-11-08 NOTE — MR AVS SNAPSHOT
After Visit Summary   11/8/2017    Kian Cortez    MRN: [id number]           Patient Information     Date Of Birth          1955        Visit Information        Provider Department      11/8/2017 12:30 PM Fabby Jose, OD; [employer] [facility]        Today's Diagnoses     Hyperopia of both eyes with astigmatism    -  1    Presbyopia          Care Instructions     DRY EYE TREATMENT    I recommend using artificial tears for your dry eye. There are over the counter drops that work well and may be used up to 4x daily. ( systane balance, refresh optive, soothe xp)   If you need more than 4 drops daily, use a preservative free product which come in individual vials which may be used for 24 hours and discarded.     Artificial tears work best as a preventative and not as well after your eyes are starting to bother you.  It may take 4- 6 weeks of using the drops before you notice improvement.  If after that time you are still having problems schedule an appointment for an evaluation and discussion of different treatments.  Dry eyes are a chronic condition and you may have more symptoms at certain times of the year.      Additional recommended treatment:  Warm compresses once to twice daily for 5-10 minutes    Directions for warm soaks  There are few methods for hot compresses. Moisten a washcloth with hot water, or microwave for 10 seconds, being careful to not get the cloth too hot.   Then put the washcloth onto your eyelids for 5 minutes. It will cool quickly so a rice pack or eyemask that can be heated and laid on top of the washcloth will help retain the heat.          Omega 3 fatty acid supplements taken 1-2x daily            Follow-ups after your visit        Your next 10 appointments already scheduled     Jan 08, 2018 10:00 AM CST   (Arrive by 9:45 AM)   New Patient Visit with Julien Long MD   [facility] ([facility]    [address]  [address] [facility] [address] [phone]              Who to contact     If you have questions or need follow up information about today's clinic visit or your schedule please contact [facility] directly at [phone].  Normal or non-critical lab and imaging results will be communicated to you by MyChart, letter or phone within 4 business days after the clinic has received the results. If you do not hear from us within 7 days, please contact the clinic through MyChart or phone. If you have a critical or abnormal lab result, we will notify you by phone as soon as possible.  Submit refill requests through [employer] or call your pharmacy and they will forward the refill request to us. Please allow 3 business days for your refill to be completed.          Additional Information About Your Visit        MyChart Information     [employer] lets you send messages to your doctor, view your test results, renew your prescriptions, schedule appointments and more. To sign up, go to [email or web address] . Click on \"Log in\" on the left side of the screen, which will take you to the Welcome page. Then click on \"Sign up Now\" on the right side of the page.     You will be asked to enter the access code listed below, as well as some personal information. Please follow the directions to create your username and password.     Your access code is: [email or web address]  Expires: 2018  5:31 AM     Your access code will  in 90 days. If you need help or a new code, please call your [place] clinic or [phone].        Care EveryWhere ID     This is your Care EveryWhere ID. This could be used by other organizations to access your [place] medical records  [id number]        Your Vitals Were     Last Period                   2006            Blood Pressure from Last 3 Encounters:   10/17/17 117/80   17 134/86   17 127/78    Weight from Last 3 " Encounters:   10/17/17 91.6 kg (202 lb)   05/23/17 91.2 kg (201 lb)   05/16/17 89.6 kg (197 lb 9.6 oz)              Today, you had the following     No orders found for display       Primary Care Provider    None Specified       No primary provider on file.        Equal Access to Services     BETSYINOCENCIA KENIA : Hadii leila ku hadayeo Sotannaali, waaxda luqadaha, qaybta kaalmada cliffsharronedwina, konrad vilarickeyrandy fam. So [facility] [phone].    ATENCIÓN: Si habla español, tiene a bassett disposición servicios gratuitos de asistencia lingüística. Llame al [phone].    We comply with applicable federal civil rights laws and [place] laws. We do not discriminate on the basis of race, color, national origin, age, disability, sex, sexual orientation, or gender identity.            Thank you!     Thank you for choosing [facility]  for your care. Our goal is always to provide you with excellent care. Hearing back from our patients is one way we can continue to improve our services. Please take a few minutes to complete the written survey that you may receive in the mail after your visit with us. Thank you!             Your Updated Medication List - Protect others around you: Learn how to safely use, store and throw away your medicines at www.disposemymeds.org.          This list is accurate as of: 11/8/17  1:37 PM.  Always use your most recent med list.                   Brand Name Dispense Instructions for use Diagnosis    calcium carbonate 500 MG chewable tablet    TUMS    180 tablet    Take 1 tablet (500 mg) by mouth 2 times daily    Gastroesophageal reflux disease, esophagitis presence not specified       CANE, ANY MATERIAL     1 Device    Use with ambulation    Right knee pain       diclofenac 1 % Gel topical gel    VOLTAREN    100 g    Apply 4 grams to knees four times daily using enclosed dosing card.    Primary osteoarthritis of both knees       FISH OIL + D3 PO      Take  by mouth daily.         gabapentin 100 MG capsule    NEURONTIN    90 capsule    Take 1 capsule (100 mg) by mouth 3 times daily Ok to fill early-pt is leaving on vacation for 2 months    Acute left-sided low back pain without sciatica       methocarbamol 500 MG tablet    ROBAXIN    60 tablet    Take 2 tablets (1,000 mg) by mouth 3 times daily as needed for muscle spasms    Acute left-sided low back pain without sciatica       naproxen sodium 220 MG tablet    ALEVE    60 tablet    Take 1 tablet (220 mg) by mouth 2 times daily (with meals)    Acute left-sided low back pain without sciatica       * omeprazole 20 MG CR capsule    priLOSEC    60 capsule    Take 1 capsule (20 mg) by mouth daily    Gastroesophageal reflux disease, esophagitis presence not specified       * omeprazole 20 MG CR capsule    priLOSEC    30 capsule    Take 1 capsule (20 mg) by mouth daily    Gastroesophageal reflux disease, esophagitis presence not specified       polyethylene glycol powder    MIRALAX    500 g    Take 17 g (1 capful) by mouth daily    Slow transit constipation       ranitidine 150 MG tablet    ZANTAC    60 tablet    Take 1 tablet (150 mg) by mouth 2 times daily    Gastroesophageal reflux disease without esophagitis       vitamin D 1000 UNITS capsule      Take 1 capsule by mouth daily.    Osteoporosis, post-menopausal       * Notice:  This list has 2 medication(s) that are the same as other medications prescribed for you. Read the directions carefully, and ask your doctor or other care provider to review them with you.

## 2017-11-08 NOTE — PATIENT INSTRUCTIONS
DRY EYE TREATMENT    I recommend using artificial tears for your dry eye. There are over the counter drops that work well and may be used up to 4x daily. ( systane balance, refresh optive, soothe xp)   If you need more than 4 drops daily, use a preservative free product which come in individual vials which may be used for 24 hours and discarded.     Artificial tears work best as a preventative and not as well after your eyes are starting to bother you.  It may take 4- 6 weeks of using the drops before you notice improvement.  If after that time you are still having problems schedule an appointment for an evaluation and discussion of different treatments.  Dry eyes are a chronic condition and you may have more symptoms at certain times of the year.      Additional recommended treatment:  Warm compresses once to twice daily for 5-10 minutes    Directions for warm soaks  There are few methods for hot compresses. Moisten a washcloth with hot water, or microwave for 10 seconds, being careful to not get the cloth too hot.   Then put the washcloth onto your eyelids for 5 minutes. It will cool quickly so a rice pack or eyemask that can be heated and laid on top of the washcloth will help retain the heat.          Omega 3 fatty acid supplements taken 1-2x daily

## 2017-11-10 LAB — COPATH REPORT: NORMAL

## 2017-11-28 ENCOUNTER — OFFICE VISIT (OUTPATIENT)
Dept: FAMILY MEDICINE | Facility: CLINIC | Age: 62
End: 2017-11-28
Payer: COMMERCIAL

## 2017-11-28 VITALS
TEMPERATURE: 97.4 F | HEART RATE: 86 BPM | DIASTOLIC BLOOD PRESSURE: 80 MMHG | BODY MASS INDEX: 39.59 KG/M2 | SYSTOLIC BLOOD PRESSURE: 117 MMHG | WEIGHT: 202.2 LBS

## 2017-11-28 DIAGNOSIS — E66.01 MORBID OBESITY, UNSPECIFIED OBESITY TYPE (H): ICD-10-CM

## 2017-11-28 DIAGNOSIS — K21.9 GASTROESOPHAGEAL REFLUX DISEASE WITHOUT ESOPHAGITIS: Primary | ICD-10-CM

## 2017-11-28 LAB
CHOLEST SERPL-MCNC: 177 MG/DL
GLUCOSE BLD-MCNC: 86 MG/DL (ref 70–99)
HDLC SERPL-MCNC: 68 MG/DL
LDLC SERPL CALC-MCNC: 94 MG/DL
NONHDLC SERPL-MCNC: 109 MG/DL
TRIGL SERPL-MCNC: 75 MG/DL

## 2017-11-28 PROCEDURE — 36415 COLL VENOUS BLD VENIPUNCTURE: CPT | Performed by: PHYSICIAN ASSISTANT

## 2017-11-28 PROCEDURE — 80061 LIPID PANEL: CPT | Performed by: PHYSICIAN ASSISTANT

## 2017-11-28 PROCEDURE — 82947 ASSAY GLUCOSE BLOOD QUANT: CPT | Performed by: PHYSICIAN ASSISTANT

## 2017-11-28 PROCEDURE — 99214 OFFICE O/P EST MOD 30 MIN: CPT | Performed by: PHYSICIAN ASSISTANT

## 2017-11-28 NOTE — LETTER
[facility]  [address] [place]  [address]  [phone]        November 29, 2017    Kian Cortez  [address]  [facility] [address]        Dear Kian,    Your cholesterol and blood sugar is normal.  NO concerns.     Results for orders placed or performed in visit on 11/28/17   Glucose, whole blood   Result Value Ref Range    Glucose Whole Blood 86 70 - 99 mg/dL   Lipid panel reflex to direct LDL Fasting   Result Value Ref Range    Cholesterol 177 <200 mg/dL    Triglycerides 75 <150 mg/dL    HDL Cholesterol 68 >49 mg/dL    LDL Cholesterol Calculated 94 <100 mg/dL    Non HDL Cholesterol 109 <130 mg/dL       If you have any questions please call the clinic at [phone].    Sincerely,    Karma VERONICA

## 2017-11-28 NOTE — NURSING NOTE
"Chief Complaint   Patient presents with     RECHECK     blood pressure     [facility] F/U     [facility], 11/11/2017       Initial /80 (BP Location: Right arm, Patient Position: Chair, Cuff Size: Adult Large)  Pulse 86  Temp 97.4  F (36.3  C) (Oral)  Wt 202 lb 3.2 oz (91.7 kg)  LMP 04/19/2006  Breastfeeding? No  BMI 39.59 kg/m2 Estimated body mass index is 39.59 kg/(m^2) as calculated from the following:    Height as of 5/23/17: 4' 11.92\" (1.522 m).    Weight as of this encounter: 202 lb 3.2 oz (91.7 kg).  Medication Reconciliation: complete     RUBÉN Reynoso MA      "

## 2017-11-28 NOTE — PATIENT INSTRUCTIONS
Avoid greasy fatty foods, spicy food, tomatoes, chocolate, tea, coffee.     Eating smaller frequent meals might help.    Continue omeprazole for a month to heal the stomach lining.  I have reordered it for you   Continue to avoid ibuprofen or aleve but can use tylenol   Avoid becoming constipated   If pain is not better in one month return to clinic.  If pain returns after stopping omeprazole return to clinic.

## 2017-11-28 NOTE — MR AVS SNAPSHOT
After Visit Summary   11/28/2017    Kian Cortez    MRN: [id number]           Patient Information     Date Of Birth          1955        Visit Information        Provider Department      11/28/2017 10:20 AM Karma Burger PA-C; [employer] [facility]        Today's Diagnoses     Gastroesophageal reflux disease without esophagitis    -  1    Morbid obesity, unspecified obesity type (H)        Gastroesophageal reflux disease, esophagitis presence not specified          Care Instructions    Avoid greasy fatty foods, spicy food, tomatoes, chocolate, tea, coffee.     Eating smaller frequent meals might help.    Continue omeprazole for a month to heal the stomach lining.  I have reordered it for you   Continue to avoid ibuprofen or aleve but can use tylenol   Avoid becoming constipated   If pain is not better in one month return to clinic.  If pain returns after stopping omeprazole return to clinic.            Follow-ups after your visit        Your next 10 appointments already scheduled     Jan 08, 2018 10:00 AM CST   (Arrive by 9:45 AM)   New Patient Visit with Julien Long MD   [facility] ([facility])    [address] [place] [address] [phone]              Who to contact     If you have questions or need follow up information about today's clinic visit or your schedule please contact [facility] directly at [phone].  Normal or non-critical lab and imaging results will be communicated to you by MyChart, letter or phone within 4 business days after the clinic has received the results. If you do not hear from us within 7 days, please contact the clinic through MyChart or phone. If you have a critical or abnormal lab result, we will notify you by phone as soon as possible.  Submit refill requests through [employer] or call your pharmacy and  "they will forward the refill request to us. Please allow 3 business days for your refill to be completed.          Additional Information About Your Visit        MyChart Information     [employer] lets you send messages to your doctor, view your test results, renew your prescriptions, schedule appointments and more. To sign up, go to [email or web address] . Click on \"Log in\" on the left side of the screen, which will take you to the Welcome page. Then click on \"Sign up Now\" on the right side of the page.     You will be asked to enter the access code listed below, as well as some personal information. Please follow the directions to create your username and password.     Your access code is: [email or web address]  Expires: 2018  5:31 AM     Your access code will  in 90 days. If you need help or a new code, please call your [place] clinic or [phone].        Care EveryWhere ID     This is your Care EveryWhere ID. This could be used by other organizations to access your [place] medical records  [id number]        Your Vitals Were     Pulse Temperature Last Period Breastfeeding? BMI (Body Mass Index)       86 97.4  F (36.3  C) (Oral) 2006 No 39.59 kg/m2        Blood Pressure from Last 3 Encounters:   17 117/80   10/17/17 117/80   17 134/86    Weight from Last 3 Encounters:   17 202 lb 3.2 oz (91.7 kg)   10/17/17 202 lb (91.6 kg)   17 201 lb (91.2 kg)              We Performed the Following     Glucose, whole blood     Lipid panel reflex to direct LDL Fasting          Where to get your medicines      These medications were sent to [facility] - [place], [place] - [address]  [address], [place] [address]     Phone:  [phone]     omeprazole 20 MG CR capsule          Primary Care Provider Fax #    Physician No Ref-Primary [phone]       No address on file        Equal Access to Services     GAYLE AQUINO AH: Arleenii leila hayes " jyothi Vásquez, warosa elenada luqadaha, qaybta kanolanda guero, konrad larson clifffarnaz cadenceterri larebecarosio cristel. So [facility] [phone].    ATENCIÓN: Si habla español, tiene a bassett disposición servicios gratuitos de asistencia lingüística. Nazia al [phone].    We comply with applicable federal civil rights laws and [place] laws. We do not discriminate on the basis of race, color, national origin, age, disability, sex, sexual orientation, or gender identity.            Thank you!     Thank you for choosing [facility]  for your care. Our goal is always to provide you with excellent care. Hearing back from our patients is one way we can continue to improve our services. Please take a few minutes to complete the written survey that you may receive in the mail after your visit with us. Thank you!             Your Updated Medication List - Protect others around you: Learn how to safely use, store and throw away your medicines at www.disposemymeds.org.          This list is accurate as of: 11/28/17 11:00 AM.  Always use your most recent med list.                   Brand Name Dispense Instructions for use Diagnosis    calcium carbonate 500 MG chewable tablet    TUMS    180 tablet    Take 1 tablet (500 mg) by mouth 2 times daily    Gastroesophageal reflux disease, esophagitis presence not specified       CANE, ANY MATERIAL     1 Device    Use with ambulation    Right knee pain       diclofenac 1 % Gel topical gel    VOLTAREN    100 g    Apply 4 grams to knees four times daily using enclosed dosing card.    Primary osteoarthritis of both knees       FISH OIL + D3 PO      Take  by mouth daily.        omeprazole 20 MG CR capsule    priLOSEC    30 capsule    Take 1 capsule (20 mg) by mouth daily    Gastroesophageal reflux disease, esophagitis presence not specified       polyethylene glycol powder    MIRALAX    500 g    Take 17 g (1 capful) by mouth daily    Slow transit constipation       TYLENOL PO            vitamin D 1000 UNITS capsule      Take 1 capsule by mouth daily.    Osteoporosis, post-menopausal

## 2017-11-28 NOTE — PROGRESS NOTES
SUBJECTIVE:   Kian Cortez is a 62 year old female who presents to clinic today for the following health issues:      ED/UC Followup:    Facility:  [facility]  Date of visit: 11/11/2017  Reason for visit: Stomach Pain   Current Status: Pt is doing better with change in diet and medication. When she eats at times the pain in her stomach comes back.      Was seen by IM at the [facility] earlier this month for her stomach pain.  They did an endoscopy that showed inflammation but no h. Pylori.  Lifestyle changes recommended.  Also has a small hiatal hernia.    Knows she should avoid spicy food.    Omeprazole doesn't help but diet does.    Not using nsaids.  Only tylenol when needed.    When hungry she gets a headache.  Wants her blood sugar tested.        Problem list and histories reviewed & adjusted, as indicated.  Additional history: as documented    Patient Active Problem List   Diagnosis     Advanced directives, counseling/discussion     CARDIOVASCULAR SCREENING; LDL GOAL LESS THAN 130     Pseudoexfoliation syndrome, right eye     Gastroesophageal reflux disease without esophagitis     Osteopenia     Constipation, unspecified constipation type     Morbid obesity, unspecified obesity type (H)     Vertigo     BPPV (benign paroxysmal positional vertigo), unspecified laterality     Past Surgical History:   Procedure Laterality Date     DILATION AND CURETTAGE SUCTION       EYE SURGERY      eye duct repair 10+ years  ago     NASOLACRIMAL DUCT PROBE/IRRG         Social History   Substance Use Topics     Smoking status: Never Smoker     Smokeless tobacco: Never Used     Alcohol use No     Family History   Problem Relation Age of Onset     Other Cancer Brother      CANCER Brother      Glaucoma No family hx of      Macular Degeneration No family hx of      DIABETES No family hx of      Hypertension No family hx of      CEREBROVASCULAR DISEASE No family hx of      Thyroid Disease No family hx of               Reviewed and updated as needed this visit by clinical staffTobacco  Allergies  Meds  Med Hx  Surg Hx  Fam Hx  Soc Hx      Reviewed and updated as needed this visit by Provider         ROS:  As above    OBJECTIVE:     /80 (BP Location: Right arm, Patient Position: Chair, Cuff Size: Adult Large)  Pulse 86  Temp 97.4  F (36.3  C) (Oral)  Wt 202 lb 3.2 oz (91.7 kg)  LMP 04/19/2006  Breastfeeding? No  BMI 39.59 kg/m2  Body mass index is 39.59 kg/(m^2).  GENERAL: alert, no distress and obese  RESP: lungs clear to auscultation - no rales, rhonchi or wheezes  CV: regular rates and rhythm, normal S1 S2, no S3 or S4 and no murmur, click or rub  ABDOMEN: soft, nontender, no hepatosplenomegaly, no masses and bowel sounds normal    Diagnostic Test Results:  Endoscopy showed dyspepsia and GERD with no esophagitis or h. Pylori        ASSESSMENT/PLAN:       1. Gastroesophageal reflux disease without esophagitis  Discussed lifestyle changes again.  encouraged one month of omeprazole and then follow up if pain is not better or pain gets better then returns.      2. Morbid obesity, unspecified obesity type (H)  Follow up as needed  - Glucose, whole blood  - Lipid panel reflex to direct LDL Fasting    Patient Instructions   Avoid greasy fatty foods, spicy food, tomatoes, chocolate, tea, coffee.     Eating smaller frequent meals might help.    Continue omeprazole for a month to heal the stomach lining.  I have reordered it for you   Continue to avoid ibuprofen or aleve but can use tylenol   Avoid becoming constipated   If pain is not better in one month return to clinic.  If pain returns after stopping omeprazole return to clinic.        Karma Burger PA-C  [facility]

## 2017-12-19 NOTE — TELEPHONE ENCOUNTER
APPT INFO    Date /Time: 1/8/17 10AM    Reason for Appt: DX: Acid reflux    Ref Provider/Clinic: [facility]    Are there internal records? Yes/No?  IF YES, list clinic names: [facility] (referring)   [facility]   Upper EGD 11/07/17   Colonoscopy 6/01/11    Are there outside records? Yes/No? No    Patient Contact (Y/N) & Call Details: No, pt was referred.    Action: Closing encounter

## 2018-01-03 ENCOUNTER — TELEPHONE (OUTPATIENT)
Dept: FAMILY MEDICINE | Facility: CLINIC | Age: 63
End: 2018-01-03

## 2018-01-03 NOTE — TELEPHONE ENCOUNTER
Patient was last seen 11/28/17 for ER follow up for stomach pain, she has gastro consult on 1/8/18.     She states she is taking her omprazole and it seemed to be helping but the last few days is having more burning to her stomach.   Denies fever, severe pain, vomiting, diarrhea, or constipation.     She is also worried about new issue, trouble sleeping.   She denies her abdominal pain is keeping her up.   Says they are unrelated issues.  She is tired but denies dizziness or trouble walking.  Is drinking fluids and urinating.   Pain comes and goes, she is not sure if related to eating or not.  She also had her daughter talk to me on the phone to clarify some questions, mild language barrier.    Patient's daughter says her mom had a respiratory illness last week but is better now.   Since then also unable to sleep.  They have tried melatonin but not helping.    Discussed sleep hygiene measures, scheduled to see Karma Burger PA-C but no openings until Friday.   Patient declined to see alternate provider sooner.  Advised to seek evaluation in ER for suddenly severely worsened symptoms, fainting, vomiting, vomiting or stooling blood, not urinating at least once in 8-12 hours or other alarming symptoms.    Patient and daughter verbalized understanding of and agreement with plan.    Source:  Telephone Triage Protocols for Nurses, [place], 5th ed, abdominal pain, adult    Yazmin Renee, JIGNA  [facility]

## 2018-01-04 ENCOUNTER — HOSPITAL ENCOUNTER (EMERGENCY)
Facility: CLINIC | Age: 63
Discharge: HOME | End: 2018-01-04
Attending: FAMILY MEDICINE | Admitting: FAMILY MEDICINE
Payer: COMMERCIAL

## 2018-01-04 VITALS
SYSTOLIC BLOOD PRESSURE: 107 MMHG | OXYGEN SATURATION: 98 % | RESPIRATION RATE: 16 BRPM | BODY MASS INDEX: 39.04 KG/M2 | HEART RATE: 66 BPM | WEIGHT: 199.38 LBS | TEMPERATURE: 96.2 F | DIASTOLIC BLOOD PRESSURE: 71 MMHG

## 2018-01-04 DIAGNOSIS — G47.00 INSOMNIA, UNSPECIFIED TYPE: ICD-10-CM

## 2018-01-04 PROCEDURE — 99283 EMERGENCY DEPT VISIT LOW MDM: CPT | Mod: Z6 | Performed by: FAMILY MEDICINE

## 2018-01-04 PROCEDURE — 99283 EMERGENCY DEPT VISIT LOW MDM: CPT | Performed by: FAMILY MEDICINE

## 2018-01-04 RX ORDER — QUETIAPINE FUMARATE 25 MG/1
25 TABLET, FILM COATED ORAL
Qty: 30 TABLET | Refills: 1 | Status: SHIPPED | OUTPATIENT
Start: 2018-01-04 | End: 2018-01-05 | Stop reason: ALTCHOICE

## 2018-01-04 NOTE — ED PROVIDER NOTES
History     Chief Complaint   Patient presents with     Insomnia     has tried using melatonin and tylenol pm , minimal sleep over the lsst 1 week     HPI  Kian Cortez is a 62 year old female with a history of diverticulosis who presents to the Emergency Department for evaluation of insomnia.  Patient has had trouble sleeping for the past few years and was evaluated for this by her primary care provider in June/July 2017.  This is worsened over the past week, she has only been sleeping 1-2 hours per night.  She has tried using melatonin (10 mg) and Tylenol PM, but has had little improvement.  Patient drinks coffee daily in the morning and denies other caffeine use.  She denies alcohol or drug use.  She is not typically on the computer before bed, but occasionally watches TV.  She reports that she does not snore, but her  is.  She denies feelings of anxiety when trying to sleep, or daily depression or anxiety.  She denies any pain preventing her from sleeping.  She has she has been more forgetful recently, but this is occasional.    Past Medical History:   Diagnosis Date     Diverticulosis 11/2015    on CT scan     Osteoarthritis of right knee      Osteopenia 4/27/2015       Past Surgical History:   Procedure Laterality Date     DILATION AND CURETTAGE SUCTION       EYE SURGERY      eye duct repair 10+ years  ago     NASOLACRIMAL DUCT PROBE/IRRG         Family History   Problem Relation Age of Onset     Other Cancer Brother      CANCER Brother      Glaucoma No family hx of      Macular Degeneration No family hx of      DIABETES No family hx of      Hypertension No family hx of      CEREBROVASCULAR DISEASE No family hx of      Thyroid Disease No family hx of        Social History   Substance Use Topics     Smoking status: Never Smoker     Smokeless tobacco: Never Used     Alcohol use No       No current facility-administered medications for this encounter.      Current Outpatient Prescriptions   Medication      omeprazole (PRILOSEC) 20 MG CR capsule     Cholecalciferol (VITAMIN D) 1000 UNITS capsule     Fish Oil-Cholecalciferol (FISH OIL + D3 PO)     traZODone (DESYREL) 50 MG tablet     Acetaminophen (TYLENOL PO)     diclofenac (VOLTAREN) 1 % GEL topical gel     polyethylene glycol (MIRALAX) powder     calcium carbonate (TUMS) 500 MG chewable tablet      No Known Allergies      I have reviewed the Medications, Allergies, Past Medical and Surgical History, and Social History in the Epic system.    Review of Systems    ROS: 10 point ROS neg other than the symptoms noted above in the HPI.    Physical Exam   BP: (!) 134/94  Pulse: 73  Temp: 97.8  F (36.6  C)  Resp: 16  Weight: 90.4 kg (199 lb 6 oz)  SpO2: 98 %      Physical Exam   Constitutional: She is oriented to person, place, and time. She appears well-developed and well-nourished.   HENT:   Head: Normocephalic and atraumatic.   Mouth/Throat: Oropharynx is clear and moist.   Eyes: EOM are normal. Pupils are equal, round, and reactive to light.   Neck: Normal range of motion. Neck supple. No tracheal deviation present. No thyromegaly present.   Cardiovascular: Normal rate, regular rhythm, normal heart sounds and intact distal pulses.  Exam reveals no gallop and no friction rub.    No murmur heard.  Pulmonary/Chest: Effort normal and breath sounds normal. She exhibits no tenderness.   Abdominal: Soft. Bowel sounds are normal. She exhibits no distension and no mass. There is no tenderness.   Musculoskeletal: She exhibits no edema or tenderness.   Neurological: She is alert and oriented to person, place, and time. No cranial nerve deficit. Coordination normal.   Skin: Skin is warm and dry. No rash noted.   Psychiatric: Her speech is normal and behavior is normal. Judgment and thought content normal. Her mood appears anxious. Cognition and memory are normal.   Nursing note and vitals reviewed.      ED Course   9:33 AM  The patient was seen and examined by Erik Oconnell MD  in Room 18.     ED Course     Procedures             Critical Care time:  none             Labs Ordered and Resulted from Time of ED Arrival Up to the Time of Departure from the ED - No data to display         Assessments & Plan (with Medical Decision Making)   Patient presenting with acute on chronic insomnia which has not responded to melatonin and Tylenol PM at home.  She does not endorse any acute psychiatric or medical complaints today otherwise, has normal vitals, normal exam, and there is no obviously identifiable etiology of her sleep disorder.  She does have some mild symptoms of anxiety, and is very definitive and her request that I provide her with some type of pharmaceutical assistance for sleep.  Ultimately I counseled her that this problem would best be addressed through her primary physician who could adjust her medications or titrate based on response, but did agree to prescribe a small number of Seroquel.  Discussed expected course, need for follow up, and indications for return with the patient.  See discharge instructions.      I have reviewed the nursing notes.    I have reviewed the findings, diagnosis, plan and need for follow up with the patient.    Discharge Medication List as of 1/4/2018 10:06 AM      START taking these medications    Details   QUEtiapine (SEROQUEL) 25 MG tablet Take 1 tablet (25 mg) by mouth nightly as needed, Disp-30 tablet, R-1, Local Print             Final diagnoses:   Insomnia, unspecified type   I, Rae Zepeda, am serving as a trained medical scribe to document services personally performed by Erik Oconnell MD, based on the provider's statements to me.      I, Erik Oconnell MD, was physically present and have reviewed and verified the accuracy of this note documented by Rae Zepeda.      1/4/2018   [facility], [place], EMERGENCY DEPARTMENT     Ezequiel Oconnell MD  01/08/18 5907

## 2018-01-04 NOTE — DISCHARGE INSTRUCTIONS
Thank you for choosing [facility].     Please closely monitor for further symptoms. Return to the Emergency Department if you develop any new or worsening signs or symptoms.    If you received any opiate pain medications or sedatives during your visit, please do not drive for at least 8 hours.     Labs, cultures or final xray interpretations may still need to be reviewed.  We will call you if your plan of care needs to be changed.    Please follow up with your primary care physician or clinic as soon as possible to further discuss your issues with insomnia.      What Is Insomnia?    Do you have trouble falling asleep? Do you wake up often during the night? Or maybe you wake up too early in the morning. You may be suffering from insomnia. Talk to your healthcare provider if it lasts longer than a few weeks and you feel tired most of the time.  What causes insomnia?  Some common causes of insomnia are:    Medical problems such as pain, depression, medicine side effects, or trouble breathing    Circadian rhythm disorder, a shift in the body s normal 24-hour activity cycle    Lifestyle factors such as a changing sleep schedule, lack of exercise, or too much caffeine    Sleep settings such as a poor mattress, noise, or a room that s too hot or too cold    Stress such as problems at work, money worries, or family events  Talk to your healthcare provider  Describe your sleeping problems to your healthcare provider. Try to keep a daily sleep diary for a couple weeks. Write down the time you go to bed, the time you wake up, and anything that seems to affect your sleep. Your healthcare provider can work with you to develop a treatment plan. You may need to learn good sleeping habits and make some lifestyle changes. If you have any medical problems, these may need to be treated first.  Date Last Reviewed: 7/18/2015 2000-2017 [employer]. [address] [place] [address], [place] [address].  All rights reserved. This information is not intended as a substitute for professional medical care. Always follow your healthcare professional's instructions.          Sleep and Women: Life Stages  A woman goes through many stages during her lifetime. These stages are a natural part of being a woman. Physical and emotional changes take place during the menstrual cycle, pregnancy, motherhood, and menopause. These changes can affect sleep, even cause insomnia. But there are ways you can improve your sleep.     Try using a pillow to support your body while you sleep.   Menstrual cycle  Many women have physical or emotional symptoms before or during their period. These symptoms may include mood swings, cramping, and fatigue. They can affect how you feel and how you sleep. Eating a well-balanced diet low in fat, salt, and sugar may reduce your symptoms. Vitamin and mineral supplements may also help. Regular exercise can reduce stress and relieve some of your symptoms. You will have more energy during the day and be more tired at bedtime. Morning or afternoon exercise is best. Nighttime exercise may affect your sleep.  Pregnancy    Take a warm shower before bed.    Ask your partner to massage your shoulders, neck, or back.    Sleep with pillows under your stomach and back, and between your knees.    Avoid naps after 3 pm.    Exercise and practice good posture. Sleep on a firm mattress.    To reduce frequent urination at night, drink most of your fluids earlier in the day.    Sleep with your upper body raised several inches. Don t lie down for 2 hours after you eat.    Walk, stretch, or massage restless legs.    Avoid or limit coffee, black tea, and cola. These may keep you awake at night.    Try relaxation techniques.  Motherhood    Ask for help when you need it. Accept help when it s offered.    Many new mothers feel a little down for a few weeks. Share your feelings with your loved ones. Talk to your health care  provider if your feelings get in the way of sleeping or eating.    Try to adjust your baby s sleep to fit a day-night cycle. At night, have lights dim and the setting quiet. During the day, keep your baby active longer. Then he or she will sleep better at night.    Take a daily walk with your baby. Fresh air and daylight will help you both sleep better.    When your baby sleeps, lie down for a nap or put your feet up and rest.    Avoid or limit coffee, black tea, and cola. These may keep you awake at night.  Menopause  Menopause is when you stop having periods for good. Just before menopause, your body produces fewer female hormones. This can cause physical, mental, or emotional changes that may affect your sleep. Try these tips:    If you have hot flashes and night sweats, avoid caffeine and spicy foods at nighttime. Wear a cotton nightgown and put cotton sheets on your bed. Keep the room cool and dark. Use a portable fan.    Mood swings can cause insomnia, memory loss, fatigue, or depression. If these affect your sleep, talk to your health care provider. Lift your spirits by exercising and doing things you enjoy.    Try relaxation techniques. Exercise regularly.    Avoid alcohol.    Avoid naps after 3 pm.    Only use the bedroom for sleep and sex.  Date Last Reviewed: 4/26/2015 2000-2017 [employer]. [address] [place] [address], [place] [address]. All rights reserved. This information is not intended as a substitute for professional medical care. Always follow your healthcare professional's instructions.

## 2018-01-04 NOTE — ED AVS SNAPSHOT
[facility], Emergency Department    [address]    [place] [address]    Phone:  [phone]    Fax:  [phone]                                       Kian Cortez   MRN: [id number]    Department:  [facility], Emergency Department   Date of Visit:  1/4/2018           Patient Information     Date Of Birth          1955        Your diagnoses for this visit were:     Insomnia, unspecified type        You were seen by Ezequiel Oconnell MD.        Discharge Instructions       Thank you for choosing [facility].     Please closely monitor for further symptoms. Return to the Emergency Department if you develop any new or worsening signs or symptoms.    If you received any opiate pain medications or sedatives during your visit, please do not drive for at least 8 hours.     Labs, cultures or final xray interpretations may still need to be reviewed.  We will call you if your plan of care needs to be changed.    Please follow up with your primary care physician or clinic as soon as possible to further discuss your issues with insomnia.      What Is Insomnia?    Do you have trouble falling asleep? Do you wake up often during the night? Or maybe you wake up too early in the morning. You may be suffering from insomnia. Talk to your healthcare provider if it lasts longer than a few weeks and you feel tired most of the time.  What causes insomnia?  Some common causes of insomnia are:    Medical problems such as pain, depression, medicine side effects, or trouble breathing    Circadian rhythm disorder, a shift in the body s normal 24-hour activity cycle    Lifestyle factors such as a changing sleep schedule, lack of exercise, or too much caffeine    Sleep settings such as a poor mattress, noise, or a room that s too hot or too cold    Stress such as problems at work, money worries, or family events  Talk to your healthcare provider  Describe your sleeping problems to your healthcare  provider. Try to keep a daily sleep diary for a couple weeks. Write down the time you go to bed, the time you wake up, and anything that seems to affect your sleep. Your healthcare provider can work with you to develop a treatment plan. You may need to learn good sleeping habits and make some lifestyle changes. If you have any medical problems, these may need to be treated first.  Date Last Reviewed: 7/18/2015 2000-2017 [employer]. [address] [place] [address], [place] [address]. All rights reserved. This information is not intended as a substitute for professional medical care. Always follow your healthcare professional's instructions.          Sleep and Women: Life Stages  A woman goes through many stages during her lifetime. These stages are a natural part of being a woman. Physical and emotional changes take place during the menstrual cycle, pregnancy, motherhood, and menopause. These changes can affect sleep, even cause insomnia. But there are ways you can improve your sleep.     Try using a pillow to support your body while you sleep.   Menstrual cycle  Many women have physical or emotional symptoms before or during their period. These symptoms may include mood swings, cramping, and fatigue. They can affect how you feel and how you sleep. Eating a well-balanced diet low in fat, salt, and sugar may reduce your symptoms. Vitamin and mineral supplements may also help. Regular exercise can reduce stress and relieve some of your symptoms. You will have more energy during the day and be more tired at bedtime. Morning or afternoon exercise is best. Nighttime exercise may affect your sleep.  Pregnancy    Take a warm shower before bed.    Ask your partner to massage your shoulders, neck, or back.    Sleep with pillows under your stomach and back, and between your knees.    Avoid naps after 3 pm.    Exercise and practice good posture. Sleep on a firm mattress.    To reduce frequent urination at night, drink  most of your fluids earlier in the day.    Sleep with your upper body raised several inches. Don t lie down for 2 hours after you eat.    Walk, stretch, or massage restless legs.    Avoid or limit coffee, black tea, and cola. These may keep you awake at night.    Try relaxation techniques.  Motherhood    Ask for help when you need it. Accept help when it s offered.    Many new mothers feel a little down for a few weeks. Share your feelings with your loved ones. Talk to your health care provider if your feelings get in the way of sleeping or eating.    Try to adjust your baby s sleep to fit a day-night cycle. At night, have lights dim and the setting quiet. During the day, keep your baby active longer. Then he or she will sleep better at night.    Take a daily walk with your baby. Fresh air and daylight will help you both sleep better.    When your baby sleeps, lie down for a nap or put your feet up and rest.    Avoid or limit coffee, black tea, and cola. These may keep you awake at night.  Menopause  Menopause is when you stop having periods for good. Just before menopause, your body produces fewer female hormones. This can cause physical, mental, or emotional changes that may affect your sleep. Try these tips:    If you have hot flashes and night sweats, avoid caffeine and spicy foods at nighttime. Wear a cotton nightgown and put cotton sheets on your bed. Keep the room cool and dark. Use a portable fan.    Mood swings can cause insomnia, memory loss, fatigue, or depression. If these affect your sleep, talk to your health care provider. Lift your spirits by exercising and doing things you enjoy.    Try relaxation techniques. Exercise regularly.    Avoid alcohol.    Avoid naps after 3 pm.    Only use the bedroom for sleep and sex.  Date Last Reviewed: 4/26/2015 2000-2017 [employer]. [address] [facility], [place] [address]. All rights reserved. This information is not intended as a substitute for  professional medical care. Always follow your healthcare professional's instructions.          Future Appointments        Provider Department Dept Phone Center    1/5/2018 10:40 AM Karma Burger PA-C; [facility] [phone] [facility]    1/8/2018 10:00 AM Julien Long MD [facility] [phone] [facility]      24 Hour Appointment Hotline       To make an appointment at any [facility], call [phone] ([phone]). If you don't have a family doctor or clinic, we will help you find one. [facility] are conveniently located to serve the needs of you and your family.             Review of your medicines      START taking        Dose / Directions Last dose taken    QUEtiapine 25 MG tablet   Commonly known as:  SEROQUEL   Dose:  25 mg   Quantity:  30 tablet        Take 1 tablet (25 mg) by mouth nightly as needed   Refills:  1          Our records show that you are taking the medicines listed below. If these are incorrect, please call your family doctor or clinic.        Dose / Directions Last dose taken    calcium carbonate 500 MG chewable tablet   Commonly known as:  TUMS   Dose:  1 chew tab   Quantity:  180 tablet        Take 1 tablet (500 mg) by mouth 2 times daily   Refills:  0        diclofenac 1 % Gel topical gel   Commonly known as:  VOLTAREN   Quantity:  100 g        Apply 4 grams to knees four times daily using enclosed dosing card.   Refills:  1        FISH OIL + D3 PO        Take  by mouth daily.   Refills:  0        omeprazole 20 MG CR capsule   Commonly known as:  priLOSEC   Dose:  20 mg   Quantity:  30 capsule        Take 1 capsule (20 mg) by mouth daily   Refills:  1        polyethylene glycol powder   Commonly known as:  MIRALAX   Dose:  1 capful   Quantity:  500 g        Take 17 g (1 capful) by mouth daily   Refills:  1        TYLENOL PO        Refills:  0        vitamin D 1000 UNITS capsule   Dose:  1 capsule      "   Take 1 capsule by mouth daily.   Refills:  0                Prescriptions were sent or printed at these locations (1 Prescription)                   Other Prescriptions                Printed at Department/Unit printer (1 of 1)         QUEtiapine (SEROQUEL) 25 MG tablet                Orders Needing Specimen Collection     None      Pending Results     No orders found from 2018 to 2018.            Pending Culture Results     No orders found from 2018 to 2018.            Pending Results Instructions     If you had any lab results that were not finalized at the time of your Discharge, you can call the ED Lab Result RN at [phone]. You will be contacted by this team for any positive Lab results or changes in treatment. The nurses are available 7 days a week from 10A to 6:30P.  You can leave a message 24 hours per day and they will return your call.        Thank you for choosing [facility]       Thank you for choosing [facility] for your care. Our goal is always to provide you with excellent care. Hearing back from our patients is one way we can continue to improve our services. Please take a few minutes to complete the written survey that you may receive in the mail after you visit with us. Thank you!        [employer] Information     [employer] lets you send messages to your doctor, view your test results, renew your prescriptions, schedule appointments and more. To sign up, go to [email or web address] . Click on \"Log in\" on the left side of the screen, which will take you to the Welcome page. Then click on \"Sign up Now\" on the right side of the page.     You will be asked to enter the access code listed below, as well as some personal information. Please follow the directions to create your username and password.     Your access code is: [email or web address]  Expires: 2018  5:31 AM     Your access code will  in 90 days. If you need help or a new code, please call your [facility] clinic or " [phone].        Care EveryWhere ID     This is your Care EveryWhere ID. This could be used by other organizations to access your [facility] medical records  [id number]        Equal Access to Services     GAYLE AQUINO : Beverly Vásquez, sally wasserman, qadontata kanolanedwina jack, konrad fam. So [facility] [phone].    ATENCIÓN: Si habla español, tiene a bassett disposición servicios gratuitos de asistencia lingüística. Llame al [phone].    We comply with applicable federal civil rights laws and [place] laws. We do not discriminate on the basis of race, color, national origin, age, disability, sex, sexual orientation, or gender identity.            After Visit Summary       This is your record. Keep this with you and show to your community pharmacist(s) and doctor(s) at your next visit.

## 2018-01-04 NOTE — ED AVS SNAPSHOT
[facility], [place], Emergency Department    [address]    [facility] [address]    Phone:  [phone]    Fax:  [phone]                                       Kian Cortez   MRN: [id number]    Department:  [facility], Emergency Department   Date of Visit:  1/4/2018           After Visit Summary Signature Page     I have received my discharge instructions, and my questions have been answered. I have discussed any challenges I see with this plan with the nurse or doctor.    ..........................................................................................................................................  Patient/Patient Representative Signature      ..........................................................................................................................................  Patient Representative Print Name and Relationship to Patient    ..................................................               ................................................  Date                                            Time    ..........................................................................................................................................  Reviewed by Signature/Title    ...................................................              ..............................................  Date                                                            Time

## 2018-01-05 ENCOUNTER — OFFICE VISIT (OUTPATIENT)
Dept: FAMILY MEDICINE | Facility: CLINIC | Age: 63
End: 2018-01-05
Payer: COMMERCIAL

## 2018-01-05 VITALS
HEART RATE: 84 BPM | TEMPERATURE: 97.1 F | BODY MASS INDEX: 39.41 KG/M2 | WEIGHT: 201.25 LBS | SYSTOLIC BLOOD PRESSURE: 134 MMHG | DIASTOLIC BLOOD PRESSURE: 87 MMHG

## 2018-01-05 DIAGNOSIS — G47.00 INSOMNIA, UNSPECIFIED TYPE: Primary | ICD-10-CM

## 2018-01-05 PROCEDURE — 99213 OFFICE O/P EST LOW 20 MIN: CPT | Performed by: PHYSICIAN ASSISTANT

## 2018-01-05 RX ORDER — TRAZODONE HYDROCHLORIDE 50 MG/1
25 TABLET, FILM COATED ORAL AT BEDTIME
Qty: 30 TABLET | Refills: 1 | Status: SHIPPED | OUTPATIENT
Start: 2018-01-05 | End: 2019-01-25

## 2018-01-05 NOTE — MR AVS SNAPSHOT
After Visit Summary   1/5/2018    Kian Cortez    MRN: [id number]           Patient Information     Date Of Birth          1955        Visit Information        Provider Department      1/5/2018 11:40 AM Karma Burger PA-C; [employer] [facility]        Today's Diagnoses     Insomnia, unspecified type    -  1      Care Instructions    Try trazodone at night as needed.  Ok to increase to 50mg nightly as needed           Follow-ups after your visit        Additional Services     SLEEP EVALUATION & MANAGEMENT REFERRAL - ADULT [facility]  [phone] (Age 15 and up)       Please be aware that coverage of these services is subject to the terms and limitations of your health insurance plan.  Call member services at your health plan with any benefit or coverage questions.      Please bring the following to your appointment:    >>   List of current medications   >>   This referral request   >>   Any documents/labs given to you for this referral                      Follow-up notes from your care team     Return in about 3 months (around 4/5/2018) for sleep .      Your next 10 appointments already scheduled     Jan 08, 2018  9:45 AM CST   New Patient Visit with Julien Long MD   [facility] ([facility])    [address] [place] [address] [phone]              Future tests that were ordered for you today     Open Future Orders        Priority Expected Expires Ordered    SLEEP EVALUATION & MANAGEMENT REFERRAL - [facility]  [phone] (Age 15 and up) Routine  1/5/2019 1/5/2018            Who to contact     If you have questions or need follow up information about today's clinic visit or your schedule please contact [facility] directly at [phone].  Normal or non-critical lab and  "imaging results will be communicated to you by MyChart, letter or phone within 4 business days after the clinic has received the results. If you do not hear from us within 7 days, please contact the clinic through Cell The[employer]st or phone. If you have a critical or abnormal lab result, we will notify you by phone as soon as possible.  Submit refill requests through Oddcast or call your pharmacy and they will forward the refill request to us. Please allow 3 business days for your refill to be completed.          Additional Information About Your Visit        FatRedCouchharHouseTab Information     Oddcast lets you send messages to your doctor, view your test results, renew your prescriptions, schedule appointments and more. To sign up, go to [email or web address] . Click on \"Log in\" on the left side of the screen, which will take you to the Welcome page. Then click on \"Sign up Now\" on the right side of the page.     You will be asked to enter the access code listed below, as well as some personal information. Please follow the directions to create your username and password.     Your access code is: [email or web address]  Expires: 2018  5:31 AM     Your access code will  in 90 days. If you need help or a new code, please call your [place] clinic or [phone].        Care EveryWhere ID     This is your Care EveryWhere ID. This could be used by other organizations to access your [place] medical records  [id number]        Your Vitals Were     Pulse Temperature Last Period BMI (Body Mass Index)          84 97.1  F (36.2  C) (Oral) 2006 39.41 kg/m2         Blood Pressure from Last 3 Encounters:   18 134/87   18 107/71   17 117/80    Weight from Last 3 Encounters:   18 201 lb 4 oz (91.3 kg)   18 199 lb 6 oz (90.4 kg)   17 202 lb 3.2 oz (91.7 kg)                 Today's Medication Changes          These changes are accurate as of: 18 12:37 PM.  If you have any questions, ask your nurse or " doctor.               Start taking these medicines.        Dose/Directions    melatonin 5 MG Caps   Used for:  Insomnia, unspecified type   Started by:  Karma Burger PA-C        1-2 at night as needed   Quantity:  180 capsule   Refills:  1       traZODone 50 MG tablet   Commonly known as:  DESYREL   Used for:  Insomnia, unspecified type   Started by:  Karma Burger PA-C        Dose:  25 mg   Take 0.5 tablets (25 mg) by mouth At Bedtime   Quantity:  30 tablet   Refills:  1         Stop taking these medicines if you haven't already. Please contact your care team if you have questions.     QUEtiapine 25 MG tablet   Commonly known as:  SEROQUEL   Stopped by:  Karma Burger PA-C                Where to get your medicines      These medications were sent to [facility] [address]  [address] [facility] [address]     Phone:  [phone]     melatonin 5 MG Caps    traZODone 50 MG tablet                Primary Care Provider Office Phone # Fax #    [facility] [phone]       [address] [facility] [address]        Equal Access to Services     GAYLE AQUINO : Hadii leila ku hadasho Soomaali, waaxda luqadaha, qaybta kaalmada adeegyada, konrad bell hayjinn karmen dinero . So [facility] [phone].    ATENCIÓN: Si habla español, tiene a bassett disposición servicios gratuitos de asistencia lingüística. Llame al [phone].    We comply with applicable federal civil rights laws and [place] laws. We do not discriminate on the basis of race, color, national origin, age, disability, sex, sexual orientation, or gender identity.            Thank you!     Thank you for choosing [facility]  for your care. Our goal is always to provide you with excellent care. Hearing back from our patients is one way we can continue to improve our services. Please take a  few minutes to complete the written survey that you may receive in the mail after your visit with us. Thank you!             Your Updated Medication List - Protect others around you: Learn how to safely use, store and throw away your medicines at www.disposemymeds.org.          This list is accurate as of: 1/5/18 12:37 PM.  Always use your most recent med list.                   Brand Name Dispense Instructions for use Diagnosis    calcium carbonate 500 MG chewable tablet    TUMS    180 tablet    Take 1 tablet (500 mg) by mouth 2 times daily    Gastroesophageal reflux disease, esophagitis presence not specified       diclofenac 1 % Gel topical gel    VOLTAREN    100 g    Apply 4 grams to knees four times daily using enclosed dosing card.    Primary osteoarthritis of both knees       FISH OIL + D3 PO      Take  by mouth daily.        melatonin 5 MG Caps     180 capsule    1-2 at night as needed    Insomnia, unspecified type       omeprazole 20 MG CR capsule    priLOSEC    30 capsule    Take 1 capsule (20 mg) by mouth daily        polyethylene glycol powder    MIRALAX    500 g    Take 17 g (1 capful) by mouth daily    Slow transit constipation       traZODone 50 MG tablet    DESYREL    30 tablet    Take 0.5 tablets (25 mg) by mouth At Bedtime    Insomnia, unspecified type       TYLENOL PO           vitamin D 1000 UNITS capsule      Take 1 capsule by mouth daily.    Osteoporosis, post-menopausal

## 2018-01-05 NOTE — PROGRESS NOTES
"  SUBJECTIVE:   Kian Cortez is a 62 year old female who presents to clinic today for the following health issues:      ED/UC Followup:    Facility:  [facility]  Date of visit: 01/04/2018  Reason for visit: abdominal pain, lack of sleep   Current Status: no changes   Was given RX of Seroquel at ER, medication made her very sleepy. Did not sleep for 5 days up until then.    Still sleepy today after taking Seroquel.   Only other 2 things she tried was melatonin and tylenol PM.    Last few days was not sleeping at all, before that would sleep a few hours.  Was sick with \"flu\" before this started.    Doesn't want to be on Seroquel.    Started after moving out of her old house.    Does go to the gym, doesn't nap during the day.   does snore.  Wakes up and then can't fall back asleep.  But this happens even when not sleeping in the same room as her .    Daughter worried there is another underlying cause to not sleeping.                Problem list and histories reviewed & adjusted, as indicated.  Additional history: as documented    Patient Active Problem List   Diagnosis     Advanced directives, counseling/discussion     CARDIOVASCULAR SCREENING; LDL GOAL LESS THAN 130     Pseudoexfoliation syndrome, right eye     Gastroesophageal reflux disease without esophagitis     Osteopenia     Constipation, unspecified constipation type     Morbid obesity, unspecified obesity type (H)     Vertigo     BPPV (benign paroxysmal positional vertigo), unspecified laterality     Insomnia, unspecified type     Past Surgical History:   Procedure Laterality Date     DILATION AND CURETTAGE SUCTION       EYE SURGERY      eye duct repair 10+ years  ago     NASOLACRIMAL DUCT PROBE/IRRG         Social History   Substance Use Topics     Smoking status: Never Smoker     Smokeless tobacco: Never Used     Alcohol use No     Family History   Problem Relation Age of Onset     Other Cancer Brother      CANCER Brother      Glaucoma " No family hx of      Macular Degeneration No family hx of      DIABETES No family hx of      Hypertension No family hx of      CEREBROVASCULAR DISEASE No family hx of      Thyroid Disease No family hx of              Reviewed and updated as needed this visit by clinical staffTobacco  Allergies  Meds  Med Hx  Surg Hx  Fam Hx  Soc Hx      Reviewed and updated as needed this visit by Provider         ROS:  As above    OBJECTIVE:     /87  Pulse 84  Temp 97.1  F (36.2  C) (Oral)  Wt 201 lb 4 oz (91.3 kg)  LMP 04/19/2006  BMI 39.41 kg/m2  Body mass index is 39.41 kg/(m^2).  GENERAL: healthy, alert and no distress  PSYCH: mentation appears normal, affect normal/bright    Diagnostic Test Results:  none     ASSESSMENT/PLAN:       1. Insomnia, unspecified type  Try trazodone.    - SLEEP EVALUATION & MANAGEMENT REFERRAL - ADULT -[facility]  [phone] (Age 15 and up); Future  - traZODone (DESYREL) 50 MG tablet; Take 0.5 tablets (25 mg) by mouth At Bedtime  Dispense: 30 tablet; Refill: 1  - melatonin 5 MG CAPS; 1-2 at night as needed  Dispense: 180 capsule; Refill: 1    FUTURE APPOINTMENTS:       - Follow-up visit in 3 months    Karma Burger PA-C  [facility]

## 2018-01-08 ENCOUNTER — OFFICE VISIT (OUTPATIENT)
Dept: GASTROENTEROLOGY | Facility: CLINIC | Age: 63
End: 2018-01-08
Attending: INTERNAL MEDICINE
Payer: COMMERCIAL

## 2018-01-08 VITALS
WEIGHT: 201.3 LBS | BODY MASS INDEX: 37.04 KG/M2 | TEMPERATURE: 98.1 F | HEIGHT: 62 IN | DIASTOLIC BLOOD PRESSURE: 76 MMHG | SYSTOLIC BLOOD PRESSURE: 135 MMHG | OXYGEN SATURATION: 98 % | HEART RATE: 71 BPM

## 2018-01-08 DIAGNOSIS — K30 FUNCTIONAL DYSPEPSIA: Primary | ICD-10-CM

## 2018-01-08 ASSESSMENT — PAIN SCALES - GENERAL: PAINLEVEL: NO PAIN (0)

## 2018-01-08 NOTE — MR AVS SNAPSHOT
"              After Visit Summary   1/8/2018    Kian Cortez    MRN: [id number]           Patient Information     Date Of Birth          1955        Visit Information        Provider Department      1/8/2018 9:45 AM Julien Long MD; LANGUAGE [facility]        Today's Diagnoses     Functional dyspepsia    -  1    Regurgitation          Care Instructions    I've included a brief summary of our discussion and care plan from today's visit below.  Please review this information with your primary care provider.  _______________________________________________________________________      1. It was wonderful getting a chance to meet with you to discuss your Functional Dyspepsia + intermittent regurgitation, particularly given the overall encouraging findings of your recent Upper Endoscopy 11/2017 - discussed next steps in evaluation and management at length together today.  - Recommend moving forward with a 4-Hour Gastric Emptying Study (to rule out underlying Idiopathic Gastroparesis) and a HIDA/CCK Scan (to rule out biliary colic presentation) as the next steps in evaluation.  - Discussed your overall symptoms (and lack of clinical alarm features such as overt GI bleeding or weight loss), which are most suggestive of Functional Dyspepsia + Regurgitation (rather than nadege reflux) at this time.    2. Recommend dietary/lifestyle modifications for Functional Dyspepsia + Regurgitation as we discussed today, including:  - I would recommend keeping a food/symptom diary to review at next visit, particularly as this may help identify other dietary/stress/volume triggers for your symptoms. Keep this for at least 2 weeks tracking meals and symptoms, will be helpful when you meet with the Nutrition team.  - Smaller-volume but frequent meals spread throughout the day, adjusting consistencies and dietary choices based on if it's a \"good\" or \"bad\" day, avoiding prolonged fasting, etc.  - " Regular mild/moderate cardio exercise as tolerated, ensuring adequate hydration daily.  - Gradual/sustainable weight loss can also be beneficial from the standpoint of a holistic care plan.  - Increasing the elevation of your head while you sleep (consider using a foam wedge pillow along with raising the head of the bed), regular mild/moderate cardio exercise as tolerated (particularly in the evening after dinner), not eating or drinking within 2-3 hours of bedtime, avoiding prolonged fasting, etc.  - Consider role for low-FODMAP diet (+/- empiric gluten or dairy restriction, particularly if celiac disease has been excluded), best implemented in conjunction with a Registered Dietician as part of a Nutrition referral. Will place a Nutrition referral for you today.    3. Return to GI Clinic with me in 3-4 months to review your progress, sooner if symptomatic.   - If you are unable to schedule this follow-up appointment today, please contact Ellen Bradley at [phone] within the next week to help set up this necessary appointment.    _______________________________________________________________________    It was a pleasure seeing you in clinic today - please be in touch if there are any further questions that arise following today's visit.  During business hours, you may reach Clinic Nurse Triage Line at [phone].  For urgent/emergent questions after business hours, you may reach the on-call GI Fellow by contacting the [facility]  at [phone].    Any benign/non-urgent test results are usually communicated via letter or PLDThart message within 1-2 weeks after completion.  Urgent results (those that require a change in the previously-discussed care plan) are usually communicated via a phone call once available from our clinic staff to discuss the results and the next steps in your evaluation.    I recommend signing up for [employer] access if you have not already done so and are comfortable  with using a computer.  This allows for online access to your lab results and also helps you communicate efficiently with my clinic should any questions arise in your care.    We have Financial Counseling services available through our clinic.  If you have questions about your insurance coverage or payment responsibilities, particularly before you undergo any tests or procedures, please let us know and we can arrange a consultation accordingly to help you make informed decisions about your healthcare.    Sincerely,    Julien Long MD    [facility] - Department of Medicine  Division of Gastroenterology            Follow-ups after your visit        Follow-up notes from your care team     Return in about 3 months (around 4/8/2018).      Your next 10 appointments already scheduled     Danial 10, 2018 11:00 AM CST   New Sleep Patient with Adolfo Verma MD   [facility] [place], Sleep Study ([facility])    [address] [place] [address] [phone]            Jan 16, 2018 10:30 AM CST   (Arrive by 10:15 AM)   New Patient Visit with Salima Uriarte RD   [facility] ([facility])    [address]  [address] [facility] [address] [phone]              Future tests that were ordered for you today     Open Future Orders        Priority Expected Expires Ordered    NM Hepatobiliary Scan w GB EF Routine  1/8/2019 1/8/2018    NM Gastric emptying Routine  1/8/2019 1/8/2018            Who to contact     Please call your clinic at [phone] to:    Ask questions about your health    Make or cancel appointments    Discuss your medicines    Learn about your test results    Speak to your doctor   If you have compliments or concerns about an experience at your clinic, or if you wish to file a complaint, please contact [facility]  "Patient Relations at [phone] or email us at [email or web address]         Additional Information About Your Visit        AllFreedharSaut Media Information     EverySignal is an electronic gateway that provides easy, online access to your medical records. With EverySignal, you can request a clinic appointment, read your test results, renew a prescription or communicate with your care team.     To sign up for EverySignal visit the website at [email or web address]   You will be asked to enter the access code listed below, as well as some personal information. Please follow the directions to create your username and password.     Your access code is: [email or web address]  Expires: 2018  5:31 AM     Your access code will  in 90 days. If you need help or a new code, please contact your [facility] or call [phone] for assistance.        Care EveryWhere ID     This is your Care EveryWhere ID. This could be used by other organizations to access your [place] medical records  [id number]        Your Vitals Were     Pulse Temperature Height Last Period Pulse Oximetry BMI (Body Mass Index)    71 98.1  F (36.7  C) (Oral) 1.575 m (5' 2\") 2006 98% 36.82 kg/m2       Blood Pressure from Last 3 Encounters:   18 135/76   18 134/87   18 107/71    Weight from Last 3 Encounters:   18 91.3 kg (201 lb 4.8 oz)   18 91.3 kg (201 lb 4 oz)   18 90.4 kg (199 lb 6 oz)               Primary Care Provider Office Phone # Fax #    [facility] [phone]       [address] [place] [address]        Equal Access to Services     GAYLE AQUINO : Beverly Vásquez, wavenessa luqcarolee, qaybta kaalkonrad zendejas. So [facility] [phone].    ATENCIÓN: Si habla español, tiene a bassett disposición servicios gratuitos de asistencia lingüística. Llame al [phone].    We comply with " applicable federal civil rights laws and [place] laws. We do not discriminate on the basis of race, color, national origin, age, disability, sex, sexual orientation, or gender identity.            Thank you!     Thank you for choosing [facility]  for your care. Our goal is always to provide you with excellent care. Hearing back from our patients is one way we can continue to improve our services. Please take a few minutes to complete the written survey that you may receive in the mail after your visit with us. Thank you!             Your Updated Medication List - Protect others around you: Learn how to safely use, store and throw away your medicines at www.disposemymeds.org.          This list is accurate as of: 1/8/18 10:53 AM.  Always use your most recent med list.                   Brand Name Dispense Instructions for use Diagnosis    calcium carbonate 500 MG chewable tablet    TUMS    180 tablet    Take 1 tablet (500 mg) by mouth 2 times daily    Gastroesophageal reflux disease, esophagitis presence not specified       diclofenac 1 % Gel topical gel    VOLTAREN    100 g    Apply 4 grams to knees four times daily using enclosed dosing card.    Primary osteoarthritis of both knees       FISH OIL + D3 PO      Take  by mouth daily.        omeprazole 20 MG CR capsule    priLOSEC    30 capsule    Take 1 capsule (20 mg) by mouth daily        polyethylene glycol powder    MIRALAX    500 g    Take 17 g (1 capful) by mouth daily    Slow transit constipation       traZODone 50 MG tablet    DESYREL    30 tablet    Take 0.5 tablets (25 mg) by mouth At Bedtime    Insomnia, unspecified type       TYLENOL PO           vitamin D 1000 UNITS capsule      Take 1 capsule by mouth daily.    Osteoporosis, post-menopausal

## 2018-01-08 NOTE — PATIENT INSTRUCTIONS
"I've included a brief summary of our discussion and care plan from today's visit below.  Please review this information with your primary care provider.  _______________________________________________________________________      1. It was wonderful getting a chance to meet with you to discuss your Functional Dyspepsia + intermittent regurgitation, particularly given the overall encouraging findings of your recent Upper Endoscopy 11/2017 - discussed next steps in evaluation and management at length together today.  - Recommend moving forward with a 4-Hour Gastric Emptying Study (to rule out underlying Idiopathic Gastroparesis) and a HIDA/CCK Scan (to rule out biliary colic presentation) as the next steps in evaluation.  - Discussed your overall symptoms (and lack of clinical alarm features such as overt GI bleeding or weight loss), which are most suggestive of Functional Dyspepsia + Regurgitation (rather than nadege reflux) at this time.    2. Recommend dietary/lifestyle modifications for Functional Dyspepsia + Regurgitation as we discussed today, including:  - I would recommend keeping a food/symptom diary to review at next visit, particularly as this may help identify other dietary/stress/volume triggers for your symptoms. Keep this for at least 2 weeks tracking meals and symptoms, will be helpful when you meet with the Nutrition team.  - Smaller-volume but frequent meals spread throughout the day, adjusting consistencies and dietary choices based on if it's a \"good\" or \"bad\" day, avoiding prolonged fasting, etc.  - Regular mild/moderate cardio exercise as tolerated, ensuring adequate hydration daily.  - Gradual/sustainable weight loss can also be beneficial from the standpoint of a holistic care plan.  - Increasing the elevation of your head while you sleep (consider using a foam wedge pillow along with raising the head of the bed), regular mild/moderate cardio exercise as tolerated (particularly in the evening " after dinner), not eating or drinking within 2-3 hours of bedtime, avoiding prolonged fasting, etc.  - Consider role for low-FODMAP diet (+/- empiric gluten or dairy restriction, particularly if celiac disease has been excluded), best implemented in conjunction with a Registered Dietician as part of a Nutrition referral. Will place a Nutrition referral for you today.    3. Return to GI Clinic with me in 3-4 months to review your progress, sooner if symptomatic.   - If you are unable to schedule this follow-up appointment today, please contact Ellen Bradley at [phone] within the next week to help set up this necessary appointment.    _______________________________________________________________________    It was a pleasure seeing you in clinic today - please be in touch if there are any further questions that arise following today's visit.  During business hours, you may reach Clinic Nurse Triage Line at [phone].  For urgent/emergent questions after business hours, you may reach the on-call GI Fellow by contacting the [facility]  at [phone].    Any benign/non-urgent test results are usually communicated via letter or numares GmbHhart message within 1-2 weeks after completion.  Urgent results (those that require a change in the previously-discussed care plan) are usually communicated via a phone call once available from our clinic staff to discuss the results and the next steps in your evaluation.    I recommend signing up for GTI access if you have not already done so and are comfortable with using a computer.  This allows for online access to your lab results and also helps you communicate efficiently with my clinic should any questions arise in your care.    We have Financial Counseling services available through our clinic.  If you have questions about your insurance coverage or payment responsibilities, particularly before you undergo any tests or procedures, please let us know and  we can arrange a consultation accordingly to help you make informed decisions about your healthcare.    Sincerely,    Julien Long MD    [facility]   Department of Medicine  Division of Gastroenterology

## 2018-01-08 NOTE — NURSING NOTE
Writer offered to schedule procedure and imaging study. Patient requested to wait and would like to see GI dietitian first. Schedule with Salima.   AVS printed.

## 2018-01-08 NOTE — PROGRESS NOTES
GI CLINIC VISIT - NEW PATIENT    CC/REFERRING PROVIDER: Manuela Gimenez  REASON FOR CONSULTATION: dyspepsia    HPI: 62 year old female w/ h/o suspected Functional Dyspepsia, obesity, osteopenia, among other medical issues - presenting for evaluation of suspected Functional Dyspepsia. +chronic sensitivity to fatty foods w/ induced epigastric burning discomfort, occ induced regurgitation if irritation persists (no assoc wt loss). Reports having 1 formed BM/day w/o blood (usually adequately evacuative). Denies any tenesmus or insecurity passing flatus, no fecal incontinence or new perianal/nocturnal sxs noted. Denies any N/V/F/C/HA/NS or other const/syst/cardiopulmonary sxs, no BRBPR/melena/urinary changes, no unintentional wt loss or appetite/satiety changes. No other bowel/bladder habit changes, no dysphagia/odynophagia. No jaundice/icterus/pruritus, no acholic stools/steatorrhea, no new lumps/bumps, no jt pain/oral ulcer/rash/eye sxs noted.    ROS: 10pt ROS performed and otherwise negative.    PERTINENT PAST MEDICAL HISTORY:  As noted above.    PREVIOUS ABDOMINAL/GYNECOLOGIC SURGERIES:  None.    PREVIOUS ENDOSCOPY:  - EGD 11/7/17: +small HH, +scant inflammation in stomach and duodenum, o/w normal. +mild reactive gastropathy on gastric biopsies (no HP), duodenal biopsies normal.    PERTINENT MEDICATIONS:  - omeprazole 20mg PO QAM  Medications reviewed with patient today, see Medication List/Assessment for details.  No other NSAID/anticoagulation reported by patient.  No other OTC/herbal/supplements reported by patient.    SOCIAL HISTORY:  Denies any tob/etoh/rd/ivda. +caffeine intake (1-2cups/day).     FAMILY HISTORY:  Brother w/ unknown liver CA. No colon/panc/esophageal/other GI CA, no other Carlin or other HPS-related Ricardo. No IBD/celiac, no other AI/liver/thyroid disease.    PHYSICAL EXAMINATION:  Vitals reviewed, AFVSS  Wt 201# today (stable)  Gen: aaox3, cooperative, pleasant, not diaphoretic, nad  HEENT:  ncat, neck supple, no clad/sclad, normal op w/o ulcer/exudate, anicteric, mmm  Mallampati Score 2  Resp/CV without acute findings, not dyspneic/tachycardic  Abd: +nabs, soft, nt, nd, no peritoneal s/s noted  Ext: no c/c/e  Skin: warm, perfused, no jaundice  Neuro: grossly intact, no asterixis noted    PERTINENT STUDIES:  Surgical Pathology 11/7/17  A. DUODENUM, BIOPSY:   - Duodenal mucosa with no significant histologic abnormality   - Negative for peptic duodenitis and celiac     B. STOMACH, BIOPSY:   - Fragments of unremarkable gastric body mucosa and antral mucosa with   mild reactive gastropathy   - No evidence of significant inflammation, intestinal metaplasia or   dysplasia   - No H. pylori like organisms identified on routine staining     ASSESSMENT/PLAN:    1. Suspected Functional Dyspepsia + intermittent regurgitation - continue supportive care as ordered for now  1. It was wonderful getting a chance to meet with you to discuss your Functional Dyspepsia + intermittent regurgitation, particularly given the overall encouraging findings of your recent Upper Endoscopy 11/2017 - discussed next steps in evaluation and management at length together today.  - Recommend moving forward with a 4-Hour Gastric Emptying Study (to rule out underlying Idiopathic Gastroparesis) and a HIDA/CCK Scan (to rule out biliary colic presentation) as the next steps in evaluation.  - Discussed your overall symptoms (and lack of clinical alarm features such as overt GI bleeding or weight loss), which are most suggestive of Functional Dyspepsia + Regurgitation (rather than nadege reflux) at this time.  - Can readdress interval escalation/optimization of PPI in ongoing care if patient willing/desires; patient may want to focus on conservative measures first in light of findings thus far. Would advise daily MVI/MM supplementation if long-term acid suppression is required.    2. Recommend dietary/lifestyle modifications for Functional  "Dyspepsia + Regurgitation as we discussed today, including:  - I would recommend keeping a food/symptom diary to review at next visit, particularly as this may help identify other dietary/stress/volume triggers for your symptoms. Keep this for at least 2 weeks tracking meals and symptoms, will be helpful when you meet with the Nutrition team.  - Smaller-volume but frequent meals spread throughout the day, adjusting consistencies and dietary choices based on if it's a \"good\" or \"bad\" day, avoiding prolonged fasting, etc.  - Regular mild/moderate cardio exercise as tolerated, ensuring adequate hydration daily.  - Gradual/sustainable weight loss can also be beneficial from the standpoint of a holistic care plan.  - Increasing the elevation of your head while you sleep (consider using a foam wedge pillow along with raising the head of the bed), regular mild/moderate cardio exercise as tolerated (particularly in the evening after dinner), not eating or drinking within 2-3 hours of bedtime, avoiding prolonged fasting, etc.  - Consider role for low-FODMAP diet (+/- empiric gluten or dairy restriction, particularly if celiac disease has been excluded), best implemented in conjunction with a Registered Dietician as part of a Nutrition referral. Will place a Nutrition referral for you today.    2. Colorectal Cancer Screening  No known FH CRC, patient reports normal outside colonoscopy in 2009 (through [facility], records unavailable for review). Will readdress in ongoing care, may be due in the next 1-2 years, sooner if symptomatic or if required as part of current diagnostic evaluation.    RTC 3-4 months, sooner if symptomatic.      Thank you for this consultation. It was a pleasure to participate in the care of this patient; please contact us with any further questions.    Julien Long MD   of Medicine  [facility] - Department of Medicine  Division of Gastroenterology      "

## 2018-01-08 NOTE — LETTER
1/8/2018       RE: Kian Cortez  [address]  [place] [address]     Dear Colleague,    Thank you for referring your patient, Kian Cortez, to the [facility] GASTROENTER[facility] IBD [facility]INIC at [facility]. Please see a copy of my visit note below.    GI CLINIC VISIT - NEW PATIENT    CC/REFERRING PROVIDER: Manuela Gimenez  REASON FOR CONSULTATION: dyspepsia    HPI: 62 year old female w/ h/o suspected Functional Dyspepsia, obesity, osteopenia, among other medical issues - presenting for evaluation of suspected Functional Dyspepsia. +chronic sensitivity to fatty foods w/ induced epigastric burning discomfort, occ induced regurgitation if irritation persists (no assoc wt loss). Reports having 1 formed BM/day w/o blood (usually adequately evacuative). Denies any tenesmus or insecurity passing flatus, no fecal incontinence or new perianal/nocturnal sxs noted. Denies any N/V/F/C/HA/NS or other const/syst/cardiopulmonary sxs, no BRBPR/melena/urinary changes, no unintentional wt loss or appetite/satiety changes. No other bowel/bladder habit changes, no dysphagia/odynophagia. No jaundice/icterus/pruritus, no acholic stools/steatorrhea, no new lumps/bumps, no jt pain/oral ulcer/rash/eye sxs noted.    ROS: 10pt ROS performed and otherwise negative.    PERTINENT PAST MEDICAL HISTORY:  As noted above.    PREVIOUS ABDOMINAL/GYNECOLOGIC SURGERIES:  None.    PREVIOUS ENDOSCOPY:  - EGD 11/7/17: +small HH, +scant inflammation in stomach and duodenum, o/w normal. +mild reactive gastropathy on gastric biopsies (no HP), duodenal biopsies normal.    PERTINENT MEDICATIONS:  - omeprazole 20mg PO QAM  Medications reviewed with patient today, see Medication List/Assessment for details.  No other NSAID/anticoagulation reported by patient.  No other OTC/herbal/supplements reported by patient.    SOCIAL HISTORY:  Denies any tob/etoh/rd/ivda. +caffeine intake (1-2cups/day).     FAMILY HISTORY:  Brother w/  unknown liver CA. No colon/panc/esophageal/other GI CA, no other Carlin or other HPS-related Ricardo. No IBD/celiac, no other AI/liver/thyroid disease.    PHYSICAL EXAMINATION:  Vitals reviewed, AFVSS  Wt 201# today (stable)  Gen: aaox3, cooperative, pleasant, not diaphoretic, nad  HEENT: ncat, neck supple, no clad/sclad, normal op w/o ulcer/exudate, anicteric, mmm  Mallampati Score 2  Resp/CV without acute findings, not dyspneic/tachycardic  Abd: +nabs, soft, nt, nd, no peritoneal s/s noted  Ext: no c/c/e  Skin: warm, perfused, no jaundice  Neuro: grossly intact, no asterixis noted    PERTINENT STUDIES:  Surgical Pathology 11/7/17  A. DUODENUM, BIOPSY:   - Duodenal mucosa with no significant histologic abnormality   - Negative for peptic duodenitis and celiac     B. STOMACH, BIOPSY:   - Fragments of unremarkable gastric body mucosa and antral mucosa with   mild reactive gastropathy   - No evidence of significant inflammation, intestinal metaplasia or   dysplasia   - No H. pylori like organisms identified on routine staining     ASSESSMENT/PLAN:    1. Suspected Functional Dyspepsia + intermittent regurgitation - continue supportive care as ordered for now  1. It was wonderful getting a chance to meet with you to discuss your Functional Dyspepsia + intermittent regurgitation, particularly given the overall encouraging findings of your recent Upper Endoscopy 11/2017 - discussed next steps in evaluation and management at length together today.  - Recommend moving forward with a 4-Hour Gastric Emptying Study (to rule out underlying Idiopathic Gastroparesis) and a HIDA/CCK Scan (to rule out biliary colic presentation) as the next steps in evaluation.  - Discussed your overall symptoms (and lack of clinical alarm features such as overt GI bleeding or weight loss), which are most suggestive of Functional Dyspepsia + Regurgitation (rather than nadege reflux) at this time.  - Can readdress interval escalation/optimization of PPI  "in ongoing care if patient willing/desires; patient may want to focus on conservative measures first in light of findings thus far. Would advise daily MVI/MM supplementation if long-term acid suppression is required.    2. Recommend dietary/lifestyle modifications for Functional Dyspepsia + Regurgitation as we discussed today, including:  - I would recommend keeping a food/symptom diary to review at next visit, particularly as this may help identify other dietary/stress/volume triggers for your symptoms. Keep this for at least 2 weeks tracking meals and symptoms, will be helpful when you meet with the Nutrition team.  - Smaller-volume but frequent meals spread throughout the day, adjusting consistencies and dietary choices based on if it's a \"good\" or \"bad\" day, avoiding prolonged fasting, etc.  - Regular mild/moderate cardio exercise as tolerated, ensuring adequate hydration daily.  - Gradual/sustainable weight loss can also be beneficial from the standpoint of a holistic care plan.  - Increasing the elevation of your head while you sleep (consider using a foam wedge pillow along with raising the head of the bed), regular mild/moderate cardio exercise as tolerated (particularly in the evening after dinner), not eating or drinking within 2-3 hours of bedtime, avoiding prolonged fasting, etc.  - Consider role for low-FODMAP diet (+/- empiric gluten or dairy restriction, particularly if celiac disease has been excluded), best implemented in conjunction with a Registered Dietician as part of a Nutrition referral. Will place a Nutrition referral for you today.    2. Colorectal Cancer Screening  No known FH CRC, patient reports normal outside colonoscopy in 2009 (through [facility], records unavailable for review). Will readdress in ongoing care, may be due in the next 1-2 years, sooner if symptomatic or if required as part of current diagnostic evaluation.    RTC 3-4 months, sooner if symptomatic.      Thank you for this " consultation. It was a pleasure to participate in the care of this patient; please contact us with any further questions.    Julien Long MD   of Medicine  [facility] - Department of Medicine  Division of Gastroenterology

## 2018-01-08 NOTE — NURSING NOTE
"Chief Complaint   Patient presents with     Clinic Care Coordination - Initial     new       Vitals:    01/08/18 0938   BP: 135/76   Pulse: 71   Temp: 98.1  F (36.7  C)   TempSrc: Oral   SpO2: 98%   Weight: 201 lb 4.8 oz   Height: 5' 2\"       Body mass index is 36.82 kg/(m^2). Manuela MONTANEZ LPN               "

## 2018-01-10 ENCOUNTER — OFFICE VISIT (OUTPATIENT)
Dept: SLEEP MEDICINE | Facility: CLINIC | Age: 63
End: 2018-01-10
Attending: PHYSICIAN ASSISTANT
Payer: COMMERCIAL

## 2018-01-10 VITALS
HEIGHT: 62 IN | BODY MASS INDEX: 37.17 KG/M2 | WEIGHT: 202 LBS | HEART RATE: 72 BPM | SYSTOLIC BLOOD PRESSURE: 117 MMHG | RESPIRATION RATE: 18 BRPM | OXYGEN SATURATION: 99 % | DIASTOLIC BLOOD PRESSURE: 75 MMHG

## 2018-01-10 DIAGNOSIS — G47.00 INSOMNIA, UNSPECIFIED TYPE: ICD-10-CM

## 2018-01-10 DIAGNOSIS — F51.04 PSYCHOPHYSIOLOGICAL INSOMNIA: Primary | ICD-10-CM

## 2018-01-10 PROCEDURE — 99205 OFFICE O/P NEW HI 60 MIN: CPT | Performed by: INTERNAL MEDICINE

## 2018-01-10 NOTE — PROGRESS NOTES
DICTATED THE SLEEP CLINIC VISIT NOTE FOR TODAY.  Adolfo Verma MD   of Medicine,  Division of Pulmonary/Sleep Medicine  [facility].

## 2018-01-10 NOTE — MR AVS SNAPSHOT
After Visit Summary   1/10/2018    Kian Cortez    MRN: [id number]           Patient Information     Date Of Birth          1955        Visit Information        Provider Department      1/10/2018 10:45 AM Adolfo Verma MD; [place] [employer], [place], Sleep Study        Today's Diagnoses     Psychophysiological insomnia    -  1    Insomnia, unspecified type          Care Instructions    [place] [facility]      Treating Insomnia  Good sleeping habits are a key part of treatment. If needed, some medications may help you sleep better at first. Making healthy lifestyle changes and learning to relax can improve your sleep. Treating insomnia takes commitment, but trust that your efforts will pay off. Talk to your doctor before taking any medication.    Healthy Lifestyle  Your lifestyle affects your health and your sleep. Here are some healthy habits:    Keep a regular sleep schedule. Go to bed and get up at the same time each day.    Exercise regularly. It may help you reduce stress. Avoid strenuous exercise for two to four hours before bedtime.    Avoid or limit naps.    Use your bed only for sleep and sex.    Don t spend too much time in bed trying to fall asleep. If you can t fall asleep, get up and do something until you become tired and drowsy.    Avoid or limit caffeine and nicotine. They can keep you awake at night. Also avoid alcohol. It may help you fall asleep at first, but your sleep will not be restful.    Before Bedtime  To sleep better every night, try these tips:    Have a bedtime routine to let your body and mind know when it s time to sleep.    Going to bed should be relaxing so try to do only relaxing things around bedtime. Sleep will come sooner.    If your worries don t let you sleep, write them down in a diary. Then close it, and go to bed.    Make sure the room is not too hot or too cold. If it s not dark enough, an eye mask can  help. If it s noisy, try using earplugs.    Learn to Relax  Stress, anxiety, and body tension may keep you awake at night. To unwind before bedtime, try reading a book, meditation, or yoga. Also, try the following:    Deep breathing. Sit or lie back in a chair. Take a slow, deep breath. Hold it for 5 counts. Then breathe out slowly through your mouth. Keep doing this until you feel relaxed.    Imagery. Think of the last fun trip you took. In your mind, walk through the trip from start to finish. Put as much detail into the memory as you can remember. It will help you relax.    Cognitive Behavioral Treatment (CBT)  CBT is the most effective treatment for long-term insomnia. It tries to address the underlying causes of your sleep problems, including your habits and how you think about sleep.      Individual Therapy   Gerard Chester    [phone]     [address] [address]      Online Programs     www.activ[employer] Intelligence (pronounced shut eye). There is a fee for this program. Enter the code  [place]  if you decide to enroll in this program.      www.sleepIO.com (pronounced sleep ee oh). There is a fee for this program. Enter the code  [place]  if you decide to enroll in this program.     Suggested Resources  Insomnia Treatment Books     Overcoming Insomnia by Arya Vivas and Trupti Stewart (2008)    No More Sleepless Nights by Claude Zambrano and Delmy Clifford (1996)    Say Mena to Insomnia by Lakhwinder Mayo (2009)    The Insomnia Workbook by Yolanda Edwards and Joseph Guthrie (2009)    The Insomnia Answer by Dakota Kessler and Butch Montenegro (2006)      Stress Management and Relaxation Books    The Relaxation and Stress Reduction Workbook by Sylwia Acharya, Mari Maddox and Pato Hays (2008)    Stress Management Workbook: Techniques and Self-Assessment Procedures by Alise Wolf and Todd Wayne (1997)    A Mindfulness-Based Stress Reduction Workbook by Michael Luz and  Fallon Boland (2010)    The Complete Stress Management Workbook by Ortiz Multani, Cole Cortez and Donny Rivera (1996)    Assert Yourself by Caty Frederick and Aleksandr Frederick (1977)    Relaxation Resources for Computer Download   These websites offer resources to help you relax. This list is for information only. [facility] is not responsible for the quality of services or the actions of any person or organization.  Progressive Muscle Relaxation (PMR):     http://www.Niche/progressive-muscle-relaxation-exercise.html     http://studentsupport.[facility]/counseling/resources/self-help/relaxation-and-stress-management/   Deep Breathing Exercises:    http://www.Niche/breathing-awareness.html     Meditation:     wwwVirtustream    www.TaskEasyguidediCrossingmeditation-site.Fly [employer]arel You may have to pay for some of these resources.    Guided Imagery:    http://www.Niche/guided-imagery-scripts.html     http://Get Smart Content/library/mcclszeqqd-yophec-gqcfcjh/     Counseling / Behavioral Health  [facility]  Visit [email or web address] or call [phone] to find a clinic close to you.      This is not a prescription and these resources are optional. You must pay for any costs when using these resources. Please ask your insurance carrier if you can be reimbursed for these resources. If so, you are responsible for sending the needed details to your insurance carrier. These resources may also be tax deductible as medical expenses. Check with your .     These programs and publications are not affiliated in any way with [facility].            Follow-ups after your visit        Additional Services     SLEEP PSYCHOLOGY REFERRAL       Referral Urgency: Next available    Dr. Gerard Chester is at the following clinics on the following days:  [address]        Thursday and Friday (PLEASE CALL  [phone] to schedule an appointment).  [facility] - [address] [place] [place]       Wednesdays   (PLEASE CALL [phone] to schedule an appointment).     Please be aware that coverage of these services is subject to the terms and limitations of your health insurance plan. Call member services at your health plan with any benefit or coverage questions.     Please bring the following to your appointment:  >> List of current medications   >> This referral request   >> Any documents/labs given to you for this referral                  Follow-up notes from your care team     Return if symptoms worsen or fail to improve.      Your next 10 appointments already scheduled     Jan 16, 2018 10:30 AM CST   (Arrive by 10:15 AM)   New Patient Visit with Salima Uriarte RD   [facility] ([facility])    [address] [place] [id number] [phone]            Apr 23, 2018 11:20 AM CDT   (Arrive by 11:05 AM)   Return Visit with Julien Long MD   [facility] ([facility])    [address] [place] [id number] [phone]              Who to contact     If you have questions or need follow up information about today's clinic visit or your schedule please contact [facility] directly at [phone].  Normal or non-critical lab and imaging results will be communicated to you by MyChart, letter or phone within 4 business days after the clinic has received the results. If you do not hear from us within 7 days, please contact the clinic through MyChart or phone. If you have a critical or abnormal lab result, we will notify you by phone as soon as possible.  Submit refill requests through WhoKnows or call your pharmacy and they will forward the refill request to us. Please allow 3 business days for your refill to be completed.          Additional  "Information About Your Visit        MyChart Information     [employer] lets you send messages to your doctor, view your test results, renew your prescriptions, schedule appointments and more. To sign up, go to [email or web address] . Click on \"Log in\" on the left side of the screen, which will take you to the Welcome page. Then click on \"Sign up Now\" on the right side of the page.     You will be asked to enter the access code listed below, as well as some personal information. Please follow the directions to create your username and password.     Your access code is: [email or web address]  Expires: 2018  5:31 AM     Your access code will  in 90 days. If you need help or a new code, please call your [facility] clinic or [phone].        Care EveryWhere ID     This is your Care EveryWhere ID. This could be used by other organizations to access your [facility] medical records  [id number]        Your Vitals Were     Pulse Respirations Height Last Period Pulse Oximetry BMI (Body Mass Index)    72 18 1.575 m (5' 2\") 2006 99% 36.95 kg/m2       Blood Pressure from Last 3 Encounters:   01/10/18 117/75   18 135/76   18 134/87    Weight from Last 3 Encounters:   01/10/18 91.6 kg (202 lb)   18 91.3 kg (201 lb 4.8 oz)   18 91.3 kg (201 lb 4 oz)              We Performed the Following     SLEEP EVALUATION & MANAGEMENT REFERRAL - ADULT -[facility] [place]  [phone] (Age 15 and up)     SLEEP PSYCHOLOGY REFERRAL        Primary Care Provider Office Phone # Fax #    [facility] [phone]       [address] [place] [address]        Equal Access to Services     GAYLE AQUINO AH: Beverly Vásquez, sally wasserman, qajennifer kaaldoreen jack, konrad fam. So [facility] [phone].    ATENCIÓN: Si habla español, tiene a bassett disposición servicios gratuitos de asistencia lingüística. Llame al " [phone].    We comply with applicable federal civil rights laws and [place] laws. We do not discriminate on the basis of race, color, national origin, age, disability, sex, sexual orientation, or gender identity.            Thank you!     [facility] you for choosing [facility]  [facility] your care. Our goal is always to provide you with excellent care. Hearing back from our patients is one way we can continue to improve our services. Please take a few minutes to complete the written survey that you may receive in the mail after your visit with us. Thank you!             Your Updated Medication List - Protect others around you: Learn how to safely use, store and throw away your medicines at www.disposemymeds.org.          This list is accurate as of: 1/10/18 12:02 PM.  Always use your most recent med list.                   Brand Name Dispense Instructions for use Diagnosis    calcium carbonate 500 MG chewable tablet    TUMS    180 tablet    Take 1 tablet (500 mg) by mouth 2 times daily    Gastroesophageal reflux disease, esophagitis presence not specified       diclofenac 1 % Gel topical gel    VOLTAREN    100 g    Apply 4 grams to knees four times daily using enclosed dosing card.    Primary osteoarthritis of both knees       FISH OIL + D3 PO      Take  by mouth daily.        omeprazole 20 MG CR capsule    priLOSEC    30 capsule    Take 1 capsule (20 mg) by mouth daily    Gastroesophageal reflux disease without esophagitis       polyethylene glycol powder    MIRALAX    500 g    Take 17 g (1 capful) by mouth daily    Slow transit constipation       traZODone 50 MG tablet    DESYREL    30 tablet    Take 0.5 tablets (25 mg) by mouth At Bedtime    Insomnia, unspecified type       TYLENOL PO           vitamin D 1000 UNITS capsule      Take 1 capsule by mouth daily.    Osteoporosis, post-menopausal

## 2018-01-10 NOTE — PATIENT INSTRUCTIONS
[place] [facility]      Treating Insomnia  Good sleeping habits are a key part of treatment. If needed, some medications may help you sleep better at first. Making healthy lifestyle changes and learning to relax can improve your sleep. Treating insomnia takes commitment, but trust that your efforts will pay off. Talk to your doctor before taking any medication.    Healthy Lifestyle  Your lifestyle affects your health and your sleep. Here are some healthy habits:    Keep a regular sleep schedule. Go to bed and get up at the same time each day.    Exercise regularly. It may help you reduce stress. Avoid strenuous exercise for two to four hours before bedtime.    Avoid or limit naps.    Use your bed only for sleep and sex.    Don t spend too much time in bed trying to fall asleep. If you can t fall asleep, get up and do something until you become tired and drowsy.    Avoid or limit caffeine and nicotine. They can keep you awake at night. Also avoid alcohol. It may help you fall asleep at first, but your sleep will not be restful.    Before Bedtime  To sleep better every night, try these tips:    Have a bedtime routine to let your body and mind know when it s time to sleep.    Going to bed should be relaxing so try to do only relaxing things around bedtime. Sleep will come sooner.    If your worries don t let you sleep, write them down in a diary. Then close it, and go to bed.    Make sure the room is not too hot or too cold. If it s not dark enough, an eye mask can help. If it s noisy, try using earplugs.    Learn to Relax  Stress, anxiety, and body tension may keep you awake at night. To unwind before bedtime, try reading a book, meditation, or yoga. Also, try the following:    Deep breathing. Sit or lie back in a chair. Take a slow, deep breath. Hold it for 5 counts. Then breathe out slowly through your mouth. Keep doing this until you feel relaxed.    Imagery. Think of the last fun trip you took. In your  mind, walk through the trip from start to finish. Put as much detail into the memory as you can remember. It will help you relax.    Cognitive Behavioral Treatment (CBT)  CBT is the most effective treatment for long-term insomnia. It tries to address the underlying causes of your sleep problems, including your habits and how you think about sleep.      Individual Therapy   Gerard Chester    [phone]     [address] [address]      Online Programs     www.Mismi (pronounced shut eye). There is a fee for this program. Enter the code  Joppa  if you decide to enroll in this program.      www.sleepIO.com (pronounced sleep ee oh). There is a fee for this program. Enter the code  Joppa  if you decide to enroll in this program.     Suggested Resources  Insomnia Treatment Books     Overcoming Insomnia by Arya Vivas and Trupti Stewart (2008)    No More Sleepless Nights by Claude Zambrano and Delmy Clifford (1996)    Say Mena to Insomnia by Lakhwinder Mayo (2009)    The Insomnia Workbook by Yolanda Edwards and Joseph Guthrie (2009)    The Insomnia Answer by Dakota Kessler and Butch Montenegro (2006)      Stress Management and Relaxation Books    The Relaxation and Stress Reduction Workbook by Sylwia Acharya, Mari Maddox and Pato Hays (2008)    Stress Management Workbook: Techniques and Self-Assessment Procedures by Alise Wolf and Todd Wayne (1997)    A Mindfulness-Based Stress Reduction Workbook by Michael Luz and Fallon Boland (2010)    The Complete Stress Management Workbook by Ortiz Multani, Cole Cortez and Donny Rivera (1996)    Assert Yourself by Caty Frederick and Aleksandr Frederick (1977)    Relaxation Resources for Computer Download   These websites offer resources to help you relax. This list is for information only. [facility] is not responsible for the quality of services or the actions of any person or organization.  Progressive Muscle  Relaxation (PMR):     http://www.Tunii/progressive-muscle-relaxation-exercise.html     http://studentsupport.[facility]/counseling/resources/self-help/relaxation-and-stress-management/   Deep Breathing Exercises:    http://www.Tunii/breathing-awareness.html     Meditation:     wwwdooub    www.MyntrameditationDemocracy.comsite.OpenPlacement You may have to pay for some of these resources.    Guided Imagery:    http://www.Tunii/guided-imagery-scripts.html     http://Nine [employer]/library/etqnefelqa-mmjtfe-nfqhjil/     Counseling / Behavioral Health  [facility]  Visit [email or web address] or call [phone] to find a clinic close to you.      This is not a prescription and these resources are optional. You must pay for any costs when using these resources. Please ask your insurance carrier if you can be reimbursed for these resources. If so, you are responsible for sending the needed details to your insurance carrier. These resources may also be tax deductible as medical expenses. Check with your .     These programs and publications are not affiliated in any way with [facility].

## 2018-01-11 ENCOUNTER — TELEPHONE (OUTPATIENT)
Dept: SLEEP MEDICINE | Facility: CLINIC | Age: 63
End: 2018-01-11

## 2018-01-11 NOTE — TELEPHONE ENCOUNTER
Patient needs to schedule with Dr. Chester and then follow up with Dr. Robledo. Called the patient to help schedule these appointments but no answer. Left voice mail asking to call clinic to schedule.

## 2018-01-11 NOTE — PROGRESS NOTES
Sleep Medicine consultation note    DATE OF SERVICE:  01/10/2018      CHIEF COMPLAINT AND PURPOSE OF VISIT:  Insomnia, do not sleep enough at night.    Sleep Medicine consultation has been requested by Karma Burger.      HISTORY OF PRESENT ILLNESS:  Ms. Kian Cortez is a 63-year-old female who presents to the Sleep Clinic today accompanied by an Algerian  and her daughter.  Sleep Medicine consultation has been requested by her primary care provider for insomnia evaluation.  The patient's main concern is that she does not sleep enough at night. She has been having concerns about insomnia for the past year.  She relocated from [facility] [place] the [place] ago.  It was approximately a year ago when she and her  sold their house and moved to live with their son from [place] to [place], [place], and they are currently staying temporarily until the construction of their new house is  completed.  It is around the same time when the house was getting sold that she was also having vertigo that subsequently resolved, but ever since she moved to her son's house, she has been having the problems with insomnia and she attributes it to the change in the  environment.  She is happy that she is with her son.  She does not have any concerns about depression, but it is just the change in environment that has been affecting her sleep.  She tried over-the-counter medications in the past, such as Tylenol PM and melatonin, which were not that helpful.  Prior to the relocation, she had no concerns about insomnia.  She would go to bed between 10 to 11 p.m., absolutely had no trouble initiating or maintaining sleep and she would wake up at 6 a.m. fine.  However, ever since she moved to live with her son, she still goes to bed around 10 to 11 p.m. and is able to fall asleep right away, but she wakes up very quickly right after she falls asleep or a few hours later. There is no apparent reason that  wakes her up.  She reports  difficulty resuming sleep and stays awake for a very long time.  She does report active mind, but she denies depression or anxiety or pain as contributing factors for the insomnia.  She  continues to lay  in the bed and keeps praying until she is able to fall asleep.  After a few hours of being awake, she can fall asleep and get a couple more hours of sleep before she finally wakes up in the morning.  On an average she thinks she only gets about 3-4 hours of sleep per night.  During the first week in Jan 2018, she had flu for 3 days and after that she had very severe insomnia where she could not sleep for almost 5 days and she went to the emergency room on 01/04 due to insomnia and was prescribed Seroquel at the emergency room and the medication made her very sleepy.  She was seen at the [facility] by Karma Burger PA-C, who   prescribed trazodone 50 mg and recommended her to take 1/2 to 1 tablet before bed. Her daughter mentioned that her mom is very determined that she does not want to have any dependency on the trazodone and the plan is to eventually switch to melatonin down the road.  Ever since she obtained the trazodone, she has been taking 1/2 tablet of the trazodone 50 mg and with that her sleep has improved. With trazodone, once she falls asleep, she still wakes up, but she is able to resume sleep easily compared to before and has been getting approximately 6 hours of sleep, at least.  She has been feeling comparatively a little more refreshed since her sleep has improved.  She reports she does not always feel refreshed upon awakening from sleep.  She reports tiredness as the day goes by but denies excessive daytime sleepiness. She does not take naps during the day.  [place] Sleepiness Score of 0/24.  She does not drive.  She does not read, eat, watch TV or use electronic devices in the bed.     There have not been any reports of snoring or witnessed  apneas during sleep or awakenings due to snort arousals or gasping for air or choking sensation.  She occasionally has dry mouth upon awakening.     She denies symptoms of RLS.      She denies nightmares or sleepwalking or sleep eating or dream enactment behavior or teeth grinding or clenching or cataplexy or sleep paralysis or hallucinations.         SOCIAL HISTORY:  She drinks one cup of coffee occasionally in the morning.  Never smoked, does not drink alcohol, does not use illicit drugs.      CURRENT MEDICATIONS:   Current Outpatient Prescriptions   Medication Sig Dispense Refill     omeprazole (PRILOSEC) 20 MG CR capsule Take 1 capsule (20 mg) by mouth daily 30 capsule 1     traZODone (DESYREL) 50 MG tablet Take 0.5 tablets (25 mg) by mouth At Bedtime 30 tablet 1     Acetaminophen (TYLENOL PO)        diclofenac (VOLTAREN) 1 % GEL topical gel Apply 4 grams to knees four times daily using enclosed dosing card. 100 g 1     polyethylene glycol (MIRALAX) powder Take 17 g (1 capful) by mouth daily 500 g 1     calcium carbonate (TUMS) 500 MG chewable tablet Take 1 tablet (500 mg) by mouth 2 times daily 180 tablet 0     Cholecalciferol (VITAMIN D) 1000 UNITS capsule Take 1 capsule by mouth daily.       Fish Oil-Cholecalciferol (FISH OIL + D3 PO) Take  by mouth daily.       Past medical history:  Past Medical History:   Diagnosis Date     Diverticulosis 11/2015    on CT scan     Osteoarthritis of right knee      Osteopenia 4/27/2015     Patient Active Problem List   Diagnosis     Advanced directives, counseling/discussion     CARDIOVASCULAR SCREENING; LDL GOAL LESS THAN 130     Pseudoexfoliation syndrome, right eye     Gastroesophageal reflux disease without esophagitis     Osteopenia     Constipation, unspecified constipation type     Morbid obesity, unspecified obesity type (H)     Vertigo     BPPV (benign paroxysmal positional vertigo), unspecified laterality     Insomnia, unspecified type      Past surgical  "history:  Past Surgical History:   Procedure Laterality Date     DILATION AND CURETTAGE SUCTION       EYE SURGERY      eye duct repair 10+ years  ago     NASOLACRIMAL DUCT PROBE/IRRG       Allergies:No Known Allergies      REVIEW OF SYSTEMS: The 14 point ROS was completed. In addition to symptoms listed under HPI, and the symptoms listed below, the rest of the ROS is negative:   Increased frequency of urination, nausea and constipation sometimes.      PHYSICAL EXAMINATION:   VITAL SIGNS:/75  Pulse 72  Resp 18  Ht 1.575 m (5' 2\")  Wt 91.6 kg (202 lb)  LMP 04/19/2006  SpO2 99%  BMI 36.95 kg/m2  GENERAL APPEARANCE:  Normal, in no apparent cardiopulmonary distress.   NOSE, MOUTH AND THROAT:  Mildly deviated nasal septum but patent airflow through both the nares.  Oropharynx:  Mallampati class III with a low-draping soft palate.  No tonsillar hypertrophy.   NECK:  Circumference 12.5 inches.  No palpable thyromegaly, no jugular venous distention, no carotid bruit.   CARDIOVASCULAR:  Regular S1 and S2.  No gallops or murmurs.   RESPIRATORY:  Clear to auscultation bilaterally with no rales or rhonchi.   EXTREMITIES:  No calf tenderness or pedal edema.   NEUROLOGIC/PSYCHIATRIC:  Mood and affect normal.  Alert and oriented x3.  Speech normal.  Gait normal.  Motor tone normal.  No focal neurological deficit.      IMPRESSION/REPORT/PLAN:  Chronic insomnia(maintenance of sleep)- psychophysiologic insomnia. We discussed optimizing sleep hygiene.  We discussed stimulus control measures and we also discussed in detail about cognitive behavioral therapy, which is the appropriate treatment intervention for the insomnia.  I have provided her with information about the [place] insomnia program with additional resources for insomnia as well, which her daughter is going to go through with her.  I also provided her with a referral to sleep psychologist Dr. Gerard Chester to obtain cognitive behavioral therapy for insomnia.  " "The plan is that she will continue to take 1/2 tablet of the trazodone 50 mg before bed, at least until she starts the CBTI and then  subsequently wean off the medication. On the other hand if she chooses  to discontinue the medication  before starting the CBTI, since she does not  like being on the medication, that option would be fine as well.  She also was instructed that she cannot combine trazodone and the melatonin and if she decides to go off the trazodone, she can use melatonin over the counter on an as-needed basis but was instructed to take no more than 3-5 mg of melatonin before bed.      The plan is for her to follow up at the Sleep Clinic approximately 4-5 weeks after she starts the CBTI.  At that time of the visit if her sleep improves but she continues to feel unrefreshed upon awakening from sleep and has concerns about fatigue, will consider obtaining an overnight polysomnography to evaluate for possible sleep apnea.      The above note was dictated using voice recognition software. Although reviewed after completion, some word and grammatical error may remain . Please contact the author for any clarifications.    \"I spent a total of 60  minutes face to face with Bobby Cortez during today's office visit. Over 50% of this time was spent counseling the patient and  coordinating care regarding insomnia, sleep hygiene, CBT-i.\"       Adolfo Verma MD   of Medicine,  Division of Pulmonary/Sleep Medicine  [facility].           D: 01/10/2018 16:26   T: 01/10/2018 18:16   MT: [id number]      Name:     BOBBY CORTEZ   MRN:      [id number]        Account:      [id number]   :      1955           Visit Date:   01/10/2018      Document: [id number]      "

## 2018-01-11 NOTE — TELEPHONE ENCOUNTER
----- Message from Adolfo Verma MD sent at 1/10/2018  4:27 PM CST -----  Please schedule f/u visit in 4-5 weeks after starting CBTi

## 2018-01-16 ENCOUNTER — OFFICE VISIT (OUTPATIENT)
Dept: GASTROENTEROLOGY | Facility: CLINIC | Age: 63
End: 2018-01-16
Payer: COMMERCIAL

## 2018-01-16 DIAGNOSIS — E66.9 OBESITY: ICD-10-CM

## 2018-01-16 DIAGNOSIS — K21.9 ESOPHAGEAL REFLUX: Primary | ICD-10-CM

## 2018-01-16 NOTE — LETTER
"1/16/2018       RE: Kian Cortez  [address]  [place] [address]     Dear Colleague,    Thank you for referring your patient, Kian Cortez, to the [facility] GASTROENTEROLOGY AND IBD CLINIC at [facility]. Please see a copy of my visit note below.    [facility] Outpatient Medical Nutrition Therapy      Time Spent:  60 minutes  Session Type:  Initial Individual Session  Referring Physician: Dr. Julien Long  Reason for RD Visit:   GERD, obesity, nutritional counseling.     Nutrition Assessment:  Patient is here for initial visit with Registered Dietitian (SU).  Patient is a 63 y.o. female with history of GERD, functional dyspepsia, obesity, osteopenia. Patient stated that she has been eating less or not always what she wants to eat because she does not tolerate all foods. C/o epigastric burning and will have vomiting if eats a food that she does not tolerate such as greasy foods. MD recommended gastric emptying study but patient wanted to see Dietitian first. Is interested in diet education and also now  interested in calling to scheduled gastric emptying study as recommended by MD.    Diet Recall:  (usual intake)  Meal Food    Breakfast Bread with peanut butter or cream cheese OR oatmeal    Lunch Rice and chicken cooked in pan with oil   Dinner Boiled beans and 1 piece homemade flat bread   Snacks none   Beverages 2-3 water bottles (water+ SF drops), 1 c coffee with sugar and milk with added 1 scoop protein drink   Alcohol Intake None      Frequency of eating/taking out meals: rare     Height:   Ht Readings from Last 1 Encounters:   01/10/18 1.575 m (5' 2\")     Weight:    Wt Readings from Last 10 Encounters:   01/10/18 91.6 kg (202 lb)   01/08/18 91.3 kg (201 lb 4.8 oz)   01/05/18 91.3 kg (201 lb 4 oz)   01/04/18 90.4 kg (199 lb 6 oz)   11/28/17 91.7 kg (202 lb 3.2 oz)   10/17/17 91.6 kg (202 lb)   05/23/17 91.2 kg (201 lb)   05/16/17 89.6 kg (197 lb 9.6 oz)   04/19/17 90.2 kg (198 " lb 12.8 oz)   17 90.3 kg (199 lb)      BMI: 37.02 class II obesity    Labs:  Reviewed EMR  Pertinent Medications/vitamin and mineral supplements:   Reported that she takes vitamin D, fish oil and omeprazole.   Food Allergies:  NKFA  Food intolerances: oily/greasy foods, lime/citrus fruit, tomato  Physical Activity:  3x per week goes to gym uses treadmill and weights.  Estimated Nutrition Needs based on ideal body weight of ~50k-1500 calories (25-30 kcals/kg), 60g protein (1.2g/kg).    MALNUTRITION:  % Weight Loss:  None noted  % Intake:  Decreased intake does not meet criteria for malnutrition   Subcutaneous Fat Loss:  None observed  Muscle Loss:  None observed  Fluid Retention:  None noted    Malnutrition Diagnosis: Patient does not meet two of the above criteria necessary for diagnosing malnutrition  In Context of:  Chronic illness or disease    Nutrition Diagnosis:    Knowledge and beliefs deficit related to lack of previous diet education related to GERD and weight loss  as evidenced by patient report and interest in diet education.    Nutrition Prescription: See recommendations under goals below.     Nutrition Intervention:    Nutrition Education/Counseling:  Reviewed diet education with tips and suggestions for GERD including recommended foods (as tolerated) and foods not recommended if causing symptoms such as chocolate, caffeine, peppermint and spearmint, alcohol, spicy foods and fatty foods as well as recommendation not to smoke. Provided lowfat diet tips. Encouraged patient to keep a food journal and write down all food and beverages consumed, time consumed and track any GI symptoms. Encouraged drinking adequate fluids to get at least 64 oz.    Discussed some lifestyle recommendations that may be helpful for relieving issues with GERD such as getting up and moving/walking after eating meals, can raise the head of the bed by using a foam wedge under top part of mattress for example. Told  patient that wearing loose fitting clothing may be more comfortable, and eating slowly, sitting while eating and eating in a calm and relaxed place is recommended. Additionally recommended not eating 2-3 hours before bedtime or lying down. Gave patient diet education handout.    Provided diet education. Discussed weight loss tips and suggestions including eating 3 balanced meals per day, limiting snacking/only having planned snack if truly hungry or if having long stretch between meals and then choosing healthy snack options. Discussed some sample healthy snack options based on patient's preferences. Discussed recommendation to keep daily food journals and track all foods and beverages consumed as well as measuring and tracking portions sizes. Recommended taking at least 20-30 minutes to eat a meal and practicing mindful eating such as eating at table and avoiding distractions such as tv, computer, phone while eating and stop eating when satisfied. Discussed general exercise recommendations of getting at least 30 minutes, most days of the week.     Gave patient radiology number from RN care coordinator so she could schedule her HIDA and gastric emptying study per MD recommendations.    Educational Materials Provided:  Nutrition care manual: GERD nutrition therapy, Weight loss tips    Patient verbalized understanding of education provided. See Goals below.     Goals:  -Do not skip meals. Aim for eating 3 balanced lower fat meals per day.  -Decrease coffee to 1/2 cup per day  -Aim for increasing exercise to go to gym at least 4 days per week and doing 30 minutes on treadmill.  -Keep daily food journals and track any symptoms you feel such as heartburn, vomiting.  -Drink 3 of your water bottles each day (ok to add some sugar free flavor drops to some water)  -Follow up with  Salima Uriarte Registered Dietitian in 1 month.    Nutrition Monitoring and Evaluation: Will monitor adherence to nutrition recommendations at  future RD visits.     Further Medical Nutrition Therapy:  Yes, recommended  Next Appointment (if applicable):  Patient scheduled f/u for 2/19/18.  Patient was encouraged to call/contact RD with any further questions.    Salima Uriarte MS, RD, LD

## 2018-01-16 NOTE — PATIENT INSTRUCTIONS
It was nice meeting you today:  -Do not skip meals. Aim for eating 3 balanced lower fat meals per day.  -Decrease coffee to 1/2 cup per day  -Aim for increasing exercise to go to gym at least 4 days per week and doing 30 minutes on treadmill.  -Keep daily food journals and track any symptoms you feel such as heartburn, vomiting.  -Drink 3 of your water bottles each day (ok to add some sugar free flavor drops to some water)  -Follow up with  Salima Uriarte Registered Dietitian in 1 month.   can call [phone] to schedule or make changes to appointment.

## 2018-01-16 NOTE — MR AVS SNAPSHOT
After Visit Summary   1/16/2018    Kian Cortez    MRN: [id number]           Patient Information     Date Of Birth          1955        Visit Information        Provider Department      1/16/2018 10:15 AM Salima Uriarte RD; [facility] Gastroenterology and IBD Clinic        Care Instructions    It was nice meeting you today:  -Do not skip meals. Aim for eating 3 balanced lower fat meals per day.  -Decrease coffee to 1/2 cup per day  -Aim for increasing exercise to go to gym at least 4 days per week and doing 30 minutes on treadmill.  -Keep daily food journals and track any symptoms you feel such as heartburn, vomiting.  -Drink 3 of your water bottles each day (ok to add some sugar free flavor drops to some water)  -Follow up with  Salima Uriarte Registered Dietitian in 1 month.   can call [phone] to schedule or make changes to appointment.            Follow-ups after your visit        Your next 10 appointments already scheduled     Apr 23, 2018 11:20 AM CDT   (Arrive by 11:05 AM)   Return Visit with Julien Long MD   [facility] Gastroenterology and IBD Clinic ([facility])    [address] [place] [id number] [phone]              Who to contact     Please call your clinic at [phone] to:    Ask questions about your health    Make or cancel appointments    Discuss your medicines    Learn about your test results    Speak to your doctor   If you have compliments or concerns about an experience at your clinic, or if you wish to file a complaint, please contact [facility] Patient Relations at [phone] or email us at [email or web address]         Additional Information About Your Visit        Aravhart Information     Lionseek is an electronic gateway that provides easy, online access to your medical records. With Lionseek, you can request a clinic appointment, read your test  results, renew a prescription or communicate with your care team.     To sign up for MyChart visit the website at [email or web address]   You will be asked to enter the access code listed below, as well as some personal information. Please follow the directions to create your username and password.     Your access code is: [email or web address]  Expires: 4/15/2018  6:30 AM     Your access code will  in 90 days. If you need help or a new code, please contact your [facility] or call [phone] for assistance.        Care EveryWhere ID     This is your Care EveryWhere ID. This could be used by other organizations to access your [place] medical records  [id number]        Your Vitals Were     Last Period                   2006            Blood Pressure from Last 3 Encounters:   01/10/18 117/75   18 135/76   18 134/87    Weight from Last 3 Encounters:   01/10/18 91.6 kg (202 lb)   18 91.3 kg (201 lb 4.8 oz)   18 91.3 kg (201 lb 4 oz)              Today, you had the following     No orders found for display       Primary Care Provider Office Phone # Fax #    [facility] [phone]       [address] [place] [id number]        Equal Access to Services     GAYLE AQUINO : Hadii leila ku hadasho Sotannaali, waaxda luqadaha, qaybta kaalmada adeegyada, konrad fam. So [facility] [phone].    ATENCIÓN: Si habla español, tiene a bassett disposición servicios gratuitos de asistencia lingüística. Llame al [phone].    We comply with applicable federal civil rights laws and [place] laws. We do not discriminate on the basis of race, color, national origin, age, disability, sex, sexual orientation, or gender identity.            Thank you!     Thank you for choosing [facility] GASTROENTEROLOGY AND IBD CLINIC  for your care. Our goal is always to provide you with excellent care. Hearing back  from our patients is one way we can continue to improve our services. Please take a few minutes to complete the written survey that you may receive in the mail after your visit with us. Thank you!             Your Updated Medication List - Protect others around you: Learn how to safely use, store and throw away your medicines at www.disposemymeds.org.          This list is accurate as of: 1/16/18 11:47 AM.  Always use your most recent med list.                   Brand Name Dispense Instructions for use Diagnosis    calcium carbonate 500 MG chewable tablet    TUMS    180 tablet    Take 1 tablet (500 mg) by mouth 2 times daily    Gastroesophageal reflux disease, esophagitis presence not specified       diclofenac 1 % Gel topical gel    VOLTAREN    100 g    Apply 4 grams to knees four times daily using enclosed dosing card.    Primary osteoarthritis of both knees       FISH OIL + D3 PO      Take  by mouth daily.        omeprazole 20 MG CR capsule    priLOSEC    30 capsule    Take 1 capsule (20 mg) by mouth daily    Gastroesophageal reflux disease without esophagitis       polyethylene glycol powder    MIRALAX    500 g    Take 17 g (1 capful) by mouth daily    Slow transit constipation       traZODone 50 MG tablet    DESYREL    30 tablet    Take 0.5 tablets (25 mg) by mouth At Bedtime    Insomnia, unspecified type       TYLENOL PO           vitamin D 1000 UNITS capsule      Take 1 capsule by mouth daily.    Osteoporosis, post-menopausal

## 2018-01-22 ENCOUNTER — HOSPITAL ENCOUNTER (OUTPATIENT)
Dept: NUCLEAR MEDICINE | Facility: CLINIC | Age: 63
Setting detail: NUCLEAR MEDICINE
Discharge: HOME | End: 2018-01-22
Attending: INTERNAL MEDICINE | Admitting: INTERNAL MEDICINE
Payer: COMMERCIAL

## 2018-01-22 DIAGNOSIS — K30 FUNCTIONAL DYSPEPSIA: ICD-10-CM

## 2018-01-22 PROCEDURE — 78264 GASTRIC EMPTYING IMG STUDY: CPT

## 2018-01-22 PROCEDURE — A9541 TC99M SULFUR COLLOID: HCPCS | Performed by: INTERNAL MEDICINE

## 2018-01-22 PROCEDURE — 34300033 ZZH RX 343: Performed by: INTERNAL MEDICINE

## 2018-01-22 RX ADMIN — Medication 2 MILLICURIE: at 08:15

## 2018-01-22 NOTE — LETTER
January 26, 2018       TO: Kian Cortez  [address] [place] [address]           Dear ,    We are writing to inform you of your test results.    Your Gastric Emptying Study reveals a slightly delayed t-1/2 emptying time and a level of retention just above the cut-off after 4 hours. This is an indeterminate result, particularly when paired with your 11/2017 Upper Endoscopy which also did not show any evidence of solid food retention.    I would not render a diagnosis of Idiopathic Gastroparesis based on these studies at this time.    Please review these findings with your PCP further for now, and follow-up in GI Clinic as scheduled.  We can review these findings in further detail together at that visit, but feel free to contact us with any other questions in the interim.    It has been a pleasure participating in your care - please be in touch if there are any further questions that arise.  During business hours, you may reach the Clinic Nurse Triage Line at [phone]  For urgent/emergent questions after business hours, you may reach the on-call GI Fellow by contacting the [facility]  at [phone].    I recommend signing up for [employer] access if you have not already done so and are comfortable with using a computer. This allows for online access to your lab results and also helps you communicate efficiently with my clinic should any questions arise in your care.    Sincerely,    Julien Long MD    [facility] - Department of Medicine  Division of Gastroenterology

## 2018-01-22 NOTE — LETTER
Patient:  Kian Cortez  :   1955  MRN:     [id number]        Ms. Kian Cortez  [address] [facility] [address]        2018    Dear Ms. Cortez,    Thank you for choosing the [facility] for your healthcare needs.  We appreciate the opportunity to serve you.    The following are your recent test results. Your recent test to evaluate gastric emptying was abnormal.  There is nothing immediate that needs to be done, but I would recommend you schedule an office visit with GI or Primary Care at your convenience to review this test and any next steps in your care.    Please contact your provider if you have any questions or concerns.  We look forward to serving your needs in the future.      Sincerely,  Primary Care Clinic  Manuela Gimenez MD  Internal Medicine

## 2018-01-25 ENCOUNTER — HOSPITAL ENCOUNTER (OUTPATIENT)
Dept: NUCLEAR MEDICINE | Facility: CLINIC | Age: 63
Setting detail: NUCLEAR MEDICINE
Discharge: HOME | End: 2018-01-25
Attending: INTERNAL MEDICINE | Admitting: INTERNAL MEDICINE
Payer: COMMERCIAL

## 2018-01-25 DIAGNOSIS — K30 FUNCTIONAL DYSPEPSIA: ICD-10-CM

## 2018-01-25 PROCEDURE — 78227 HEPATOBIL SYST IMAGE W/DRUG: CPT

## 2018-01-25 PROCEDURE — A9537 TC99M MEBROFENIN: HCPCS | Performed by: INTERNAL MEDICINE

## 2018-01-25 PROCEDURE — 34300033 ZZH RX 343: Performed by: INTERNAL MEDICINE

## 2018-01-25 RX ORDER — KIT FOR THE PREPARATION OF TECHNETIUM TC 99M MEBROFENIN 45 MG/10ML
4.8-7.2 INJECTION, POWDER, LYOPHILIZED, FOR SOLUTION INTRAVENOUS ONCE
Status: COMPLETED | OUTPATIENT
Start: 2018-01-25 | End: 2018-01-25

## 2018-01-25 RX ADMIN — MEBROFENIN 5.5 MCI.: 45 INJECTION, POWDER, LYOPHILIZED, FOR SOLUTION INTRAVENOUS at 12:51

## 2018-01-26 NOTE — ADDENDUM NOTE
Encounter addended by: Julien Long MD on: 1/26/2018 10:23 AM<BR>     Actions taken: Letter status changed

## 2018-02-08 NOTE — TELEPHONE ENCOUNTER
Spoke to the patient on 2/8/2018 at 9:14 AM and was told that she doesn't want to make an appointment because she is fine. Informed the patient that if anything changes to please call us back and we would be happy to see her.

## 2018-02-27 ENCOUNTER — ALLIED HEALTH/NURSE VISIT (OUTPATIENT)
Dept: GASTROENTEROLOGY | Facility: CLINIC | Age: 63
End: 2018-02-27
Payer: COMMERCIAL

## 2018-02-27 VITALS — WEIGHT: 200.1 LBS | HEIGHT: 62 IN | BODY MASS INDEX: 36.82 KG/M2

## 2018-02-27 DIAGNOSIS — Z71.3 NUTRITIONAL COUNSELING: Primary | ICD-10-CM

## 2018-02-27 DIAGNOSIS — K21.9 ESOPHAGEAL REFLUX: ICD-10-CM

## 2018-02-27 NOTE — PROGRESS NOTES
[facility] Outpatient Medical Nutrition Therapy      Time Spent:  30 minutes  Session Type:  Follow up Individual Session  Referring Physician: Dr. Julien Long  Reason for RD Visit:   GERD, obesity, nutritional counseling.     Nutrition Assessment:  Patient is here with  for follow up visit with Registered Dietitian (SU). Patient is a 63 y.o. female with history of GERD, functional dyspepsia, obesity, osteopenia. Since last RD visit, pt had gastric emptying study and HIDA scan. Patient stated that she received her results in the mail. Patient stated that her appetite is good but cannot tolerate many different foods/trial of different foods. Does not tolerate greasy/fatty foods.C/o epigastric burning and will have vomiting if eats a food that she does not tolerate such as greasy foods. Reported has bloating at times. Reported that she cannot/has trouble eating fruits and vegetables.    Diet Recall:  (usual intake)  Meal Food    Breakfast Bread with peanut butter or cream cheese OR oatmeal OR bagel with honey and PB   Lunch Rice and chicken cooked in pan with oil   Dinner Boiled beans and 1 piece homemade flat bread   Snacks Sometimes banana or other fruit   Beverages 2-3 water bottles (water+ SF drops), 1 c coffee with sugar and milk with added 1 scoop protein drink   Alcohol Intake None      Frequency of eating/taking out meals: rare    Progress towards previous goals:  -Do not skip meals. Aim for eating 3 balanced lower fat meals per day.-Met but has been unable to change the type of foods she eats b/c she did not tolerate.  -Decrease coffee to 1/2 cup per day-N/A due to tolerates coffee/continues to drink b/c does not bother her. Tends to put some protein powder into her coffee.  -Aim for increasing exercise to go to gym at least 4 days per week and doing 30 minutes on treadmill.-did not discuss at visit today.  -Keep daily food journals and track any symptoms you feel such as heartburn,  "vomiting.-Met  -Drink 3 of your water bottles each day (ok to add some sugar free flavor drops to some water)-Met. Pt reported that drinking this much water has been very helpful and it has also been helpful to avoid drinking liquids with meals for her.    Additionally, pt stated that she has also been following recommendation to chew food well before swallowing and this has been helpful as well.     Height:   Ht Readings from Last 1 Encounters:   01/10/18 1.575 m (5' 2\")     Weight:  Current weight on 2018 was 200.1 lbs (-2 lbs from last visit).  Wt Readings from Last 10 Encounters:   01/10/18 91.6 kg (202 lb)   18 91.3 kg (201 lb 4.8 oz)   18 91.3 kg (201 lb 4 oz)   18 90.4 kg (199 lb 6 oz)   17 91.7 kg (202 lb 3.2 oz)   10/17/17 91.6 kg (202 lb)   17 91.2 kg (201 lb)   17 89.6 kg (197 lb 9.6 oz)   17 90.2 kg (198 lb 12.8 oz)   17 90.3 kg (199 lb)      BMI: 36.68 class II obesity    Labs:  Reviewed EMR  Pertinent Medications/vitamin and mineral supplements:   Reported that she takes vitamin D, fish oil and omeprazole.   Food Allergies:  NKFA  Food intolerances: oily/greasy foods, lime/citrus fruit, tomato  Physical Activity:  3x per week goes to gym uses treadmill and weights. (did not have time at visit today to discuss but patient reported this at initial visit in 2018).  Estimated Nutrition Needs based on ideal body weight of ~50k-1500 calories (25-30 kcals/kg), 60g protein (1.2g/kg).    MALNUTRITION:  % Weight Loss:  Weight loss does not meet malnutrition guidelines. Pt interested in losing weight. Discussed wt loss tips at previous visit.  % Intake:  Decreased intake does not meet criteria for malnutrition   Subcutaneous Fat Loss:  None observed  Muscle Loss:  None observed  Fluid Retention:  None noted    Malnutrition Diagnosis: Patient does not meet two of the above criteria necessary for diagnosing malnutrition  In Context of:  Chronic illness " or disease    Nutrition Diagnosis:    Knowledge and beliefs deficit related to lack of previous diet education related to GERD and weight loss  as evidenced by patient report and interest in diet education.    Nutrition Prescription: See recommendations under goals below.     Nutrition Intervention:    Nutrition Education/Counseling:  Although pt not diagnosed with/indeterminant results for gastroparesis, explained that some gastroparesis diet tips may be helpful for patient and coincide with GERD recommendations. Discussed recommendations for eating at 4-6 smaller meals/snacks per day if better tolerated. Recommended eating a lower fat diet and lower fiber diet as tolerated/if better tolerated. Provided diet education on lowfat diet. Discussed foods high in fat and substitutions for those foods. Discussed food sources of fiber and discussed some tips for following a low fiber diet. Discussed ways in which to include some fruits and vegetables that may be better tolerated such as removing skins, peels, seeds from fruits and vegetables and cooking well before eating and having canned non-stringy fruits.     Discussed recommendation for balanced meals and not skipping and discussed general components of balanced meals. Can have oral nutrition supplement/protein drink for meal(s) or snack(s) instead of skipping. Encouraged patient to continue to chew food well before swallowing. Discussed adequate hydrate with recommendation to continue to drink 48 oz- 64 oz (6-8 cups) fluids per day. Encouraged patient to continue to limit fluids with meals except sips as needed and drink the majority of fluids in between meals which may help so patient does not get too full at meals and to allow patient to eat solid foods at meals. Discussed tips and various diet consistency/liquids if better tolerated or having a day where she is having vomiting/unable to eat depending on how patient is feeling and able to tolerate. Gave patient  gastroparesis diet education handout.    Educational Materials Provided:  Nutrition care manual: GERD nutrition therapy, Weight loss tips    Patient verbalized understanding of education provided. See Goals below.     Goals:  -Do not skip meals. Aim for eating 3 balanced lower fat meals per day.  -Decrease coffee to 1/2 cup per day  -Aim for increasing exercise to go to gym at least 4 days per week and doing 30 minutes on treadmill.  -Keep daily food journals and track any symptoms you feel such as heartburn, vomiting.  -Drink 3 of your water bottles each day (ok to add some sugar free flavor drops to some water)  -Follow up with  Salima Uriarte Registered Dietitian in 1 month.    Nutrition Monitoring and Evaluation: Will monitor adherence to nutrition recommendations at future RD visits.     Further Medical Nutrition Therapy: PRN  Patient was encouraged to call/contact RD with any further questions.    Salima Uriarte, MS, RD, LD

## 2018-02-27 NOTE — PATIENT INSTRUCTIONS
It was nice seeing you again:  `Continue to avoid liquids with meals.  -Continue chewing food very well before swallowing.  -Aim for eating 4-5 smaller meals per day (lower fat and lower fiber meals)  -Okay to have protein powder in your coffee or lowfat milk or water as a meal replacement.  If you would like to schedule a follow up appointment with Salima Uriarte, Registered Dietitian, please call [phone].

## 2018-02-27 NOTE — MR AVS SNAPSHOT
After Visit Summary   2/27/2018    Kian Cortez    MRN: [id number]           Patient Information     Date Of Birth          1955        Visit Information        Provider Department      2/27/2018 11:30 AM Salima Uriarte RD; LANGUAGE [facility]        Care Instructions    It was nice seeing you again:  `Continue to avoid liquids with meals.  -Continue chewing food very well before swallowing.  -Aim for eating 4-5 smaller meals per day (lower fat and lower fiber meals)  -Okay to have protein powder in your coffee or lowfat milk or water as a meal replacement.  If you would like to schedule a follow up appointment with Salima Uriarte, Registered Dietitian, please call [phone].              Follow-ups after your visit        Your next 10 appointments already scheduled     Apr 23, 2018 11:20 AM CDT   (Arrive by 11:05 AM)   Return Visit with Julien Long MD   [facility] ([facility])    [address] [place] [id number] [phone]              Who to contact     Please call your clinic at [phone] to:    Ask questions about your health    Make or cancel appointments    Discuss your medicines    Learn about your test results    Speak to your doctor            Additional Information About Your Visit        MyChart Information     [employer] is an electronic gateway that provides easy, online access to your medical records. With P[employer], you can request a clinic appointment, read your test results, renew a prescription or communicate with your care team.     To sign up for [employer] visit the website at [email or web address]   You will be asked to enter the access code listed below, as well as some personal information. Please follow the directions to create your username and password.     Your access code is: [email or web address]  Expires: 4/15/2018  6:30 AM     Your access code will   in 90 days. If you need help or a new code, please contact your [facility] or call [phone] for assistance.        Care EveryWhere ID     This is your Care EveryWhere ID. This could be used by other organizations to access your [place] medical records  [id number]        Your Vitals Were     Last Period                   2006            Blood Pressure from Last 3 Encounters:   01/10/18 117/75   18 135/76   18 134/87    Weight from Last 3 Encounters:   01/10/18 91.6 kg (202 lb)   18 91.3 kg (201 lb 4.8 oz)   18 91.3 kg (201 lb 4 oz)              Today, you had the following     No orders found for display       Primary Care Provider Office Phone # Fax #    [facility] [phone]       [address] [place] [id number]        Equal Access to Services     GAYLE AQUINO : Hadii leila esquivelo Sodebbi, waaxda luqadaha, qaybta kaalmada adeegyada, konrad dinero . So [facility] [phone].    ATENCIÓN: Si habla español, tiene a bassett disposición servicios gratuitos de asistencia lingüística. Llame al [phone].    We comply with applicable federal civil rights laws and [place] laws. We do not discriminate on the basis of race, color, national origin, age, disability, sex, sexual orientation, or gender identity.            Thank you!     Thank you for choosing [facility]  for your care. Our goal is always to provide you with excellent care. Hearing back from our patients is one way we can continue to improve our services. Please take a few minutes to complete the written survey that you may receive in the mail after your visit with us. Thank you!             Your Updated Medication List - Protect others around you: Learn how to safely use, store and throw away your medicines at www.disposemymeds.org.          This list is accurate as of 18  12:50 PM.  Always use your most recent med list.                   Brand Name Dispense Instructions for use Diagnosis    calcium carbonate 500 MG chewable tablet    TUMS    180 tablet    Take 1 tablet (500 mg) by mouth 2 times daily    Gastroesophageal reflux disease, esophagitis presence not specified       diclofenac 1 % Gel topical gel    VOLTAREN    100 g    Apply 4 grams to knees four times daily using enclosed dosing card.    Primary osteoarthritis of both knees       FISH OIL + D3 PO      Take  by mouth daily.        omeprazole 20 MG CR capsule    priLOSEC    30 capsule    Take 1 capsule (20 mg) by mouth daily    Gastroesophageal reflux disease without esophagitis       polyethylene glycol powder    MIRALAX    500 g    Take 17 g (1 capful) by mouth daily    Slow transit constipation       traZODone 50 MG tablet    DESYREL    30 tablet    Take 0.5 tablets (25 mg) by mouth At Bedtime    Insomnia, unspecified type       TYLENOL PO           vitamin D 1000 UNITS capsule      Take 1 capsule by mouth daily.    Osteoporosis, post-menopausal

## 2018-04-23 ENCOUNTER — OFFICE VISIT (OUTPATIENT)
Dept: GASTROENTEROLOGY | Facility: CLINIC | Age: 63
End: 2018-04-23
Payer: COMMERCIAL

## 2018-04-23 VITALS
DIASTOLIC BLOOD PRESSURE: 81 MMHG | BODY MASS INDEX: 37.58 KG/M2 | HEIGHT: 62 IN | WEIGHT: 204.2 LBS | OXYGEN SATURATION: 98 % | SYSTOLIC BLOOD PRESSURE: 117 MMHG | HEART RATE: 70 BPM | TEMPERATURE: 98.4 F

## 2018-04-23 DIAGNOSIS — K30 FUNCTIONAL DYSPEPSIA: Primary | ICD-10-CM

## 2018-04-23 RX ORDER — NICOTINE POLACRILEX 4 MG/1
20 GUM, CHEWING ORAL 2 TIMES DAILY
Qty: 180 TABLET | Refills: 3 | Status: SHIPPED | OUTPATIENT
Start: 2018-04-23 | End: 2019-01-25

## 2018-04-23 ASSESSMENT — PAIN SCALES - GENERAL: PAINLEVEL: NO PAIN (0)

## 2018-04-23 NOTE — MR AVS SNAPSHOT
"              After Visit Summary   4/23/2018    Kian Cortez    MRN: [id number]           Patient Information     Date Of Birth          1955        Visit Information        Provider Department      4/23/2018 11:05 AM Celeste Pike; Julien Long MD [facility]        Today's Diagnoses     Functional dyspepsia    -  1      Care Instructions    I've included a brief summary of our discussion and care plan from today's visit below.  Please review this information with your primary care provider.  _______________________________________________________________________      1. It was wonderful getting a chance to meet with you to discuss your Functional Dyspepsia, particularly given the interval clinical improvement you've had with more consistent use of low-dose PPI + mindful dietary/lifestyle modifications - discussed next steps in management together today.  - Reviewed your HIDA scan, no overt evidence of acute/chronic cholecystitis at play.  - Reviewed your Gastric Emptying Study, indeterminate result with nearly-adequate emptying by 180 minutes. In the absence of solid food retention on your recent Upper Endoscopy, suspect that this is a normal variant result. Would not render a diagnosis of Idiopathic Gastroparesis at this time.  - Recommend gently increasing your PPI (omeprazole) to 20mg, taken twice daily, for now. Can readdress your clinical response at the next visit, discussed risks/benefits/alternatives at length today.  - Strongly recommend daily multivitamin supplementation if long-term acid suppression is required.    2. Recommend dietary/lifestyle modifications for Functional Dyspepsia as we discussed today, including:  - Continue to follow through on the recommendations from your recent Nutrition visit!  - Smaller-volume but frequent meals spread throughout the day, adjusting consistencies and dietary choices based on if it's a \"good\" or \"bad\" day, avoiding prolonged " fasting, etc.  - Regular mild/moderate cardio exercise as tolerated, ensuring adequate hydration daily.  - Gradual/sustainable weight loss can also be beneficial from the standpoint of a holistic care plan.  - Increasing the elevation of your head while you sleep (consider using a foam wedge pillow along with raising the head of the bed), regular mild/moderate cardio exercise as tolerated (particularly in the evening after dinner), not eating or drinking within 2-3 hours of bedtime, avoiding prolonged fasting, etc.    3. Recommend routine CRC screening with colonoscopy likely in the next 1-2 years, unless symptomatic sooner, given the reported normal colonoscopy in 2009 and the lack of concerning Family History. Please discuss this with your PCP further accordingly.    4. Return to GI Clinic with my GI Physician Assistant (Michael Garza) in 6 months to review your progress, sooner if symptomatic.   - If you are unable to schedule this follow-up appointment today, please contact our  at [phone] within the next week to help set up this necessary appointment.    _______________________________________________________________________    It was a pleasure seeing you in clinic today - please be in touch if there are any further questions that arise following today's visit.  During business hours, you may reach Clinic Nurse Triage Line at [phone].  For urgent/emergent questions after business hours, you may reach the on-call GI Fellow by contacting the [facility]  at [phone].    Any benign/non-urgent test results are usually communicated via letter or Veracodehart message within 1-2 weeks after completion.  Urgent results (those that require a change in the previously-discussed care plan) are usually communicated via a phone call once available from our clinic staff to discuss the results and the next steps in your evaluation.    I recommend signing up for [employer] access if you have  not already done so and are comfortable with using a computer.  This allows for online access to your lab results and also helps you communicate efficiently with my clinic should any questions arise in your care.    We have Financial Counseling services available through our clinic.  If you have questions about your insurance coverage or payment responsibilities, particularly before you undergo any tests or procedures, please let us know and we can arrange a consultation accordingly to help you make informed decisions about your healthcare.    Sincerely,    Julien Long MD    [facility] - Department of Medicine  Division of Gastroenterology            Follow-ups after your visit        Follow-up notes from your care team     Return in about 6 months (around 10/23/2018).      Your next 10 appointments already scheduled     Oct 01, 2018  2:20 PM CDT   (Arrive by 2:05 PM)   Return Visit with Michael Garza PA-C   [facility] ([facility])    [address] [place] [address] [id number] [phone]              Who to contact     Please call your clinic at [phone] to:    Ask questions about your health    Make or cancel appointments    Discuss your medicines    Learn about your test results    Speak to your doctor            Additional Information About Your Visit        MyChart Information     Shopcasterhart is an electronic gateway that provides easy, online access to your medical records. With MySupportAssistantt, you can request a clinic appointment, read your test results, renew a prescription or communicate with your care team.     To sign up for Shopcasterhart visit the website at [email or web address]   You will be asked to enter the access code listed below, as well as some personal information. Please follow the directions to create your username and password.     Your access code is: [email or web address]  Expires: 7/19/2018   "6:30 AM     Your access code will  in 90 days. If you need help or a new code, please contact your [facility] or call [phone] for assistance.        Care EveryWhere ID     This is your Care EveryWhere ID. This could be used by other organizations to access your [place] medical records  [id number]        Your Vitals Were     Pulse Temperature Height Last Period Pulse Oximetry BMI (Body Mass Index)    70 98.4  F (36.9  C) 1.575 m (5' 2\") 2006 98% 37.35 kg/m2       Blood Pressure from Last 3 Encounters:   18 117/81   01/10/18 117/75   18 135/76    Weight from Last 3 Encounters:   18 92.6 kg (204 lb 3.2 oz)   18 90.8 kg (200 lb 1.6 oz)   01/10/18 91.6 kg (202 lb)              Today, you had the following     No orders found for display         Today's Medication Changes          These changes are accurate as of 18 12:10 PM.  If you have any questions, ask your nurse or doctor.               These medicines have changed or have updated prescriptions.        Dose/Directions    * omeprazole 20 MG CR capsule   Commonly known as:  priLOSEC   This may have changed:  Another medication with the same name was added. Make sure you understand how and when to take each.   Used for:  Gastroesophageal reflux disease without esophagitis   Changed by:  Julien Long MD        Dose:  20 mg   Take 1 capsule (20 mg) by mouth daily   Quantity:  30 capsule   Refills:  1       * omeprazole 20 MG tablet   This may have changed:  You were already taking a medication with the same name, and this prescription was added. Make sure you understand how and when to take each.   Used for:  Functional dyspepsia   Changed by:  Julien Long MD        Dose:  20 mg   Take 1 tablet (20 mg) by mouth 2 times daily   Quantity:  180 tablet   Refills:  3       * Notice:  This list has 2 medication(s) that are the same as other medications prescribed for you. Read the directions " carefully, and ask your doctor or other care provider to review them with you.         Where to get your medicines      These medications were sent to [facility] - [place], [place] - [address]  [address], [facility] [address]     Phone:  [phone]     omeprazole 20 MG tablet                Primary Care Provider Office Phone # Fax #    [facility] [phone]       [address] [facility] [address]        Equal Access to Services     GAYLE AQUINO : Hadii aad ku hadasho Soomaali, waaxda luqadaha, qaybta kaalmada adeegyada, waxay idiin hayaan adeeg cadenceararandy dinero . So [facility] [phone].    ATENCIÓN: Si habla español, tiene a bassett disposición servicios gratuitos de asistencia lingüística. [facility] [phone].    We comply with applicable federal civil rights laws and [place] laws. We do not discriminate on the basis of race, color, national origin, age, disability, sex, sexual orientation, or gender identity.            Thank you!     Thank you for choosing [facility]  for your care. Our goal is always to provide you with excellent care. Hearing back from our patients is one way we can continue to improve our services. Please take a few minutes to complete the written survey that you may receive in the mail after your visit with us. Thank you!             Your Updated Medication List - Protect others around you: Learn how to safely use, store and throw away your medicines at www.disposemymeds.org.          This list is accurate as of 4/23/18 12:10 PM.  Always use your most recent med list.                   Brand Name Dispense Instructions for use Diagnosis    calcium carbonate 500 MG chewable tablet    TUMS    180 tablet    Take 1 tablet (500 mg) by mouth 2 times daily    Gastroesophageal reflux disease, esophagitis presence not specified       diclofenac 1 % Gel topical gel     VOLTAREN    100 g    Apply 4 grams to knees four times daily using enclosed dosing card.    Primary osteoarthritis of both knees       FISH OIL + D3 PO      Take  by mouth daily.        * omeprazole 20 MG CR capsule    priLOSEC    30 capsule    Take 1 capsule (20 mg) by mouth daily    Gastroesophageal reflux disease without esophagitis       * omeprazole 20 MG tablet     180 tablet    Take 1 tablet (20 mg) by mouth 2 times daily    Functional dyspepsia       polyethylene glycol powder    MIRALAX    500 g    Take 17 g (1 capful) by mouth daily    Slow transit constipation       traZODone 50 MG tablet    DESYREL    30 tablet    Take 0.5 tablets (25 mg) by mouth At Bedtime    Insomnia, unspecified type       TYLENOL PO           vitamin D 1000 units capsule      Take 1 capsule by mouth daily.    Osteoporosis, post-menopausal       * Notice:  This list has 2 medication(s) that are the same as other medications prescribed for you. Read the directions carefully, and ask your doctor or other care provider to review them with you.

## 2018-04-23 NOTE — NURSING NOTE
"No chief complaint on file.      Vitals:    04/23/18 1120   BP: 117/81   BP Location: Left arm   Patient Position: Chair   Cuff Size: Adult Regular   Pulse: 70   Temp: 98.4  F (36.9  C)   SpO2: 98%   Weight: 204 lb 3.2 oz   Height: 5' 2\"       Body mass index is 37.35 kg/(m^2).      Yoly Katz                       "

## 2018-04-23 NOTE — LETTER
4/23/2018       RE: Kian Cortez  [address]  [place] [address]     Dear Colleague,    Thank you for referring your patient, Kian Cortez, to the [facility] at [facility]. Please see a copy of my visit note below.    GI CLINIC VISIT    CC/REFERRING PROVIDER: Manuela Gimenez  REASON FOR CONSULTATION: dyspepsia    HPI: 63 year old female w/ h/o suspected Functional Dyspepsia, obesity, osteopenia, among other medical issues - presenting for f/u dyspepsia. Doing better overall since using low-dose PPI more consistently, +far less regurgitation and severe epigastric discomfort and slowly liberalizing some portions of diet. Did have recent acute discomfort after eating a chicken stew (cultural food, spicy); responsive improvement in sxs w/ use of low-dose PPI BID for 3 days. Denies any tenesmus or insecurity passing flatus, no fecal incontinence or new perianal/nocturnal sxs noted. Denies any N/V/F/C/HA/NS or other const/syst/cardiopulmonary sxs, no BRBPR/melena/urinary changes, no unintentional wt loss or appetite/satiety changes. No other bowel/bladder habit changes, no dysphagia/odynophagia. No jaundice/icterus/pruritus, no acholic stools/steatorrhea, no new lumps/bumps, no jt pain/oral ulcer/rash/eye sxs noted.    ROS: 10pt ROS performed and otherwise negative.    PERTINENT PAST MEDICAL/SURGICAL HISTORY:  As noted above.    PERTINENT MEDICATIONS:  - omeprazole 20mg PO QAM  Medications reviewed with patient today, see Medication List/Assessment for details.  No other NSAID/anticoagulation reported by patient.  No other OTC/herbal/supplements reported by patient.    SOCIAL HISTORY: Tobacco: none.    PHYSICAL EXAMINATION:  Vitals reviewed, AFVSS  Wt 204# today (stable)  Gen: aaox3, cooperative, pleasant, not diaphoretic, nad  HEENT: ncat, neck supple, no clad/sclad, normal op w/o ulcer/exudate, anicteric, mmm  Resp/CV without acute findings, not  "dyspneic/tachycardic  Abd: +nabs, soft, nt, nd, no peritoneal s/s noted  Ext: no c/c/e  Skin: warm, perfused, no jaundice  Neuro: grossly intact, no asterixis noted    PERTINENT STUDIES:  4hr GES 1/22/18: +slightly elevated t-1/2 of 115mins, but emptying pattern is actually indeterminate. Suspect normal variant, particularly in light of lack of solid food retention on recent EGD 11/2017.    HIDA/CCK scan normal 1/25/18    ASSESSMENT/PLAN:    1. Functional Dyspepsia with interval partial clinical improvement with medication + dietary/lifestyle measures instituted thus far - continue supportive care as ordered for now  1. It was wonderful getting a chance to meet with you to discuss your Functional Dyspepsia, particularly given the interval clinical improvement you've had with more consistent use of low-dose PPI + mindful dietary/lifestyle modifications - discussed next steps in management together today.  - Reviewed your HIDA scan, no overt evidence of acute/chronic cholecystitis at play.  - Reviewed your Gastric Emptying Study, indeterminate result with nearly-adequate emptying by 180 minutes. In the absence of solid food retention on your recent Upper Endoscopy, suspect that this is a normal variant result. Would not render a diagnosis of Idiopathic Gastroparesis at this time.  - Recommend gently increasing your PPI (omeprazole) to 20mg, taken twice daily, for now. Can readdress your clinical response at the next visit, discussed risks/benefits/alternatives at length today.  - Strongly recommend daily multivitamin supplementation if long-term acid suppression is required.    2. Recommend dietary/lifestyle modifications for Functional Dyspepsia as we discussed today, including:  - Continue to follow through on the recommendations from your recent Nutrition visit!  - Smaller-volume but frequent meals spread throughout the day, adjusting consistencies and dietary choices based on if it's a \"good\" or \"bad\" day, " avoiding prolonged fasting, etc.  - Regular mild/moderate cardio exercise as tolerated, ensuring adequate hydration daily.  - Gradual/sustainable weight loss can also be beneficial from the standpoint of a holistic care plan.  - Increasing the elevation of your head while you sleep (consider using a foam wedge pillow along with raising the head of the bed), regular mild/moderate cardio exercise as tolerated (particularly in the evening after dinner), not eating or drinking within 2-3 hours of bedtime, avoiding prolonged fasting, etc.    3. Recommend routine CRC screening with colonoscopy likely in the next 1-2 years, unless symptomatic sooner, given the reported normal colonoscopy in 2009 and the lack of concerning Family History. Please discuss this with your PCP further accordingly.    2. Cancer Screening  No known FH CRC, patient reports normal outside colonoscopy in 2009 (through [facility], records unavailable for review). Will readdress in ongoing care, may be due in the next 1-2 years, sooner if symptomatic or if required as part of current diagnostic evaluation.    RTC 6 months with GI PA, sooner if symptomatic.      Thank you for this consultation. It was a pleasure to participate in the care of this patient; please contact us with any further questions.       Again, thank you for allowing me to participate in the care of your patient.      Sincerely,    Julien Long MD

## 2018-04-23 NOTE — PATIENT INSTRUCTIONS
"I've included a brief summary of our discussion and care plan from today's visit below.  Please review this information with your primary care provider.  _______________________________________________________________________      1. It was wonderful getting a chance to meet with you to discuss your Functional Dyspepsia, particularly given the interval clinical improvement you've had with more consistent use of low-dose PPI + mindful dietary/lifestyle modifications - discussed next steps in management together today.  - Reviewed your HIDA scan, no overt evidence of acute/chronic cholecystitis at play.  - Reviewed your Gastric Emptying Study, indeterminate result with nearly-adequate emptying by 180 minutes. In the absence of solid food retention on your recent Upper Endoscopy, suspect that this is a normal variant result. Would not render a diagnosis of Idiopathic Gastroparesis at this time.  - Recommend gently increasing your PPI (omeprazole) to 20mg, taken twice daily, for now. Can readdress your clinical response at the next visit, discussed risks/benefits/alternatives at length today.  - Strongly recommend daily multivitamin supplementation if long-term acid suppression is required.    2. Recommend dietary/lifestyle modifications for Functional Dyspepsia as we discussed today, including:  - Continue to follow through on the recommendations from your recent Nutrition visit!  - Smaller-volume but frequent meals spread throughout the day, adjusting consistencies and dietary choices based on if it's a \"good\" or \"bad\" day, avoiding prolonged fasting, etc.  - Regular mild/moderate cardio exercise as tolerated, ensuring adequate hydration daily.  - Gradual/sustainable weight loss can also be beneficial from the standpoint of a holistic care plan.  - Increasing the elevation of your head while you sleep (consider using a foam wedge pillow along with raising the head of the bed), regular mild/moderate cardio exercise as " tolerated (particularly in the evening after dinner), not eating or drinking within 2-3 hours of bedtime, avoiding prolonged fasting, etc.    3. Recommend routine CRC screening with colonoscopy likely in the next 1-2 years, unless symptomatic sooner, given the reported normal colonoscopy in 2009 and the lack of concerning Family History. Please discuss this with your PCP further accordingly.    4. Return to GI Clinic with my GI Physician Assistant (Michael Garza) in 6 months to review your progress, sooner if symptomatic.   - If you are unable to schedule this follow-up appointment today, please contact our  at [phone] within the next week to help set up this necessary appointment.    _______________________________________________________________________    It was a pleasure seeing you in clinic today - please be in touch if there are any further questions that arise following today's visit.  During business hours, you may reach Clinic Nurse Triage Line at [phone].  For urgent/emergent questions after business hours, you may reach the on-call GI Fellow by contacting the [facility]  at [phone].    Any benign/non-urgent test results are usually communicated via letter or [employer] message within 1-2 weeks after completion.  Urgent results (those that require a change in the previously-discussed care plan) are usually communicated via a phone call once available from our clinic staff to discuss the results and the next steps in your evaluation.    I recommend signing up for [employer] access if you have not already done so and are comfortable with using a computer.  This allows for online access to your lab results and also helps you communicate efficiently with my clinic should any questions arise in your care.    We have Financial Counseling services available through our clinic.  If you have questions about your insurance coverage or payment responsibilities, particularly  before you undergo any tests or procedures, please let us know and we can arrange a consultation accordingly to help you make informed decisions about your healthcare.    Sincerely,    Julien Long MD    [facility]   Department of Medicine  Division of Gastroenterology

## 2018-04-23 NOTE — PROGRESS NOTES
GI CLINIC VISIT    CC/REFERRING PROVIDER: Manuela Gimenez  REASON FOR CONSULTATION: dyspepsia    HPI: 63 year old female w/ h/o suspected Functional Dyspepsia, obesity, osteopenia, among other medical issues - presenting for f/u dyspepsia. Doing better overall since using low-dose PPI more consistently, +far less regurgitation and severe epigastric discomfort and slowly liberalizing some portions of diet. Did have recent acute discomfort after eating a chicken stew (cultural food, spicy); responsive improvement in sxs w/ use of low-dose PPI BID for 3 days. Denies any tenesmus or insecurity passing flatus, no fecal incontinence or new perianal/nocturnal sxs noted. Denies any N/V/F/C/HA/NS or other const/syst/cardiopulmonary sxs, no BRBPR/melena/urinary changes, no unintentional wt loss or appetite/satiety changes. No other bowel/bladder habit changes, no dysphagia/odynophagia. No jaundice/icterus/pruritus, no acholic stools/steatorrhea, no new lumps/bumps, no jt pain/oral ulcer/rash/eye sxs noted.    ROS: 10pt ROS performed and otherwise negative.    PERTINENT PAST MEDICAL/SURGICAL HISTORY:  As noted above.    PERTINENT MEDICATIONS:  - omeprazole 20mg PO QAM  Medications reviewed with patient today, see Medication List/Assessment for details.  No other NSAID/anticoagulation reported by patient.  No other OTC/herbal/supplements reported by patient.    SOCIAL HISTORY: Tobacco: none.    PHYSICAL EXAMINATION:  Vitals reviewed, AFVSS  Wt 204# today (stable)  Gen: aaox3, cooperative, pleasant, not diaphoretic, nad  HEENT: ncat, neck supple, no clad/sclad, normal op w/o ulcer/exudate, anicteric, mmm  Resp/CV without acute findings, not dyspneic/tachycardic  Abd: +nabs, soft, nt, nd, no peritoneal s/s noted  Ext: no c/c/e  Skin: warm, perfused, no jaundice  Neuro: grossly intact, no asterixis noted    PERTINENT STUDIES:  4hr GES 1/22/18: +slightly elevated t-1/2 of 115mins, but emptying pattern is actually indeterminate.  "Suspect normal variant, particularly in light of lack of solid food retention on recent EGD 11/2017.    HIDA/CCK scan normal 1/25/18    ASSESSMENT/PLAN:    1. Functional Dyspepsia with interval partial clinical improvement with medication + dietary/lifestyle measures instituted thus far - continue supportive care as ordered for now  1. It was wonderful getting a chance to meet with you to discuss your Functional Dyspepsia, particularly given the interval clinical improvement you've had with more consistent use of low-dose PPI + mindful dietary/lifestyle modifications - discussed next steps in management together today.  - Reviewed your HIDA scan, no overt evidence of acute/chronic cholecystitis at play.  - Reviewed your Gastric Emptying Study, indeterminate result with nearly-adequate emptying by 180 minutes. In the absence of solid food retention on your recent Upper Endoscopy, suspect that this is a normal variant result. Would not render a diagnosis of Idiopathic Gastroparesis at this time.  - Recommend gently increasing your PPI (omeprazole) to 20mg, taken twice daily, for now. Can readdress your clinical response at the next visit, discussed risks/benefits/alternatives at length today.  - Strongly recommend daily multivitamin supplementation if long-term acid suppression is required.    2. Recommend dietary/lifestyle modifications for Functional Dyspepsia as we discussed today, including:  - Continue to follow through on the recommendations from your recent Nutrition visit!  - Smaller-volume but frequent meals spread throughout the day, adjusting consistencies and dietary choices based on if it's a \"good\" or \"bad\" day, avoiding prolonged fasting, etc.  - Regular mild/moderate cardio exercise as tolerated, ensuring adequate hydration daily.  - Gradual/sustainable weight loss can also be beneficial from the standpoint of a holistic care plan.  - Increasing the elevation of your head while you sleep (consider " using a foam wedge pillow along with raising the head of the bed), regular mild/moderate cardio exercise as tolerated (particularly in the evening after dinner), not eating or drinking within 2-3 hours of bedtime, avoiding prolonged fasting, etc.    3. Recommend routine CRC screening with colonoscopy likely in the next 1-2 years, unless symptomatic sooner, given the reported normal colonoscopy in 2009 and the lack of concerning Family History. Please discuss this with your PCP further accordingly.    2. Cancer Screening  No known FH CRC, patient reports normal outside colonoscopy in 2009 (through [facility], records unavailable for review). Will readdress in ongoing care, may be due in the next 1-2 years, sooner if symptomatic or if required as part of current diagnostic evaluation.    RTC 6 months with GI PA, sooner if symptomatic.      Thank you for this consultation. It was a pleasure to participate in the care of this patient; please contact us with any further questions.     Julien Long MD   of Medicine  [facility] - Department of Medicine  Division of Gastroenterology

## 2018-07-11 ENCOUNTER — OFFICE VISIT (OUTPATIENT)
Dept: INTERNAL MEDICINE | Facility: CLINIC | Age: 63
End: 2018-07-11
Payer: COMMERCIAL

## 2018-07-11 VITALS
BODY MASS INDEX: 36.69 KG/M2 | WEIGHT: 200.6 LBS | HEART RATE: 72 BPM | OXYGEN SATURATION: 98 % | DIASTOLIC BLOOD PRESSURE: 68 MMHG | SYSTOLIC BLOOD PRESSURE: 99 MMHG

## 2018-07-11 DIAGNOSIS — S39.012S BACK STRAIN, SEQUELA: Primary | ICD-10-CM

## 2018-07-11 DIAGNOSIS — S39.012S BACK STRAIN, SEQUELA: ICD-10-CM

## 2018-07-11 DIAGNOSIS — K21.9 GASTROESOPHAGEAL REFLUX DISEASE WITHOUT ESOPHAGITIS: ICD-10-CM

## 2018-07-11 DIAGNOSIS — Z78.9 H/O FOREIGN TRAVEL: ICD-10-CM

## 2018-07-11 LAB
ANION GAP SERPL CALCULATED.3IONS-SCNC: 7 MMOL/L (ref 3–14)
BASOPHILS # BLD AUTO: 0.1 10E9/L (ref 0–0.2)
BASOPHILS NFR BLD AUTO: 0.8 %
BUN SERPL-MCNC: 14 MG/DL (ref 7–30)
CALCIUM SERPL-MCNC: 9.1 MG/DL (ref 8.5–10.1)
CHLORIDE SERPL-SCNC: 107 MMOL/L (ref 94–109)
CO2 SERPL-SCNC: 25 MMOL/L (ref 20–32)
CREAT SERPL-MCNC: 0.68 MG/DL (ref 0.52–1.04)
DIFFERENTIAL METHOD BLD: NORMAL
EOSINOPHIL # BLD AUTO: 0.2 10E9/L (ref 0–0.7)
EOSINOPHIL NFR BLD AUTO: 1.9 %
ERYTHROCYTE [DISTWIDTH] IN BLOOD BY AUTOMATED COUNT: 13.2 % (ref 10–15)
GFR SERPL CREATININE-BSD FRML MDRD: 88 ML/MIN/1.7M2
GLUCOSE SERPL-MCNC: 100 MG/DL (ref 70–99)
HCT VFR BLD AUTO: 41.3 % (ref 35–47)
HGB BLD-MCNC: 13.6 G/DL (ref 11.7–15.7)
IMM GRANULOCYTES # BLD: 0 10E9/L (ref 0–0.4)
IMM GRANULOCYTES NFR BLD: 0.3 %
LYMPHOCYTES # BLD AUTO: 2.2 10E9/L (ref 0.8–5.3)
LYMPHOCYTES NFR BLD AUTO: 27.9 %
MCH RBC QN AUTO: 30.6 PG (ref 26.5–33)
MCHC RBC AUTO-ENTMCNC: 32.9 G/DL (ref 31.5–36.5)
MCV RBC AUTO: 93 FL (ref 78–100)
MONOCYTES # BLD AUTO: 0.7 10E9/L (ref 0–1.3)
MONOCYTES NFR BLD AUTO: 8.3 %
NEUTROPHILS # BLD AUTO: 4.7 10E9/L (ref 1.6–8.3)
NEUTROPHILS NFR BLD AUTO: 60.8 %
NRBC # BLD AUTO: 0 10*3/UL
NRBC BLD AUTO-RTO: 0 /100
PLATELET # BLD AUTO: 189 10E9/L (ref 150–450)
POTASSIUM SERPL-SCNC: 3.7 MMOL/L (ref 3.4–5.3)
RBC # BLD AUTO: 4.44 10E12/L (ref 3.8–5.2)
SODIUM SERPL-SCNC: 139 MMOL/L (ref 133–144)
WBC # BLD AUTO: 7.8 10E9/L (ref 4–11)

## 2018-07-11 RX ORDER — ATOVAQUONE AND PROGUANIL HYDROCHLORIDE 250; 100 MG/1; MG/1
TABLET, FILM COATED ORAL
Qty: 70 TABLET | Refills: 0 | Status: SHIPPED | OUTPATIENT
Start: 2018-07-11 | End: 2019-01-25

## 2018-07-11 ASSESSMENT — PAIN SCALES - GENERAL: PAINLEVEL: NO PAIN (0)

## 2018-07-11 NOTE — NURSING NOTE
Chief Complaint   Patient presents with     Travel Clinic     Pt is here for travel clinic. Pt is going to [facility]. Pt is leaving on 7/30/18.      Sylvia Colon LPN at 5:24 PM on 7/11/2018.

## 2018-07-11 NOTE — MR AVS SNAPSHOT
After Visit Summary   7/11/2018    Kian Cortez    MRN: [id number]           Patient Information     Date Of Birth          1955        Visit Information        Provider Department      7/11/2018 5:30 PM Julien Aguirre MD; [facility]        Today's Diagnoses     Back strain, sequela    -  1    H/O foreign travel        Gastroesophageal reflux disease without esophagitis          Care Instructions    Primary Care Center: [phone]     Primary Care Center Medication Refill Request Information:  * Please contact your pharmacy regarding ANY request for medication refills.  ** [facility] Prescription Fax = [phone]  * Please allow 3 business days for routine medication refills.  * Please allow 5 business days for controlled substance medication refills.     Primary Care Center Test Result notification information:  *You will be notified with in 7-10 days of your appointment day regarding the results of your test.  If you are on MyChart you will be notified as soon as the provider has reviewed the results and signed off on them.          Follow-ups after your visit        Additional Services     PHYSICAL THERAPY REFERRAL       *This therapy referral will be filtered to a centralized scheduling office at [facility] and the patient will receive a call to schedule an appointment at a [place] location most convenient for them. *     [facility] provides Physical Therapy evaluation and treatment and many specialty services across the [place] system.  If requesting a specialty program, please choose from the list below.    If you have not heard from the scheduling office within 2 business days, please call [phone] for all locations, with the exception of [place], please call [phone] and [facility], please call [phone]  Treatment: Evaluation & Treatment  Special Instructions/Modalities: home  "exercise and core strengthening progam  Special Programs: None    Please be aware that coverage of these services is subject to the terms and limitations of your health insurance plan.  Call member services at your health plan with any benefit or coverage questions.      **Note to Provider:  If you are referring outside of [place] for the therapy appointment, please list the name of the location in the \"special instructions\" above, print the referral and give to the patient to schedule the appointment.                  Your next 10 appointments already scheduled     Jul 11, 2018  7:00 PM CDT   LAB with [facility] ([facility])    [address] [place] [address]  [address] [facility] [address] [phone]           Please do not eat 10-12 hours before your appointment if you are coming in fasting for labs on lipids, cholesterol, or glucose (sugar). This does not apply to pregnant women. Water, hot tea and black coffee (with nothing added) are okay. Do not drink other fluids, diet soda or chew gum.            Oct 01, 2018  2:20 PM CDT   (Arrive by 2:05 PM)   Return Visit with Michael Garza PA-C   [facility] ([facility])    [address] [place] [address]  [address] [facility] [address] [phone]              Future tests that were ordered for you today     Open Future Orders        Priority Expected Expires Ordered    Basic metabolic panel Routine  7/11/2019 7/11/2018    CBC with platelets differential Routine  7/11/2019 7/11/2018            Who to contact     Please call your clinic at [phone] to:    Ask questions about your health    Make or cancel appointments    Discuss your medicines    Learn about your test results    Speak to your doctor            Additional Information About Your Visit        [employer] Information     [employer] is an electronic gateway that provides easy, online access to your medical records. With " [employer], you can request a clinic appointment, read your test results, renew a prescription or communicate with your care team.     To sign up for [employer] visit the website at [email or web address]   You will be asked to enter the access code listed below, as well as some personal information. Please follow the directions to create your username and password.     Your access code is: [email or web address]  Expires: 2018  6:30 AM     Your access code will  in 90 days. If you need help or a new code, please contact your [facility] or call [phone] for assistance.        Care EveryWhere ID     This is your Care EveryWhere ID. This could be used by other organizations to access your [place] medical records  [id number]        Your Vitals Were     Pulse Last Period Pulse Oximetry Breastfeeding? BMI (Body Mass Index)       72 2006 98% No 36.69 kg/m2        Blood Pressure from Last 3 Encounters:   18 99/68   18 117/81   01/10/18 117/75    Weight from Last 3 Encounters:   18 91 kg (200 lb 9.6 oz)   18 92.6 kg (204 lb 3.2 oz)   18 90.8 kg (200 lb 1.6 oz)              We Performed the Following     HEPATITIS A AND B VACCINE (TWINRIX), ADULT     PHYSICAL THERAPY REFERRAL     TYPHOID VACCINE, IM          Today's Medication Changes          These changes are accurate as of 18  5:58 PM.  If you have any questions, ask your nurse or doctor.               Start taking these medicines.        Dose/Directions    atovaquone-proguanil 250-100 MG per tablet   Commonly known as:  MALARONE   Used for:  H/O foreign travel   Started by:  Julien Aguirre MD        Start 1-2 days before leaving for [place] and continue for 1 week after returning   Quantity:  70 tablet   Refills:  0            Where to get your medicines      These medications were sent to [phone]  [facility] [phone] [address]    Hours:  TRANSPLANT PHONE NUMBER [phone] Phone:  [phone]     atovaquone-proguanil 250-100 MG per tablet                Primary Care Provider Office Phone # Fax #    [facility] [phone]       [address] [place] [id number]        Equal Access to Services     GAYLE AQUINO : Hadii aad ku hadasho Soomaali, waaxda luqadaha, qaybta kaalmada adeegyada, waxay idiin hayaan adeeg kharash la'aan ah. So [facility] [phone].    ATENCIÓN: Si habla español, tiene a bassett disposición servicios gratuitos de asistencia lingüística. Katerynaame al [phone].    We comply with applicable federal civil rights laws and [place] laws. We do not discriminate on the basis of race, color, national origin, age, disability, sex, sexual orientation, or gender identity.            Thank you!     Thank you for choosing [facility]  for your care. Our goal is always to provide you with excellent care. Hearing back from our patients is one way we can continue to improve our services. Please take a few minutes to complete the written survey that you may receive in the mail after your visit with us. Thank you!             Your Updated Medication List - Protect others around you: Learn how to safely use, store and throw away your medicines at www.disposemymeds.org.          This list is accurate as of 7/11/18  5:58 PM.  Always use your most recent med list.                   Brand Name Dispense Instructions for use Diagnosis    atovaquone-proguanil 250-100 MG per tablet    MALARONE    70 tablet    Start 1-2 days before leaving for [facility] and continue for 1 week after returning    H/O foreign travel       calcium carbonate 500 MG chewable tablet    TUMS    180 tablet    Take 1 tablet (500 mg) by mouth 2 times daily    Gastroesophageal reflux disease, esophagitis presence not specified       diclofenac 1 % Gel topical gel     VOLTAREN    100 g    Apply 4 grams to knees four times daily using enclosed dosing card.    Primary osteoarthritis of both knees       FISH OIL + D3 PO      Take  by mouth daily.        * omeprazole 20 MG CR capsule    priLOSEC    30 capsule    Take 1 capsule (20 mg) by mouth daily    Gastroesophageal reflux disease without esophagitis       * omeprazole 20 MG tablet     180 tablet    Take 1 tablet (20 mg) by mouth 2 times daily    Functional dyspepsia       polyethylene glycol powder    MIRALAX    500 g    Take 17 g (1 capful) by mouth daily    Slow transit constipation       traZODone 50 MG tablet    DESYREL    30 tablet    Take 0.5 tablets (25 mg) by mouth At Bedtime    Insomnia, unspecified type       * TYLENOL PO           * acetaminophen 500 MG Caps     250 capsule    Take 1,000 mg by mouth 3 times daily    Back strain, sequela       vitamin D 1000 units capsule      Take 1 capsule by mouth daily.    Osteoporosis, post-menopausal       * Notice:  This list has 4 medication(s) that are the same as other medications prescribed for you. Read the directions carefully, and ask your doctor or other care provider to review them with you.

## 2018-07-11 NOTE — PATIENT INSTRUCTIONS
[facility]: [phone]     [facility] Medication Refill Request Information:  * Please contact your pharmacy regarding ANY request for medication refills.  ** [facility] Prescription Fax = [phone]  * Please allow 3 business days for routine medication refills.  * Please allow 5 business days for controlled substance medication refills.     [facility] Test Result notification information:  *You will be notified with in 7-10 days of your appointment day regarding the results of your test.  If you are on MyChart you will be notified as soon as the provider has reviewed the results and signed off on them.

## 2018-07-11 NOTE — NURSING NOTE
Patient received Twinrix and Typhoid vaccine.  See immunization list for administration details.  Patient tolerated injection well.     Sylvia Romero at 6:16 PM on 7/11/2018.

## 2018-07-11 NOTE — PROGRESS NOTES
HPI  63-year-old presents today for travel consultation prior to traveling to [place] [facility] [place] [facility]".  She is traveled there in the past and is scheduled to go the end of this month.  We reviewed the [facility] website with vaccination recommendations.  She never completed her hepatitis A or hepatitis B vaccination series so we will complete that today.  Her typhoid vaccine is 10 years old so we will repeat that today.  She is also due for yellow fever vaccine and she was referred to [place] to get the yellow fever vaccine.  She will be in an area where malaria prophylaxis is recommended so I prescribed the Malarone to take 1-2 days prior and continuing daily until a week after.  She will be gone for 2 months.  She also indicates that she has back pain when traveling long distances on the plane.  She tends to feel stiff and sore when she gets off the plane and was requesting a wheelchair for this.  She has not had any radicular symptoms she has not had any injury or trauma she has not been doing any therapy or exercise for the back.  Is also not tried any analgesia at all.  Said no red hot swollen joints elsewhere.  Otherwise she has been feeling well and indicated no other symptoms or problems.    Past and Family hx reviewed and updated    Past Medical History:   Diagnosis Date     Diverticulosis 11/2015    on CT scan     Functional dyspepsia      Insomnia     psychophysiologic     Osteoarthritis of right knee      Osteopenia 4/27/2015     Past Surgical History:   Procedure Laterality Date     DILATION AND CURETTAGE SUCTION       EYE SURGERY      eye duct repair 10+ years  ago     NASOLACRIMAL DUCT PROBE/IRRG       Family History   Problem Relation Age of Onset     Other Cancer Brother      Cancer Brother      Glaucoma No family hx of      Macular Degeneration No family hx of      Diabetes No family hx of      Hypertension No family hx of      Cerebrovascular Disease No family hx of      Thyroid Disease No family hx of      Social  History     Social History     Marital status:      Spouse name: N/A     Number of children: 7     Years of education: N/A     Social History Main Topics     Smoking status: Never Smoker     Smokeless tobacco: Never Used     Alcohol use No     Drug use: No     Sexual activity: Yes     Partners: Male     Other Topics Concern     Parent/Sibling W/ Cabg, Mi Or Angioplasty Before 65f 55m? No     Social History Narrative    Originally from [facility].     Complete review of symptoms negative except as noted above.    Exam:  BP 99/68  Pulse 72  Wt 91 kg (200 lb 9.6 oz)  LMP 04/19/2006  SpO2 98%  Breastfeeding? No  BMI 36.69 kg/m2  200 lbs 9.6 oz  The patient is alert, oriented with a clear sensorium.   Skin shows no lesions or rashes and good turgor.   Head is normocephalic and atraumatic.    Neck shows no nodes, thyromegaly.     Lungs are clear.   Heart shows normal S1 and S2 without murmur or gallop.    Abdomen obese  Back shows increased lordosis full range of motion negative straight leg raising bilaterally.    Extremities show no edema.    ASSESSMENT  1 planned [place] travel  2 low back strain  3 diverticulosis  4 osteopenia  5 morbid obesity    Plan we will refer to physical therapy and have her started on a pelvic stabilization and core strengthening exercise program to continue through her trip.  Whenever use acetaminophen on a regular basis during travel.  We are going to finish her hepatitis a and B immunization series update her immunizations with typhoid and have her get yellow fever vaccine at [place].  Also check her CBC and BMP today    This note was completed using Dragon voice recognition software.  Although reviewed after completion, some word and grammatical errors may occur.    Julien Aguirre MD  General Internal Medicine  Primary Care Center  [phone]

## 2018-07-17 ENCOUNTER — THERAPY VISIT (OUTPATIENT)
Dept: PHYSICAL THERAPY | Facility: CLINIC | Age: 63
End: 2018-07-17
Attending: INTERNAL MEDICINE
Payer: COMMERCIAL

## 2018-07-17 DIAGNOSIS — M54.50 LUMBAGO: Primary | ICD-10-CM

## 2018-07-17 PROCEDURE — 97112 NEUROMUSCULAR REEDUCATION: CPT | Mod: GP | Performed by: PHYSICAL THERAPIST

## 2018-07-17 PROCEDURE — 97110 THERAPEUTIC EXERCISES: CPT | Mod: GP | Performed by: PHYSICAL THERAPIST

## 2018-07-17 PROCEDURE — 97161 PT EVAL LOW COMPLEX 20 MIN: CPT | Mod: GP | Performed by: PHYSICAL THERAPIST

## 2018-07-17 NOTE — MR AVS SNAPSHOT
After Visit Summary   7/17/2018    Kian Cortez    MRN: [id number]           Patient Information     Date Of Birth          1955        Visit Information        Provider Department      7/17/2018 9:50 AM Lor Campbell, PT [facility] PT        Today's Diagnoses     Lumbago    -  1       Follow-ups after your visit        Your next 10 appointments already scheduled     Jul 24, 2018  9:10 AM CDT   EM[facility]ine with [facility] Campbell PT   [facility] ([facility] [place] [facility])    [address] [facility] [id number] [phone]            Oct 01, 2018  2:20 PM CDT   (Arrive by 2:05 PM)   Return Visit with Michael Garza PA-C   [facility] ([facility] [place])    [address] [place] [address] [phone]              Who to contact     If you have questions or need follow up information about today's clinic visit or your schedule please contact [facility] PT directly at [phone].  Normal or non-critical lab and imaging results will be communicated to you by MyChart, letter or phone within 4 business days after the clinic has received the results. If you do not hear from us within 7 days, please contact the clinic through MyChart or phone. If you have a critical or abnormal lab result, we will notify you by phone as soon as possible.  Submit refill requests through [email or web address] or call your pharmacy and they will forward the refill request to us. Please allow 3 business days for your refill to be completed.          Additional Information About Your Visit        Care EveryWhere ID     This is your Care EveryWhere ID. This could be used by other organizations to access your [place] medical records  [id number]        Your Vitals Were     Last Period                   04/19/2006            Blood Pressure from  Last 3 Encounters:   07/11/18 99/68   04/23/18 117/81   01/10/18 117/75    Weight from Last 3 Encounters:   07/11/18 91 kg (200 lb 9.6 oz)   04/23/18 92.6 kg (204 lb 3.2 oz)   02/27/18 90.8 kg (200 lb 1.6 oz)              We Performed the Following     HC PT EVAL, LOW COMPLEXITY     NEUROMUSCULAR RE-EDUCATION     THERAPEUTIC EXERCISES        Primary Care Provider Office Phone # Fax #    [facility] [phone]       [address] [place] [address]        Equal Access to Services     GAYLE AQUINO : Hadii aad freddy hadasho Sodebbi, waaxda luqadaha, qaybta kaalmada adefarnazyada, konrad dinero . So [facility] [phone].    ATENCIÓN: Si habla español, tiene a bassett disposición servicios gratuitos de asistencia lingüística. Llame al [phone].    We comply with applicable federal civil rights laws and [place] laws. We do not discriminate on the basis of race, color, national origin, age, disability, sex, sexual orientation, or gender identity.            Thank you!     Thank you for choosing [facility]  for your care. Our goal is always to provide you with excellent care. Hearing back from our patients is one way we can continue to improve our services. Please take a few minutes to complete the written survey that you may receive in the mail after your visit with us. Thank you!             Your Updated Medication List - Protect others around you: Learn how to safely use, store and throw away your medicines at www.disposemymeds.org.          This list is accurate as of 7/17/18 10:26 AM.  Always use your most recent med list.                   Brand Name Dispense Instructions for use Diagnosis    atovaquone-proguanil 250-100 MG per tablet    MALARONE    70 tablet    Start 1-2 days before leaving for \[facility] [place] [facility]\"" and continue for 1 week after returning    H/O foreign travel       calcium carbonate 500 MG chewable  tablet    TUMS    180 tablet    Take 1 tablet (500 mg) by mouth 2 times daily    Gastroesophageal reflux disease, esophagitis presence not specified       diclofenac 1 % Gel topical gel    VOLTAREN    100 g    Apply 4 grams to knees four times daily using enclosed dosing card.    Primary osteoarthritis of both knees       FISH OIL + D3 PO      Take  by mouth daily.        * omeprazole 20 MG CR capsule    priLOSEC    30 capsule    Take 1 capsule (20 mg) by mouth daily    Gastroesophageal reflux disease without esophagitis       * omeprazole 20 MG tablet     180 tablet    Take 1 tablet (20 mg) by mouth 2 times daily    Functional dyspepsia       polyethylene glycol powder    MIRALAX    500 g    Take 17 g (1 capful) by mouth daily    Slow transit constipation       traZODone 50 MG tablet    DESYREL    30 tablet    Take 0.5 tablets (25 mg) by mouth At Bedtime    Insomnia, unspecified type       * TYLENOL PO           * acetaminophen 500 MG Caps     250 capsule    Take 1,000 mg by mouth 3 times daily    Back strain, sequela       vitamin D 1000 units capsule      Take 1 capsule by mouth daily.    Osteoporosis, post-menopausal       * Notice:  This list has 4 medication(s) that are the same as other medications prescribed for you. Read the directions carefully, and ask your doctor or other care provider to review them with you.

## 2018-07-17 NOTE — PROGRESS NOTES
[place] [facility] Initial Evaluation  Subjective:  Patient is a 63 year old female presenting with rehab back hpi. The history is provided by the patient.   Kian Cortez is a 63 year old female with a lumbar condition.  Condition occurred with:  Other reason (prolonged sitting).  Condition occurred: in the community.  This is a new condition  Pain occurs every time she gets off of a plane after a long flight (5+ hours). The last time this happened was last year. She does not currently have back pain, but has a 16 hr flight planned in 2 weeks. PT referral on 7/11/18.    Patient reports pain:  Lumbar spine right, lumbar spine left and lower lumbar spine.  Radiates to:  No radiation.  Pain is described as aching and is intermittent and reported as 10/10.  Associated symptoms:  Loss of motion/stiffness. Worse during: no pattern.  Symptoms are exacerbated by sitting and relieved by rest and NSAID's.  Since onset symptoms are unchanged.    Previous treatment includes physical therapy.  There was significant improvement following previous treatment.  General health as reported by patient is good.  Pertinent medical history includes:  None.  Medical allergies: no.  Other surgeries include:  No.  Current medications:  None as reported by the patient.  Current occupation is none.        Barriers include:  None as reported by the patient.    Red flags:  None as reported by the patient.                        Objective:        Flexibility/Screens:       Lower Extremity:  Decreased left lower extremity flexibility:Hip ER's and Piriformis    Decreased right lower extremity flexibility:  Hip ER's and Piriformis               Lumbar/SI Evaluation  ROM:  AROM Lumbar: normal      Lumbar Myotomes:  normal                Lumbar Dermtomes:  normal                Neural Tension/Mobility:  Lumbar:  Normal        Lumbar Palpation:    Tenderness present at Left:    Quadratus Lumborum and Piriformis  Tenderness not present at  Left:    Gluteus Medius  Tenderness present at Right: Quadratus Lumborum; Piriformis and Gluteus Medius        SI joint/Sacrum:          Left negative at:    Thigh thrust and Sacral thrust    Right negative at:  Thigh thrust and Sacral thrust                                      Hip Evaluation  HIP AROM:  AROM:    Left Hip:     Normal    Right Hip:   Normal                    Hip Strength:    Flexion:   Left: 5/5   Pain:  Right: 5/5   Pain:                    Extension:  Left: 4/5  Pain:Right: 4/5    Pain:    Abduction:  Left: 4+/5     Pain:Right: 4/5    Pain:  Adduction:  Left: 5/5    Pain:Right: 5/5   Pain:  Internal Rotation:  Left: 5/5    Pain:Right: 5/5   Pain:  External Rotation:  Left: 4/5   Pain:  Right: 4/5   Pain:                Functional Testing:          Quad:      Bilateral leg squat:  Moderate loss of control and excessive anterior knee excursion                  General     ROS    Assessment/Plan:    Patient is a 63 year old female with lumbar complaints.    Patient has the following significant findings with corresponding treatment plan.                Diagnosis 1:  LBP  Pain -  hot/cold therapy, manual therapy, self management, education and home program  Decreased ROM/flexibility - manual therapy, therapeutic exercise and home program  Decreased strength - therapeutic exercise, therapeutic activities and home program    Therapy Evaluation Codes:   1) History comprised of:   Personal factors that impact the plan of care:      None.    Comorbidity factors that impact the plan of care are:      None.     Medications impacting care: None.  2) Examination of Body Systems comprised of:   Body structures and functions that impact the plan of care:      Hip, Knee, Lumbar spine and Sacral illiac joint.   Activity limitations that impact the plan of care are:      Sitting.  3) Clinical presentation characteristics are:   Stable/Uncomplicated.  4) Decision-Making    Low complexity using standardized patient  assessment instrument and/or measureable assessment of functional outcome.  Cumulative Therapy Evaluation is: Low complexity.    Previous and current functional limitations:  (See Goal Flow Sheet for this information)    Short term and Long term goals: (See Goal Flow Sheet for this information)     Communication ability:  Patient appears to be able to clearly communicate and understand verbal and written communication and follow directions correctly.  Treatment Explanation - The following has been discussed with the patient:   RX ordered/plan of care  Anticipated outcomes  Possible risks and side effects  This patient would benefit from PT intervention to resume normal activities.   Rehab potential is good.    Frequency:  1 X week, once daily  Duration:  for 8 weeks  Discharge Plan:  Achieve all LTG.  Independent in home treatment program.  Reach maximal therapeutic benefit.    Please refer to the daily flowsheet for treatment today, total treatment time and time spent performing 1:1 timed codes.

## 2018-07-24 ENCOUNTER — THERAPY VISIT (OUTPATIENT)
Dept: PHYSICAL THERAPY | Facility: CLINIC | Age: 63
End: 2018-07-24
Payer: COMMERCIAL

## 2018-07-24 DIAGNOSIS — M54.50 LUMBAGO: ICD-10-CM

## 2018-07-24 PROCEDURE — 97110 THERAPEUTIC EXERCISES: CPT | Mod: GP | Performed by: PHYSICAL THERAPIST

## 2018-07-24 PROCEDURE — 97112 NEUROMUSCULAR REEDUCATION: CPT | Mod: GP | Performed by: PHYSICAL THERAPIST

## 2018-07-24 NOTE — MR AVS SNAPSHOT
After Visit Summary   7/24/2018    Kian Cortez    MRN: [id number]           Patient Information     Date Of Birth          1955        Visit Information        Provider Department      7/24/2018 9:10 AM Lor Campbell, PT [facility] PT        Today's Diagnoses     Lumbago           Follow-ups after your visit        Your next 10 appointments already scheduled     Jul 31, 2018  9:50 AM CDT   EM[facility] Spine with Lor Campbell PT   [facility]    [address] [facility] [id number] [phone]            Oct 01, 2018  2:20 PM CDT   (Arrive by 2:05 PM)   Return Visit with Michael Garza PA-C   [facility] ([facility] [place])    [address] [place] [address] [phone]              Who to contact     If you have questions or need follow up information about today's clinic visit or your schedule please contact [facility] PT directly at [phone].  Normal or non-critical lab and imaging results will be communicated to you by MyChart, letter or phone within 4 business days after the clinic has received the results. If you do not hear from us within 7 days, please contact the clinic through MyChart or phone. If you have a critical or abnormal lab result, we will notify you by phone as soon as possible.  Submit refill requests through [employer] or call your pharmacy and they will forward the refill request to us. Please allow 3 business days for your refill to be completed.          Additional Information About Your Visit        Care EveryWhere ID     This is your Care EveryWhere ID. This could be used by other organizations to access your [place] medical records  [id number]        Your Vitals Were     Last Period                   04/19/2006            Blood Pressure from Last  3 Encounters:   07/11/18 99/68   04/23/18 117/81   01/10/18 117/75    Weight from Last 3 Encounters:   07/11/18 91 kg (200 lb 9.6 oz)   04/23/18 92.6 kg (204 lb 3.2 oz)   02/27/18 90.8 kg (200 lb 1.6 oz)              We Performed the Following     NEUROMUSCULAR RE-EDUCATION     THERAPEUTIC EXERCISES        Primary Care Provider Office Phone # Fax #    [facility] [phone]       [address] [place] [address]        Equal Access to Services     GAYLE AQUINO : Hadii aad ku hadasho Soomaali, waaxda luqadaha, qaybta kaalmada adefarnazyada, konrad dinero . So [facility] [phone].    ATENCIÓN: Si habla español, tiene a bassett disposición servicios gratuitos de asistencia lingüística. Llame al [phone].    We comply with applicable federal civil rights laws and [place] laws. We do not discriminate on the basis of race, color, national origin, age, disability, sex, sexual orientation, or gender identity.            Thank you!     Thank you for choosing [facility]  for your care. Our goal is always to provide you with excellent care. Hearing back from our patients is one way we can continue to improve our services. Please take a few minutes to complete the written survey that you may receive in the mail after your visit with us. Thank you!             Your Updated Medication List - Protect others around you: Learn how to safely use, store and throw away your medicines at www.disposemymeds.org.          This list is accurate as of 7/24/18  3:00 PM.  Always use your most recent med list.                   Brand Name Dispense Instructions for use Diagnosis    atovaquone-proguanil 250-100 MG per tablet    MALARONE    70 tablet    Start 1-2 days before leaving for [facility] and continue for 1 week after returning    H/O foreign travel       calcium carbonate 500 MG chewable tablet    TUMS    180 tablet    Take 1  tablet (500 mg) by mouth 2 times daily    Gastroesophageal reflux disease, esophagitis presence not specified       diclofenac 1 % Gel topical gel    VOLTAREN    100 g    Apply 4 grams to knees four times daily using enclosed dosing card.    Primary osteoarthritis of both knees       FISH OIL + D3 PO      Take  by mouth daily.        * omeprazole 20 MG CR capsule    priLOSEC    30 capsule    Take 1 capsule (20 mg) by mouth daily    Gastroesophageal reflux disease without esophagitis       * omeprazole 20 MG tablet     180 tablet    Take 1 tablet (20 mg) by mouth 2 times daily    Functional dyspepsia       polyethylene glycol powder    MIRALAX    500 g    Take 17 g (1 capful) by mouth daily    Slow transit constipation       traZODone 50 MG tablet    DESYREL    30 tablet    Take 0.5 tablets (25 mg) by mouth At Bedtime    Insomnia, unspecified type       * TYLENOL PO           * acetaminophen 500 MG Caps     250 capsule    Take 1,000 mg by mouth 3 times daily    Back strain, sequela       vitamin D 1000 units capsule      Take 1 capsule by mouth daily.    Osteoporosis, post-menopausal       * Notice:  This list has 4 medication(s) that are the same as other medications prescribed for you. Read the directions carefully, and ask your doctor or other care provider to review them with you.

## 2018-09-28 ENCOUNTER — TELEPHONE (OUTPATIENT)
Dept: GASTROENTEROLOGY | Facility: CLINIC | Age: 63
End: 2018-09-28

## 2018-09-28 NOTE — TELEPHONE ENCOUNTER
Called and left message for patient reminding of appointment scheduled on 10/1/18 at 220pm with Michael Garza GI clinic. Patient to arrive 15 min early. If need to be rescheduled, patient to call [phone].    SAILAJA Buck

## 2019-01-25 ENCOUNTER — OFFICE VISIT (OUTPATIENT)
Dept: FAMILY MEDICINE | Facility: CLINIC | Age: 64
End: 2019-01-25
Payer: COMMERCIAL

## 2019-01-25 VITALS
TEMPERATURE: 97.4 F | WEIGHT: 206 LBS | BODY MASS INDEX: 40.44 KG/M2 | SYSTOLIC BLOOD PRESSURE: 110 MMHG | HEIGHT: 60 IN | DIASTOLIC BLOOD PRESSURE: 73 MMHG | OXYGEN SATURATION: 99 % | HEART RATE: 65 BPM

## 2019-01-25 DIAGNOSIS — Z00.00 ROUTINE GENERAL MEDICAL EXAMINATION AT A HEALTH CARE FACILITY: Primary | ICD-10-CM

## 2019-01-25 DIAGNOSIS — Z23 NEED FOR PROPHYLACTIC VACCINATION AND INOCULATION AGAINST INFLUENZA: ICD-10-CM

## 2019-01-25 DIAGNOSIS — Z11.4 SCREENING FOR HIV (HUMAN IMMUNODEFICIENCY VIRUS): ICD-10-CM

## 2019-01-25 PROCEDURE — G0145 SCR C/V CYTO,THINLAYER,RESCR: HCPCS | Performed by: PHYSICIAN ASSISTANT

## 2019-01-25 PROCEDURE — G0476 HPV COMBO ASSAY CA SCREEN: HCPCS | Performed by: PHYSICIAN ASSISTANT

## 2019-01-25 PROCEDURE — 99396 PREV VISIT EST AGE 40-64: CPT | Performed by: PHYSICIAN ASSISTANT

## 2019-01-25 ASSESSMENT — PAIN SCALES - GENERAL: PAINLEVEL: NO PAIN (0)

## 2019-01-25 ASSESSMENT — MIFFLIN-ST. JEOR: SCORE: 1414.66

## 2019-01-25 NOTE — PROGRESS NOTES
SUBJECTIVE:   CC: Kian Cortez is an 64 year old woman who presents for preventive health visit.     Healthy Habits:    Do you get at least three servings of calcium containing foods daily (dairy, green leafy vegetables, etc.)? yes    Amount of exercise or daily activities, outside of work: Walking    Problems taking medications regularly No    Medication side effects: No    Have you had an eye exam in the past two years? yes    Do you see a dentist twice per year? no    Do you have sleep apnea, excessive snoring or daytime drowsiness?no      Feels off balance when she has been eating.  When she gets hungry she gets symptoms.      Today's PHQ-2 Score:   PHQ-2 ( 1999 Pf[employer]) 1/25/2019 5/23/2017   Q1: Little interest or pleasure in doing things 0 0   Q2: Feeling down, depressed or hopeless 0 0   PHQ-2 Score 0 0       Abuse: Current or Past(Physical, Sexual or Emotional)- No  Do you feel safe in your environment? Yes    Social History     Tobacco Use     Smoking status: Never Smoker     Smokeless tobacco: Never Used   Substance Use Topics     Alcohol use: No     If you drink alcohol do you typically have >3 drinks per day or >7 drinks per week? No                     Dulce Maria Lopez MA    Reviewed orders with patient.  Reviewed health maintenance and updated orders accordingly - Yes  Labs reviewed in [facility]    Mammogram Screening: Patient over age 50, mutual decision to screen reflected in health maintenance.    Pertinent mammograms are reviewed under the imaging tab.  History of abnormal Pap smear: NO - age 30-65 PAP every 5 years with negative HPV co-testing recommended  PAP / HPV Latest Ref Rng & Units 5/2/2016 2/15/2013   PAP - NIL NIL   HPV 16 DNA NEG Negative -   HPV 18 DNA NEG Negative -   OTHER HR HPV NEG Negative -     Reviewed and updated as needed this visit by clinical staff  Tobacco  Allergies  Meds  Problems  Med Hx  Surg Hx  Fam Hx  Soc Hx          Reviewed and updated as needed this visit by  "Provider  Tobacco  Allergies  Meds  Problems  Med Hx  Surg Hx  Fam Hx            ROS:  CONSTITUTIONAL: NEGATIVE for fever, chills, change in weight  INTEGUMENTARY/SKIN: NEGATIVE for worrisome rashes, moles or lesions  EYES: NEGATIVE for vision changes or irritation  ENT: NEGATIVE for ear, mouth and throat problems  RESP: NEGATIVE for significant cough or SOB  BREAST: NEGATIVE for masses, tenderness or discharge  CV: NEGATIVE for chest pain, palpitations or peripheral edema  GI: GERD controlled   : NEGATIVE for unusual urinary or vaginal symptoms. No vaginal bleeding.  MUSCULOSKELETAL:occasional knee pain   NEURO: occasional headache   PSYCHIATRIC: NEGATIVE for changes in mood or affect     OBJECTIVE:   /73 (BP Location: Left arm, Patient Position: Chair, Cuff Size: Adult Regular)   Pulse 65   Temp 97.4  F (36.3  C) (Oral)   Ht 1.53 m (5' 0.24\")   Wt 93.4 kg (206 lb)   LMP 04/19/2006   SpO2 99%   BMI 39.92 kg/m    EXAM:  GENERAL: alert, no distress and obese  EYES: Eyes grossly normal to inspection, PERRL and conjunctivae and sclerae normal  HENT: ear canals and TM's normal, nose and mouth without ulcers or lesions  NECK: no adenopathy, no asymmetry, masses, or scars and thyroid normal to palpation  RESP: lungs clear to auscultation - no rales, rhonchi or wheezes  BREAST: normal without masses, tenderness or nipple discharge and no palpable axillary masses or adenopathy  CV: regular rate and rhythm, normal S1 S2, no S3 or S4, no murmur, click or rub, no peripheral edema and peripheral pulses strong  ABDOMEN: soft, nontender, no hepatosplenomegaly, no masses and bowel sounds normal   (female): normal female external genitalia, normal urethral meatus, vaginal mucosa, normal cervix/adnexa/uterus without masses or discharge  MS: no gross musculoskeletal defects noted, no edema  SKIN: no suspicious lesions or rashes  NEURO: Normal strength and tone, mentation intact and speech normal  PSYCH: " "mentation appears normal, affect normal/bright  Right nipple inverted   Diagnostic Test Results:  none     ASSESSMENT/PLAN:   1. Routine general medical examination at a health care facility    - **Glucose FUTURE anytime; Future  - MA Screening Digital Bilateral; Future  - Pap imaged thin layer screen with HPV - recommended age 30 - 65 years (select HPV order below)  - HPV High Risk Types DNA Cervical    2. Screening for HIV (human immunodeficiency virus)    - HIV Screening; Future    3. Need for prophylactic vaccination and inoculation against influenza        COUNSELING:   Reviewed preventive health counseling, as reflected in patient instructions       Regular exercise       Healthy diet/nutrition       Osteoporosis Prevention/Bone Health    BP Readings from Last 1 Encounters:   01/25/19 110/73     Estimated body mass index is 39.92 kg/m  as calculated from the following:    Height as of this encounter: 1.53 m (5' 0.24\").    Weight as of this encounter: 93.4 kg (206 lb).      Weight management plan: Discussed healthy diet and exercise guidelines     reports that  has never smoked. she has never used smokeless tobacco.      Counseling Resources:  ATP IV Guidelines  Pooled Cohorts Equation Calculator  Breast Cancer Risk Calculator  FRAX Risk Assessment  ICSI Preventive Guidelines  Dietary Guidelines for Americans, 2010  USDA's MyPlate  ASA Prophylaxis  Lung CA Screening    Karma Burger PA-C  [facility]  "

## 2019-01-25 NOTE — LETTER
February 5, 2019    Kian Cortez  [address]   [place]. \[facility] [place].\"" [id number]    Dear ,  This letter is regarding your recent Pap smear (cervical cancer screening) and Human Papillomavirus (HPV) test.  We are happy to inform you that your Pap smear result is normal. Cervical cancer is closely linked with certain types of HPV. Your results showed no evidence of high-risk HPV.  Therefore we recommend you return in 5 years for your next pap smear and HPV test.  You will still need to return to the clinic every year for an annual exam and other preventive tests.  If you have additional questions regarding this result, please call our registered nurse, Marla at [phone].  Sincerely,    Karma Burger PA-C/aleida

## 2019-01-29 LAB
COPATH REPORT: NORMAL
PAP: NORMAL

## 2019-01-31 LAB
FINAL DIAGNOSIS: NORMAL
HPV HR 12 DNA CVX QL NAA+PROBE: NEGATIVE
HPV16 DNA SPEC QL NAA+PROBE: NEGATIVE
HPV18 DNA SPEC QL NAA+PROBE: NEGATIVE
SPECIMEN DESCRIPTION: NORMAL
SPECIMEN SOURCE CVX/VAG CYTO: NORMAL

## 2019-02-06 ENCOUNTER — ALLIED HEALTH/NURSE VISIT (OUTPATIENT)
Dept: NURSING | Facility: CLINIC | Age: 64
End: 2019-02-06
Payer: COMMERCIAL

## 2019-02-06 DIAGNOSIS — Z11.4 SCREENING FOR HIV (HUMAN IMMUNODEFICIENCY VIRUS): ICD-10-CM

## 2019-02-06 DIAGNOSIS — Z23 NEED FOR PROPHYLACTIC VACCINATION AND INOCULATION AGAINST INFLUENZA: Primary | ICD-10-CM

## 2019-02-06 DIAGNOSIS — Z00.00 ROUTINE GENERAL MEDICAL EXAMINATION AT A HEALTH CARE FACILITY: ICD-10-CM

## 2019-02-06 LAB
GLUCOSE SERPL-MCNC: 90 MG/DL (ref 70–99)
HIV 1+2 AB+HIV1 P24 AG SERPL QL IA: NONREACTIVE

## 2019-02-06 PROCEDURE — 90471 IMMUNIZATION ADMIN: CPT

## 2019-02-06 PROCEDURE — 36415 COLL VENOUS BLD VENIPUNCTURE: CPT | Performed by: PHYSICIAN ASSISTANT

## 2019-02-06 PROCEDURE — 87389 HIV-1 AG W/HIV-1&-2 AB AG IA: CPT | Performed by: PHYSICIAN ASSISTANT

## 2019-02-06 PROCEDURE — 82947 ASSAY GLUCOSE BLOOD QUANT: CPT | Performed by: PHYSICIAN ASSISTANT

## 2019-02-06 PROCEDURE — 90686 IIV4 VACC NO PRSV 0.5 ML IM: CPT

## 2019-02-06 PROCEDURE — 99207 ZZC NO CHARGE NURSE ONLY: CPT

## 2019-02-06 NOTE — PROGRESS NOTES
Injectable Influenza Immunization Documentation    1.  Is the person to be vaccinated sick today?   No    2. Does the person to be vaccinated have an allergy to a component   of the vaccine?   No  Egg Allergy Algorithm Link    3. Has the person to be vaccinated ever had a serious reaction   to influenza vaccine in the past?   No    4. Has the person to be vaccinated ever had Guillain-Barré syndrome?   No    Form completed by Sofiya Louis MA  Prior to injection, verified patient identity using patient's name and date of birth.  Due to injection administration, patient instructed to remain in clinic for 15 minutes  afterwards, and to report any adverse reaction to me immediately.    Influenza Vaccine    Drug Amount Wasted:  None.  Vial/Syringe: Syringe  Expiration Date:  06/30/2019  Sofiya Louis MA on 2/6/2019 at 8:24 AM

## 2019-03-05 ENCOUNTER — TELEPHONE (OUTPATIENT)
Dept: FAMILY MEDICINE | Facility: CLINIC | Age: 64
End: 2019-03-05

## 2019-03-25 ENCOUNTER — ANCILLARY PROCEDURE (OUTPATIENT)
Dept: MAMMOGRAPHY | Facility: CLINIC | Age: 64
End: 2019-03-25
Attending: PHYSICIAN ASSISTANT
Payer: COMMERCIAL

## 2019-03-25 DIAGNOSIS — Z00.00 ROUTINE GENERAL MEDICAL EXAMINATION AT A HEALTH CARE FACILITY: ICD-10-CM

## 2019-03-25 PROCEDURE — 77067 SCR MAMMO BI INCL CAD: CPT | Mod: TC

## 2019-03-26 ENCOUNTER — OFFICE VISIT (OUTPATIENT)
Dept: FAMILY MEDICINE | Facility: CLINIC | Age: 64
End: 2019-03-26
Payer: COMMERCIAL

## 2019-03-26 VITALS
WEIGHT: 206 LBS | OXYGEN SATURATION: 98 % | DIASTOLIC BLOOD PRESSURE: 75 MMHG | BODY MASS INDEX: 39.92 KG/M2 | TEMPERATURE: 97.4 F | HEART RATE: 69 BPM | SYSTOLIC BLOOD PRESSURE: 110 MMHG

## 2019-03-26 DIAGNOSIS — M25.511 RIGHT SHOULDER PAIN, UNSPECIFIED CHRONICITY: Primary | ICD-10-CM

## 2019-03-26 PROCEDURE — 99213 OFFICE O/P EST LOW 20 MIN: CPT | Performed by: PHYSICIAN ASSISTANT

## 2019-03-26 RX ORDER — CYCLOBENZAPRINE HCL 10 MG
10 TABLET ORAL 3 TIMES DAILY PRN
Qty: 30 TABLET | Refills: 1 | Status: SHIPPED | OUTPATIENT
Start: 2019-03-26 | End: 2020-12-29

## 2019-03-26 NOTE — PROGRESS NOTES
SUBJECTIVE:   Kian Cortez is a 64 year old female who presents to clinic today for the following health issues:         R shoulder pain x 3-4 years,getting worse     Onset: 3-4 years     Description:   Location: right shoulder  Character: burning     Intensity: 7/10    Progression of Symptoms: worse    Accompanying Signs & Symptoms:  Other symptoms: weakness     History:   Previous similar pain: no       Precipitating factors:   Trauma or overuse: no     Alleviating factors:  Improved by: nothing    Therapies Tried and outcome: tylenol     Has not been seen for this before.  No injury.  Worse with house work.  Having to do a lot of heavy stirring of thick soup and hard to kneed bread.              Problem list and histories reviewed & adjusted, as indicated.  Additional history: as documented    Patient Active Problem List   Diagnosis     Advanced directives, counseling/discussion     CARDIOVASCULAR SCREENING; LDL GOAL LESS THAN 130     Pseudoexfoliation syndrome, right eye     Gastroesophageal reflux disease without esophagitis     Osteopenia     Constipation, unspecified constipation type     Morbid obesity, unspecified obesity type (H)     Vertigo     BPPV (benign paroxysmal positional vertigo), unspecified laterality     Insomnia, unspecified type     Lumbago     Past Surgical History:   Procedure Laterality Date     DILATION AND CURETTAGE SUCTION       EYE SURGERY      eye duct repair 10+ years  ago     NASOLACRIMAL DUCT PROBE/IRRG         Social History     Tobacco Use     Smoking status: Never Smoker     Smokeless tobacco: Never Used   Substance Use Topics     Alcohol use: No     Family History   Problem Relation Age of Onset     Other Cancer Brother      Cancer Brother      Glaucoma No family hx of      Macular Degeneration No family hx of      Diabetes No family hx of      Hypertension No family hx of      Cerebrovascular Disease No family hx of      Thyroid Disease No family hx of            Reviewed  and updated as needed this visit by clinical staff  Tobacco  Allergies  Meds  Med Hx  Surg Hx  Fam Hx  Soc Hx      Reviewed and updated as needed this visit by Provider         ROS:  As above    OBJECTIVE:     /75   Pulse 69   Temp 97.4  F (36.3  C) (Oral)   Wt 93.4 kg (206 lb)   LMP 04/19/2006   SpO2 98%   BMI 39.92 kg/m    Body mass index is 39.92 kg/m .  GENERAL: healthy, alert and no distress  RESP: lungs clear to auscultation - no rales, rhonchi or wheezes  CV: regular rates and rhythm, normal S1 S2, no S3 or S4 and no murmur, click or rub  MS: normal rom of neck and both shoulders, equal and normal strength bilaterally in both upper extremities, tender to palpation over the spine of the right scapula and knots noted    Diagnostic Test Results:  none     ASSESSMENT/PLAN:       1. Right shoulder pain, unspecified chronicity  Start with PT and muscle relaxant.    - [facility], [facility] REFERRAL; Future  - cyclobenzaprine (FLEXERIL) 10 MG tablet; Take 1 tablet (10 mg) by mouth 3 times daily as needed for muscle spasms  Dispense: 30 tablet; Refill: 1  - acetaminophen 500 MG CAPS; Take 1,000 mg by mouth 3 times daily  Dispense: 250 capsule; Refill: 99    FUTURE APPOINTMENTS:       - Follow-up visit in 6 weeks if needed    Karma Burger PA-C  [facility]

## 2019-04-09 ENCOUNTER — THERAPY VISIT (OUTPATIENT)
Dept: PHYSICAL THERAPY | Facility: CLINIC | Age: 64
End: 2019-04-09
Attending: PHYSICIAN ASSISTANT
Payer: COMMERCIAL

## 2019-04-09 DIAGNOSIS — M54.50 LUMBAGO: ICD-10-CM

## 2019-04-09 DIAGNOSIS — M25.511 RIGHT SHOULDER PAIN, UNSPECIFIED CHRONICITY: ICD-10-CM

## 2019-04-09 DIAGNOSIS — M25.519 SHOULDER PAIN: ICD-10-CM

## 2019-04-09 PROCEDURE — 97161 PT EVAL LOW COMPLEX 20 MIN: CPT | Mod: GP | Performed by: PHYSICAL THERAPIST

## 2019-04-09 PROCEDURE — 97110 THERAPEUTIC EXERCISES: CPT | Mod: GP | Performed by: PHYSICAL THERAPIST

## 2019-04-09 PROCEDURE — 97112 NEUROMUSCULAR REEDUCATION: CPT | Mod: GP | Performed by: PHYSICAL THERAPIST

## 2019-04-09 NOTE — PROGRESS NOTES
[facility] Initial Evaluation  Subjective:  The history is provided by the patient.   Kian Cortez is a 64 year old female with a right shoulder condition.    Condition occurred: for unknown reasons.  This is a new condition     Patient reports pain:  Anterior, in the joint and scapular area.  Radiates to:  Upper arm.  Pain is described as aching and is intermittent and reported as 10/10.  Associated symptoms:  Loss of strength, painful arc and loss of motion/stiffness. Worse during: no pattern.  Symptoms are exacerbated by using arm at shoulder level, using arm overhead, using arm behind back and lying on extremity and relieved by heat and NSAID's.  Since onset symptoms are gradually improving.        General health as reported by patient is good.  Pertinent medical history includes:  None.  Medical allergies: no.  Other surgeries include:  None reported.  Current medications:  Muscle relaxants and pain medication.  Current occupation is Retired.        Barriers include:  None as reported by the patient.    Red flags:  None as reported by the patient.                        Objective:  Standing Alignment:    Cervical/Thoracic:  Forward head  Shoulder/UE:  Rounded shoulders                  Flexibility/Screens:     Upper Extremity:    Decreased left upper extremity flexibility at:  Pectoralis Minor    Decreased right upper extremity flexibility present at:  Pectoralis Minor    Spine:  Decreased left spine flexibility:  Upper Trap    Decreased right spine flexibility:  Upper Trap                  Cervical/Thoracic Evaluation  Cervical AROM: normal         Headaches: cervical  Cervical Myotomes:  normal                                       Shoulder Evaluation:  ROM:  AROM:  normal (pain with flexion, abduction, IR)                                  Strength:  normal                      Stability Testing:  normal      Special Tests:      Right shoulder positive for the following special  tests:Impingement  Right shoulder negative for the following special tests:Rotator cuff tear  Palpation:      Right shoulder tenderness present at: Supraspinatus; Levator; Rhomboids and Upper Trap                                     General     ROS    Assessment/Plan:    Patient is a 64 year old female with right side shoulder complaints.    Patient has the following significant findings with corresponding treatment plan.                Diagnosis 1:  R shoulder pain  Pain -  hot/cold therapy, manual therapy, self management, education and home program  Decreased ROM/flexibility - manual therapy, therapeutic exercise and home program  Decreased strength - therapeutic exercise, therapeutic activities and home program    Therapy Evaluation Codes:   1) History comprised of:   Personal factors that impact the plan of care:      None.    Comorbidity factors that impact the plan of care are:      None.     Medications impacting care: None.  2) Examination of Body Systems comprised of:   Body structures and functions that impact the plan of care:      Cervical spine, Shoulder and Thoracic Spine.   Activity limitations that impact the plan of care are:      Bathing, Cooking, Driving, Dressing, Lifting, Reading/Computer work, Sitting, Sleeping and Laying down.  3) Clinical presentation characteristics are:   Stable/Uncomplicated.  4) Decision-Making    Low complexity using standardized patient assessment instrument and/or measureable assessment of functional outcome.  Cumulative Therapy Evaluation is: Low complexity.    Previous and current functional limitations:  (See Goal Flow Sheet for this information)    Short term and Long term goals: (See Goal Flow Sheet for this information)     Communication ability:  Patient appears to be able to clearly communicate and understand verbal and written communication and follow directions correctly.  Treatment Explanation - The following has been discussed with the patient:   RX  ordered/plan of care  Anticipated outcomes  Possible risks and side effects  This patient would benefit from PT intervention to resume normal activities.   Rehab potential is good.    Frequency:  1 X week, once daily  Duration:  for 8 weeks  Discharge Plan:  Achieve all LTG.  Independent in home treatment program.  Reach maximal therapeutic benefit.    Please refer to the daily flowsheet for treatment today, total treatment time and time spent performing 1:1 timed codes.

## 2019-04-11 PROBLEM — M25.519 SHOULDER PAIN: Status: ACTIVE | Noted: 2019-04-11

## 2019-04-16 ENCOUNTER — THERAPY VISIT (OUTPATIENT)
Dept: PHYSICAL THERAPY | Facility: CLINIC | Age: 64
End: 2019-04-16
Payer: COMMERCIAL

## 2019-04-16 DIAGNOSIS — M25.519 SHOULDER PAIN: ICD-10-CM

## 2019-04-16 PROCEDURE — 97112 NEUROMUSCULAR REEDUCATION: CPT | Mod: GP | Performed by: PHYSICAL THERAPIST

## 2019-04-16 PROCEDURE — 97110 THERAPEUTIC EXERCISES: CPT | Mod: GP | Performed by: PHYSICAL THERAPIST

## 2019-04-23 ENCOUNTER — THERAPY VISIT (OUTPATIENT)
Dept: PHYSICAL THERAPY | Facility: CLINIC | Age: 64
End: 2019-04-23
Payer: COMMERCIAL

## 2019-04-23 DIAGNOSIS — M25.519 SHOULDER PAIN: ICD-10-CM

## 2019-04-23 PROCEDURE — 97110 THERAPEUTIC EXERCISES: CPT | Mod: GP | Performed by: PHYSICAL THERAPIST

## 2019-04-23 PROCEDURE — 97112 NEUROMUSCULAR REEDUCATION: CPT | Mod: GP | Performed by: PHYSICAL THERAPIST

## 2019-05-06 ENCOUNTER — THERAPY VISIT (OUTPATIENT)
Dept: PHYSICAL THERAPY | Facility: CLINIC | Age: 64
End: 2019-05-06
Payer: COMMERCIAL

## 2019-05-06 ENCOUNTER — TELEPHONE (OUTPATIENT)
Dept: FAMILY MEDICINE | Facility: CLINIC | Age: 64
End: 2019-05-06

## 2019-05-06 DIAGNOSIS — M17.0 PRIMARY OSTEOARTHRITIS OF BOTH KNEES: Primary | ICD-10-CM

## 2019-05-06 DIAGNOSIS — M25.519 SHOULDER PAIN: ICD-10-CM

## 2019-05-06 PROCEDURE — 97110 THERAPEUTIC EXERCISES: CPT | Mod: GP | Performed by: PHYSICAL THERAPIST

## 2019-05-06 PROCEDURE — 97112 NEUROMUSCULAR REEDUCATION: CPT | Mod: GP | Performed by: PHYSICAL THERAPIST

## 2019-05-06 RX ORDER — CHOLECALCIFEROL (VITAMIN D3) 50 MCG
1 TABLET ORAL DAILY
Qty: 90 TABLET | Refills: 1 | Status: SHIPPED | OUTPATIENT
Start: 2019-05-06

## 2019-05-06 NOTE — TELEPHONE ENCOUNTER
Routing refill request to provider for review/approval because:  Medication is reported/historical      Gemma Cameron RN  [facility]

## 2019-05-14 ENCOUNTER — THERAPY VISIT (OUTPATIENT)
Dept: PHYSICAL THERAPY | Facility: CLINIC | Age: 64
End: 2019-05-14
Payer: COMMERCIAL

## 2019-05-14 DIAGNOSIS — M25.519 SHOULDER PAIN: ICD-10-CM

## 2019-05-14 PROCEDURE — 97112 NEUROMUSCULAR REEDUCATION: CPT | Mod: GP | Performed by: PHYSICAL THERAPIST

## 2019-05-14 PROCEDURE — 97110 THERAPEUTIC EXERCISES: CPT | Mod: GP | Performed by: PHYSICAL THERAPIST

## 2019-05-14 PROCEDURE — 97140 MANUAL THERAPY 1/> REGIONS: CPT | Mod: GP | Performed by: PHYSICAL THERAPIST

## 2019-05-21 ENCOUNTER — THERAPY VISIT (OUTPATIENT)
Dept: PHYSICAL THERAPY | Facility: CLINIC | Age: 64
End: 2019-05-21
Payer: COMMERCIAL

## 2019-05-21 DIAGNOSIS — M25.519 SHOULDER PAIN: ICD-10-CM

## 2019-05-21 PROCEDURE — 97110 THERAPEUTIC EXERCISES: CPT | Mod: GP | Performed by: PHYSICAL THERAPIST

## 2019-05-21 PROCEDURE — 97140 MANUAL THERAPY 1/> REGIONS: CPT | Mod: GP | Performed by: PHYSICAL THERAPIST

## 2019-05-28 ENCOUNTER — THERAPY VISIT (OUTPATIENT)
Dept: PHYSICAL THERAPY | Facility: CLINIC | Age: 64
End: 2019-05-28
Payer: COMMERCIAL

## 2019-05-28 DIAGNOSIS — M25.519 SHOULDER PAIN: ICD-10-CM

## 2019-05-28 PROCEDURE — 97110 THERAPEUTIC EXERCISES: CPT | Mod: GP | Performed by: PHYSICAL THERAPIST

## 2019-05-28 PROCEDURE — 97112 NEUROMUSCULAR REEDUCATION: CPT | Mod: GP | Performed by: PHYSICAL THERAPIST

## 2019-07-30 ENCOUNTER — OFFICE VISIT (OUTPATIENT)
Dept: FAMILY MEDICINE | Facility: CLINIC | Age: 64
End: 2019-07-30
Payer: COMMERCIAL

## 2019-07-30 VITALS
SYSTOLIC BLOOD PRESSURE: 125 MMHG | WEIGHT: 208 LBS | BODY MASS INDEX: 40.3 KG/M2 | DIASTOLIC BLOOD PRESSURE: 77 MMHG | HEART RATE: 65 BPM | TEMPERATURE: 97.6 F

## 2019-07-30 DIAGNOSIS — L29.9 PRURITIC DISORDER: Primary | ICD-10-CM

## 2019-07-30 LAB
ALBUMIN SERPL-MCNC: 3.5 G/DL (ref 3.4–5)
ALP SERPL-CCNC: 92 U/L (ref 40–150)
ALT SERPL W P-5'-P-CCNC: 21 U/L (ref 0–50)
AST SERPL W P-5'-P-CCNC: 22 U/L (ref 0–45)
BILIRUB DIRECT SERPL-MCNC: 0.1 MG/DL (ref 0–0.2)
BILIRUB SERPL-MCNC: 0.4 MG/DL (ref 0.2–1.3)
PROT SERPL-MCNC: 8 G/DL (ref 6.8–8.8)

## 2019-07-30 PROCEDURE — 99213 OFFICE O/P EST LOW 20 MIN: CPT | Performed by: PHYSICIAN ASSISTANT

## 2019-07-30 PROCEDURE — 80076 HEPATIC FUNCTION PANEL: CPT | Performed by: PHYSICIAN ASSISTANT

## 2019-07-30 PROCEDURE — 36415 COLL VENOUS BLD VENIPUNCTURE: CPT | Performed by: PHYSICIAN ASSISTANT

## 2019-07-30 RX ORDER — CETIRIZINE HYDROCHLORIDE 10 MG/1
20 TABLET ORAL AT BEDTIME
Qty: 28 TABLET | Refills: 0 | Status: ON HOLD | OUTPATIENT
Start: 2019-07-30 | End: 2019-10-13

## 2019-07-30 NOTE — LETTER
[facility]   [address] [place]  [address]  [phone]                                   July 31, 2019    Kian Cortez  [address]   [place] [address]        Dear Kian,    Your liver enzymes are normal.      Results for orders placed or performed in visit on 07/30/19   Hepatic panel   Result Value Ref Range    Bilirubin Direct 0.1 0.0 - 0.2 mg/dL    Bilirubin Total 0.4 0.2 - 1.3 mg/dL    Albumin 3.5 3.4 - 5.0 g/dL    Protein Total 8.0 6.8 - 8.8 g/dL    Alkaline Phosphatase 92 40 - 150 U/L    ALT 21 0 - 50 U/L    AST 22 0 - 45 U/L       If you have any questions please call the clinic at [phone]    Sincerely,    [employer] DANIELA  hnr

## 2019-07-30 NOTE — PROGRESS NOTES
Subjective     Kian Cortez is a 64 year old female who presents to clinic today for the following health issues:    HPI   Itchy legs 2-3 months,no pain,patient is using aquaphor ointment.Itching is getting worse.  Left worse but both itch.  No rash.  No travel recently.      Years ago had itching and was found to have h.pylori.  After being treated then her itching did go away.  She says its a little different this time        Patient Active Problem List   Diagnosis     Advanced directives, counseling/discussion     CARDIOVASCULAR SCREENING; LDL GOAL LESS THAN 130     Pseudoexfoliation syndrome, right eye     Gastroesophageal reflux disease without esophagitis     Osteopenia     Constipation, unspecified constipation type     Morbid obesity, unspecified obesity type (H)     Vertigo     BPPV (benign paroxysmal positional vertigo), unspecified laterality     Insomnia, unspecified type     Lumbago     Shoulder pain     Past Surgical History:   Procedure Laterality Date     DILATION AND CURETTAGE SUCTION       EYE SURGERY      eye duct repair 10+ years  ago     NASOLACRIMAL DUCT PROBE/IRRG         Social History     Tobacco Use     Smoking status: Never Smoker     Smokeless tobacco: Never Used   Substance Use Topics     Alcohol use: No     Family History   Problem Relation Age of Onset     Other Cancer Brother      Cancer Brother      Glaucoma No family hx of      Macular Degeneration No family hx of      Diabetes No family hx of      Hypertension No family hx of      Cerebrovascular Disease No family hx of      Thyroid Disease No family hx of              Reviewed and updated as needed this visit by Provider  Allergies  Meds         Review of Systems   As above      Objective    BP (P) 125/77   Pulse (P) 65   Temp (P) 97.6  F (36.4  C) (Oral)   Wt (P) 94.3 kg (208 lb)   LMP 04/19/2006   BMI (P) 40.30 kg/m    Body mass index is 40.3 kg/m  (pended).  Physical Exam   Constitutional: She appears well-developed and  well-nourished.   Cardiovascular: Normal rate, regular rhythm, normal heart sounds and intact distal pulses.   Pulmonary/Chest: Effort normal and breath sounds normal.   Musculoskeletal: She exhibits no edema.   Skin: No rash noted.   Hypopigmented macules on both lower legs with some excoriation marks          Diagnostic Test Results:  Labs reviewed in Epic        Assessment & Plan     1. Pruritic disorder  Start zyrtec.  I wanted her to take 2 a night but insurance only covers one.    - Hepatic panel  - cetirizine (ZYRTEC) 10 MG tablet; Take 2 tablets (20 mg) by mouth At Bedtime  Dispense: 28 tablet; Refill: 0           Return in about 2 weeks (around 8/13/2019) for if not improving.    Karma Burger PA-C  [facility]

## 2019-08-06 ENCOUNTER — OFFICE VISIT (OUTPATIENT)
Dept: FAMILY MEDICINE | Facility: CLINIC | Age: 64
End: 2019-08-06
Payer: COMMERCIAL

## 2019-08-06 VITALS
HEART RATE: 71 BPM | WEIGHT: 209 LBS | BODY MASS INDEX: 40.5 KG/M2 | SYSTOLIC BLOOD PRESSURE: 113 MMHG | TEMPERATURE: 97.7 F | DIASTOLIC BLOOD PRESSURE: 74 MMHG

## 2019-08-06 DIAGNOSIS — R20.0 LEG NUMBNESS: Primary | ICD-10-CM

## 2019-08-06 DIAGNOSIS — L29.9 ITCH OF SKIN: ICD-10-CM

## 2019-08-06 PROCEDURE — 99213 OFFICE O/P EST LOW 20 MIN: CPT | Performed by: PHYSICIAN ASSISTANT

## 2019-08-06 NOTE — LETTER
[facility]   [address] [place]  [address]  [phone]                                   September 3, 2019    Kian Cortez  [address]   [place] [address]        Dear Kian,    The most recent test you did to look at your nerves was normal.       Let me know if you have any questions.    Results for orders placed or performed in visit on 08/06/19   NEUROLOGY ADULT REFERRAL    Impression    [facility]  Electrodiagnostic Laboratory     Nerve Conduction & EMG Report            Patient:       Kian Cortez  Patient ID:    [id number]  Gender:        Female  YOB: 1955  Age:           64 Years 7 Months         History & Examination:  64 year old woman with numbness and itchiness in both legs, left more affected than right. Onset about 4 months ago. Also reports intermittent low back pain. Eval for polyneuropathy vs radiculopathy.      Techniques: Motor and sensory conduction studies were done with surface recording electrodes. EMG was done with a concentric needle electrode.      Results:  Nerve conduction studies:   1. Bilateral sural and left superficial peroneal sensory responses are normal.   2. Bilateral peroneal-EDB and tibial-AH motor responses are normal.      Needle EMG of selected right and left lower limb muscles was performed as tabulated below. No abnormal spontaneous activity was observed in the sampled muscles. Motor unit potential morphology and recruitment patterns were normal.      Interpretation:  This is a normal study. There is no electrophysiologic evidence of a large-fiber polyneuropathy or lumbosacral radiculopathy affecting the lower limbs on the basis of this study.      Brett Lake MD       If you have any questions please call the clinic at [phone]    Sincerely,    Karma Burger PA-C  hnr

## 2019-08-06 NOTE — PROGRESS NOTES
Subjective     Kian Cortez is a 64 year old female who presents to clinic today for the following health issues:    HPI   Itchy legs follow up-Zyrtec helps just a little.L leg itches better, now feels the itchiness is inside.  Sometimes leg swells.  Some pain in left leg.  Still feels her skin is dry.  Numbness on side of left leg.  No rash or travel.  Feels she has to scratch hard due to numbness.    Daughter concerned about blood flow.  Pain with touch.    All been going on for about 3 months        Patient Active Problem List   Diagnosis     Advanced directives, counseling/discussion     CARDIOVASCULAR SCREENING; LDL GOAL LESS THAN 130     Pseudoexfoliation syndrome, right eye     Gastroesophageal reflux disease without esophagitis     Osteopenia     Constipation, unspecified constipation type     Morbid obesity, unspecified obesity type (H)     Vertigo     BPPV (benign paroxysmal positional vertigo), unspecified laterality     Insomnia, unspecified type     Lumbago     Shoulder pain     Past Surgical History:   Procedure Laterality Date     DILATION AND CURETTAGE SUCTION       EYE SURGERY      eye duct repair 10+ years  ago     NASOLACRIMAL DUCT PROBE/IRRG         Social History     Tobacco Use     Smoking status: Never Smoker     Smokeless tobacco: Never Used   Substance Use Topics     Alcohol use: No     Family History   Problem Relation Age of Onset     Other Cancer Brother      Cancer Brother      Glaucoma No family hx of      Macular Degeneration No family hx of      Diabetes No family hx of      Hypertension No family hx of      Cerebrovascular Disease No family hx of      Thyroid Disease No family hx of              Reviewed and updated as needed this visit by Provider         Review of Systems   As above      Objective    /74   Pulse 71   Temp 97.7  F (36.5  C) (Oral)   Wt 94.8 kg (209 lb)   LMP 04/19/2006   BMI 40.50 kg/m    Body mass index is 40.5 kg/m .  Physical Exam   GENERAL: healthy,  alert and no distress  MS: no edema and peripheral pulses normal, mild pain on palpation of both lower legs  SKIN: no suspicious lesions or rashes  NEURO: Normal strength and tone and DTR's normal and symmetric knees            Assessment & Plan     1. Leg numbness  Not sure of cause.  Start with emg.  May need stephanie but has good pulses.    - NEUROLOGY ADULT REFERRAL    2. Itch of skin  As above.  May need derm referral.               No follow-ups on file.    Karma Burger PA-C  [facility]

## 2019-08-15 ENCOUNTER — OFFICE VISIT (OUTPATIENT)
Dept: INTERNAL MEDICINE | Facility: CLINIC | Age: 64
End: 2019-08-15
Payer: COMMERCIAL

## 2019-08-15 VITALS
BODY MASS INDEX: 40.32 KG/M2 | HEART RATE: 70 BPM | DIASTOLIC BLOOD PRESSURE: 74 MMHG | SYSTOLIC BLOOD PRESSURE: 113 MMHG | RESPIRATION RATE: 16 BRPM | OXYGEN SATURATION: 98 % | WEIGHT: 208.1 LBS

## 2019-08-15 DIAGNOSIS — L29.9 PRURITIC DISORDER: Primary | ICD-10-CM

## 2019-08-15 DIAGNOSIS — L29.9 PRURITIC DISORDER: ICD-10-CM

## 2019-08-15 LAB
FERRITIN SERPL-MCNC: 90 NG/ML (ref 8–252)
FOLATE SERPL-MCNC: 13.7 NG/ML
HBA1C MFR BLD: 5.1 % (ref 0–5.6)
TSH SERPL DL<=0.005 MIU/L-ACNC: 1.28 MU/L (ref 0.4–4)
VIT B12 SERPL-MCNC: 542 PG/ML (ref 193–986)

## 2019-08-15 RX ORDER — KETOCONAZOLE 20 MG/G
CREAM TOPICAL DAILY
Qty: 120 G | Refills: 3 | Status: SHIPPED | OUTPATIENT
Start: 2019-08-15

## 2019-08-15 ASSESSMENT — PAIN SCALES - GENERAL: PAINLEVEL: NO PAIN (0)

## 2019-08-15 NOTE — NURSING NOTE
Chief Complaint   Patient presents with     Derm Problem     Pt reports both legs being itch for past 3 months     Cadence Serrano EMT at 1:53 PM sign on 8/15/2019

## 2019-08-15 NOTE — PROGRESS NOTES
[facility]    CC: Leg itching    HPI:  Kian Cortez is a 64 year old woman with a history of diverticulosis, osteopenia, dyspepsia, insomnnia, and osteoarthritis who presents for evaluation of leg itching.    Bilateral, L>R legs itchiness, numb, loss of sensation. Started 3 months ago. Started to take medication and it was better for a while but came back again. Not worse at night. Has to move feet as well as scratch. Never experienced anything like this before.  No numbness or itchiness on feet.   Some increased fatigue, attributes to age. Denies other thyroid symptoms, denies dry mouth, tingling of lips.    Saw CHAS Kaba with family medicine to evaluate, got cetirizine and aquaphor, neurology appointment. Checked LFTs without answer.     ROS: 10 point ROS neg other than the symptoms noted above in the HPI.    Current Outpatient Medications   Medication     acetaminophen 500 MG CAPS     calcium carbonate (TUMS) 500 MG chewable tablet     cetirizine (ZYRTEC) 10 MG tablet     Cholecalciferol (VITAMIN D) 1000 UNITS capsule     Fish Oil-Cholecalciferol (FISH OIL + D3 PO)     vitamin D3 (CHOLECALCIFEROL) 2000 units tablet     cyclobenzaprine (FLEXERIL) 10 MG tablet     No current facility-administered medications for this visit.      No Known Allergies  Past Medical History:   Diagnosis Date     Diverticulosis 11/2015    on CT scan     Functional dyspepsia      Insomnia     psychophysiologic     Osteoarthritis of right knee      Osteopenia 4/27/2015     Past Surgical History:   Procedure Laterality Date     DILATION AND CURETTAGE SUCTION       EYE SURGERY      eye duct repair 10+ years  ago     NASOLACRIMAL DUCT PROBE/IRRG       Family History   Problem Relation Age of Onset     Other Cancer Brother      Cancer Brother      Glaucoma No family hx of      Macular Degeneration No family hx of      Diabetes No family hx of      Hypertension No family hx of      Cerebrovascular Disease No family hx of       Thyroid Disease No family hx of      Social History     Socioeconomic History     Marital status:      Spouse name: Not on file     Number of children: 7     Years of education: Not on file     Highest education level: Not on file   Occupational History     Not on file   Social Needs     Financial resource strain: Not on file     Food insecurity:     Worry: Not on file     Inability: Not on file     Transportation needs:     Medical: Not on file     Non-medical: Not on file   Tobacco Use     Smoking status: Never Smoker     Smokeless tobacco: Never Used   Substance and Sexual Activity     Alcohol use: No     Drug use: No     Sexual activity: Yes     Partners: Male   Lifestyle     Physical activity:     Days per week: Not on file     Minutes per session: Not on file     Stress: Not on file   Relationships     Social connections:     Talks on phone: Not on file     Gets together: Not on file     Attends [employer] service: Not on file     Active member of club or organization: Not on file     Attends meetings of clubs or organizations: Not on file     Relationship status: Not on file     Intimate partner violence:     Fear of current or ex partner: Not on file     Emotionally abused: Not on file     Physically abused: Not on file     Forced sexual activity: Not on file   Other Topics Concern     Parent/sibling w/ CABG, MI or angioplasty before 65F 55M? No   Social History Narrative    Originally from [place].     Objective  Physical Examination:  /74 (BP Location: Right arm, Patient Position: Sitting, Cuff Size: Adult Large)   Pulse 70   Resp 16   Wt 94.4 kg (208 lb 1.6 oz)   LMP 04/19/2006   SpO2 98%   Breastfeeding? No   BMI 40.32 kg/m      General:  Conversant, generally healthy appearing, no acute distress  Neuro: Alert and oriented, face symmetric. No tremor.  Gait steady. Reduced light touch and sharp/dull sensation over anterior lower legs, left more reduced than right. Intact cold  sensation.  M/S:   No joint deformities noted.  No joint swelling.  Skin:   Hypopigmented papules with linear excoriations on bilateral anterior lower legs. Normal temperature, turgor and texture. No other rashes, suspicious lesions,  jaundice or ulcers.  Extremities:  No peripheral edema, no digital cyanosis,  Psych:  Alert and oriented to person, place and time. Appropriate affect.  Not psychomotor slowed.  No signs of anxiety or agitation.    Labs  Results for BOBBY BIRD (MRN [id number]) as of 8/15/2019 15:57   Ref. Range 7/30/2019 11:53   Albumin Latest Ref Range: 3.4 - 5.0 g/dL 3.5   Protein Total Latest Ref Range: 6.8 - 8.8 g/dL 8.0   Bilirubin Total Latest Ref Range: 0.2 - 1.3 mg/dL 0.4   Alkaline Phosphatase Latest Ref Range: 40 - 150 U/L 92   ALT Latest Ref Range: 0 - 50 U/L 21   AST Latest Ref Range: 0 - 45 U/L 22   Bilirubin Direct Latest Ref Range: 0.0 - 0.2 mg/dL 0.1       Assessment/Plan  Bilateral leg numbness, itching - Anterior leg itching and numbness for 3 months accompanied by hypopigmented rash, though unclear if hypopigmentation due to excoriations. Unresponsive to lotions and cetirizine. Unclear etiology, though initial evaluation returned normal LFTs. [place] further evaluation for peripheral neuropathy, as well as dermatology referral with tentative treatment for tinea versicolor in the meantime.  - Derm referral  - Ketoconazole cream  - Folate  - B12  - Ferritin  - TSH  - A1c         Mayito Camp, MS  08/15/19    Provider Disclosure:  I agree with above History, Review of Systems, Physical exam and Plan. I have reviewed the content of the documentation and have edited it as needed. I have personally performed the services documented here and the documentation accurately represents those services and the decisions I have made.   Salima Diop MD

## 2019-08-19 PROBLEM — M54.50 LUMBAGO: Status: RESOLVED | Noted: 2018-07-17 | Resolved: 2019-08-19

## 2019-08-19 PROBLEM — M25.519 SHOULDER PAIN: Status: RESOLVED | Noted: 2019-04-11 | Resolved: 2019-08-19

## 2019-08-19 NOTE — PROGRESS NOTES
Subjective:  HPI                    Objective:  System    Physical Exam    General     ROS    Assessment/Plan:    DISCHARGE REPORT    Progress reporting period is from eval date to 7/24/18.     SUBJECTIVE  Subjective: No news to report. Exercises going well. Leaves for trip on August 1.    Current Pain level: 0/10   Initial Pain level: 10/10   Changes in function: No changes noted in function since last SOAP note   Adverse reactions: None;   ,     Patient has failed to return to therapy so current objective findings are unknown.  The subjective and objective information are from the last SOAP note on this patient.    OBJECTIVE  Objective: Requires many cues for HEP.      ASSESSMENT/PLAN  Diagnosis 1:  LBP  Pain -  hot/cold therapy, manual therapy, self management, education and home program  Decreased ROM/flexibility - manual therapy, therapeutic exercise and home program  Decreased strength - therapeutic exercise, therapeutic activities and home program  STG/LTGs have been met or progress has been made towards goals:  Yes (See Goal flow sheet completed today.)  Assessment of Progress: The patient has not returned to therapy. Current status is unknown.  Self Management Plans:  Patient has been instructed in a home treatment program.  Patient  has been instructed in self management of symptoms.  Kian continues to require the following intervention to meet STG and LTG's: PT intervention is no longer required to meet STG/LTG.  The patient failed to complete scheduled/ordered appointments so current information is unknown.  We will discharge this patient from PT.    Recommendations:  This patient is ready to be discharged from therapy and continue their home treatment program.    Please refer to the daily flowsheet for treatment today, total treatment time and time spent performing 1:1 timed codes.

## 2019-08-19 NOTE — PROGRESS NOTES
Subjective:  HPI                    Objective:  System    Physical Exam    General     ROS    Assessment/Plan:    DISCHARGE REPORT    Progress reporting period is from 4/9/19 to 5/28/19.     SUBJECTIVE  Subjective: Things are continuing to improve. Will be leaving for 3 weeks. Will make appt for when she gets back if still having pain.    Current Pain level: 2/10   Initial Pain level: 8/10   Changes in function: Yes, see goal flow sheet for change in function   Adverse reactions: None;   ,     Patient has failed to return to therapy so current objective findings are unknown.  The subjective and objective information are from the last SOAP note on this patient.    OBJECTIVE  Objective: (-) HK on R. Tender over biceps tendon. Independent in HEP      ASSESSMENT/PLAN  Diagnosis 1:  R shoulder pain  Pain -  hot/cold therapy, manual therapy, self management, education and home program  Decreased ROM/flexibility - manual therapy, therapeutic exercise and home program  Decreased strength - therapeutic exercise, therapeutic activities and home program  STG/LTGs have been met or progress has been made towards goals:  Yes (See Goal flow sheet completed today.)  Assessment of Progress: The patient has not returned to therapy. Current status is unknown.  Self Management Plans:  Patient has been instructed in a home treatment program.  Patient  has been instructed in self management of symptoms.  Kian continues to require the following intervention to meet STG and LTG's: PT intervention is no longer required to meet STG/LTG.  The patient failed to complete scheduled/ordered appointments so current information is unknown.  We will discharge this patient from PT.    Recommendations:  This patient is ready to be discharged from therapy and continue their home treatment program.    Please refer to the daily flowsheet for treatment today, total treatment time and time spent performing 1:1 timed codes.

## 2019-09-03 ENCOUNTER — OFFICE VISIT (OUTPATIENT)
Dept: NEUROLOGY | Facility: CLINIC | Age: 64
End: 2019-09-03
Payer: COMMERCIAL

## 2019-09-03 DIAGNOSIS — R20.0 BILATERAL LEG NUMBNESS: Primary | ICD-10-CM

## 2019-09-03 DIAGNOSIS — M54.42 CHRONIC BILATERAL LOW BACK PAIN WITH LEFT-SIDED SCIATICA: ICD-10-CM

## 2019-09-03 DIAGNOSIS — G89.29 CHRONIC BILATERAL LOW BACK PAIN WITH LEFT-SIDED SCIATICA: ICD-10-CM

## 2019-09-03 NOTE — LETTER
9/3/2019       RE: Kian Cortez  [address]  [place] [address]     Dear Colleague,    Thank you for referring your patient, Kian Cortez, to the [facility] at [facility]. Please see a copy of my visit note below.                       [facility]  Electrodiagnostic Laboratory    Nerve Conduction & EMG Report          Patient:       Kian Cortez  Patient ID:    [id number]  Gender:        Female  YOB: 1955  Age:           64 Years 7 Months        History & Examination:  64 year old woman with numbness and itchiness in both legs, left more affected than right. Onset about 4 months ago. Also reports intermittent low back pain. Eval for polyneuropathy vs radiculopathy.     Techniques: Motor and sensory conduction studies were done with surface recording electrodes. EMG was done with a concentric needle electrode.      Results:  Nerve conduction studies:   1. Bilateral sural and left superficial peroneal sensory responses are normal.   2. Bilateral peroneal-EDB and tibial-AH motor responses are normal.     Needle EMG of selected right and left lower limb muscles was performed as tabulated below. No abnormal spontaneous activity was observed in the sampled muscles. Motor unit potential morphology and recruitment patterns were normal.     Interpretation:  This is a normal study. There is no electrophysiologic evidence of a large-fiber polyneuropathy or lumbosacral radiculopathy affecting the lower limbs on the basis of this study.     Brett Lake MD  Department of Neurology         Sensory NCS      Nerve / Sites Rec. Site Onset Peak NP Amp Ref. PP Amp Dist Dereje Ref. Temp     ms ms  V  V  V cm m/s m/s  C   L SURAL - Lat Mall 60      Calf Ankle 2.60 3.49 10.8 5.0 15.7 14 53.8 38.0 32.4   R SURAL - Lat Mall 60      Calf Ankle 2.50 3.33 8.2 5.0 10.6 14 56.0 38.0    L SUP PERONEAL      Lat Leg Vaughan 2.76 3.65 5.2  8.5 12.5 45.3 38.0         Motor NCS      Nerve / Sites Rec. Site Lat Ref. Amp Ref. Rel Amp Dist Dereje Ref. Dur. Area Temp.     ms ms mV mV % cm m/s m/s ms %  C   L DEEP PERONEAL - EDB 60      Ankle EDB 3.59 6.00 6.6 2.0 100 8   5.99 100 32.3      FibHead EDB 9.38  5.8  87.9 30 51.9 38.0 5.68 80.4 32.4      Pop Fos EDB 10.73  5.8  87 8 59.1 38.0 5.52 76.7 32.6   R DEEP PERONEAL - EDB 60      Ankle EDB 3.54 6.00 10.5 2.0 100 8   5.73 100       FibHead EDB 9.27  9.9  94.2 30 52.4 38.0 6.20 98.6       Pop Fos EDB 10.63  8.6  81.5 8 59.1 38.0 5.78 82.6    L TIBIAL - AH      Ankle AH 3.70 6.00 8.6 4.0 100 8   6.09 100    R TIBIAL - AH      Ankle AH 3.49 6.00 9.0 4.0 100 8   5.94 100        F  Wave      Nerve Min F Lat Max F Lat Mean FLat Temp.    ms ms ms  C   L TIBIAL 42.97 49.84 46.81 24.3       EMG Summary Table     Spontaneous MUAP Recruitment    IA Fib/PSW Fasc H.F. Amp Dur. PPP Pattern   L. VAST LATERALIS N None None None N N N Normal   L. TIB ANTERIOR N None None None N N N Normal   L. GASTROCN (MED) N None None None N N N Normal   R. TIB ANTERIOR N None None None N N N Normal   R. GASTROCN (MED) N None None None N N N Normal                            Again, thank you for allowing me to participate in the care of your patient.      Sincerely,    Brett Lake MD

## 2019-09-03 NOTE — PROGRESS NOTES
[facility]  Electrodiagnostic Laboratory    Nerve Conduction & EMG Report          Patient:       Kian Cortez  Patient ID:    [id number]  Gender:        Female  YOB: 1955  Age:           64 Years 7 Months        History & Examination:  64 year old woman with numbness and itchiness in both legs, left more affected than right. Onset about 4 months ago. Also reports intermittent low back pain. Eval for polyneuropathy vs radiculopathy.     Techniques: Motor and sensory conduction studies were done with surface recording electrodes. EMG was done with a concentric needle electrode.      Results:  Nerve conduction studies:   1. Bilateral sural and left superficial peroneal sensory responses are normal.   2. Bilateral peroneal-EDB and tibial-AH motor responses are normal.     Needle EMG of selected right and left lower limb muscles was performed as tabulated below. No abnormal spontaneous activity was observed in the sampled muscles. Motor unit potential morphology and recruitment patterns were normal.     Interpretation:  This is a normal study. There is no electrophysiologic evidence of a large-fiber polyneuropathy or lumbosacral radiculopathy affecting the lower limbs on the basis of this study.     Brett Lake MD  Department of Neurology         Sensory NCS      Nerve / Sites Rec. Site Onset Peak NP Amp Ref. PP Amp Dist Dereje Ref. Temp     ms ms  V  V  V cm m/s m/s  C   L SURAL - Lat Mall 60      Calf Ankle 2.60 3.49 10.8 5.0 15.7 14 53.8 38.0 32.4   R SURAL - Lat Mall 60      Calf Ankle 2.50 3.33 8.2 5.0 10.6 14 56.0 38.0    L SUP PERONEAL      Lat Leg Vaughan 2.76 3.65 5.2  8.5 12.5 45.3 38.0        Motor NCS      Nerve / Sites Rec. Site Lat Ref. Amp Ref. Rel Amp Dist Dereje Ref. Dur. Area Temp.     ms ms mV mV % cm m/s m/s ms %  C   L DEEP PERONEAL - EDB 60      Ankle EDB 3.59 6.00 6.6 2.0 100 8   5.99 100 32.3      FibHead EDB 9.38  5.8  87.9 30 51.9 38.0 5.68 80.4 32.4      Pop Fos EDB 10.73   5.8  87 8 59.1 38.0 5.52 76.7 32.6   R DEEP PERONEAL - EDB 60      Ankle EDB 3.54 6.00 10.5 2.0 100 8   5.73 100       FibHead EDB 9.27  9.9  94.2 30 52.4 38.0 6.20 98.6       Pop Fos EDB 10.63  8.6  81.5 8 59.1 38.0 5.78 82.6    L TIBIAL - AH      Ankle AH 3.70 6.00 8.6 4.0 100 8   6.09 100    R TIBIAL - AH      Ankle AH 3.49 6.00 9.0 4.0 100 8   5.94 100        F  Wave      Nerve Min F Lat Max F Lat Mean FLat Temp.    ms ms ms  C   L TIBIAL 42.97 49.84 46.81 24.3       EMG Summary Table     Spontaneous MUAP Recruitment    IA Fib/PSW Fasc H.F. Amp Dur. PPP Pattern   L. VAST LATERALIS N None None None N N N Normal   L. TIB ANTERIOR N None None None N N N Normal   L. GASTROCN (MED) N None None None N N N Normal   R. TIB ANTERIOR N None None None N N N Normal   R. GASTROCN (MED) N None None None N N N Normal

## 2019-10-11 PROCEDURE — 96367 TX/PROPH/DG ADDL SEQ IV INF: CPT | Performed by: EMERGENCY MEDICINE

## 2019-10-11 PROCEDURE — 96361 HYDRATE IV INFUSION ADD-ON: CPT | Performed by: EMERGENCY MEDICINE

## 2019-10-11 PROCEDURE — 96375 TX/PRO/DX INJ NEW DRUG ADDON: CPT | Performed by: EMERGENCY MEDICINE

## 2019-10-11 PROCEDURE — 99284 EMERGENCY DEPT VISIT MOD MDM: CPT | Mod: Z6 | Performed by: EMERGENCY MEDICINE

## 2019-10-11 PROCEDURE — 96365 THER/PROPH/DIAG IV INF INIT: CPT | Mod: 59 | Performed by: EMERGENCY MEDICINE

## 2019-10-11 PROCEDURE — 99285 EMERGENCY DEPT VISIT HI MDM: CPT | Mod: 25 | Performed by: EMERGENCY MEDICINE

## 2019-10-12 ENCOUNTER — HOSPITAL ENCOUNTER (OUTPATIENT)
Facility: CLINIC | Age: 64
Setting detail: OBSERVATION
Discharge: HOME | End: 2019-10-13
Attending: EMERGENCY MEDICINE | Admitting: INTERNAL MEDICINE
Payer: COMMERCIAL

## 2019-10-12 ENCOUNTER — APPOINTMENT (OUTPATIENT)
Dept: CT IMAGING | Facility: CLINIC | Age: 64
End: 2019-10-12
Attending: EMERGENCY MEDICINE
Payer: COMMERCIAL

## 2019-10-12 DIAGNOSIS — R10.84 ABDOMINAL PAIN, GENERALIZED: ICD-10-CM

## 2019-10-12 DIAGNOSIS — R19.7 DIARRHEA, UNSPECIFIED TYPE: ICD-10-CM

## 2019-10-12 DIAGNOSIS — K52.9 COLITIS: Primary | ICD-10-CM

## 2019-10-12 DIAGNOSIS — K92.1 MELENA: ICD-10-CM

## 2019-10-12 DIAGNOSIS — L29.9 PRURITIC DISORDER: ICD-10-CM

## 2019-10-12 LAB
ABO + RH BLD: NORMAL
ABO + RH BLD: NORMAL
ALBUMIN SERPL-MCNC: 3.3 G/DL (ref 3.4–5)
ALBUMIN UR-MCNC: NEGATIVE MG/DL
ALP SERPL-CCNC: 85 U/L (ref 40–150)
ALT SERPL W P-5'-P-CCNC: 17 U/L (ref 0–50)
ANION GAP SERPL CALCULATED.3IONS-SCNC: 5 MMOL/L (ref 3–14)
APPEARANCE UR: CLEAR
AST SERPL W P-5'-P-CCNC: 17 U/L (ref 0–45)
BACTERIA #/AREA URNS HPF: ABNORMAL /HPF
BASOPHILS # BLD AUTO: 0.1 10E9/L (ref 0–0.2)
BASOPHILS NFR BLD AUTO: 0.7 %
BILIRUB SERPL-MCNC: 0.2 MG/DL (ref 0.2–1.3)
BILIRUB UR QL STRIP: NEGATIVE
BLD GP AB SCN SERPL QL: NORMAL
BLOOD BANK CMNT PATIENT-IMP: NORMAL
BUN SERPL-MCNC: 9 MG/DL (ref 7–30)
C COLI+JEJUNI+LARI FUSA STL QL NAA+PROBE: NOT DETECTED
C DIFF TOX B STL QL: NEGATIVE
CALCIUM SERPL-MCNC: 8.3 MG/DL (ref 8.5–10.1)
CHLORIDE SERPL-SCNC: 110 MMOL/L (ref 94–109)
CO2 SERPL-SCNC: 28 MMOL/L (ref 20–32)
COLOR UR AUTO: ABNORMAL
CREAT SERPL-MCNC: 0.56 MG/DL (ref 0.52–1.04)
DIFFERENTIAL METHOD BLD: NORMAL
EC STX1 GENE STL QL NAA+PROBE: NOT DETECTED
EC STX2 GENE STL QL NAA+PROBE: NOT DETECTED
ENTERIC PATHOGEN COMMENT: NORMAL
EOSINOPHIL # BLD AUTO: 0.2 10E9/L (ref 0–0.7)
EOSINOPHIL NFR BLD AUTO: 1.4 %
ERYTHROCYTE [DISTWIDTH] IN BLOOD BY AUTOMATED COUNT: 13.4 % (ref 10–15)
ERYTHROCYTE [DISTWIDTH] IN BLOOD BY AUTOMATED COUNT: 13.7 % (ref 10–15)
GFR SERPL CREATININE-BSD FRML MDRD: >90 ML/MIN/{1.73_M2}
GLUCOSE SERPL-MCNC: 95 MG/DL (ref 70–99)
GLUCOSE UR STRIP-MCNC: NEGATIVE MG/DL
HCT VFR BLD AUTO: 39.7 % (ref 35–47)
HCT VFR BLD AUTO: 41.7 % (ref 35–47)
HGB BLD-MCNC: 12.7 G/DL (ref 11.7–15.7)
HGB BLD-MCNC: 13.6 G/DL (ref 11.7–15.7)
HGB UR QL STRIP: NEGATIVE
IMM GRANULOCYTES # BLD: 0 10E9/L (ref 0–0.4)
IMM GRANULOCYTES NFR BLD: 0.2 %
KETONES UR STRIP-MCNC: NEGATIVE MG/DL
LACTATE BLD-SCNC: 1 MMOL/L (ref 0.7–2)
LEUKOCYTE ESTERASE UR QL STRIP: NEGATIVE
LYMPHOCYTES # BLD AUTO: 2 10E9/L (ref 0.8–5.3)
LYMPHOCYTES NFR BLD AUTO: 19 %
MCH RBC QN AUTO: 30 PG (ref 26.5–33)
MCH RBC QN AUTO: 30.2 PG (ref 26.5–33)
MCHC RBC AUTO-ENTMCNC: 32 G/DL (ref 31.5–36.5)
MCHC RBC AUTO-ENTMCNC: 32.6 G/DL (ref 31.5–36.5)
MCV RBC AUTO: 93 FL (ref 78–100)
MCV RBC AUTO: 94 FL (ref 78–100)
MONOCYTES # BLD AUTO: 0.9 10E9/L (ref 0–1.3)
MONOCYTES NFR BLD AUTO: 8.8 %
MUCOUS THREADS #/AREA URNS LPF: PRESENT /LPF
NEUTROPHILS # BLD AUTO: 7.4 10E9/L (ref 1.6–8.3)
NEUTROPHILS NFR BLD AUTO: 69.9 %
NITRATE UR QL: NEGATIVE
NOROV GI+II ORF1-ORF2 JNC STL QL NAA+PR: NOT DETECTED
NRBC # BLD AUTO: 0 10*3/UL
NRBC BLD AUTO-RTO: 0 /100
PH UR STRIP: 5.5 PH (ref 5–7)
PLATELET # BLD AUTO: 180 10E9/L (ref 150–450)
PLATELET # BLD AUTO: 205 10E9/L (ref 150–450)
POTASSIUM SERPL-SCNC: 3.6 MMOL/L (ref 3.4–5.3)
PROT SERPL-MCNC: 7.7 G/DL (ref 6.8–8.8)
RBC # BLD AUTO: 4.23 10E12/L (ref 3.8–5.2)
RBC # BLD AUTO: 4.5 10E12/L (ref 3.8–5.2)
RBC #/AREA URNS AUTO: <1 /HPF (ref 0–2)
RVA NSP5 STL QL NAA+PROBE: NOT DETECTED
SALMONELLA SP RPOD STL QL NAA+PROBE: NOT DETECTED
SHIGELLA SP+EIEC IPAH STL QL NAA+PROBE: NOT DETECTED
SODIUM SERPL-SCNC: 143 MMOL/L (ref 133–144)
SOURCE: ABNORMAL
SP GR UR STRIP: 1.01 (ref 1–1.03)
SPECIMEN EXP DATE BLD: NORMAL
SPECIMEN SOURCE: NORMAL
SQUAMOUS #/AREA URNS AUTO: <1 /HPF (ref 0–1)
UROBILINOGEN UR STRIP-MCNC: NORMAL MG/DL (ref 0–2)
V CHOL+PARA RFBL+TRKH+TNAA STL QL NAA+PR: NOT DETECTED
WBC # BLD AUTO: 10.6 10E9/L (ref 4–11)
WBC # BLD AUTO: 7.5 10E9/L (ref 4–11)
WBC #/AREA URNS AUTO: <1 /HPF (ref 0–5)
Y ENTERO RECN STL QL NAA+PROBE: NOT DETECTED

## 2019-10-12 PROCEDURE — C9113 INJ PANTOPRAZOLE SODIUM, VIA: HCPCS | Performed by: EMERGENCY MEDICINE

## 2019-10-12 PROCEDURE — 87506 IADNA-DNA/RNA PROBE TQ 6-11: CPT | Performed by: EMERGENCY MEDICINE

## 2019-10-12 PROCEDURE — 25000132 ZZH RX MED GY IP 250 OP 250 PS 637: Performed by: EMERGENCY MEDICINE

## 2019-10-12 PROCEDURE — 86900 BLOOD TYPING SEROLOGIC ABO: CPT | Performed by: EMERGENCY MEDICINE

## 2019-10-12 PROCEDURE — 80053 COMPREHEN METABOLIC PANEL: CPT | Performed by: EMERGENCY MEDICINE

## 2019-10-12 PROCEDURE — 87493 C DIFF AMPLIFIED PROBE: CPT | Performed by: EMERGENCY MEDICINE

## 2019-10-12 PROCEDURE — 85025 COMPLETE CBC W/AUTO DIFF WBC: CPT | Performed by: EMERGENCY MEDICINE

## 2019-10-12 PROCEDURE — 25000128 H RX IP 250 OP 636: Performed by: EMERGENCY MEDICINE

## 2019-10-12 PROCEDURE — 83605 ASSAY OF LACTIC ACID: CPT | Performed by: EMERGENCY MEDICINE

## 2019-10-12 PROCEDURE — 87209 SMEAR COMPLEX STAIN: CPT | Performed by: STUDENT IN AN ORGANIZED HEALTH CARE EDUCATION/TRAINING PROGRAM

## 2019-10-12 PROCEDURE — 25000132 ZZH RX MED GY IP 250 OP 250 PS 637: Performed by: PHYSICIAN ASSISTANT

## 2019-10-12 PROCEDURE — 87177 OVA AND PARASITES SMEARS: CPT | Performed by: STUDENT IN AN ORGANIZED HEALTH CARE EDUCATION/TRAINING PROGRAM

## 2019-10-12 PROCEDURE — 86901 BLOOD TYPING SEROLOGIC RH(D): CPT | Performed by: EMERGENCY MEDICINE

## 2019-10-12 PROCEDURE — 25000125 ZZHC RX 250: Performed by: EMERGENCY MEDICINE

## 2019-10-12 PROCEDURE — G0378 HOSPITAL OBSERVATION PER HR: HCPCS

## 2019-10-12 PROCEDURE — 87328 CRYPTOSPORIDIUM AG IA: CPT | Performed by: STUDENT IN AN ORGANIZED HEALTH CARE EDUCATION/TRAINING PROGRAM

## 2019-10-12 PROCEDURE — 74177 CT ABD & PELVIS W/CONTRAST: CPT

## 2019-10-12 PROCEDURE — 25800030 ZZH RX IP 258 OP 636: Performed by: EMERGENCY MEDICINE

## 2019-10-12 PROCEDURE — 87329 GIARDIA AG IA: CPT | Performed by: STUDENT IN AN ORGANIZED HEALTH CARE EDUCATION/TRAINING PROGRAM

## 2019-10-12 PROCEDURE — 85027 COMPLETE CBC AUTOMATED: CPT | Performed by: STUDENT IN AN ORGANIZED HEALTH CARE EDUCATION/TRAINING PROGRAM

## 2019-10-12 PROCEDURE — 99220 ZZC INITIAL OBSERVATION CARE,LEVL III: CPT | Mod: AI | Performed by: INTERNAL MEDICINE

## 2019-10-12 PROCEDURE — 86850 RBC ANTIBODY SCREEN: CPT | Performed by: EMERGENCY MEDICINE

## 2019-10-12 PROCEDURE — 25000132 ZZH RX MED GY IP 250 OP 250 PS 637: Performed by: STUDENT IN AN ORGANIZED HEALTH CARE EDUCATION/TRAINING PROGRAM

## 2019-10-12 PROCEDURE — 96361 HYDRATE IV INFUSION ADD-ON: CPT

## 2019-10-12 PROCEDURE — 81001 URINALYSIS AUTO W/SCOPE: CPT | Performed by: EMERGENCY MEDICINE

## 2019-10-12 PROCEDURE — 25800030 ZZH RX IP 258 OP 636: Performed by: STUDENT IN AN ORGANIZED HEALTH CARE EDUCATION/TRAINING PROGRAM

## 2019-10-12 RX ORDER — ACETAMINOPHEN 650 MG/1
650 SUPPOSITORY RECTAL EVERY 4 HOURS PRN
Status: DISCONTINUED | OUTPATIENT
Start: 2019-10-12 | End: 2019-10-12

## 2019-10-12 RX ORDER — ONDANSETRON 2 MG/ML
4 INJECTION INTRAMUSCULAR; INTRAVENOUS EVERY 6 HOURS PRN
Status: DISCONTINUED | OUTPATIENT
Start: 2019-10-12 | End: 2019-10-13 | Stop reason: HOSPADM

## 2019-10-12 RX ORDER — NALOXONE HYDROCHLORIDE 0.4 MG/ML
.1-.4 INJECTION, SOLUTION INTRAMUSCULAR; INTRAVENOUS; SUBCUTANEOUS
Status: DISCONTINUED | OUTPATIENT
Start: 2019-10-12 | End: 2019-10-13 | Stop reason: HOSPADM

## 2019-10-12 RX ORDER — ONDANSETRON 4 MG/1
4 TABLET, ORALLY DISINTEGRATING ORAL EVERY 6 HOURS PRN
Status: DISCONTINUED | OUTPATIENT
Start: 2019-10-12 | End: 2019-10-13 | Stop reason: HOSPADM

## 2019-10-12 RX ORDER — ACETAMINOPHEN 500 MG
1000 TABLET ORAL 3 TIMES DAILY PRN
Status: DISCONTINUED | OUTPATIENT
Start: 2019-10-12 | End: 2019-10-12 | Stop reason: DRUGHIGH

## 2019-10-12 RX ORDER — ONDANSETRON 2 MG/ML
4 INJECTION INTRAMUSCULAR; INTRAVENOUS EVERY 30 MIN PRN
Status: DISCONTINUED | OUTPATIENT
Start: 2019-10-12 | End: 2019-10-12 | Stop reason: ALTCHOICE

## 2019-10-12 RX ORDER — IOPAMIDOL 755 MG/ML
100 INJECTION, SOLUTION INTRAVASCULAR ONCE
Status: COMPLETED | OUTPATIENT
Start: 2019-10-12 | End: 2019-10-12

## 2019-10-12 RX ORDER — ACETAMINOPHEN 325 MG/1
650 TABLET ORAL EVERY 4 HOURS PRN
Status: DISCONTINUED | OUTPATIENT
Start: 2019-10-12 | End: 2019-10-13 | Stop reason: HOSPADM

## 2019-10-12 RX ORDER — SIMETHICONE 80 MG
80 TABLET,CHEWABLE ORAL EVERY 6 HOURS PRN
Status: DISCONTINUED | OUTPATIENT
Start: 2019-10-12 | End: 2019-10-13 | Stop reason: HOSPADM

## 2019-10-12 RX ORDER — ACETAMINOPHEN 500 MG
1000 TABLET ORAL ONCE
Status: COMPLETED | OUTPATIENT
Start: 2019-10-12 | End: 2019-10-12

## 2019-10-12 RX ORDER — SODIUM CHLORIDE 9 MG/ML
1000 INJECTION, SOLUTION INTRAVENOUS CONTINUOUS
Status: DISCONTINUED | OUTPATIENT
Start: 2019-10-12 | End: 2019-10-12

## 2019-10-12 RX ORDER — SODIUM CHLORIDE 9 MG/ML
INJECTION, SOLUTION INTRAVENOUS CONTINUOUS
Status: DISCONTINUED | OUTPATIENT
Start: 2019-10-12 | End: 2019-10-13

## 2019-10-12 RX ADMIN — ACETAMINOPHEN 650 MG: 325 TABLET, FILM COATED ORAL at 13:25

## 2019-10-12 RX ADMIN — PANTOPRAZOLE SODIUM 8 MG/HR: 40 INJECTION, POWDER, FOR SOLUTION INTRAVENOUS at 04:08

## 2019-10-12 RX ADMIN — SODIUM CHLORIDE 65 ML: 9 INJECTION, SOLUTION INTRAVENOUS at 03:25

## 2019-10-12 RX ADMIN — SIMETHICONE CHEW TAB 80 MG 80 MG: 80 TABLET ORAL at 20:49

## 2019-10-12 RX ADMIN — ACETAMINOPHEN 1000 MG: 500 TABLET ORAL at 01:36

## 2019-10-12 RX ADMIN — ONDANSETRON 4 MG: 2 INJECTION INTRAMUSCULAR; INTRAVENOUS at 01:34

## 2019-10-12 RX ADMIN — IOPAMIDOL 98 ML: 755 INJECTION, SOLUTION INTRAVENOUS at 03:25

## 2019-10-12 RX ADMIN — PANTOPRAZOLE SODIUM 80 MG: 40 INJECTION, POWDER, FOR SOLUTION INTRAVENOUS at 03:48

## 2019-10-12 RX ADMIN — ACETAMINOPHEN 650 MG: 325 TABLET, FILM COATED ORAL at 23:25

## 2019-10-12 RX ADMIN — ACETAMINOPHEN 650 MG: 325 TABLET, FILM COATED ORAL at 17:45

## 2019-10-12 RX ADMIN — SODIUM CHLORIDE: 9 INJECTION, SOLUTION INTRAVENOUS at 06:40

## 2019-10-12 RX ADMIN — SODIUM CHLORIDE 1000 ML: 9 INJECTION, SOLUTION INTRAVENOUS at 02:40

## 2019-10-12 RX ADMIN — SODIUM CHLORIDE 1000 ML: 9 INJECTION, SOLUTION INTRAVENOUS at 01:34

## 2019-10-12 ASSESSMENT — PAIN DESCRIPTION - DESCRIPTORS: DESCRIPTORS: ACHING;DISCOMFORT

## 2019-10-12 NOTE — H&P
[facility], [place]    History and Physical - [facility] Night Service        Date of Admission:  10/12/2019    Assessment & Plan   Kian Cortez is a 64 year old female admitted on 10/12/2019. She is admitted with 24 hours of watery diarrhea and one episode of dark/bloody appearing stool and CT evidence of colitis, most likely secondary to a infectious colitis.     #Colitis   #Watery Diarrhea   #Blood per rectum   #History of diverticulosis   Patient presents with 24 hours of watery diarrhea and an episode of bloody stool witnessed in the emergency room.  She received 80 mg of IV Protonix and was started on IV Protonix infusion prior to transfer given concern for an upper GI bleed.  However CT scan shows clear evidence of colitis from the transverse colon to the rectum.  This is most likely the cause of her witnessed bleeding.  Her hemoglobin was 13.6 on admission and she remains hemodynamically stable and nontoxic-appearing.  My suspicion is this  most likely that represents a bacterial versus viral gastroenteritis, bleeding seems to make viral gastroenteritis less likely.  Other considerations include ischemic colitis although her lactate is normal.  To be an unusual presentation for inflammatory bowel disease.  Iatrogenic causes are a consideration although the patient denies taking any new medications including NSAIDs.  She remains quite stable we will hold off on antibiotics pending stool testing.  I do not feel there is an indication for an IV PPI regardless is covered with 80 mg IV bolus prior to transfer. Would hold off on antibiotics unless she were to change clinically pending stool studies.   -Hold IV PPI for now  -Continue maintenance IV fluids, NPO   -Stool studies including enteric bacterial/viral panel, C. difficile, stool O&P, Giardia, Cryptosporidium  -Hold off on IV antibiotics for now  -Consider GI involvement if infectious work-up negative  -Recheck hemoglobin, if  downtrending consider resuming PPI  -PRN zofran        Diet: NPO   Fluids: IVFs  DVT Prophylaxis: Ambulate every shift  Deras Catheter: not present  Code Status: Full code    Disposition Plan   Expected discharge: Tomorrow, recommended to prior living arrangement once Diarrhea improves.  Entered: Steven Fournier MD 10/12/2019, 5:44 AM       Patient will be formally staffed in the AM.     Steven Fournier MD  [facility], [place]  Pager: [phone]   Please see sticky note for cross cover information  ______________________________________________________________________    Chief Complaint   Diarrhea, abdominal pain     History is obtained from the patient    History of Present Illness   Kian Cortez is a 64 year old female with a PMH of diverticulosis and GERD who presented to the emergency department this evening with acute onset diarrhea since yesterday.  She states she awoke has been having profuse, watery diarrhea all throughout the day.  She reports abdominal pain as well as some lower back pain which she associates with passing stool.  In between bowel movements there is very little pain.  She came in this evening because her symptoms were persistent.  She had not noticed any blood in her stool throughout the day although had a bowel movement which was a little bit dark and red while in the emergency department.  She has not observed anything like this before.  She only takes vitamins and has not taken any over-the-counter medications except for Tylenol, she does not take NSAIDs.  She has not eaten any new foods and nobody else in her family is sick.  She has not eaten out over the past couple of days.  No recent travel.  No recent antibiotics.  She denies fevers, does endorse some chills currently.  No cough or shortness of breath.  No dysuria.  She does feel nauseous but no emesis.    EGD in 2017, mild gastric erythema but negative for H pylori and  pathology negative for acute inflammation. H pylori serology negative as well.      She presented to the ED vitally stable with a temperature of 97.5, pulse of 86, respiratory rate of 16 and blood pressure 152/80.  Her initial labs were notable for a white count of 10.6, hemoglobin of 13.6 and essentially normal basic metabolic panel.  Hepatic panel within normal limits and lactate 1.0.  She apparently had a bowel movement while in the ED which appeared dark/red.  She was is given 80 mg of IV pantoprazole and started on a PPI infusion.  She also underwent a CT abdomen/pelvis with IV contrast which showed colitis from the mid transverse colon to the rectum.  She was transferred to [facility] for further management.    Review of Systems    The 10 point Review of Systems is negative other than noted in the HPI or here.     Past Medical History    I have reviewed this patient's medical history and updated it with pertinent information if needed.   Past Medical History:   Diagnosis Date     Diverticulosis 11/2015    on CT scan     Functional dyspepsia      Insomnia     psychophysiologic     Osteoarthritis of right knee      Osteopenia 4/27/2015        Past Surgical History   I have reviewed this patient's surgical history and updated it with pertinent information if needed.  Past Surgical History:   Procedure Laterality Date     DILATION AND CURETTAGE SUCTION       EYE SURGERY      eye duct repair 10+ years  ago     NASOLACRIMAL DUCT PROBE/IRRG          Social History   I have reviewed this patient's social history and updated it with pertinent information if needed. Kian Cortez  reports that she has never smoked. She has never used smokeless tobacco. She reports that she does not drink alcohol or use drugs.    Family History   Family history reviewed with patient and is noncontributory.    Prior to Admission Medications   Prior to Admission Medications   Prescriptions Last Dose Informant Patient Reported? Taking?    Cholecalciferol (VITAMIN D) 1000 UNITS capsule Past Week at Unknown time  Yes Yes   Sig: Take 1 capsule by mouth daily.   Fish Oil-Cholecalciferol (FISH OIL + D3 PO) Past Week at Unknown time  Yes Yes   Sig: Take  by mouth daily.   acetaminophen 500 MG CAPS Unknown at Unknown time  No No   Sig: Take 1,000 mg by mouth 3 times daily   calcium carbonate (TUMS) 500 MG chewable tablet Unknown at Unknown time  No No   Sig: Take 1 tablet (500 mg) by mouth 2 times daily   cetirizine (ZYRTEC) 10 MG tablet Unknown at Unknown time  No No   Sig: Take 2 tablets (20 mg) by mouth At Bedtime   cyclobenzaprine (FLEXERIL) 10 MG tablet Unknown at Unknown time  No No   Sig: Take 1 tablet (10 mg) by mouth 3 times daily as needed for muscle spasms   Patient not taking: Reported on 7/30/2019   ketoconazole (NIZORAL) 2 % external cream Unknown at Unknown time  No No   Sig: Apply topically daily To legs   vitamin D3 (CHOLECALCIFEROL) 2000 units tablet Unknown at Unknown time  No No   Sig: Take 1 tablet by mouth daily      Facility-Administered Medications: None     Allergies   No Known Allergies    Physical Exam   Vital Signs: Temp: 97.5  F (36.4  C) Temp src: Oral BP: 111/71 Pulse: 60   Resp: 14 SpO2: 99 % O2 Device: None (Room air)    Weight: 210 lbs 9.6 oz    General Appearance: Alert, oriented x3, not in distress   Eyes: No scleral icterus  HEENT: MMM, no oropharyngeal erythema or exudate   Respiratory: CTAB, no wheezing or rales   Cardiovascular: S1/S2, RRR. No murmurs.   GI: Soft, mildly tender in lower quadrants bilaterally. No rebound tenderness no or guarding. Rectal exam deferred. Bowel sounds present.   Skin: No rash or jaundice   Musculoskeletal: No lower extremity edema   Neurologic: Alert, oriented x3  Psychiatric: Appropriate mood and insight     Data   CT ABDOMEN PELVIS W CONTRAST  10/12/2019 3:26 AM      HISTORY: Abdominal pain, acute, generalized.     TECHNIQUE: CT abdomen and pelvis with 100 mL Isovue-370 IV.  Radiation  dose for this scan was reduced using automated exposure control,  adjustment of the mA and/or kV according to patient size, or iterative  reconstruction technique.     COMPARISON: 11/2/2015.     FINDINGS:  Abdomen: The lung bases are unremarkable. The superior aspect of the  liver and spleen are not included on the exam. Visualized portions of  the liver and spleen are within normal limits. The gallbladder,  pancreas, adrenal glands and kidneys are normal in appearance. There  is no abdominal or pelvic lymph node enlargement.     Pelvis: There is wall thickening of the colon extending from mid  transverse colon to the rectum. No bowel obstruction. The appendix is  within normal limits. The uterus and adnexa appear grossly normal.  There is no free intraperitoneal gas or fluid.                                                                      IMPRESSION: Nonspecific colitis extending from mid transverse colon to  the rectum.    Recent Labs   Lab 10/12/19  0044   WBC 10.6   HGB 13.6   MCV 93         POTASSIUM 3.6   CHLORIDE 110*   CO2 28   BUN 9   CR 0.56   ANIONGAP 5   ANA 8.3*   GLC 95   ALBUMIN 3.3*   PROTTOTAL 7.7   BILITOTAL 0.2   ALKPHOS 85   ALT 17   AST 17       Internal Medicine Staff Addendum  Date of Service: 10/12/2019    I have seen and examined this patient, reviewed the data and discussed the plan of care. I agree with the above documentation including plan and ddx unless otherwise stated:     # colitis, likely infectious. No diarrhea since admission. hgb stable. No emesis since admission. No fevers.   - restart diet, slowly advancing.    # hematochezia. Resolved. Likely 2/2 infectious colitis.  - continue to monitor. hgb stable    Discussed with RN, son, daughter and patient via [place] .    dispo 1-2d when able to take PO    Zach Francisco MD  Internal Medicine Hospitalist  [facility]  Attending pager: [phone]

## 2019-10-12 NOTE — ED PROVIDER NOTES
History     Chief Complaint   Patient presents with     Abdominal Pain     Patient c/o n/v/d, abd pain, and back pain. Sxs onset today.      HPI  Kian Cortez is a 64 year old female history of osteopenia, diverticulosis, among other medical problems who presents with 1 day of severe abdominal cramping associated with multiple watery diarrhea episodes.  One episode of vomiting.  Last episode of diarrhea is just shortly prior to arrival.  She has some baseline abdominal discomfort throughout the day with severe lower abdominal cramping occurring just before bowel movements.  Occasionally abdominal pain radiates to her back.    She feels chills but no nadege fevers.  No chest pain or shortness of breath.  Has mild headache.    No specific sick contacts, recent travel, no unusual foods.    I have reviewed the Medications, Allergies, Past Medical and Surgical History, and Social History in the Epic system.    Review of Systems   14 point review of symptoms was performed and is negative except as noted above.     Physical Exam   BP: (!) 152/80  Pulse: 86  Temp: 97.5  F (36.4  C)  Resp: 16  Weight: 95.5 kg (210 lb 9.6 oz)  SpO2: 99 %      Physical Exam  GEN: Well appearing, non toxic, cooperative and conversant.   HEENT: The head is normocephalic and atraumatic. Pupils are equal round and reactive to light. Extraocular motions are intact. There is no facial swelling. The neck is nontender and supple.   CV: Regular rate and rhythm without murmurs rubs or gallops. 2+ radial pulses bilaterally.  PULM: Clear to auscultation bilaterally.  ABD: Soft, nontender though describes discomfort to palpation.  Tolerates deep palpation., nondistended.   EXT: Full range of motion.  No edema.  NEURO: Cranial nerves II through XII are intact and symmetric. Bilateral upper and lower extremities grossly show full range of motion without any focal deficits.   SKIN: No rashes, ecchymosis, or lacerations  PSYCH: Calm and cooperative,  interactive.     ED Course        Procedures               Labs Ordered and Resulted from Time of ED Arrival Up to the Time of Departure from the ED   COMPREHENSIVE METABOLIC PANEL - Abnormal; Notable for the following components:       Result Value    Chloride 110 (*)     Calcium 8.3 (*)     Albumin 3.3 (*)     All other components within normal limits   ROUTINE UA WITH MICROSCOPIC - Abnormal; Notable for the following components:    Bacteria Urine Few (*)     Mucous Urine Present (*)     All other components within normal limits   CBC WITH PLATELETS DIFFERENTIAL   LACTIC ACID WHOLE BLOOD   ABO/RH TYPE AND SCREEN   ENTERIC BACTERIA AND VIRUS PANEL BY SILVA STOOL   CLOSTRIDIUM DIFFICILE TOXIN B     Recent Results (from the past 24 hour(s))   CT Abdomen Pelvis w Contrast    Narrative    CT ABDOMEN PELVIS W CONTRAST  10/12/2019 3:26 AM     HISTORY: Abdominal pain, acute, generalized.    TECHNIQUE: CT abdomen and pelvis with 100 mL Isovue-370 IV. Radiation  dose for this scan was reduced using automated exposure control,  adjustment of the mA and/or kV according to patient size, or iterative  reconstruction technique.    COMPARISON: 11/2/2015.    FINDINGS:  Abdomen: The lung bases are unremarkable. The superior aspect of the  liver and spleen are not included on the exam. Visualized portions of  the liver and spleen are within normal limits. The gallbladder,  pancreas, adrenal glands and kidneys are normal in appearance. There  is no abdominal or pelvic lymph node enlargement.    Pelvis: There is wall thickening of the colon extending from mid  transverse colon to the rectum. No bowel obstruction. The appendix is  within normal limits. The uterus and adnexa appear grossly normal.  There is no free intraperitoneal gas or fluid.      Impression    IMPRESSION: Nonspecific colitis extending from mid transverse colon to  the rectum.            Assessments & Plan (with Medical Decision Making)   64-year-old female with history  as noted presenting with acute diarrhea, chills and one episode of vomiting with abdominal pain.    DDX includes viral enteritis, bacterial enteritis, IBD IBS, GI bleed, mesenteric ischemia, ischemic colitis, among other causes.    Exam is fairly unrevealing.  Patient is nontoxic and well-appearing.  Treated with IV fluids, analgesic and antiemetics resulting in significant improvement.  Labs reviewed and are unrevealing including normal creatinine, lactate 1.0.    On further evaluation, the patient's notes she went to the bathroom and had a very large dark, tarry colored stool.  She also notes that prior to this she had been experiencing several mucousy stools.  Continues to have significant abdominal pain just prior and during stool passage.    Given bloody stools, differential extends to dysentery as well as more significant risk for GI bleed.  Given afebrile, will hold off antibiotics at this time as to not precipitate worsening symptoms with enteroinvasive E. coli.  Stool culture sent, C. difficile sent  PPI initiated in case of GI bleed.  CT also acquired per request of medicine team showing only nonspecific colitis.  Discussed and admitted to the internal medicine service under observation.      I have reviewed the nursing notes.    I have reviewed the findings, diagnosis, plan and need for follow up with the patient.    New Prescriptions    No medications on file       Final diagnoses:   Melena   Diarrhea, unspecified type   Abdominal pain, generalized       10/11/2019   [facility], [place], EMERGENCY DEPARTMENT     Cong He MD  10/12/19 0426

## 2019-10-12 NOTE — PROGRESS NOTES
Pt arrived from [place] ED via HE, ambulated SBA into room. Provider made aware of pt's arrival.

## 2019-10-12 NOTE — PLAN OF CARE
Outpatient/Observation goals to be met before discharge home:  -diagnostic tests and consults completed and resulted - no, stool sample needed  -vital signs normal or at patient baseline - yes  -Cause of colitis will be determined - pending

## 2019-10-12 NOTE — ED NOTES
[facility], [place]   ED Nurse to Floor Handoff     Kian Cortez is a 64 year old female who speaks Oromo and lives with family members,  in a home  They arrived in the ED by car from home    ED Chief Complaint: Abdominal Pain (Patient c/o n/v/d, abd pain, and back pain. Sxs onset today. )    ED Dx;   Final diagnoses:   None         Needed?: Yes    Allergies: No Known Allergies.  Past Medical Hx:   Past Medical History:   Diagnosis Date     Diverticulosis 11/2015    on CT scan     Functional dyspepsia      Insomnia     psychophysiologic     Osteoarthritis of right knee      Osteopenia 4/27/2015      Baseline Mental status: WDL  Current Mental Status changes: at basesline    Infection present or suspected this encounter: cultures pending  Sepsis suspected: No  Isolation type: Enteric     Activity level - Baseline/Home:  Stand with Assist  Activity Level - Current:   Stand with Assist    Bariatric equipment needed?: No    In the ED these meds were given:   Medications   0.9% sodium chloride BOLUS (0 mLs Intravenous Stopped 10/12/19 0235)     Followed by   sodium chloride 0.9% infusion (0 mLs Intravenous Stopped 10/12/19 0348)   ondansetron (ZOFRAN) injection 4 mg (4 mg Intravenous Given 10/12/19 0134)   pantoprazole (PROTONIX) 80 mg in sodium chloride 0.9 % 100 mL infusion (has no administration in time range)   acetaminophen (TYLENOL) tablet 1,000 mg (1,000 mg Oral Given 10/12/19 0136)   pantoprazole (PROTONIX) 80 mg in sodium chloride 0.9 % 100 mL intermittent infusion  [ BOLUS DOSE ] (80 mg Intravenous New Bag 10/12/19 0348)   sodium chloride 0.9 % bag 500mL for CT scan flush use (65 mLs As instructed Given 10/12/19 0325)   iopamidol (ISOVUE-370) solution 100 mL (98 mLs Intravenous Given 10/12/19 0325)       Drips running?  Yes    Home pump  No    Current LDAs  Peripheral IV 10/12/19 Left Upper forearm (Active)   Site Assessment WDL 10/12/2019 12:52 AM   Line Status Saline  locked 10/12/2019 12:52 AM   Number of days: 0       Labs results:   Labs Ordered and Resulted from Time of ED Arrival Up to the Time of Departure from the ED   COMPREHENSIVE METABOLIC PANEL - Abnormal; Notable for the following components:       Result Value    Chloride 110 (*)     Calcium 8.3 (*)     Albumin 3.3 (*)     All other components within normal limits   ROUTINE UA WITH MICROSCOPIC - Abnormal; Notable for the following components:    Bacteria Urine Few (*)     Mucous Urine Present (*)     All other components within normal limits   CBC WITH PLATELETS DIFFERENTIAL   LACTIC ACID WHOLE BLOOD   ABO/RH TYPE AND SCREEN   ENTERIC BACTERIA AND VIRUS PANEL BY SILVA STOOL   CLOSTRIDIUM DIFFICILE TOXIN B       Imaging Studies:   Recent Results (from the past 24 hour(s))   CT Abdomen Pelvis w Contrast    Narrative    CT ABDOMEN PELVIS W CONTRAST  10/12/2019 3:26 AM     HISTORY: Abdominal pain, acute, generalized.    TECHNIQUE: CT abdomen and pelvis with 100 mL Isovue-370 IV. Radiation  dose for this scan was reduced using automated exposure control,  adjustment of the mA and/or kV according to patient size, or iterative  reconstruction technique.    COMPARISON: 11/2/2015.    FINDINGS:  Abdomen: The lung bases are unremarkable. The superior aspect of the  liver and spleen are not included on the exam. Visualized portions of  the liver and spleen are within normal limits. The gallbladder,  pancreas, adrenal glands and kidneys are normal in appearance. There  is no abdominal or pelvic lymph node enlargement.    Pelvis: There is wall thickening of the colon extending from mid  transverse colon to the rectum. No bowel obstruction. The appendix is  within normal limits. The uterus and adnexa appear grossly normal.  There is no free intraperitoneal gas or fluid.      Impression    IMPRESSION: Nonspecific colitis extending from mid transverse colon to  the rectum.       Recent vital signs:   BP (!) 152/80   Pulse 86   Temp  97.5  F (36.4  C) (Oral)   Resp 16   Wt 95.5 kg (210 lb 9.6 oz)   LMP 04/19/2006   SpO2 99%   BMI 40.81 kg/m      Jo-Ann Coma Scale Score: 15 (10/12/19 0014)       Cardiac Rhythm: Other  Pt needs tele? No  Skin/wound Issues: None    Code Status: Full Code    Pain control: good    Nausea control: good    Abnormal labs/tests/findings requiring intervention: none    Family present during ED course? Yes   Family Comments/Social Situation comments:     Tasks needing completion: Attempting to get stool sample. May need to collect on EB.    Charly Cárdenas RN  [facility] --   [phone] [place] [facility]  [phone] [facility]

## 2019-10-12 NOTE — PROGRESS NOTES
Outpatient/Observation goals to be met before discharge home:  -diagnostic tests and consults completed and resulted - No, stool sample sent pending results.  -vital signs normal or at patient baseline - yes  -Cause of colitis will be determined - pending    Patient c/o of pain in abdomin. Tylenol given to help pain. Otherwise patient is eating and drinking without difficulties. VSS. Will continue to monitor.

## 2019-10-12 NOTE — PLAN OF CARE
Outpatient/Observation goals to be met before discharge home:  -diagnostic tests and consults completed and resulted - No, stool sample sent pending results  -vital signs normal or at patient baseline - yes  -Cause of colitis will be determined - pending

## 2019-10-13 VITALS
WEIGHT: 210.6 LBS | HEART RATE: 69 BPM | OXYGEN SATURATION: 100 % | DIASTOLIC BLOOD PRESSURE: 87 MMHG | BODY MASS INDEX: 40.81 KG/M2 | TEMPERATURE: 97.4 F | RESPIRATION RATE: 16 BRPM | SYSTOLIC BLOOD PRESSURE: 118 MMHG

## 2019-10-13 LAB
ANION GAP SERPL CALCULATED.3IONS-SCNC: 5 MMOL/L (ref 3–14)
BUN SERPL-MCNC: 4 MG/DL (ref 7–30)
C PARVUM AG STL QL IA: NEGATIVE
CALCIUM SERPL-MCNC: 8.1 MG/DL (ref 8.5–10.1)
CHLORIDE SERPL-SCNC: 113 MMOL/L (ref 94–109)
CO2 SERPL-SCNC: 24 MMOL/L (ref 20–32)
CREAT SERPL-MCNC: 0.59 MG/DL (ref 0.52–1.04)
ERYTHROCYTE [DISTWIDTH] IN BLOOD BY AUTOMATED COUNT: 13.7 % (ref 10–15)
G LAMBLIA AG STL QL IA: NEGATIVE
GFR SERPL CREATININE-BSD FRML MDRD: >90 ML/MIN/{1.73_M2}
GLUCOSE SERPL-MCNC: 90 MG/DL (ref 70–99)
HCT VFR BLD AUTO: 37.1 % (ref 35–47)
HGB BLD-MCNC: 11.8 G/DL (ref 11.7–15.7)
MCH RBC QN AUTO: 29.9 PG (ref 26.5–33)
MCHC RBC AUTO-ENTMCNC: 31.8 G/DL (ref 31.5–36.5)
MCV RBC AUTO: 94 FL (ref 78–100)
PLATELET # BLD AUTO: 167 10E9/L (ref 150–450)
POTASSIUM SERPL-SCNC: 3.4 MMOL/L (ref 3.4–5.3)
RBC # BLD AUTO: 3.94 10E12/L (ref 3.8–5.2)
SODIUM SERPL-SCNC: 142 MMOL/L (ref 133–144)
SPECIMEN SOURCE: NORMAL
WBC # BLD AUTO: 6.9 10E9/L (ref 4–11)

## 2019-10-13 PROCEDURE — 36415 COLL VENOUS BLD VENIPUNCTURE: CPT | Performed by: INTERNAL MEDICINE

## 2019-10-13 PROCEDURE — G0378 HOSPITAL OBSERVATION PER HR: HCPCS

## 2019-10-13 PROCEDURE — 40000556 ZZH STATISTIC PERIPHERAL IV START W US GUIDANCE

## 2019-10-13 PROCEDURE — 25000132 ZZH RX MED GY IP 250 OP 250 PS 637: Performed by: INTERNAL MEDICINE

## 2019-10-13 PROCEDURE — 25800030 ZZH RX IP 258 OP 636: Performed by: STUDENT IN AN ORGANIZED HEALTH CARE EDUCATION/TRAINING PROGRAM

## 2019-10-13 PROCEDURE — 85027 COMPLETE CBC AUTOMATED: CPT | Performed by: INTERNAL MEDICINE

## 2019-10-13 PROCEDURE — 80048 BASIC METABOLIC PNL TOTAL CA: CPT | Performed by: INTERNAL MEDICINE

## 2019-10-13 PROCEDURE — 25000132 ZZH RX MED GY IP 250 OP 250 PS 637: Performed by: PHYSICIAN ASSISTANT

## 2019-10-13 PROCEDURE — 99217 ZZC OBSERVATION CARE DISCHARGE: CPT | Performed by: INTERNAL MEDICINE

## 2019-10-13 PROCEDURE — 25000132 ZZH RX MED GY IP 250 OP 250 PS 637: Performed by: STUDENT IN AN ORGANIZED HEALTH CARE EDUCATION/TRAINING PROGRAM

## 2019-10-13 RX ORDER — DICYCLOMINE HCL 20 MG
20 TABLET ORAL
Status: DISCONTINUED | OUTPATIENT
Start: 2019-10-13 | End: 2019-10-13 | Stop reason: HOSPADM

## 2019-10-13 RX ORDER — DICYCLOMINE HCL 20 MG
20 TABLET ORAL
Qty: 5 TABLET | Refills: 0 | Status: SHIPPED | OUTPATIENT
Start: 2019-10-13 | End: 2019-11-15

## 2019-10-13 RX ORDER — CETIRIZINE HYDROCHLORIDE 10 MG/1
10 TABLET ORAL AT BEDTIME
Qty: 28 TABLET | Refills: 0 | Status: SHIPPED | OUTPATIENT
Start: 2019-10-13 | End: 2020-12-29

## 2019-10-13 RX ORDER — SIMETHICONE 80 MG
80 TABLET,CHEWABLE ORAL EVERY 6 HOURS PRN
Qty: 20 TABLET | Refills: 0 | Status: SHIPPED | OUTPATIENT
Start: 2019-10-13 | End: 2020-12-29

## 2019-10-13 RX ADMIN — SIMETHICONE CHEW TAB 80 MG 80 MG: 80 TABLET ORAL at 09:05

## 2019-10-13 RX ADMIN — ACETAMINOPHEN 650 MG: 325 TABLET, FILM COATED ORAL at 09:04

## 2019-10-13 RX ADMIN — DICYCLOMINE HYDROCHLORIDE 20 MG: 20 TABLET ORAL at 11:46

## 2019-10-13 RX ADMIN — SODIUM CHLORIDE: 9 INJECTION, SOLUTION INTRAVENOUS at 00:15

## 2019-10-13 ASSESSMENT — PAIN DESCRIPTION - DESCRIPTORS: DESCRIPTORS: CRAMPING;INTERMITTENT

## 2019-10-13 NOTE — PLAN OF CARE
Observation Goals  -diagnostic tests and consults completed and resulted Yes (enteric viral/bacterial panel negative)  -vital signs normal or at patient baseline Yes  -Cause of colitis will be determined In progress (still unknown)    Shift: 4569-6098  VS: BP 92/69 (BP Location: Left arm)   Pulse 64   Temp 98.1  F (36.7  C) (Oral)   Resp 16   Wt 95.5 kg (210 lb 9.6 oz)   LMP 04/19/2006   SpO2 97%   BMI 40.81 kg/m    Pain:  C/o cramping abdominal pain this shift, prn tylenol & simethicone given x1.  Neuro: A&O x4.  Cardiac: AVSS.  Respiratory: Satting above 90% on RA.  GI/Diet/Appetite:  Tolerating regular diet. Loose stools x1 this shift.   : No problems noted. voiding appropriately.  LDA's: R PIV infusing NS @100.  Skin: Skin intact.  Activity: Up SBA this shift.  Tests/Procedures: N/A  Pertinent Labs/Lab Collection: enteric viral/bacterial panel negative     Plan: Will continue w/ POC.

## 2019-10-13 NOTE — PLAN OF CARE
Observation Goals  -diagnostic tests and consults completed and resulted No (stool sample collected, awaiting enteric panel results)  -vital signs normal or at patient baseline Yes  Blood pressure 127/73, pulse 66, temperature 97.9  F (36.6  C), temperature source Oral, resp. rate 18, weight 95.5 kg (210 lb 9.6 oz), last menstrual period 04/19/2006, SpO2 98 %, not currently breastfeeding.  -Cause of colitis will be determined In progress

## 2019-10-13 NOTE — DISCHARGE INSTRUCTIONS
Return to primary care doctor if you are having worse abdominal pain, bleeding or fevers or come to the ED if severe.

## 2019-10-13 NOTE — PROGRESS NOTES
Pt understood discharge orders/instructions : Yes.  Pt's belongings : Pt took.  Discharge medications : Pt will pick it up at discharge pharmacy.  Lines : PIV was removed.  How is pt getting home/transportion : Pt's daughter will be at the lobby.  Discharge papers : Given to the pt.  Follow up appt : N/A.  Home supply : N/A.

## 2019-10-13 NOTE — DISCHARGE SUMMARY
[facility], [place]  Hospitalist Discharge Summary       Date of Admission:  10/12/2019  Date of Discharge:  10/13/2019  Discharging Provider: Zach Francisco MD  Discharge Team: Hospitalist Service, Gold 6    Discharge Diagnoses   Infectious colitis    Follow-ups Needed After Discharge   Follow up with primary care doctor for O & P lab result and to make sure your abdominal pain is better.    Unresulted Labs Ordered in the Past 30 Days of this Admission     Date and Time Order Name Status Description    10/12/2019 0603 Ova and Parasite Exam Routine In process       These results will be followed up by PCP    Discharge Disposition   Discharged to home  Condition at discharge: Stable    Hospital Course    # abdominal pain and BRBPR. Quickly resolved. Colitis on CT, but minimal stools since admission and improved on discharge. Able to tolerate diet. Hgb stable. Consider colonoscopy if continues to have pain, diarrhea. Had one ~5 years ago that was normal, so not likely ready for screening followup yet.    Consultations This Hospital Stay   VASCULAR ACCESS CARE ADULT IP CONSULT    Code Status   Full Code    Time Spent on this Encounter   I, Zach Francisco MD, personally saw the patient today and spent greater than 30 minutes discharging this patient.       Zach Francisco MD  [facility] [place]  ______________________________________________________________________    Physical Exam   Vital Signs: Temp: 97.4  F (36.3  C) Temp src: Axillary BP: 118/87 Pulse: 69   Resp: 16 SpO2: 100 % O2 Device: Nasal cannula    Weight: 210 lbs 9.6 oz  General Appearance: Alert, pleasant  Respiratory: CTA B  Cardiovascular: RRR, s1, s2  GI: soft, NABS, NT  Skin: no rashes  Other: Pleasant, NAD       Primary Care Physician   Karma Burger    Discharge Orders      Reason for your hospital stay    Abdominal pain and blood in stool. Your tests were all  normal except some inflammation in the colon. You are getting better. If it continues, you'll need to see your primary care doctor and get a colonoscopy done.     Activity    Your activity upon discharge: activity as tolerated     When to contact your care team    Call your primary doctor if you have any of the following: increased pain, blood in stools, inability to eat, severe diarrhea or fevers.     Full Code     Diet    Follow this diet upon discharge: Orders Placed This Encounter      Regular Diet Adult    Advance slowly as tolerated and eat bland foods for a few days and avoid dairy for a few days.       Significant Results and Procedures   Most Recent 3 CBC's:  Recent Labs   Lab Test 10/13/19  0633 10/12/19  0803 10/12/19  0044   WBC 6.9 7.5 10.6   HGB 11.8 12.7 13.6   MCV 94 94 93    180 205     Most Recent 3 BMP's:  Recent Labs   Lab Test 10/13/19  0633 10/12/19  0044 02/06/19  0737 07/11/18  1825    143  --  139   POTASSIUM 3.4 3.6  --  3.7   CHLORIDE 113* 110*  --  107   CO2 24 28  --  25   BUN 4* 9  --  14   CR 0.59 0.56  --  0.68   ANIONGAP 5 5  --  7   ANA 8.1* 8.3*  --  9.1   GLC 90 95 90 100*     Most Recent 6 Bacteria Isolates From Any Culture (See EPIC Reports for Culture Details):No lab results found.    Discharge Medications   Current Discharge Medication List      START taking these medications    Details   dicyclomine (BENTYL) 20 MG tablet Take 1 tablet (20 mg) by mouth 4 times daily (before meals and nightly) for 5 doses  Qty: 5 tablet, Refills: 0    Associated Diagnoses: Colitis      simethicone (MYLICON) 80 MG chewable tablet Take 1 tablet (80 mg) by mouth every 6 hours as needed for cramping  Qty: 20 tablet, Refills: 0    Associated Diagnoses: Colitis         CONTINUE these medications which have CHANGED    Details   cetirizine (ZYRTEC) 10 MG tablet Take 1 tablet (10 mg) by mouth At Bedtime  Qty: 28 tablet, Refills: 0    Associated Diagnoses: Pruritic disorder         CONTINUE  these medications which have NOT CHANGED    Details   Fish Oil-Cholecalciferol (FISH OIL + D3 PO) Take  by mouth daily.      acetaminophen 500 MG CAPS Take 1,000 mg by mouth 3 times daily  Qty: 250 capsule, Refills: 99    Associated Diagnoses: Right shoulder pain, unspecified chronicity      calcium carbonate (TUMS) 500 MG chewable tablet Take 1 tablet (500 mg) by mouth 2 times daily  Qty: 180 tablet, Refills: 0    Associated Diagnoses: Gastroesophageal reflux disease, esophagitis presence not specified      cyclobenzaprine (FLEXERIL) 10 MG tablet Take 1 tablet (10 mg) by mouth 3 times daily as needed for muscle spasms  Qty: 30 tablet, Refills: 1    Associated Diagnoses: Right shoulder pain, unspecified chronicity      ketoconazole (NIZORAL) 2 % external cream Apply topically daily To legs  Qty: 120 g, Refills: 3    Associated Diagnoses: Pruritic disorder      vitamin D3 (CHOLECALCIFEROL) 2000 units tablet Take 1 tablet by mouth daily  Qty: 90 tablet, Refills: 1    Associated Diagnoses: Primary osteoarthritis of both knees         STOP taking these medications       Cholecalciferol (VITAMIN D) 1000 UNITS capsule Comments:   Reason for Stopping:             Allergies   No Known Allergies

## 2019-10-13 NOTE — PLAN OF CARE
Observation Goals  -diagnostic tests and consults completed and resulted Yes.  -vital signs normal or at patient baseline Yes.  -Cause of colitis will be determined In progress

## 2019-10-13 NOTE — PLAN OF CARE
Observation Goals  -diagnostic tests and consults completed and resulted Yes.  -vital signs normal or at patient baseline Yes.  -Cause of colitis will be determined In progress.

## 2019-10-13 NOTE — PLAN OF CARE
Observation Goals  -diagnostic tests and consults completed and resulted Yes (enteric vial.bacterial panel negative)  -vital signs normal or at patient baseline Yes  Blood pressure 127/73, pulse 66, temperature 97.9  F (36.6  C), temperature source Oral, resp. rate 18, weight 95.5 kg (210 lb 9.6 oz), last menstrual period 04/19/2006, SpO2 98 %, not currently breastfeeding.  -Cause of colitis will be determined In progress

## 2019-10-14 ENCOUNTER — DOCUMENTATION ONLY (OUTPATIENT)
Dept: CARE COORDINATION | Facility: CLINIC | Age: 64
End: 2019-10-14

## 2019-10-14 ENCOUNTER — TELEPHONE (OUTPATIENT)
Dept: FAMILY MEDICINE | Facility: CLINIC | Age: 64
End: 2019-10-14

## 2019-10-14 LAB
O+P STL MICRO: NORMAL
O+P STL MICRO: NORMAL
SPECIMEN SOURCE: NORMAL

## 2019-10-14 NOTE — TELEPHONE ENCOUNTER
This patient was discharged from [facility] on 10/13/2019.    Discharge Diagnosis: Melena, Colitis    Follow-up instructions: Follow up with primary care doctor for O & P lab result and to make sure your abdominal pain is better.    A follow-up visit has not been scheduled.      Please follow-up with patient.

## 2019-10-14 NOTE — TELEPHONE ENCOUNTER
ED / Discharge Outreach Protocol    Patient Contact    Attempt # 1    Was call answered?  No.  Left message on voicemail with information to call me back.  Heydi Bauman RN,BSN  [facility]

## 2019-10-15 NOTE — TELEPHONE ENCOUNTER
"Requires Critical access hospital .    She was last seen 8/6/19 by Karma Burger PA-C.    I see her stool tests are back.  Appears are negative.    I called   Services, placed call to patient with assistance of Critical access hospital .       Patient answered.        ED for acute condition Discharge Protocol    \"Hi, my name is Yazmin Renee RN, a registered nurse, and I am calling from [facility].  I am calling to follow up and see how things are going for you after your recent emergency visit.\"    Tell me how you are doing now that you are home?\" feeling better, occasional abdominal pain but is improved.   One episode of diarrhea yesterday.      Discharge Instructions    \"Let's review your discharge instructions.  What is/are the follow-up recommendations?  Pt. Response: she is scheduled to see someone at  [facility] but would also like to see Amie    \"Has an appointment with your primary care provider been scheduled?\"  No (needed - schedule appointment and remind to bring meds)    Medications    \"Tell me what changed about your medicines when you discharged?\"    On Bentyl and simethicone for abdominal pain     \"What questions do you have about your medications?\"   None        Call Summary    \"What questions or concerns do you have about your recent visit and your follow-up care?\"     none    \"If you have questions or things don't continue to improve, we encourage you contact us through the main clinic number (give number).  Even if the clinic is not open, triage nurses are available 24/7 to help you.     We would like you to know that our clinic has extended hours (provide information).  We also have urgent care (provide details on closest location and hours/contact info)\"    \"Thank you for your time and take care!\"    Yazmin Renee RN  [facility]                               "

## 2019-10-24 ENCOUNTER — OFFICE VISIT (OUTPATIENT)
Dept: INTERNAL MEDICINE | Facility: CLINIC | Age: 64
End: 2019-10-24
Payer: COMMERCIAL

## 2019-10-24 VITALS
DIASTOLIC BLOOD PRESSURE: 79 MMHG | BODY MASS INDEX: 40.77 KG/M2 | HEART RATE: 77 BPM | SYSTOLIC BLOOD PRESSURE: 114 MMHG | WEIGHT: 210.4 LBS | OXYGEN SATURATION: 96 %

## 2019-10-24 DIAGNOSIS — Z12.11 SCREENING FOR COLON CANCER: ICD-10-CM

## 2019-10-24 DIAGNOSIS — Z23 NEED FOR PROPHYLACTIC VACCINATION AND INOCULATION AGAINST INFLUENZA: Primary | ICD-10-CM

## 2019-10-24 DIAGNOSIS — K21.9 GASTROESOPHAGEAL REFLUX DISEASE, ESOPHAGITIS PRESENCE NOT SPECIFIED: ICD-10-CM

## 2019-10-24 RX ORDER — FAMOTIDINE 20 MG/1
20 TABLET, FILM COATED ORAL 2 TIMES DAILY
Qty: 60 TABLET | Refills: 3 | Status: SHIPPED | OUTPATIENT
Start: 2019-10-24 | End: 2020-12-29 | Stop reason: ALTCHOICE

## 2019-10-24 ASSESSMENT — ENCOUNTER SYMPTOMS
ARTHRALGIAS: 1
TREMORS: 0
MUSCLE CRAMPS: 0
VOMITING: 1
WEAKNESS: 0
FEVER: 0
BACK PAIN: 1
PARALYSIS: 0
STIFFNESS: 0
POLYDIPSIA: 0
LOSS OF CONSCIOUSNESS: 0
NAUSEA: 1
HALLUCINATIONS: 0
BOWEL INCONTINENCE: 0
DISTURBANCES IN COORDINATION: 0
CHILLS: 1
SEIZURES: 0
JOINT SWELLING: 0
NIGHT SWEATS: 1
ABDOMINAL PAIN: 1
HEARTBURN: 1
ALTERED TEMPERATURE REGULATION: 0
WEIGHT GAIN: 0
NUMBNESS: 0
TINGLING: 1
INCREASED ENERGY: 0
BLOOD IN STOOL: 1
MUSCLE WEAKNESS: 0
HEADACHES: 1
MYALGIAS: 0
POLYPHAGIA: 0
FATIGUE: 1
MEMORY LOSS: 0
SPEECH CHANGE: 0
DIARRHEA: 1
WEIGHT LOSS: 0
DECREASED APPETITE: 1
NECK PAIN: 1
JAUNDICE: 0
RECTAL PAIN: 1
BLOATING: 0
DIZZINESS: 1
CONSTIPATION: 0

## 2019-10-24 ASSESSMENT — PAIN SCALES - GENERAL: PAINLEVEL: NO PAIN (0)

## 2019-10-24 NOTE — PATIENT INSTRUCTIONS
It was nice to meet you today. Head to the pharmacy to  your prescriptions. Let's plan to meet again in 3 months.

## 2019-10-24 NOTE — NURSING NOTE
Chief Complaint   Patient presents with     Establish Care     Pt would like to determine if she wants to establish care today      Hospital F/U     Pt here for hospital follow up     Cadence Serrano, EMT at 1:45 PM sign on 10/24/2019

## 2019-10-24 NOTE — PROGRESS NOTES
[facility]         HPI:       Kian Cortez is a 64 year old female with a history of diverticulosis, OA of her R knee, dyspepsia, osteopenia, who presents for hospital follow up and to establish care. The patient had a recent admission from 10/12-10/13 for abdominal pain, diarrhea and BRBPR. Was noted to have colitis on CT. Was having minimal stools during admission, and her hematochezia had resolved by discharge. Hemoglobin remained stable, and her abdominal pain also improved. The patient notes that following discharge, she was still having diarrhea, 3-4 times a day, but this has improved to once a day. Stools are between loose and watery diarrhea. Is no longer having any BRBPR. The patient still endorses some LLQ and RLQ, but this has improved. The patient endorses some post-prandial heartburn; she was previously on a PPI, but is no longer taking this. Notes that spicy foods tend to trigger her GERD. Has no recumbent reflux or morning sore throat. Endorses some lightheadedness when getting up suddenly. The patient denies fever, chills, nausea, vomiting, dizziness, chest pain, shortness of breath, constipation, hematochezia, melena, dysuria or hematuria.       Problem, Medication and Allergy Lists were reviewed and are current.     Patient Active Problem List    Diagnosis Date Noted     Colitis 10/12/2019     Priority: Medium     Insomnia, unspecified type 01/05/2018     Priority: Medium     BPPV (benign paroxysmal positional vertigo), unspecified laterality 01/10/2017     Priority: Medium     Vertigo 12/30/2016     Priority: Medium     Constipation, unspecified constipation type 05/02/2016     Priority: Medium     Morbid obesity, unspecified obesity type (H) 05/02/2016     Priority: Medium     Osteopenia 04/27/2015     Priority: Medium     Gastroesophageal reflux disease without esophagitis 10/08/2013     Priority: Medium     Pseudoexfoliation syndrome, right eye 07/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  Kian Cortez has no plan or code status on file. Discussed available resources and provided with information. Confirmed code status reflects current choices pending further ACP discussions.  Confirmed/documented designated decision maker(s). See permanent comments section of demographics in clinical tab.   Added by Fadia Eden on 6/12/2013             CARDIOVASCULAR SCREENING; LDL GOAL LESS THAN 130 06/12/2013     Priority: Medium         Current Outpatient Medications   Medication Sig Dispense Refill     calcium carbonate (TUMS) 500 MG chewable tablet Take 1 tablet (500 mg) by mouth 2 times daily 180 tablet 0     cetirizine (ZYRTEC) 10 MG tablet Take 1 tablet (10 mg) by mouth At Bedtime 28 tablet 0     famotidine (PEPCID) 20 MG tablet Take 1 tablet (20 mg) by mouth 2 times daily 60 tablet 3     Fish Oil-Cholecalciferol (FISH OIL + D3 PO) Take  by mouth daily.       simethicone (MYLICON) 80 MG chewable tablet Take 1 tablet (80 mg) by mouth every 6 hours as needed for cramping 20 tablet 0     vitamin D3 (CHOLECALCIFEROL) 2000 units tablet Take 1 tablet by mouth daily 90 tablet 1     acetaminophen 500 MG CAPS Take 1,000 mg by mouth 3 times daily (Patient not taking: Reported on 10/24/2019) 250 capsule 99     cyclobenzaprine (FLEXERIL) 10 MG tablet Take 1 tablet (10 mg) by mouth 3 times daily as needed for muscle spasms (Patient not taking: Reported on 7/30/2019) 30 tablet 1     ketoconazole (NIZORAL) 2 % external cream Apply topically daily To legs (Patient not taking: Reported on 10/24/2019) 120 g 3     No Known Allergies    Patient is a new patient to this clinic and so I reviewed/updated the Past Medical History, the Family History and the Social History.    Past Medical History:   Diagnosis Date     Diverticulosis 11/2015    on CT scan      Functional dyspepsia      GERD (gastroesophageal reflux disease)      Insomnia     psychophysiologic     Osteoarthritis of right knee      Osteopenia 4/27/2015     Family History     Problem (# of Occurrences) Relation (Name,Age of Onset)    Cancer (1) Brother    Other Cancer (1) Brother       Negative family history of: Glaucoma, Macular Degeneration, Diabetes, Hypertension, Cerebrovascular Disease, Thyroid Disease        Social History     Socioeconomic History     Marital status:      Spouse name: Not on file     Number of children: 7     Years of education: Not on file     Highest education level: Not on file   Occupational History     Not on file   Social Needs     Financial resource strain: Not on file     Food insecurity:     Worry: Not on file     Inability: Not on file     Transportation needs:     Medical: Not on file     Non-medical: Not on file   Tobacco Use     Smoking status: Never Smoker     Smokeless tobacco: Never Used   Substance and Sexual Activity     Alcohol use: No     Drug use: No     Sexual activity: Yes     Partners: Male   Lifestyle     Physical activity:     Days per week: Not on file     Minutes per session: Not on file     Stress: Not on file   Relationships     Social connections:     Talks on phone: Not on file     Gets together: Not on file     Attends Congregation service: Not on file     Active member of club or organization: Not on file     Attends meetings of clubs or organizations: Not on file     Relationship status: Not on file     Intimate partner violence:     Fear of current or ex partner: Not on file     Emotionally abused: Not on file     Physically abused: Not on file     Forced sexual activity: Not on file   Other Topics Concern     Parent/sibling w/ CABG, MI or angioplasty before 65F 55M? No   Social History Narrative    Originally from [place]. Lives in [place] with her . Not working.            Review of Systems:     Review of Systems      Constitutional:  Positive for chills, fatigue, decreased appetite and night sweats. Negative for fever, weight loss, weight gain, recent stressors, height loss, post-operative complications, incisional pain, hallucinations, increased energy, hyperactivity and confused.   HENT:  Negative for ear pain, hearing loss, tinnitus, nosebleeds, trouble swallowing, hoarse voice, mouth sores, sore throat, ear discharge, tooth pain, gum tenderness, taste disturbance, smell disturbance, hearing aid, bleeding gums, dry mouth, sinus pain, sinus congestion and neck mass.    Eyes:  Negative for double vision, pain, redness, eye pain, decreased vision, eye watering, eye bulging, eye dryness, flashing lights, spots, floaters, strabismus, tunnel vision, jaundice and eye irritation.   Respiratory:   Negative for cough, hemoptysis, sputum production, shortness of breath, wheezing, sleep disturbances due to breathing, snores loudly, respiratory pain, dyspnea on exertion, cough disturbing sleep and postural dyspnea.    Cardiovascular:  Negative for chest pain, dyspnea on exertion, palpitations, orthopnea, claudication, leg swelling, fingers/toes turn blue, hypertension, hypotension, syncope, history of heart murmur, chest pain on exertion, chest pain at rest, pacemaker, few scattered varicosities, leg pain, sleep disturbances due to breathing, tachycardia, light-headedness, exercise intolerance and edema.   Gastrointestinal:  Positive for heartburn, nausea, vomiting, abdominal pain, diarrhea, blood in stool, rectal pain and change in stool. Negative for constipation, melena, bloating, bowel incontinence, jaundice and coffee ground emesis.   Genitourinary:  Negative for bladder incontinence, dysuria, urgency, hematuria, flank pain, vaginal discharge, difficulty urinating, genital sores, dyspareunia, decreased libido, nocturia, voiding less frequently, arousal difficulty, abnormal vaginal bleeding, excessive menstruation, menstrual  changes, hot flashes, vaginal dryness and postmenopausal bleeding.   Musculoskeletal:  Positive for back pain, arthralgias and neck pain. Negative for myalgias, joint swelling, stiffness, muscle cramps, bone pain, muscle weakness and fracture.   Skin:  Negative for nail changes, itching, poor wound healing, rash, hair changes, skin changes, acne, warts, poor wound healing, scarring, flaky skin, Raynaud's phenomenon, sensitivity to sunlight and skin thickening.   Neurological:  Positive for dizziness, tingling and headaches. Negative for tremors, speech change, seizures, loss of consciousness, weakness, light-headedness, numbness, disturbances in coordination, memory loss, difficulty walking and paralysis.   Endo/Heme:  Negative for anemia, swollen glands and bruises/bleeds easily.   Psychiatric/Behavioral:  Negative for depression, hallucinations, memory loss, decreased concentration, mood swings and panic attacks.    Breast:  Negative for breast discharge, breast mass, breast pain and nipple retraction.   Endocrine:  Negative for altered temperature regulation, polyphagia, polydipsia, unwanted hair growth and change in facial hair.    10-point review of systems negative unless otherwise specified above or in HPI.          Physical Exam:   /79 (BP Location: Right arm, Patient Position: Sitting, Cuff Size: Adult Regular)   Pulse 77   Wt 95.4 kg (210 lb 6.4 oz)   LMP 04/19/2006   SpO2 96%   Breastfeeding No   BMI 40.77 kg/m    Body mass index is 40.77 kg/m .  Vitals were reviewed     General: Pleasant woman in NAD  HEENT: AT/NC, PERRL, EOMI, arcus senilis, anicteric sclerae, conjunctivae without injection, benign oropharynx, MMM  Neck: Supple, no palpable lymphadenopathy  Cardiovascular: RRR, normal S1 S2, no m/r/g, 2+ peripheral pulses, no peripheral edema  Respiratory: LCTAB, no w/r/r, normal respiratory effort  Abdominal: Soft, NTND, normal bowel sounds, no hepatosplenomegaly  Musculoskeletal: Normal  bulk, no appreciable joint abnormalities  Neurologic: CN II-XII grossly intact, no focal deficits, AAOx4        Results:     Laboratory and imaging results were reviewed by me.    Assessment and Plan   64 year old female with a history of diverticulosis, OA of her R knee, dyspepsia, osteopenia, who presents for hospital follow up and to establish care, with symptoms of GERD.    Gastroesophageal reflux disease, esophagitis presence not specified: Patient with reflux symptoms, triggered by spicy foods mostly. No recumbent reflux, no sore throat. Will start with H2 blocker; if not effective after ~1 month, will start PPI.  -     famotidine (PEPCID) 20 MG tablet; Take 1 tablet (20 mg) by mouth 2 times daily    Need for prophylactic vaccination and inoculation against influenza: Patient amenable to getting vaccinated for influenza today.  -     FLU VAC PRESRV FREE QUAD SPLIT VIR 3+YRS IM    Screening for colon cancer: Patient has not had colorectal cancer screening previously.   -     GASTROENTEROLOGY ADULT REF PROCEDURE ONLY None    Options for treatment and follow-up care were reviewed with the patient. Kian Cortez engaged in the decision making process and verbalized understanding of the options discussed and agreed with the final plan.    The patient should return to clinic for follow up with me in 3 months.    Geovanny Hermosillo MD PhD  Oct 24, 2019    The patient was seen and discussed with Dr. Estephanie Otero, who agrees with the assessment and plan.    I was present during the visit and the patient was seen and examined by me in conjunction with the resident.  I discussed the pertinent history, exam, results and plan with the resident and agree with the documentation above with the following exceptions: none.      Estephanie Otero MD

## 2019-11-12 ENCOUNTER — TELEPHONE (OUTPATIENT)
Dept: GASTROENTEROLOGY | Facility: CLINIC | Age: 64
End: 2019-11-12

## 2019-11-12 DIAGNOSIS — Z12.11 ENCOUNTER FOR SCREENING COLONOSCOPY: Primary | ICD-10-CM

## 2019-11-12 NOTE — TELEPHONE ENCOUNTER
Patient scheduled for Colonoscopy    Indication for procedure. Screening    Referring Provider. Estephanie Otero MD    ? No     Arrival time verified? 8:30 AM    Facility location verified? [facility]    Instructions given regarding prep and procedure instructions reviewed with assistance of Oromo .    Prep Type Golytely    Are you taking any anticoagulants or blood thinners? No     Instructions given? N/a     Electronic implanted devices? No     Pre procedure teaching completed? Yes    Transportation from procedure?  policy reviewed. Instructed patient to have someone stay with her for 6 hours post exam    H&P / Pre op physical completed? N/a

## 2019-11-14 ASSESSMENT — ENCOUNTER SYMPTOMS
BREAST PAIN: 0
HOT FLASHES: 0
HEARTBURN: 1
WHEEZING: 0
CHILLS: 1
BACK PAIN: 1
HEMOPTYSIS: 0
ORTHOPNEA: 0
LIGHT-HEADEDNESS: 0
ABDOMINAL PAIN: 1
INSOMNIA: 0
SMELL DISTURBANCE: 0
EYE REDNESS: 0
TINGLING: 1
TROUBLE SWALLOWING: 0
DYSPNEA ON EXERTION: 0
RECTAL PAIN: 1
BLOOD IN STOOL: 1
ALTERED TEMPERATURE REGULATION: 0
HYPOTENSION: 0
SNORES LOUDLY: 0
DIZZINESS: 1
SPEECH CHANGE: 0
POOR WOUND HEALING: 0
DOUBLE VISION: 0
CLAUDICATION: 0
LEG PAIN: 0
SYNCOPE: 0
STIFFNESS: 0
WEIGHT GAIN: 0
BLOATING: 0
SPUTUM PRODUCTION: 0
TREMORS: 0
EYE IRRITATION: 0
DECREASED LIBIDO: 0
EYE WATERING: 0
COUGH: 0
EXERCISE INTOLERANCE: 0
MYALGIAS: 0
LOSS OF CONSCIOUSNESS: 0
MUSCLE CRAMPS: 0
POSTURAL DYSPNEA: 0
HOARSE VOICE: 0
BRUISES/BLEEDS EASILY: 0
SLEEP DISTURBANCES DUE TO BREATHING: 0
HYPERTENSION: 0
FEVER: 0
EYE PAIN: 0
HEADACHES: 1
SINUS PAIN: 0
POLYPHAGIA: 0
SORE THROAT: 0
TASTE DISTURBANCE: 0
SEIZURES: 0
TACHYCARDIA: 0
COUGH DISTURBING SLEEP: 0
SKIN CHANGES: 0
CONSTIPATION: 0
FLANK PAIN: 0
DIFFICULTY URINATING: 0
DEPRESSION: 0
HEMATURIA: 0
NECK MASS: 0
NIGHT SWEATS: 1
NAIL CHANGES: 0
WEAKNESS: 0
BOWEL INCONTINENCE: 0
NUMBNESS: 0
DYSURIA: 0
DECREASED CONCENTRATION: 0
HALLUCINATIONS: 0
NAUSEA: 1
MEMORY LOSS: 0
PARALYSIS: 0
BREAST MASS: 0
FATIGUE: 1
NERVOUS/ANXIOUS: 0
JAUNDICE: 0
ARTHRALGIAS: 1
WEIGHT LOSS: 0
RESPIRATORY PAIN: 0
DISTURBANCES IN COORDINATION: 0
SINUS CONGESTION: 0
POLYDIPSIA: 0
DIARRHEA: 1
INCREASED ENERGY: 0
NECK PAIN: 1
MUSCLE WEAKNESS: 0
PANIC: 0
JOINT SWELLING: 0
SHORTNESS OF BREATH: 0
VOMITING: 1
DECREASED APPETITE: 1
PALPITATIONS: 0
LEG SWELLING: 0
SWOLLEN GLANDS: 0

## 2019-11-15 ENCOUNTER — APPOINTMENT (OUTPATIENT)
Dept: OPTOMETRY | Facility: CLINIC | Age: 64
End: 2019-11-15
Payer: COMMERCIAL

## 2019-11-15 ENCOUNTER — OFFICE VISIT (OUTPATIENT)
Dept: OPHTHALMOLOGY | Facility: CLINIC | Age: 64
End: 2019-11-15
Payer: COMMERCIAL

## 2019-11-15 DIAGNOSIS — H52.4 PRESBYOPIA: ICD-10-CM

## 2019-11-15 DIAGNOSIS — H25.13 NUCLEAR SCLEROTIC CATARACT OF BOTH EYES: ICD-10-CM

## 2019-11-15 DIAGNOSIS — Z01.00 EXAMINATION OF EYES AND VISION: ICD-10-CM

## 2019-11-15 DIAGNOSIS — H26.8 PSEUDOEXFOLIATION SYNDROME: Primary | ICD-10-CM

## 2019-11-15 DIAGNOSIS — H04.123 DRY EYE SYNDROME, BILATERAL: ICD-10-CM

## 2019-11-15 PROCEDURE — 92004 COMPRE OPH EXAM NEW PT 1/>: CPT | Performed by: STUDENT IN AN ORGANIZED HEALTH CARE EDUCATION/TRAINING PROGRAM

## 2019-11-15 PROCEDURE — 92341 FIT SPECTACLES BIFOCAL: CPT | Performed by: OPHTHALMOLOGY

## 2019-11-15 PROCEDURE — 92015 DETERMINE REFRACTIVE STATE: CPT | Performed by: STUDENT IN AN ORGANIZED HEALTH CARE EDUCATION/TRAINING PROGRAM

## 2019-11-15 ASSESSMENT — CUP TO DISC RATIO
OS_RATIO: 0.4
OD_RATIO: 0.35

## 2019-11-15 ASSESSMENT — REFRACTION_WEARINGRX
OD_AXIS: 165
OS_CYLINDER: +0.75
OD_ADD: +2.75
OS_SPHERE: +1.25
OS_AXIS: 005
OS_ADD: +2.75
SPECS_TYPE: BIFOCAL
OD_CYLINDER: +1.00
OD_SPHERE: +1.25

## 2019-11-15 ASSESSMENT — REFRACTION_MANIFEST
OD_SPHERE: +0.50
OS_SPHERE: +0.75
OD_CYLINDER: +1.75
OD_AXIS: 175
OS_CYLINDER: +0.75
OS_AXIS: 123
OS_ADD: +3.25
OD_ADD: +3.25

## 2019-11-15 ASSESSMENT — VISUAL ACUITY
METHOD: SNELLEN - LINEAR
OS_CC: 20/40-2
OD_CC: 20/40
OD_CC: J1 CLOSE

## 2019-11-15 ASSESSMENT — CONF VISUAL FIELD
OD_NORMAL: 1
OS_NORMAL: 1

## 2019-11-15 ASSESSMENT — TONOMETRY
OD_IOP_MMHG: 18
OS_IOP_MMHG: 19
IOP_METHOD: APPLANATION

## 2019-11-15 ASSESSMENT — EXTERNAL EXAM - RIGHT EYE: OD_EXAM: NORMAL

## 2019-11-15 ASSESSMENT — EXTERNAL EXAM - LEFT EYE: OS_EXAM: NORMAL

## 2019-11-15 ASSESSMENT — SLIT LAMP EXAM - LIDS
COMMENTS: NORMAL
COMMENTS: NORMAL

## 2019-11-15 NOTE — LETTER
11/15/2019         RE: Kian Cortez  [address]  [place] [address]        Dear Colleague,    Thank you for referring your patient, Kian Cortez, to the [facility]. Please see a copy of my visit note below.     Current Eye Medications:  No      Subjective:  Comprehensive eye exam.   Pt reports that her vision seem to be getting more blurry. Sometimes her eyes feel dry and itchy.     Objective:  See Ophthalmology Exam.       Assessment:  Kian Cortez is a 64 year old female who presents with:   Encounter Diagnoses   Name Primary?     Pseudoexfoliation syndrome, right eye Intraocular pressure 18/19 today.        Nuclear sclerotic cataract of both eyes Not visually significant      Dry eye syndrome, bilateral        Plan:  Glasses prescription given     Florentin Thakkar MD  [phone]        Again, thank you for allowing me to participate in the care of your patient.        Sincerely,        Florentin Thakkar MD

## 2019-11-15 NOTE — PROGRESS NOTES
Current Eye Medications:  No      Subjective:  Comprehensive eye exam.   Pt reports that her vision seem to be getting more blurry. Sometimes her eyes feel dry and itchy.     Objective:  See Ophthalmology Exam.       Assessment:  Kian Cortez is a 64 year old female who presents with:   Encounter Diagnoses   Name Primary?     Pseudoexfoliation syndrome, right eye Intraocular pressure 18/19 today.        Nuclear sclerotic cataract of both eyes Not visually significant      Dry eye syndrome, bilateral        Plan:  Glasses prescription given     Florentin Thakkar MD  [phone]

## 2019-11-19 ENCOUNTER — HOSPITAL ENCOUNTER (OUTPATIENT)
Facility: AMBULATORY SURGERY CENTER | Age: 64
End: 2019-11-19
Attending: INTERNAL MEDICINE
Payer: COMMERCIAL

## 2019-11-19 VITALS
TEMPERATURE: 97.6 F | BODY MASS INDEX: 38.64 KG/M2 | HEART RATE: 61 BPM | WEIGHT: 210 LBS | DIASTOLIC BLOOD PRESSURE: 63 MMHG | RESPIRATION RATE: 14 BRPM | OXYGEN SATURATION: 96 % | SYSTOLIC BLOOD PRESSURE: 100 MMHG | HEIGHT: 62 IN

## 2019-11-19 LAB — COLONOSCOPY: NORMAL

## 2019-11-19 RX ORDER — FENTANYL CITRATE 50 UG/ML
INJECTION, SOLUTION INTRAMUSCULAR; INTRAVENOUS PRN
Status: DISCONTINUED | OUTPATIENT
Start: 2019-11-19 | End: 2019-11-19 | Stop reason: HOSPADM

## 2019-11-19 RX ORDER — LIDOCAINE 40 MG/G
CREAM TOPICAL
Status: DISCONTINUED | OUTPATIENT
Start: 2019-11-19 | End: 2019-11-20 | Stop reason: HOSPADM

## 2019-11-19 RX ORDER — SODIUM CHLORIDE, SODIUM LACTATE, POTASSIUM CHLORIDE, CALCIUM CHLORIDE 600; 310; 30; 20 MG/100ML; MG/100ML; MG/100ML; MG/100ML
INJECTION, SOLUTION INTRAVENOUS CONTINUOUS
Status: DISCONTINUED | OUTPATIENT
Start: 2019-11-19 | End: 2019-11-20 | Stop reason: HOSPADM

## 2019-11-19 RX ORDER — ONDANSETRON 2 MG/ML
4 INJECTION INTRAMUSCULAR; INTRAVENOUS
Status: DISCONTINUED | OUTPATIENT
Start: 2019-11-19 | End: 2019-11-20 | Stop reason: HOSPADM

## 2019-11-19 RX ORDER — SIMETHICONE
LIQUID (ML) MISCELLANEOUS PRN
Status: DISCONTINUED | OUTPATIENT
Start: 2019-11-19 | End: 2019-11-19 | Stop reason: HOSPADM

## 2019-11-19 ASSESSMENT — MIFFLIN-ST. JEOR: SCORE: 1460.8

## 2020-03-09 ENCOUNTER — APPOINTMENT (OUTPATIENT)
Dept: CT IMAGING | Facility: CLINIC | Age: 65
End: 2020-03-09
Attending: EMERGENCY MEDICINE
Payer: COMMERCIAL

## 2020-03-09 ENCOUNTER — HOSPITAL ENCOUNTER (EMERGENCY)
Facility: CLINIC | Age: 65
Discharge: HOME | End: 2020-03-10
Attending: EMERGENCY MEDICINE
Payer: COMMERCIAL

## 2020-03-09 ENCOUNTER — HOSPITAL ENCOUNTER (EMERGENCY)
Facility: CLINIC | Age: 65
Discharge: HOME | End: 2020-03-09
Attending: EMERGENCY MEDICINE | Admitting: EMERGENCY MEDICINE
Payer: COMMERCIAL

## 2020-03-09 VITALS
HEART RATE: 111 BPM | SYSTOLIC BLOOD PRESSURE: 110 MMHG | DIASTOLIC BLOOD PRESSURE: 78 MMHG | RESPIRATION RATE: 19 BRPM | TEMPERATURE: 99.4 F | OXYGEN SATURATION: 100 %

## 2020-03-09 DIAGNOSIS — R11.2 NAUSEA VOMITING AND DIARRHEA: ICD-10-CM

## 2020-03-09 DIAGNOSIS — R50.9 FEVER AND CHILLS: ICD-10-CM

## 2020-03-09 DIAGNOSIS — R19.7 DIARRHEA, UNSPECIFIED TYPE: ICD-10-CM

## 2020-03-09 DIAGNOSIS — A03.9 SHIGELLA GASTROENTERITIS: ICD-10-CM

## 2020-03-09 DIAGNOSIS — R52 GENERALIZED BODY ACHES: ICD-10-CM

## 2020-03-09 DIAGNOSIS — R19.7 BLOODY DIARRHEA: ICD-10-CM

## 2020-03-09 DIAGNOSIS — R19.7 DIARRHEA OF PRESUMED INFECTIOUS ORIGIN: ICD-10-CM

## 2020-03-09 DIAGNOSIS — R50.9 FEVER IN ADULT: ICD-10-CM

## 2020-03-09 DIAGNOSIS — R19.7 NAUSEA VOMITING AND DIARRHEA: ICD-10-CM

## 2020-03-09 LAB
ALBUMIN SERPL-MCNC: 2.9 G/DL (ref 3.4–5)
ALBUMIN SERPL-MCNC: 3.5 G/DL (ref 3.4–5)
ALBUMIN UR-MCNC: NEGATIVE MG/DL
ALP SERPL-CCNC: 74 U/L (ref 40–150)
ALP SERPL-CCNC: 94 U/L (ref 40–150)
ALT SERPL W P-5'-P-CCNC: 18 U/L (ref 0–50)
ALT SERPL W P-5'-P-CCNC: 21 U/L (ref 0–50)
ANION GAP SERPL CALCULATED.3IONS-SCNC: 6 MMOL/L (ref 3–14)
ANION GAP SERPL CALCULATED.3IONS-SCNC: 7 MMOL/L (ref 3–14)
APPEARANCE UR: CLEAR
AST SERPL W P-5'-P-CCNC: 17 U/L (ref 0–45)
AST SERPL W P-5'-P-CCNC: 21 U/L (ref 0–45)
BASOPHILS # BLD AUTO: 0 10E9/L (ref 0–0.2)
BASOPHILS # BLD AUTO: 0 10E9/L (ref 0–0.2)
BASOPHILS NFR BLD AUTO: 0.1 %
BASOPHILS NFR BLD AUTO: 0.2 %
BILIRUB SERPL-MCNC: 0.3 MG/DL (ref 0.2–1.3)
BILIRUB SERPL-MCNC: 0.3 MG/DL (ref 0.2–1.3)
BILIRUB UR QL STRIP: NEGATIVE
BUN SERPL-MCNC: 15 MG/DL (ref 7–30)
BUN SERPL-MCNC: 7 MG/DL (ref 7–30)
CALCIUM SERPL-MCNC: 8.1 MG/DL (ref 8.5–10.1)
CALCIUM SERPL-MCNC: 9 MG/DL (ref 8.5–10.1)
CHLORIDE SERPL-SCNC: 102 MMOL/L (ref 94–109)
CHLORIDE SERPL-SCNC: 106 MMOL/L (ref 94–109)
CO2 SERPL-SCNC: 24 MMOL/L (ref 20–32)
CO2 SERPL-SCNC: 28 MMOL/L (ref 20–32)
COLOR UR AUTO: ABNORMAL
CREAT SERPL-MCNC: 0.65 MG/DL (ref 0.52–1.04)
CREAT SERPL-MCNC: 0.71 MG/DL (ref 0.52–1.04)
DIFFERENTIAL METHOD BLD: ABNORMAL
DIFFERENTIAL METHOD BLD: NORMAL
EOSINOPHIL # BLD AUTO: 0 10E9/L (ref 0–0.7)
EOSINOPHIL # BLD AUTO: 0.1 10E9/L (ref 0–0.7)
EOSINOPHIL NFR BLD AUTO: 0.1 %
EOSINOPHIL NFR BLD AUTO: 0.5 %
ERYTHROCYTE [DISTWIDTH] IN BLOOD BY AUTOMATED COUNT: 13.5 % (ref 10–15)
ERYTHROCYTE [DISTWIDTH] IN BLOOD BY AUTOMATED COUNT: 13.6 % (ref 10–15)
FLUAV+FLUBV AG SPEC QL: NEGATIVE
FLUAV+FLUBV AG SPEC QL: NEGATIVE
GFR SERPL CREATININE-BSD FRML MDRD: 90 ML/MIN/{1.73_M2}
GFR SERPL CREATININE-BSD FRML MDRD: >90 ML/MIN/{1.73_M2}
GLUCOSE SERPL-MCNC: 90 MG/DL (ref 70–99)
GLUCOSE SERPL-MCNC: 92 MG/DL (ref 70–99)
GLUCOSE UR STRIP-MCNC: NEGATIVE MG/DL
HCT VFR BLD AUTO: 40 % (ref 35–47)
HCT VFR BLD AUTO: 41.8 % (ref 35–47)
HGB BLD-MCNC: 13.2 G/DL (ref 11.7–15.7)
HGB BLD-MCNC: 13.5 G/DL (ref 11.7–15.7)
HGB UR QL STRIP: NEGATIVE
IMM GRANULOCYTES # BLD: 0 10E9/L (ref 0–0.4)
IMM GRANULOCYTES # BLD: 0 10E9/L (ref 0–0.4)
IMM GRANULOCYTES NFR BLD: 0.2 %
IMM GRANULOCYTES NFR BLD: 0.4 %
KETONES UR STRIP-MCNC: NEGATIVE MG/DL
LACTATE BLD-SCNC: 1.2 MMOL/L (ref 0.7–2)
LACTATE BLD-SCNC: 1.6 MMOL/L (ref 0.7–2)
LEUKOCYTE ESTERASE UR QL STRIP: NEGATIVE
LYMPHOCYTES # BLD AUTO: 0.8 10E9/L (ref 0.8–5.3)
LYMPHOCYTES # BLD AUTO: 1.1 10E9/L (ref 0.8–5.3)
LYMPHOCYTES NFR BLD AUTO: 10.9 %
LYMPHOCYTES NFR BLD AUTO: 7.6 %
MCH RBC QN AUTO: 30.6 PG (ref 26.5–33)
MCH RBC QN AUTO: 30.8 PG (ref 26.5–33)
MCHC RBC AUTO-ENTMCNC: 32.3 G/DL (ref 31.5–36.5)
MCHC RBC AUTO-ENTMCNC: 33 G/DL (ref 31.5–36.5)
MCV RBC AUTO: 93 FL (ref 78–100)
MCV RBC AUTO: 95 FL (ref 78–100)
MONOCYTES # BLD AUTO: 0.9 10E9/L (ref 0–1.3)
MONOCYTES # BLD AUTO: 1.1 10E9/L (ref 0–1.3)
MONOCYTES NFR BLD AUTO: 11.1 %
MONOCYTES NFR BLD AUTO: 8.5 %
MUCOUS THREADS #/AREA URNS LPF: PRESENT /LPF
NEUTROPHILS # BLD AUTO: 7.8 10E9/L (ref 1.6–8.3)
NEUTROPHILS # BLD AUTO: 9 10E9/L (ref 1.6–8.3)
NEUTROPHILS NFR BLD AUTO: 77.6 %
NEUTROPHILS NFR BLD AUTO: 82.8 %
NITRATE UR QL: NEGATIVE
NRBC # BLD AUTO: 0 10*3/UL
NRBC # BLD AUTO: 0 10*3/UL
NRBC BLD AUTO-RTO: 0 /100
NRBC BLD AUTO-RTO: 0 /100
PH UR STRIP: 6.5 PH (ref 5–7)
PLATELET # BLD AUTO: 174 10E9/L (ref 150–450)
PLATELET # BLD AUTO: 192 10E9/L (ref 150–450)
POTASSIUM SERPL-SCNC: 3.6 MMOL/L (ref 3.4–5.3)
POTASSIUM SERPL-SCNC: 3.8 MMOL/L (ref 3.4–5.3)
PROT SERPL-MCNC: 6.7 G/DL (ref 6.8–8.8)
PROT SERPL-MCNC: 7.8 G/DL (ref 6.8–8.8)
RBC # BLD AUTO: 4.31 10E12/L (ref 3.8–5.2)
RBC # BLD AUTO: 4.39 10E12/L (ref 3.8–5.2)
RBC #/AREA URNS AUTO: 0 /HPF (ref 0–2)
SODIUM SERPL-SCNC: 136 MMOL/L (ref 133–144)
SODIUM SERPL-SCNC: 138 MMOL/L (ref 133–144)
SOURCE: ABNORMAL
SP GR UR STRIP: 1.01 (ref 1–1.03)
SPECIMEN SOURCE: NORMAL
SQUAMOUS #/AREA URNS AUTO: 1 /HPF (ref 0–1)
UROBILINOGEN UR STRIP-MCNC: NORMAL MG/DL (ref 0–2)
WBC # BLD AUTO: 10 10E9/L (ref 4–11)
WBC # BLD AUTO: 10.8 10E9/L (ref 4–11)
WBC #/AREA URNS AUTO: 0 /HPF (ref 0–5)

## 2020-03-09 PROCEDURE — 87493 C DIFF AMPLIFIED PROBE: CPT | Performed by: EMERGENCY MEDICINE

## 2020-03-09 PROCEDURE — 74176 CT ABD & PELVIS W/O CONTRAST: CPT

## 2020-03-09 PROCEDURE — 87177 OVA AND PARASITES SMEARS: CPT | Performed by: EMERGENCY MEDICINE

## 2020-03-09 PROCEDURE — 25800030 ZZH RX IP 258 OP 636: Performed by: EMERGENCY MEDICINE

## 2020-03-09 PROCEDURE — 80053 COMPREHEN METABOLIC PANEL: CPT | Performed by: EMERGENCY MEDICINE

## 2020-03-09 PROCEDURE — 96375 TX/PRO/DX INJ NEW DRUG ADDON: CPT | Performed by: EMERGENCY MEDICINE

## 2020-03-09 PROCEDURE — 96361 HYDRATE IV INFUSION ADD-ON: CPT | Performed by: EMERGENCY MEDICINE

## 2020-03-09 PROCEDURE — 99284 EMERGENCY DEPT VISIT MOD MDM: CPT | Mod: 25,27 | Performed by: EMERGENCY MEDICINE

## 2020-03-09 PROCEDURE — 83605 ASSAY OF LACTIC ACID: CPT | Performed by: EMERGENCY MEDICINE

## 2020-03-09 PROCEDURE — 83605 ASSAY OF LACTIC ACID: CPT | Performed by: FAMILY MEDICINE

## 2020-03-09 PROCEDURE — 87506 IADNA-DNA/RNA PROBE TQ 6-11: CPT | Performed by: EMERGENCY MEDICINE

## 2020-03-09 PROCEDURE — 25000128 H RX IP 250 OP 636: Performed by: EMERGENCY MEDICINE

## 2020-03-09 PROCEDURE — 85025 COMPLETE CBC W/AUTO DIFF WBC: CPT | Performed by: FAMILY MEDICINE

## 2020-03-09 PROCEDURE — 81001 URINALYSIS AUTO W/SCOPE: CPT | Performed by: EMERGENCY MEDICINE

## 2020-03-09 PROCEDURE — 25000132 ZZH RX MED GY IP 250 OP 250 PS 637: Performed by: EMERGENCY MEDICINE

## 2020-03-09 PROCEDURE — 99285 EMERGENCY DEPT VISIT HI MDM: CPT | Mod: 25 | Performed by: EMERGENCY MEDICINE

## 2020-03-09 PROCEDURE — 99284 EMERGENCY DEPT VISIT MOD MDM: CPT | Mod: Z6 | Performed by: EMERGENCY MEDICINE

## 2020-03-09 PROCEDURE — 87040 BLOOD CULTURE FOR BACTERIA: CPT | Performed by: EMERGENCY MEDICINE

## 2020-03-09 PROCEDURE — 80053 COMPREHEN METABOLIC PANEL: CPT | Performed by: FAMILY MEDICINE

## 2020-03-09 PROCEDURE — 87804 INFLUENZA ASSAY W/OPTIC: CPT | Performed by: EMERGENCY MEDICINE

## 2020-03-09 PROCEDURE — 87209 SMEAR COMPLEX STAIN: CPT | Performed by: EMERGENCY MEDICINE

## 2020-03-09 PROCEDURE — 85025 COMPLETE CBC W/AUTO DIFF WBC: CPT | Performed by: EMERGENCY MEDICINE

## 2020-03-09 PROCEDURE — 96374 THER/PROPH/DIAG INJ IV PUSH: CPT | Performed by: EMERGENCY MEDICINE

## 2020-03-09 RX ORDER — CIPROFLOXACIN 2 MG/ML
400 INJECTION, SOLUTION INTRAVENOUS EVERY 12 HOURS
Status: DISCONTINUED | OUTPATIENT
Start: 2020-03-10 | End: 2020-03-10 | Stop reason: HOSPADM

## 2020-03-09 RX ORDER — ACETAMINOPHEN 500 MG
1000 TABLET ORAL ONCE
Status: COMPLETED | OUTPATIENT
Start: 2020-03-09 | End: 2020-03-09

## 2020-03-09 RX ORDER — ONDANSETRON 4 MG/1
4 TABLET, FILM COATED ORAL EVERY 8 HOURS PRN
Qty: 15 TABLET | Refills: 0 | Status: SHIPPED | OUTPATIENT
Start: 2020-03-09 | End: 2020-05-01

## 2020-03-09 RX ORDER — KETOROLAC TROMETHAMINE 15 MG/ML
15 INJECTION, SOLUTION INTRAMUSCULAR; INTRAVENOUS ONCE
Status: COMPLETED | OUTPATIENT
Start: 2020-03-09 | End: 2020-03-09

## 2020-03-09 RX ORDER — AZITHROMYCIN 500 MG/1
500 TABLET, FILM COATED ORAL DAILY
Qty: 3 TABLET | Refills: 0 | Status: SHIPPED | OUTPATIENT
Start: 2020-03-09 | End: 2020-03-09

## 2020-03-09 RX ORDER — ONDANSETRON 2 MG/ML
4 INJECTION INTRAMUSCULAR; INTRAVENOUS EVERY 30 MIN PRN
Status: DISCONTINUED | OUTPATIENT
Start: 2020-03-09 | End: 2020-03-10 | Stop reason: HOSPADM

## 2020-03-09 RX ADMIN — KETOROLAC TROMETHAMINE 15 MG: 15 INJECTION, SOLUTION INTRAMUSCULAR; INTRAVENOUS at 22:00

## 2020-03-09 RX ADMIN — SODIUM CHLORIDE 1000 ML: 9 INJECTION, SOLUTION INTRAVENOUS at 02:57

## 2020-03-09 RX ADMIN — KETOROLAC TROMETHAMINE 15 MG: 15 INJECTION, SOLUTION INTRAMUSCULAR; INTRAVENOUS at 03:41

## 2020-03-09 RX ADMIN — ACETAMINOPHEN 1000 MG: 500 TABLET, FILM COATED ORAL at 03:16

## 2020-03-09 RX ADMIN — ACETAMINOPHEN 1000 MG: 500 TABLET ORAL at 22:27

## 2020-03-09 RX ADMIN — SODIUM CHLORIDE 1000 ML: 9 INJECTION, SOLUTION INTRAVENOUS at 22:00

## 2020-03-09 RX ADMIN — ONDANSETRON 4 MG: 2 INJECTION INTRAMUSCULAR; INTRAVENOUS at 22:00

## 2020-03-09 ASSESSMENT — ENCOUNTER SYMPTOMS
DYSURIA: 0
BACK PAIN: 0
SHORTNESS OF BREATH: 0
BRUISES/BLEEDS EASILY: 0
DIARRHEA: 1
HEADACHES: 1
CHILLS: 1
ABDOMINAL PAIN: 1
SORE THROAT: 0
COUGH: 0
CONFUSION: 0
FEVER: 1

## 2020-03-09 NOTE — ED AVS SNAPSHOT
[facility], [place], Emergency Department  [address] [address]  Phone:  [phone]                                    Kian Cortez   MRN: [id number]    Department:  [facility], Emergency Department   Date of Visit:  3/9/2020           After Visit Summary Signature Page    I have received my discharge instructions, and my questions have been answered. I have discussed any challenges I see with this plan with the nurse or doctor.    ..........................................................................................................................................  Patient/Patient Representative Signature      ..........................................................................................................................................  Patient Representative Print Name and Relationship to Patient    ..................................................               ................................................  Date                                   Time    ..........................................................................................................................................  Reviewed by Signature/Title    ...................................................              ..............................................  Date                                               Time          22EPIC Rev 08/18

## 2020-03-09 NOTE — ED AVS SNAPSHOT
[facility], [place], Emergency Department  [address]  [facility] [address]  Phone:  [phone]  Fax:  [phone]                                    Kian Cortez   MRN: [id number]    Department:  [facility], Emergency Department   Date of Visit:  3/9/2020           After Visit Summary Signature Page    I have received my discharge instructions, and my questions have been answered. I have discussed any challenges I see with this plan with the nurse or doctor.    ..........................................................................................................................................  Patient/Patient Representative Signature      ..........................................................................................................................................  Patient Representative Print Name and Relationship to Patient    ..................................................               ................................................  Date                                   Time    ..........................................................................................................................................  Reviewed by Signature/Title    ...................................................              ..............................................  Date                                               Time          22EPIC Rev 08/18

## 2020-03-09 NOTE — DISCHARGE INSTRUCTIONS
Thank you for your patience today.  Please follow-up with your regular doctor in the next 2-3 days for further evaluation and follow-up care.  Please call to schedule an appointment.  Please continue your own medications.  Please rest, drink plenty of fluids. Please start with a liquid diet and advance as tolerated. Please take tylenol or ibuprofen as needed for fever or pain. Please return to the ER if you develop persistent high fever, weakness, chest pain, abdominal pain, persistent vomiting, or any worsening of your current symptoms.  It was a pleasure taking care of you today.  We hope you feel better soon.

## 2020-03-09 NOTE — ED PROVIDER NOTES
History     Chief Complaint   Patient presents with     Generalized Body Aches     Diarrhea     Fever     HPI  Kian Cortez is a 65 year old female who has a past medical history of osteopenia, diverticulosis who presents to the ED from home by EMS for evaluation of fever, diarrhea, and generalized body aches.  Patient reports feeling unwell for the past 1 day.  Patient states that she started feeling unwell yesterday afternoon around 1400 with some lower abdominal cramping.  No radiation of the cramping, no aggravating or alleviating factors.  Patient's reports the cramping has now resolved.  Patient states around 1800 she had 2 episodes of diarrhea which are described as soft nonbloody stools.  Patient also complains of some subjective fever, chills, and headache at home.  Patient states she took Tylenol around 6 PM.  Denies any upper respiratory symptoms, cough, chest pain, shortness of breath, dysuria, hematuria.  No sick contacts, no recent antibiotics.  Patient reports she was in [place] this past weekend for approximately 24 hours.  Patient denies any tobacco, drug, alcohol abuse.    I have reviewed the Medications, Allergies, Past Medical and Surgical History, and Social History in the Epic system.    Review of Systems   Constitutional: Positive for chills and fever.   HENT: Negative for sore throat.    Eyes: Negative for visual disturbance.   Respiratory: Negative for cough and shortness of breath.    Cardiovascular: Negative for chest pain.   Gastrointestinal: Positive for abdominal pain (cramping) and diarrhea (soft stools).   Endocrine: Negative for polyuria.   Genitourinary: Negative for dysuria.   Musculoskeletal: Negative for back pain.   Skin: Negative for rash.   Allergic/Immunologic: Negative for immunocompromised state.   Neurological: Positive for headaches.   Hematological: Does not bruise/bleed easily.   Psychiatric/Behavioral: Negative for confusion.       Physical Exam   BP: 136/81  Pulse:  116  Heart Rate: 117  Temp: 99.6  F (37.6  C)  Resp: 20  SpO2: 98 %      Physical Exam  General: Afebrile, no acute distress. Appears stated age.   HENT: MMM, no oropharyngeal lesions  Eyes: PERRL, normal sclerae  Neck: non-tender, supple  Cardio: Tachycardic rate. Regular rhythm. Extremities well perfused  Resp: Normal work of breathing, clear breath sounds  Chest/Back: no visual signs of trauma, no CVA tenderness  Abdomen: soft, no tenderness, non-distended, no rebound, no guarding  Neuro: alert and fully oriented. CN II-XII grossly intact. Grossly normal strength and sensation in all extremities.   MSK: no deformities.   Integumentary/Skin: no rash visualized, normal color  Psych: normal affect, normal behavior    ED Course        Procedures    Results for orders placed or performed during the hospital encounter of 03/09/20   Comprehensive metabolic panel     Status: None   Result Value Ref Range    Sodium 138 133 - 144 mmol/L    Potassium 3.8 3.4 - 5.3 mmol/L    Chloride 102 94 - 109 mmol/L    Carbon Dioxide 28 20 - 32 mmol/L    Anion Gap 7 3 - 14 mmol/L    Glucose 92 70 - 99 mg/dL    Urea Nitrogen 15 7 - 30 mg/dL    Creatinine 0.71 0.52 - 1.04 mg/dL    GFR Estimate 90 >60 mL/min/[1.73_m2]    GFR Estimate If Black >90 >60 mL/min/[1.73_m2]    Calcium 9.0 8.5 - 10.1 mg/dL    Bilirubin Total 0.3 0.2 - 1.3 mg/dL    Albumin 3.5 3.4 - 5.0 g/dL    Protein Total 7.8 6.8 - 8.8 g/dL    Alkaline Phosphatase 94 40 - 150 U/L    ALT 21 0 - 50 U/L    AST 21 0 - 45 U/L   Lactic acid whole blood     Status: None   Result Value Ref Range    Lactic Acid 1.2 0.7 - 2.0 mmol/L   CBC with platelets differential     Status: Abnormal   Result Value Ref Range    WBC 10.8 4.0 - 11.0 10e9/L    RBC Count 4.39 3.8 - 5.2 10e12/L    Hemoglobin 13.5 11.7 - 15.7 g/dL    Hematocrit 41.8 35.0 - 47.0 %    MCV 95 78 - 100 fl    MCH 30.8 26.5 - 33.0 pg    MCHC 32.3 31.5 - 36.5 g/dL    RDW 13.5 10.0 - 15.0 %    Platelet Count 192 150 - 450 10e9/L     Diff Method Automated Method     % Neutrophils 82.8 %    % Lymphocytes 7.6 %    % Monocytes 8.5 %    % Eosinophils 0.5 %    % Basophils 0.2 %    % Immature Granulocytes 0.4 %    Nucleated RBCs 0 0 /100    Absolute Neutrophil 9.0 (H) 1.6 - 8.3 10e9/L    Absolute Lymphocytes 0.8 0.8 - 5.3 10e9/L    Absolute Monocytes 0.9 0.0 - 1.3 10e9/L    Absolute Eosinophils 0.1 0.0 - 0.7 10e9/L    Absolute Basophils 0.0 0.0 - 0.2 10e9/L    Abs Immature Granulocytes 0.0 0 - 0.4 10e9/L    Absolute Nucleated RBC 0.0    UA with Microscopic reflex to Culture     Status: Abnormal    Specimen: Urine Midstream; Midstream Urine   Result Value Ref Range    Color Urine Light Yellow     Appearance Urine Clear     Glucose Urine Negative NEG^Negative mg/dL    Bilirubin Urine Negative NEG^Negative    Ketones Urine Negative NEG^Negative mg/dL    Specific Gravity Urine 1.012 1.003 - 1.035    Blood Urine Negative NEG^Negative    pH Urine 6.5 5.0 - 7.0 pH    Protein Albumin Urine Negative NEG^Negative mg/dL    Urobilinogen mg/dL Normal 0.0 - 2.0 mg/dL    Nitrite Urine Negative NEG^Negative    Leukocyte Esterase Urine Negative NEG^Negative    Source Midstream Urine     WBC Urine 0 0 - 5 /HPF    RBC Urine 0 0 - 2 /HPF    Squamous Epithelial /HPF Urine 1 0 - 1 /HPF    Mucous Urine Present (A) NEG^Negative /LPF   Influenza A/B antigen     Status: None   Result Value Ref Range    Influenza A/B Agn Specimen Nasal     Influenza A Negative NEG^Negative    Influenza B Negative NEG^Negative       Assessments & Plan (with Medical Decision Making)   Kian Cortez is a 65 year old female who has a past medical history of osteopenia, diverticulosis who presents to the ED from home by EMS for evaluation of fever, diarrhea, and generalized body aches.  Upon arrival patient is well-appearing, afebrile, no distress.  Patient mildly tachycardic with heart rate 117 bpm, abdomen is soft, nontender, nondistended, no peritoneal signs.  Differential diagnosis includes but  is not limited to viral illness versus gastroenteritis versus colitis versus diverticulitis versus cystitis versus dehydration/metabolic among others.    On reevaluation patient feeling better with improvement in symptoms after IV hydration, Tylenol, Toradol.  I reviewed comprehensive labs which are unremarkable with wbc 10.8, hemoglobin 13.5, no acute metabolic or electrolyte abnormality, urine with no signs of acute infection, influenza negative.  I discussed results with patient and .  At this time patient is feeling better, tolerating liquids in the emergency department.  Abdomen remains soft and nontender.  Recommend discharge home with close outpatient follow-up, continue supportive care with liquid diet advance to soft as tolerated.  Tylenol or ibuprofen as needed for discomfort.  And return precautions discussed if high fever, severe pain, persistent vomiting, or any worsening symptoms.  Patient and  understand and agree with plan.    I have reviewed the nursing notes.    I have reviewed the findings, diagnosis, plan and need for follow up with the patient.    New Prescriptions    No medications on file       Final diagnoses:   Fever in adult   Generalized body aches   Diarrhea, unspecified type       3/9/2020   [facility], [place], EMERGENCY DEPARTMENT     Tonia Starkey MD  03/09/20 [id number]

## 2020-03-09 NOTE — ED TRIAGE NOTES
Arrives via EMS, reporting generalized body aches, fever, diarrhea since 8pm this past evening. Tympanic temp 102 per EMS. VSS ex tachy in 120s. BG 74. 18ga R hand. 500mLNS bolus given.

## 2020-03-09 NOTE — ED NOTES
Bed: ED14  Expected date:   Expected time:   Means of arrival:   Comments:  H 419  70/F  Generalized body aches, fever, diarrhea  Yellow, 5min

## 2020-03-10 VITALS
OXYGEN SATURATION: 99 % | SYSTOLIC BLOOD PRESSURE: 93 MMHG | RESPIRATION RATE: 16 BRPM | TEMPERATURE: 98.7 F | HEART RATE: 84 BPM | DIASTOLIC BLOOD PRESSURE: 48 MMHG

## 2020-03-10 PROBLEM — K52.9 COLITIS: Status: ACTIVE | Noted: 2019-10-12

## 2020-03-10 PROBLEM — R50.9 FEVER: Status: ACTIVE | Noted: 2020-03-10

## 2020-03-10 PROBLEM — R19.7 DIARRHEA: Status: ACTIVE | Noted: 2020-03-10

## 2020-03-10 PROBLEM — R11.2 NAUSEA WITH VOMITING: Status: ACTIVE | Noted: 2020-03-10

## 2020-03-10 LAB
ALBUMIN UR-MCNC: NEGATIVE MG/DL
APPEARANCE UR: CLEAR
BACTERIA #/AREA URNS HPF: ABNORMAL /HPF
BILIRUB UR QL STRIP: NEGATIVE
C COLI+JEJUNI+LARI FUSA STL QL NAA+PROBE: NOT DETECTED
C DIFF TOX B STL QL: NEGATIVE
COLOR UR AUTO: ABNORMAL
EC STX1 GENE STL QL NAA+PROBE: NOT DETECTED
EC STX2 GENE STL QL NAA+PROBE: NOT DETECTED
ENTERIC PATHOGEN COMMENT: ABNORMAL
GLUCOSE UR STRIP-MCNC: NEGATIVE MG/DL
HGB UR QL STRIP: ABNORMAL
KETONES UR STRIP-MCNC: NEGATIVE MG/DL
LEUKOCYTE ESTERASE UR QL STRIP: NEGATIVE
NITRATE UR QL: NEGATIVE
NOROV GI+II ORF1-ORF2 JNC STL QL NAA+PR: NOT DETECTED
O+P STL MICRO: NORMAL
PH UR STRIP: 5.5 PH (ref 5–7)
RBC #/AREA URNS AUTO: 1 /HPF (ref 0–2)
RVA NSP5 STL QL NAA+PROBE: NOT DETECTED
SALMONELLA SP RPOD STL QL NAA+PROBE: NOT DETECTED
SHIGELLA SP+EIEC IPAH STL QL NAA+PROBE: ABNORMAL
SOURCE: ABNORMAL
SP GR UR STRIP: 1 (ref 1–1.03)
SPECIMEN SOURCE: NORMAL
SPECIMEN SOURCE: NORMAL
SQUAMOUS #/AREA URNS AUTO: 1 /HPF (ref 0–1)
UROBILINOGEN UR STRIP-MCNC: NORMAL MG/DL (ref 0–2)
V CHOL+PARA RFBL+TRKH+TNAA STL QL NAA+PR: NOT DETECTED
WBC #/AREA URNS AUTO: <1 /HPF (ref 0–5)
Y ENTERO RECN STL QL NAA+PROBE: NOT DETECTED

## 2020-03-10 PROCEDURE — 81001 URINALYSIS AUTO W/SCOPE: CPT | Performed by: EMERGENCY MEDICINE

## 2020-03-10 PROCEDURE — 96366 THER/PROPH/DIAG IV INF ADDON: CPT | Performed by: EMERGENCY MEDICINE

## 2020-03-10 PROCEDURE — 96376 TX/PRO/DX INJ SAME DRUG ADON: CPT | Performed by: EMERGENCY MEDICINE

## 2020-03-10 PROCEDURE — 25000128 H RX IP 250 OP 636: Performed by: EMERGENCY MEDICINE

## 2020-03-10 PROCEDURE — 25000132 ZZH RX MED GY IP 250 OP 250 PS 637: Performed by: EMERGENCY MEDICINE

## 2020-03-10 PROCEDURE — 25800030 ZZH RX IP 258 OP 636: Performed by: EMERGENCY MEDICINE

## 2020-03-10 PROCEDURE — 96361 HYDRATE IV INFUSION ADD-ON: CPT | Performed by: EMERGENCY MEDICINE

## 2020-03-10 PROCEDURE — 96365 THER/PROPH/DIAG IV INF INIT: CPT | Performed by: EMERGENCY MEDICINE

## 2020-03-10 RX ORDER — ONDANSETRON 4 MG/1
4 TABLET, ORALLY DISINTEGRATING ORAL EVERY 8 HOURS PRN
Qty: 10 TABLET | Refills: 0 | Status: SHIPPED | OUTPATIENT
Start: 2020-03-10 | End: 2020-05-15

## 2020-03-10 RX ORDER — ACETAMINOPHEN 325 MG/1
650 TABLET ORAL EVERY 4 HOURS PRN
Status: CANCELLED | OUTPATIENT
Start: 2020-03-10

## 2020-03-10 RX ORDER — ONDANSETRON 2 MG/ML
4 INJECTION INTRAMUSCULAR; INTRAVENOUS EVERY 6 HOURS PRN
Status: CANCELLED | OUTPATIENT
Start: 2020-03-10

## 2020-03-10 RX ORDER — SODIUM CHLORIDE 9 MG/ML
1000 INJECTION, SOLUTION INTRAVENOUS CONTINUOUS
Status: DISCONTINUED | OUTPATIENT
Start: 2020-03-10 | End: 2020-03-10 | Stop reason: HOSPADM

## 2020-03-10 RX ORDER — KETOROLAC TROMETHAMINE 15 MG/ML
15 INJECTION, SOLUTION INTRAMUSCULAR; INTRAVENOUS EVERY 6 HOURS PRN
Status: CANCELLED | OUTPATIENT
Start: 2020-03-10 | End: 2020-03-15

## 2020-03-10 RX ORDER — ONDANSETRON 2 MG/ML
4 INJECTION INTRAMUSCULAR; INTRAVENOUS ONCE
Status: COMPLETED | OUTPATIENT
Start: 2020-03-10 | End: 2020-03-10

## 2020-03-10 RX ORDER — ACETAMINOPHEN 650 MG/1
650 SUPPOSITORY RECTAL EVERY 4 HOURS PRN
Status: CANCELLED | OUTPATIENT
Start: 2020-03-10

## 2020-03-10 RX ORDER — CIPROFLOXACIN 500 MG/1
500 TABLET, FILM COATED ORAL 2 TIMES DAILY
Qty: 20 TABLET | Refills: 0 | Status: SHIPPED | OUTPATIENT
Start: 2020-03-10 | End: 2020-05-15

## 2020-03-10 RX ORDER — NALOXONE HYDROCHLORIDE 0.4 MG/ML
.1-.4 INJECTION, SOLUTION INTRAMUSCULAR; INTRAVENOUS; SUBCUTANEOUS
Status: CANCELLED | OUTPATIENT
Start: 2020-03-10

## 2020-03-10 RX ORDER — ACETAMINOPHEN 325 MG/1
975 TABLET ORAL ONCE
Status: COMPLETED | OUTPATIENT
Start: 2020-03-10 | End: 2020-03-10

## 2020-03-10 RX ORDER — ONDANSETRON 4 MG/1
4 TABLET, ORALLY DISINTEGRATING ORAL EVERY 6 HOURS PRN
Status: CANCELLED | OUTPATIENT
Start: 2020-03-10

## 2020-03-10 RX ORDER — LIDOCAINE 40 MG/G
CREAM TOPICAL
Status: CANCELLED | OUTPATIENT
Start: 2020-03-10

## 2020-03-10 RX ORDER — LACTOBACILLUS RHAMNOSUS GG 10B CELL
1 CAPSULE ORAL 2 TIMES DAILY
Status: CANCELLED | OUTPATIENT
Start: 2020-03-10

## 2020-03-10 RX ORDER — SODIUM CHLORIDE 9 MG/ML
INJECTION, SOLUTION INTRAVENOUS CONTINUOUS
Status: CANCELLED | OUTPATIENT
Start: 2020-03-10

## 2020-03-10 RX ORDER — PROCHLORPERAZINE 25 MG
25 SUPPOSITORY, RECTAL RECTAL EVERY 12 HOURS PRN
Status: CANCELLED | OUTPATIENT
Start: 2020-03-10

## 2020-03-10 RX ORDER — PROCHLORPERAZINE MALEATE 10 MG
10 TABLET ORAL EVERY 6 HOURS PRN
Status: CANCELLED | OUTPATIENT
Start: 2020-03-10

## 2020-03-10 RX ADMIN — CIPROFLOXACIN 400 MG: 2 INJECTION, SOLUTION INTRAVENOUS at 11:30

## 2020-03-10 RX ADMIN — ONDANSETRON 4 MG: 2 INJECTION INTRAMUSCULAR; INTRAVENOUS at 08:28

## 2020-03-10 RX ADMIN — CIPROFLOXACIN 400 MG: 2 INJECTION, SOLUTION INTRAVENOUS at 00:45

## 2020-03-10 RX ADMIN — SODIUM CHLORIDE 1000 ML: 9 INJECTION, SOLUTION INTRAVENOUS at 08:28

## 2020-03-10 RX ADMIN — SODIUM CHLORIDE 1000 ML: 9 INJECTION, SOLUTION INTRAVENOUS at 10:54

## 2020-03-10 RX ADMIN — ACETAMINOPHEN 975 MG: 325 TABLET, FILM COATED ORAL at 08:21

## 2020-03-10 NOTE — ED TRIAGE NOTES
Pt was seen here early this morning for Ha, N/V/D and body aches.  Since being at home her signs and symptoms are not improving and the fever comes back as soon as the Tyl wears off.

## 2020-03-10 NOTE — ED PROVIDER NOTES
[facility] EMERGENCY DEPARTMENT ([facility])    3/09/20        History     Chief Complaint   Patient presents with     Fever     HPI  Kian Cortez is a 65 year old female with a past medical history of osteopenia, diverticulosis who presents to the emergency department with a chief complaint of fever.  It is associated with diarrhea, lower abdominal cramping, nausea, vomiting, chills, worsening headache, and generalized body aches.  Symptoms have been present for the past day.  They started yesterday around 2 PM.  The pain does not radiate.  Patient originally had diarrhea that is nonbloody, but states that her diarrhea is now more frequent and has occasional blood.  The patient has not had any upper respiratory symptoms, cough, chest pain, shortness of breath, urinary symptoms, such as dysuria or hematuria.  No sick contacts or recent antibiotic treatment.  The patient traveled to [place] this past weekend for about 24 hours.  She denies alcohol, tobacco, or other drug use.      The patient was seen here in the emergency department early this morning for the same symptoms.  Influenza testing was negative, CBC revealed normal white blood cell count but with left shift, CMP was normal, urinalysis was not consistent with UTI, and lactate was within normal limits.  No imaging studies were done.  The patient symptoms improved with IV fluids, Tylenol, and Toradol given in the emergency department.  Patient was advised to continue Tylenol and ibuprofen at home as needed.  However, since returning home her symptoms are not improving.  She has been taking Tylenol for fevers at home, but the fever comes back as soon as the Tylenol wears off.    I have reviewed the Medications, Allergies, Past Medical and Surgical History, and Social History in the [employer] system.    Past Medical History:   Diagnosis Date     Diverticulosis 11/2015    on CT scan     Functional dyspepsia      GERD (gastroesophageal reflux disease)       Insomnia     psychophysiologic     Osteoarthritis of right knee      Osteopenia 4/27/2015     Past Surgical History:   Procedure Laterality Date     COLONOSCOPY N/A 11/19/2019    Procedure: COLONOSCOPY;  Surgeon: Sandra Chu MD;  Location: [facility] OR     DILATION AND CURETTAGE SUCTION       EYE SURGERY      eye duct repair 10+ years  ago     NASOLACRIMAL DUCT PROBE/IRRG       Current Facility-Administered Medications   Medication     0.9% sodium chloride BOLUS     sodium chloride (PF) 0.9% PF flush 3 mL     sodium chloride (PF) 0.9% PF flush 3 mL     Current Outpatient Medications   Medication     acetaminophen 500 MG CAPS     calcium carbonate (TUMS) 500 MG chewable tablet     carboxymethylcellul-glycerin (OPTIVE) 0.5-0.9 % SOLN ophthalmic solution     cetirizine (ZYRTEC) 10 MG tablet     cyclobenzaprine (FLEXERIL) 10 MG tablet     famotidine (PEPCID) 20 MG tablet     Fish Oil-Cholecalciferol (FISH OIL + D3 PO)     ketoconazole (NIZORAL) 2 % external cream     simethicone (MYLICON) 80 MG chewable tablet     vitamin D3 (CHOLECALCIFEROL) 2000 units tablet      No Known Allergies  Social History     Socioeconomic History     Marital status:      Spouse name: Not on file     Number of children: 7     Years of education: Not on file     Highest education level: Not on file   Occupational History     Not on file   Social Needs     Financial resource strain: Not on file     Food insecurity     Worry: Not on file     Inability: Not on file     Transportation needs     Medical: Not on file     Non-medical: Not on file   Tobacco Use     Smoking status: Never Smoker     Smokeless tobacco: Never Used   Substance and Sexual Activity     Alcohol use: No     Drug use: No     Sexual activity: Yes     Partners: Male   Lifestyle     Physical activity     Days per week: Not on file     Minutes per session: Not on file     Stress: Not on file   Relationships     Social connections     Talks on phone: Not on file     Gets  together: Not on file     Attends Church service: Not on file     Active member of club or organization: Not on file     Attends meetings of clubs or organizations: Not on file     Relationship status: Not on file     Intimate partner violence     Fear of current or ex partner: Not on file     Emotionally abused: Not on file     Physically abused: Not on file     Forced sexual activity: Not on file   Other Topics Concern     Parent/sibling w/ CABG, MI or angioplasty before 65F 55M? No   Social History Narrative    Originally from [place]. Lives in [place] with her . Not working.       Review of Systems  General: Positive for fevers, chills  Skin: No rash or diaphoresis  Eyes: No eye redness or discharge  Ears/Nose/Throat: No rhinorrhea or nasal congestion  Respiratory: No cough or SOB  Cardiovascular: No chest pain or palpitations  Gastrointestinal: Positive for nausea, vomiting, and diarrhea  Genitourinary: No urinary frequency, hematuria, or dysuria  Musculoskeletal: No arthralgias, positive for myalgias  Neurologic: No numbness, positive for generalized weakness  Hematologic/Lymphatic/Immunologic: No leg swelling, no easy bruising/bleeding  Endocrine: No polyuria/polydypsia      Physical Exam   BP: 137/63  Pulse: 104  Temp: 101.7  F (38.7  C)  Resp: 20      General: Well nourished, well developed, ill-appearing  HEENT: EOMI, anicteric. NCAT, MMM  Neck: no jugular venous distension, supple, nl ROM  Cardiac: Tachycardic rate, regular rhythm. No murmurs, rubs, or gallops. Normal S1, S2.  Intact peripheral pulses  Pulm: CTAB, no stridor, wheezes, rales, rhonchi  Abd: Soft, diffusely mildly tender to palpation without rebound or guarding, nondistended.  No masses palpated.    Skin: Warm and dry to the touch.  No rash  Extremities: No LE edema, no cyanosis, w/w/p  Neuro: A&Ox3, no gross focal deficits    ED Course   8:46 PM  The patient was seen and examined by Rachell Vargas MD in Room ED19.       Procedures                           Labs Ordered and Resulted from Time of ED Arrival Up to the Time of Departure from the ED   COMPREHENSIVE METABOLIC PANEL - Abnormal; Notable for the following components:       Result Value    Calcium 8.1 (*)     Albumin 2.9 (*)     Protein Total 6.7 (*)     All other components within normal limits   CBC WITH PLATELETS DIFFERENTIAL   LACTIC ACID WHOLE BLOOD   PERIPHERAL IV CATHETER   PULSE OXIMETRY NURSING   CARDIAC CONTINUOUS MONITORING   NURSING DRAW AND HOLD   ENTERIC BACTERIA AND VIRUS PANEL BY SILVA STOOL   OVA AND PARASITE EXAM ROUTINE   CLOSTRIDIUM DIFFICILE TOXIN B   BLOOD CULTURE   BLOOD CULTURE            Results for orders placed or performed during the hospital encounter of 03/09/20 (from the past 24 hour(s))   Comprehensive metabolic panel   Result Value Ref Range    Sodium 136 133 - 144 mmol/L    Potassium 3.6 3.4 - 5.3 mmol/L    Chloride 106 94 - 109 mmol/L    Carbon Dioxide 24 20 - 32 mmol/L    Anion Gap 6 3 - 14 mmol/L    Glucose 90 70 - 99 mg/dL    Urea Nitrogen 7 7 - 30 mg/dL    Creatinine 0.65 0.52 - 1.04 mg/dL    GFR Estimate >90 >60 mL/min/[1.73_m2]    GFR Estimate If Black >90 >60 mL/min/[1.73_m2]    Calcium 8.1 (L) 8.5 - 10.1 mg/dL    Bilirubin Total 0.3 0.2 - 1.3 mg/dL    Albumin 2.9 (L) 3.4 - 5.0 g/dL    Protein Total 6.7 (L) 6.8 - 8.8 g/dL    Alkaline Phosphatase 74 40 - 150 U/L    ALT 18 0 - 50 U/L    AST 17 0 - 45 U/L   CBC with platelets differential   Result Value Ref Range    WBC 10.0 4.0 - 11.0 10e9/L    RBC Count 4.31 3.8 - 5.2 10e12/L    Hemoglobin 13.2 11.7 - 15.7 g/dL    Hematocrit 40.0 35.0 - 47.0 %    MCV 93 78 - 100 fl    MCH 30.6 26.5 - 33.0 pg    MCHC 33.0 31.5 - 36.5 g/dL    RDW 13.6 10.0 - 15.0 %    Platelet Count 174 150 - 450 10e9/L    Diff Method Automated Method     % Neutrophils 77.6 %    % Lymphocytes 10.9 %    % Monocytes 11.1 %    % Eosinophils 0.1 %    % Basophils 0.1 %    % Immature Granulocytes 0.2 %    Nucleated RBCs 0 0  /100    Absolute Neutrophil 7.8 1.6 - 8.3 10e9/L    Absolute Lymphocytes 1.1 0.8 - 5.3 10e9/L    Absolute Monocytes 1.1 0.0 - 1.3 10e9/L    Absolute Eosinophils 0.0 0.0 - 0.7 10e9/L    Absolute Basophils 0.0 0.0 - 0.2 10e9/L    Abs Immature Granulocytes 0.0 0 - 0.4 10e9/L    Absolute Nucleated RBC 0.0    Lactic acid whole blood   Result Value Ref Range    Lactic Acid 1.6 0.7 - 2.0 mmol/L   CT Abdomen Pelvis w/o Contrast    Narrative    EXAM: CT ABDOMEN PELVIS W/O CONTRAST  LOCATION: [facility]  DATE/TIME: 3/9/2020 10:56 PM    INDICATION: Diarrhea with lower abdominal cramping and pain and fever.    COMPARISON: None.    TECHNIQUE: CT scan of the abdomen and pelvis was performed without IV contrast. Multiplanar reformats were obtained. Dose reduction techniques were used.    CONTRAST: None.    FINDINGS:   LOWER CHEST: Minimal atelectasis in the lung bases.    HEPATOBILIARY: Mildly distended gallbladder. No calcified gallstones or biliary dilatation. Noncontrast appearance to the liver demonstrates no overt abnormality.    PANCREAS: No ductal dilatation or acute surrounding inflammatory change.    SPLEEN: Normal-sized spleen.    ADRENAL GLANDS: No significant nodules.    KIDNEYS/BLADDER: No intrarenal, ureteral or bladder calculi. Bladder unremarkable. No urinary collecting system dilatation.    BOWEL: Allowing for the noncontrast study, there is a moderate amount of inflammatory change surrounding the cecum and extending through the ascending colon into the transverse colon. Mild inflammatory change involving the distal transverse colon through   the descending colon into the sigmoid colon and rectum. Allowing for the noncontrast study, there appears to be diffuse colonic wall thickening. No small bowel inflammatory change allowing for the noncontrast study or small bowel dilatation. Appendix   unremarkable. Duodenum, stomach and distal esophagus unremarkable other than a minimal sliding esophageal  hiatal hernia.    LYMPH NODES: No lymphadenopathy.    VASCULATURE: No abdominal aortic aneurysm.    PELVIC ORGANS: Noncontrast appearance to the uterus and both adnexa demonstrates no overt abnormality.    MUSCULOSKELETAL: Minimal lower lumbar facet arthropathy. Minimal degenerative changes in the spine.      Impression    IMPRESSION:   1.  Allowing for the noncontrast study there is inflammatory change, moderate, involving the cecum and extending through the ascending colon into the proximal to mid transverse colon. Minimal inflammatory change surrounding the distal transverse colon   through the anorectal junction. Allowing for the noncontrast study, there appears to be diffuse colonic wall thickening. Findings compatible with a diffuse colitis possibly infectious or inflammatory in etiology. Correlation with laboratory evaluation   recommended. Correlation with follow-up colonoscopy may be of benefit, if persistent.    2.  No overt small bowel wall thickening or inflammatory change allowing for the noncontrast study. Appendix unremarkable.    3.  Distended gallbladder without evidence for cholelithiasis or overt biliary dilatation.         Labs, vital signs, and imaging studies were reviewed by me.    Medications   sodium chloride (PF) 0.9% PF flush 3 mL (has no administration in time range)   sodium chloride (PF) 0.9% PF flush 3 mL (has no administration in time range)   0.9% sodium chloride BOLUS (has no administration in time range)       Assessments & Plan (with Medical Decision Making)   Kian Cortez is a 65 year old female who presents the emergency department chief complaint of fever.  UTI and influenza were ruled out an earlier ER visit.  However, differential diagnosis would include viral syndrome, gastroenteritis, colitis, diverticulitis, appendicitis.  Per chart review, the patient does have a history of prior colitis.  Repeat basic labs and CT of the abdomen and pelvis were ordered.  IV fluids,  Toradol, and Tylenol were ordered for symptom relief as well.    White blood cell count and lactate remained within normal limits.    CT of the abdomen pelvis shows diffuse colitis.  Gallbladder is distended without other evidence for acute cholecystitis, patient did not have focal tenderness in the right upper quadrant on exam.  LFTs were normal.    Given patient's fevers and severity of disease/bloody diarrhea, ciprofloxacin was ordered.  Blood cultures were sent.    Stool studies, including stool culture, ova and parasite exam, and C. difficile by PCR, were sent.    I have reviewed the nursing notes.    I have reviewed the findings, diagnosis, plan and need for follow up with the patient.    Discussed option of admission to ED observation unit for additional IV fluids and nausea control versus discharge home with prescription for antibiotics and Zofran for nausea relief.  Patient is feeling somewhat better after medications, however, she still complains of some nausea and cramping abdominal pain and does not feel able to go home at this time.  Will discuss patient with ED observation unit.    Patient discussed with ED observation unit advanced practice provider, Mili, to be admitted to their service for further management. Plan was discussed with patient who understands and agrees with plan.           Final diagnoses:   Fever and chills   Nausea vomiting and diarrhea   Diarrhea of presumed infectious origin   Bloody diarrhea     IRenee , am serving as a trained medical scribe to document services personally performed by Rachell Vargas MD, based on the provider's statements to me.     Rachell DE LA TORRE MD, was physically present and have reviewed and verified the accuracy of this note documented by Renee Armas.     3/9/2020   [facility], [place], EMERGENCY DEPARTMENT     Rachell Vargas MD  03/10/20 0003

## 2020-03-10 NOTE — ED NOTES
[facility], [place]   ED Nurse to Floor Handoff     Kian Cortez is a 65 year old female who speaks [place] and lives with family members,  in a home  They arrived in the ED by car from home    ED Chief Complaint: Fever    ED Dx;   Final diagnoses:   Fever and chills   Nausea vomiting and diarrhea   Diarrhea of presumed infectious origin   Bloody diarrhea         Needed?: No    Allergies: No Known Allergies.  Past Medical Hx:   Past Medical History:   Diagnosis Date     Diverticulosis 11/2015    on CT scan     Functional dyspepsia      GERD (gastroesophageal reflux disease)      Insomnia     psychophysiologic     Osteoarthritis of right knee      Osteopenia 4/27/2015      Baseline Mental status: WDL  Current Mental Status changes: at basesline    Infection present or suspected this encounter: cultures pending  Sepsis suspected: No  Isolation type: Enteric     Activity level - Baseline/Home:  Independent  Activity Level - Current:   Independent    Bariatric equipment needed?: No    In the ED these meds were given:   Medications   sodium chloride (PF) 0.9% PF flush 3 mL (3 mLs Intravenous Given 3/10/20 0030)   sodium chloride (PF) 0.9% PF flush 3 mL (3 mLs Intravenous Not Given 3/10/20 0030)   ondansetron (ZOFRAN) injection 4 mg (4 mg Intravenous Given 3/9/20 2200)   ciprofloxacin (CIPRO) infusion 400 mg (400 mg Intravenous New Bag 3/10/20 0045)   0.9% sodium chloride BOLUS (0 mLs Intravenous Stopped 3/10/20 0029)   ketorolac (TORADOL) injection 15 mg (15 mg Intravenous Given 3/9/20 2200)   acetaminophen (TYLENOL) tablet 1,000 mg (1,000 mg Oral Given 3/9/20 2227)       Drips running?  No    Home pump  No    Current LDAs  Peripheral IV Left Upper forearm (Active)   Site Assessment WDL 03/09/20 2155   Number of days:        Peripheral IV 03/10/20 Right Wrist (Active)   Site Assessment WDL 03/10/20 0027   Line Status Infusing 03/10/20 0027   Phlebitis Scale 0-->no symptoms 03/10/20  0027   Infiltration Scale 0 03/10/20 0027   Number of days: 0       Labs results:   Labs Ordered and Resulted from Time of ED Arrival Up to the Time of Departure from the ED   COMPREHENSIVE METABOLIC PANEL - Abnormal; Notable for the following components:       Result Value    Calcium 8.1 (*)     Albumin 2.9 (*)     Protein Total 6.7 (*)     All other components within normal limits   ROUTINE UA WITH MICROSCOPIC REFLEX TO CULTURE - Abnormal; Notable for the following components:    Blood Urine Trace (*)     Bacteria Urine Few (*)     All other components within normal limits   CBC WITH PLATELETS DIFFERENTIAL   LACTIC ACID WHOLE BLOOD   PERIPHERAL IV CATHETER   PULSE OXIMETRY NURSING   CARDIAC CONTINUOUS MONITORING   NURSING DRAW AND HOLD   ENTERIC BACTERIA AND VIRUS PANEL BY SILVA STOOL   OVA AND PARASITE EXAM ROUTINE   CLOSTRIDIUM DIFFICILE TOXIN B   BLOOD CULTURE   BLOOD CULTURE       Imaging Studies:   Recent Results (from the past 24 hour(s))   CT Abdomen Pelvis w/o Contrast    Narrative    EXAM: CT ABDOMEN PELVIS W/O CONTRAST  LOCATION: [facility]  DATE/TIME: 3/9/2020 10:56 PM    INDICATION: Diarrhea with lower abdominal cramping and pain and fever.    COMPARISON: None.    TECHNIQUE: CT scan of the abdomen and pelvis was performed without IV contrast. Multiplanar reformats were obtained. Dose reduction techniques were used.    CONTRAST: None.    FINDINGS:   LOWER CHEST: Minimal atelectasis in the lung bases.    HEPATOBILIARY: Mildly distended gallbladder. No calcified gallstones or biliary dilatation. Noncontrast appearance to the liver demonstrates no overt abnormality.    PANCREAS: No ductal dilatation or acute surrounding inflammatory change.    SPLEEN: Normal-sized spleen.    ADRENAL GLANDS: No significant nodules.    KIDNEYS/BLADDER: No intrarenal, ureteral or bladder calculi. Bladder unremarkable. No urinary collecting system dilatation.    BOWEL: Allowing for the noncontrast study, there is a  moderate amount of inflammatory change surrounding the cecum and extending through the ascending colon into the transverse colon. Mild inflammatory change involving the distal transverse colon through   the descending colon into the sigmoid colon and rectum. Allowing for the noncontrast study, there appears to be diffuse colonic wall thickening. No small bowel inflammatory change allowing for the noncontrast study or small bowel dilatation. Appendix   unremarkable. Duodenum, stomach and distal esophagus unremarkable other than a minimal sliding esophageal hiatal hernia.    LYMPH NODES: No lymphadenopathy.    VASCULATURE: No abdominal aortic aneurysm.    PELVIC ORGANS: Noncontrast appearance to the uterus and both adnexa demonstrates no overt abnormality.    MUSCULOSKELETAL: Minimal lower lumbar facet arthropathy. Minimal degenerative changes in the spine.      Impression    IMPRESSION:   1.  Allowing for the noncontrast study there is inflammatory change, moderate, involving the cecum and extending through the ascending colon into the proximal to mid transverse colon. Minimal inflammatory change surrounding the distal transverse colon   through the anorectal junction. Allowing for the noncontrast study, there appears to be diffuse colonic wall thickening. Findings compatible with a diffuse colitis possibly infectious or inflammatory in etiology. Correlation with laboratory evaluation   recommended. Correlation with follow-up colonoscopy may be of benefit, if persistent.    2.  No overt small bowel wall thickening or inflammatory change allowing for the noncontrast study. Appendix unremarkable.    3.  Distended gallbladder without evidence for cholelithiasis or overt biliary dilatation.         Recent vital signs:   BP 99/54   Pulse 87   Temp 99.2  F (37.3  C) (Oral)   Resp 18   LMP 04/19/2006     Jo-Ann Coma Scale Score: 14 (03/10/20 0100)       Cardiac Rhythm: Other  Pt needs tele? No  Skin/wound Issues:  None    Code Status:    Pain control: good    Nausea control: good    Abnormal labs/tests/findings requiring intervention:     Family present during ED course? Yes   Family Comments/Social Situation comments:     Tasks needing completion: None    HANNAH PETERSON RN  [facility] --   [phone] [facility]

## 2020-03-10 NOTE — DISCHARGE INSTRUCTIONS
TODAY'S VISIT:  You were seen today for fever, diarrhea  -   -You have a shigella infection which is causing your abdominal pain, vomiting, and diarrhea.     Take antibiotics as prescribed.   Return to the ER if symptoms worsen.       FOLLOW-UP:  Please make an appointment to follow up with:  - Your Primary Care Provider. If you do not have a PCP, please call the Primary Care Center (phone: [phone] for an appointment  -         - Have your provider review the results from today's visit with you again to make sure no further follow-up or additional testing is needed based on those results.     PRESCRIPTIONS / MEDICATIONS:  -ciprofloxacin  -zofran    OTHER INSTRUCTIONS:  - make sure to drink plenty of water    -Wash your hands frequently    RETURN TO THE EMERGENCY DEPARTMENT  Return to the Emergency Department at any time for any new or worsening symptoms or any concerns.

## 2020-03-10 NOTE — H&P
[facility], [place]    History and Physical - ED Observation       Date of Admission:  3/9/2020    Assessment & Plan   Kian Cortez is a 65 year old female with a past medical history of osteopenia, diverticulosis who presents to the emergency department with a chief complaint of fever.    ##Fever  ##Nausea, vomiting  ##Diarrhea  Pt reports pain is associated with diarrhea, lower abdominal cramping, nausea, vomiting, chills, worsening headache, and generalized body aches.  Symptoms have been present for the past 24hrs. Diarrhea has occasional blood in it. Pt visited ED lastnight, influenza was negative, CBC with normal white count and left shift, UA and lactate within normal limits. She returned to ED as her symptoms were not improving and she was having difficulty eating and drinking. In the ED, /63, , temp 101.7, RR 20, SPO2 100% on RA. Labs show Cr 0.65, BUN 7. Na 136, K+ 3.6, lactic acid 1.6. WBC 10, Hgb 13.2, hematocrit 40. CT Abdomen shows Findings compatible with a diffuse colitis possibly infectious or inflammatory in etiology. Enteric panel and C. Diff. Pending. Medications: IVF Bolus 1000ml x1, Tylenol 1000mg x1, Cipro 400mg IV x1, Toradol 15mg IV x1. Plan: Admit to ED Observation for symptom management. Pt never came to ED Observation Unit, she was discharged to home from the Emergency Dept.  -Freer to ED Obs  -VS Q4hrs  -Orthostatics  -IVF  -I/Os  -CBC and BMP in AM  -Enteric and C. Diff. Pending  -Cipro Q12hrs  -Zofran PRN  -Continue PTA pepcid     Diet: clears, ADAT  DVT Prophylaxis: Low Risk/Ambulatory with no VTE prophylaxis indicated  Deras Catheter: not present  Code Status: full    Disposition Plan   Expected discharge: Tomorrow, recommended to prior living arrangement once adequate pain management/ tolerating PO medications, hemoglobin stable and safe disposition plan/ TCU bed available.  Entered: Richelle Crump CNP 03/10/2020, 12:09 AM       Richelle JAFFE  "KAVIN Crump  [facility], [place]  ED Observation, 6D  [id number]  ______________________________________________________________________    Chief Complaint   Fever, Diarrhea    History is obtained from the patient    History of Present Illness   Per ED,\"Kian Cortez is a 65 year old female with a past medical history of osteopenia, diverticulosis who presents to the emergency department with a chief complaint of fever.  It is associated with diarrhea, lower abdominal cramping, nausea, vomiting, chills, worsening headache, and generalized body aches.  Symptoms have been present for the past day.  They started yesterday around 2 PM.  The pain does not radiate.  Patient originally had diarrhea that is nonbloody, but states that her diarrhea is now more frequent and has occasional blood.  The patient has not had any upper respiratory symptoms, cough, chest pain, shortness of breath, urinary symptoms, such as dysuria or hematuria.  No sick contacts or recent antibiotic treatment.  The patient traveled to [place] this past weekend for about 24 hours.  She denies alcohol, tobacco, or other drug use.       The patient was seen here in the emergency department early this morning for the same symptoms.  Influenza testing was negative, CBC revealed normal white blood cell count but with left shift, CMP was normal, urinalysis was not consistent with UTI, and lactate was within normal limits.  No imaging studies were done.  The patient symptoms improved with IV fluids, Tylenol, and Toradol given in the emergency department.  Patient was advised to continue Tylenol and ibuprofen at home as needed.  However, since returning home her symptoms are not improving.  She has been taking Tylenol for fevers at home, but the fever comes back as soon as the Tylenol wears off.\"    Review of Systems    General: Positive for fevers, chills  Skin: No rash or diaphoresis  Eyes: No eye redness or " discharge  Ears/Nose/Throat: No rhinorrhea or nasal congestion  Respiratory: No cough or SOB  Cardiovascular: No chest pain or palpitations  Gastrointestinal: Positive for nausea, vomiting, and diarrhea  Genitourinary: No urinary frequency, hematuria, or dysuria  Musculoskeletal: No arthralgias, positive for myalgias  Neurologic: No numbness, positive for generalized weakness  Hematologic/Lymphatic/Immunologic: No leg swelling, no easy bruising/bleeding  Endocrine: No polyuria/polydypsia    Past Medical History    I have reviewed this patient's medical history and updated it with pertinent information if needed.   Past Medical History:   Diagnosis Date     Diverticulosis 11/2015    on CT scan     Functional dyspepsia      GERD (gastroesophageal reflux disease)      Insomnia     psychophysiologic     Osteoarthritis of right knee      Osteopenia 4/27/2015       Past Surgical History   I have reviewed this patient's surgical history and updated it with pertinent information if needed.  Past Surgical History:   Procedure Laterality Date     COLONOSCOPY N/A 11/19/2019    Procedure: COLONOSCOPY;  Surgeon: Sandra Chu MD;  Location: [facility] OR     DILATION AND CURETTAGE SUCTION       EYE SURGERY      eye duct repair 10+ years  ago     NASOLACRIMAL DUCT PROBE/IRRG         Social History   I have reviewed this patient's social history and updated it with pertinent information if needed.  Social History     Tobacco Use     Smoking status: Never Smoker     Smokeless tobacco: Never Used   Substance Use Topics     Alcohol use: No     Drug use: No       Family History   I have reviewed this patient's family history and updated it with pertinent information if needed.   Family History   Problem Relation Age of Onset     Other Cancer Brother      Cancer Brother      Glaucoma No family hx of      Macular Degeneration No family hx of      Diabetes No family hx of      Hypertension No family hx of      Cerebrovascular Disease No  "family hx of      Thyroid Disease No family hx of        Prior to Admission Medications   Prior to Admission Medications   Prescriptions Last Dose Informant Patient Reported? Taking?   Fish Oil-Cholecalciferol (FISH OIL + D3 PO)   Yes No   Sig: Take  by mouth daily.   acetaminophen 500 MG CAPS   No No   Sig: Take 1,000 mg by mouth 3 times daily   calcium carbonate (TUMS) 500 MG chewable tablet   No No   Sig: Take 1 tablet (500 mg) by mouth 2 times daily   carboxymethylcellul-glycerin (OPTIVE) 0.5-0.9 % SOLN ophthalmic solution   No No   Sig: Place 1 drop into both eyes 4 times daily as needed for dry eyes   cetirizine (ZYRTEC) 10 MG tablet   No No   Sig: Take 1 tablet (10 mg) by mouth At Bedtime   cyclobenzaprine (FLEXERIL) 10 MG tablet   No No   Sig: Take 1 tablet (10 mg) by mouth 3 times daily as needed for muscle spasms   dicyclomine (BENTYL) 20 MG tablet   No No   Sig: Take 1 tablet (20 mg) by mouth 4 times daily (before meals and nightly) for 5 doses   famotidine (PEPCID) 20 MG tablet   No No   Sig: Take 1 tablet (20 mg) by mouth 2 times daily   ketoconazole (NIZORAL) 2 % external cream   No No   Sig: Apply topically daily To legs   polyethylene glycol (GOLYTELY/NULYTELY) 236 g suspension   No No   Sig: Take 4,000 mLs (4 L) by mouth once for 1 dose Refer to \"Getting Ready for a Colonoscopy\" instruction handout   simethicone (MYLICON) 80 MG chewable tablet   No No   Sig: Take 1 tablet (80 mg) by mouth every 6 hours as needed for cramping   vitamin D3 (CHOLECALCIFEROL) 2000 units tablet   No No   Sig: Take 1 tablet by mouth daily      Facility-Administered Medications: None     Allergies   No Known Allergies    Physical Exam   Vital Signs: Temp: 101.7  F (38.7  C) Temp src: Oral BP: 137/63 Pulse: 104   Resp: 20        Weight: 0 lbs 0 oz    Pt was never examined.    Data     Recent Labs   Lab 03/09/20  2201 03/09/20  0224   WBC 10.0 10.8   HGB 13.2 13.5   MCV 93 95    192    138   POTASSIUM 3.6 3.8 "   CHLORIDE 106 102   CO2 24 28   BUN 7 15   CR 0.65 0.71   ANIONGAP 6 7   ANA 8.1* 9.0   GLC 90 92   ALBUMIN 2.9* 3.5   PROTTOTAL 6.7* 7.8   BILITOTAL 0.3 0.3   ALKPHOS 74 94   ALT 18 21   AST 17 21     Recent Results (from the past 24 hour(s))   CT Abdomen Pelvis w/o Contrast    Narrative    EXAM: CT ABDOMEN PELVIS W/O CONTRAST  LOCATION: [facility]  DATE/TIME: 3/9/2020 10:56 PM    INDICATION: Diarrhea with lower abdominal cramping and pain and fever.    COMPARISON: None.    TECHNIQUE: CT scan of the abdomen and pelvis was performed without IV contrast. Multiplanar reformats were obtained. Dose reduction techniques were used.    CONTRAST: None.    FINDINGS:   LOWER CHEST: Minimal atelectasis in the lung bases.    HEPATOBILIARY: Mildly distended gallbladder. No calcified gallstones or biliary dilatation. Noncontrast appearance to the liver demonstrates no overt abnormality.    PANCREAS: No ductal dilatation or acute surrounding inflammatory change.    SPLEEN: Normal-sized spleen.    ADRENAL GLANDS: No significant nodules.    KIDNEYS/BLADDER: No intrarenal, ureteral or bladder calculi. Bladder unremarkable. No urinary collecting system dilatation.    BOWEL: Allowing for the noncontrast study, there is a moderate amount of inflammatory change surrounding the cecum and extending through the ascending colon into the transverse colon. Mild inflammatory change involving the distal transverse colon through   the descending colon into the sigmoid colon and rectum. Allowing for the noncontrast study, there appears to be diffuse colonic wall thickening. No small bowel inflammatory change allowing for the noncontrast study or small bowel dilatation. Appendix   unremarkable. Duodenum, stomach and distal esophagus unremarkable other than a minimal sliding esophageal hiatal hernia.    LYMPH NODES: No lymphadenopathy.    VASCULATURE: No abdominal aortic aneurysm.    PELVIC ORGANS: Noncontrast appearance to the uterus  and both adnexa demonstrates no overt abnormality.    MUSCULOSKELETAL: Minimal lower lumbar facet arthropathy. Minimal degenerative changes in the spine.      Impression    IMPRESSION:   1.  Allowing for the noncontrast study there is inflammatory change, moderate, involving the cecum and extending through the ascending colon into the proximal to mid transverse colon. Minimal inflammatory change surrounding the distal transverse colon   through the anorectal junction. Allowing for the noncontrast study, there appears to be diffuse colonic wall thickening. Findings compatible with a diffuse colitis possibly infectious or inflammatory in etiology. Correlation with laboratory evaluation   recommended. Correlation with follow-up colonoscopy may be of benefit, if persistent.    2.  No overt small bowel wall thickening or inflammatory change allowing for the noncontrast study. Appendix unremarkable.    3.  Distended gallbladder without evidence for cholelithiasis or overt biliary dilatation.

## 2020-03-10 NOTE — ED NOTES
Pt signed out to me by Dr. Rick at 7 am      Situation: Patient is a 65-year-old female who had presented to the ER due to abdominal pain nausea and vomiting.  Patient said that she had vomiting at home.  Patient had laboratory work that was stable.  Patient had a CT abdomen and pelvis that showed signs for colitis due to infectious inflammatory process.  Patient had a stool culture that was positive for Shigella.  She had received a dose of IV ciprofloxacin in the ER.    Plan: Plan was to admit the patient to the ED observation unit for serial abdominal exams and for continued care.    Shift Report:    Patient was signed out to me early this morning.  When I examined the patient this morning around 8:00 in the morning her belly was soft and nontender.  Patient said that she had no further diarrhea or vomiting but was still feeling an achy feeling in her belly.  She did that she felt mildly nauseous and did not feel like eating though she had no vomiting.  I informed her of the positive Shigella finding.  She Sukhdeep received a dose of IV ciprofloxacin overnight.  I spoke to the observation unit and there is currently no beds available when I spoke to them at around 9:00.  I went and reexamined the patient around 11 and then again around 1230.  Patient's abdomen continues to be soft and nontender.  Patient was able to eat some toast and drink some juice.  She had no further vomiting.  I discussed the finding of the Shigella with the family member who is in the room.  They said that I felt like they could take her home and treat her at home with antibiotics.  Patient received a second dose of IV ciprofloxacin while in the emergency department.  Currently patient's abdomen is still soft and nontender.  Patient is laying in bed but is in no acute distress.  She is had no serial episodes of diarrhea or vomiting.  At this time I feel that patient is feeling better and can be discharged home.  She will be discharged home  with a course of ciprofloxacin.  Patient evaluated told importance of washing her hands to prevent infection.    Signed:  Rozina Wharton MD  March 10, 2020 at 12:47 PM       Rozina Wharton MD  03/10/20 [id number]

## 2020-03-12 ENCOUNTER — OFFICE VISIT (OUTPATIENT)
Dept: INTERNAL MEDICINE | Facility: CLINIC | Age: 65
End: 2020-03-12
Payer: COMMERCIAL

## 2020-03-12 VITALS
HEART RATE: 77 BPM | OXYGEN SATURATION: 99 % | DIASTOLIC BLOOD PRESSURE: 83 MMHG | SYSTOLIC BLOOD PRESSURE: 135 MMHG | BODY MASS INDEX: 38.78 KG/M2 | WEIGHT: 212 LBS | TEMPERATURE: 98.1 F

## 2020-03-12 DIAGNOSIS — A03.9: ICD-10-CM

## 2020-03-12 DIAGNOSIS — A03.9: Primary | ICD-10-CM

## 2020-03-12 LAB
ANION GAP SERPL CALCULATED.3IONS-SCNC: 5 MMOL/L (ref 3–14)
BUN SERPL-MCNC: 4 MG/DL (ref 7–30)
CALCIUM SERPL-MCNC: 8.7 MG/DL (ref 8.5–10.1)
CHLORIDE SERPL-SCNC: 109 MMOL/L (ref 94–109)
CO2 SERPL-SCNC: 26 MMOL/L (ref 20–32)
CREAT SERPL-MCNC: 0.64 MG/DL (ref 0.52–1.04)
GFR SERPL CREATININE-BSD FRML MDRD: >90 ML/MIN/{1.73_M2}
GLUCOSE SERPL-MCNC: 78 MG/DL (ref 70–99)
POTASSIUM SERPL-SCNC: 3.6 MMOL/L (ref 3.4–5.3)
SODIUM SERPL-SCNC: 140 MMOL/L (ref 133–144)

## 2020-03-12 ASSESSMENT — PAIN SCALES - GENERAL: PAINLEVEL: NO PAIN (0)

## 2020-03-12 NOTE — NURSING NOTE
Chief Complaint   Patient presents with     Hospital F/U     Pt is here to follow up from the hospital.      Sylvia Colon LPN at 2:46 PM on 3/12/2020.

## 2020-03-12 NOTE — PATIENT INSTRUCTIONS
[facility] Medication Refill Request Information:  * Please contact your pharmacy regarding ANY request for medication refills.  ** [facility] Prescription Fax = [phone]  * Please allow 3 business days for routine medication refills.  * Please allow 5 business days for controlled substance medication refills.     [facility] Test Result notification information:  *You will be notified with in 7-10 days of your appointment day regarding the results of your test.  If you are on MyChart you will be notified as soon as the provider has reviewed the results and signed off on them.    [facility]: [phone]          [facility]         Internal Medicine Resident                   Continuity Clinic    Who We Are    Resident Continuity Clinic is a part of the [facility] Primary [facility].  Resident physicians see patients independently and establish a relationship with them over the course of their three-year residency program.  As with the Primary Care Clinic, our Resident Continuity Clinic models a group practice.  If your doctor is not available, you will be able to see another resident physician.  At the end of a resident s training, patients will be transitioned to a new resident physician for ongoing care.     We treat patients with a wide array of medical needs from routine physicals, to acute illnesses, to diabetes and blood pressure management, to complex medical illness.  What is a Resident Physician?    Resident physicians hold medical degrees and are doctors. They are training to become specialists in Internal Medicine. They work under the supervision of board-certified faculty physicians.  Expectations for Your Care    We strive to provide accessible, quality care at all times.    In order to provide this care, it is best to see your primary care resident doctor consistently rather switching between providers.  In the event you do see another physician, you should schedule  a follow-up visit with your usual primary care doctor.    If you are transitioning your care from another clinic, it is helpful to have your records available for your doctor to review.    We do not prescribe controlled substances, such as ADD medications or narcotic pain medications, on your first visit.  We will review your health records and concerns prior to devising a treatment plan with you in order to provide the best care.      Clinic Services     Extended clinic hours; patient  to help navigate your visit;  parking; laboratory and imaging services with evening and weekend hours    Multiple medical and surgical specialties in one building    Complementary services, including Nutrition, Integrative Medicine, Pharmacy consultations, Mental and Behavioral Health, Sports Medicine and Physical Therapy    Thank You    We would like to thank you for choosing the [facility] Internal Medicine Resident Continuity Clinic for your primary care. You are making a priceless contribution to the training of the next generation of health care practitioners.     Contact us at [phone] for appointments or questions.    Resident Clinic Hours are Tuesdays and Thursdays, 7:30am-5:00pm    Residents   Santos Collins MD  (Male)   Mendoza Carrizales MD   (Male)   Blanca Gomez MD  (Female)  Tatyana May MD   (Female)   Yarelis Rees MD   (Female)    Martin Tenorio MD    (Female)   Joel Hugo MD  (Male)   Steven Fournier MD  (Male)    Karma Ocasio MD  (Female)   Shaun Gomez MD  (Female)   Galo Yanes MD    (Female)   Abelino Bell MD  (Male)   Austyn Reeves MD  (Male)   Aleksandr Winslow MD  (Male)   ÁNGELA Barnett MD   (Male)   Geovanny Hermosillo MD  (Male)    Sonya Burris MD (Female)   Tyrell Chang MD  (Male)   Vishnu Arriaga MD  (Male)   Robles Navarro MD  (Male)   Radha Charlton MD    (Female)   Jose Hayes MD  (Female)     Supervising Physicians   MD Zheng Holman,  MD Salima Diop, MD Esteban Berumen, MD Julien Aguirre, MD Manuela Gimenez, MD Joseph Snyder, MD Rae Kidd MD          It was nice to see you today. Head down to the lab for a blood draw. Let's plan to meet again in 3 months. Try eating some bananas, rice, applesauce, dry toast to supplement your food intake.

## 2020-03-12 NOTE — PROGRESS NOTES
PRIMARY CARE CENTER       SUBJECTIVE:  Kian Cortez is a 65 year old female with a history of diverticulosis, OA of her R knee, dyspepsia, osteopenia, who presents for hospital follow up. The patient was recently seen in the ED on 3/9 for fever and bloody diarrhea, and was found to have Shigella. The patient was prescribed ciprofloxacin and zofran and was discharged to home. Continued to have blood diarrhea, including yesterday 3/11. Last fever was Monday 3/9. The patient has been having poor PO food intake, but has kept up her water intake. Still having intermittent mild abdominal pain. Feeling fatigued still. Symptoms started Adam 3/8 at night, following a trip to [place]; she left on Friday 3/6 and returned Saturday 3/7. Is not sure what foods she ate that may have caused her symptoms. Still having some nausea. Has not noticed any dark urine. The patient denies headache, vision changes, fever, chills, emesis, dizziness, lightheadedness, chest pain, shortness of breath, constipation, melena, dysuria or hematuria.     Medications and allergies reviewed by me today.     ROS:   10-point review of systems negative unless otherwise specified above.     OBJECTIVE:    /83   Pulse 77   Temp 98.1  F (36.7  C) (Oral)   Wt 96.2 kg (212 lb)   LMP 04/19/2006   SpO2 99%   BMI 38.78 kg/m     Wt Readings from Last 1 Encounters:   03/12/20 96.2 kg (212 lb)     Vital signs and nursing notes were reviewed by me.    General: Pleasant woman in NAD  HEENT: AT/NC, PERRL, EOMI, arcus senilis, anicteric sclerae, conjunctivae without injection, benign oropharynx, MMM  Neck: Supple, no palpable lymphadenopathy  Cardiovascular: RRR, normal S1 S2, no m/r/g, 2+ peripheral pulses, no peripheral edema  Respiratory: LCTAB, no w/r/r, normal respiratory effort  Abdominal: Soft, NTND, normal bowel sounds, no hepatosplenomegaly  Musculoskeletal: Normal bulk, no appreciable joint abnormalities  Neurologic: CN II-XII  grossly intact, no focal deficits, AAOx4    Laboratory and imaging results were reviewed by me.     ASSESSMENT/PLAN:  65 year old female with a history of diverticulosis, OA of her R knee, dyspepsia, osteopenia, who presents for follow up of Shigellosis, which appears to be improving.     Shigellosis: Patient presented with bloody diarrhea, improving with antibiotics. Will check kidney function to determine if kidney function is impaired, which may suggest HUS, though with resolution of symptoms, this is less likely. The patient should complete her course of antibiotics as prescribed. She should also consume a BRAT diet (bananas, rice, applesauce and dry toast) to enhance her PO food intake.  -     Basic metabolic panel; Future    The patient should return to clinic for follow up with me in 3 months.     Geovanny Hermosillo MD PhD  Mar 12, 2020    The patient was discussed with Dr. Joseph Kim, who agrees with the assessment and plan. Ms Cortez was discussed prior to the visit and the after dr Marshall had present his findings and her lab results I reviewed his plan and agreed  Joseph Kim MD

## 2020-03-16 LAB
BACTERIA SPEC CULT: NO GROWTH
BACTERIA SPEC CULT: NO GROWTH
SPECIMEN SOURCE: NORMAL
SPECIMEN SOURCE: NORMAL

## 2020-04-23 ENCOUNTER — VIRTUAL VISIT (OUTPATIENT)
Dept: INTERNAL MEDICINE | Facility: CLINIC | Age: 65
End: 2020-04-23
Payer: COMMERCIAL

## 2020-04-23 ENCOUNTER — TELEPHONE (OUTPATIENT)
Dept: INTERNAL MEDICINE | Facility: CLINIC | Age: 65
End: 2020-04-23

## 2020-04-23 DIAGNOSIS — G47.00 INSOMNIA, UNSPECIFIED TYPE: ICD-10-CM

## 2020-04-23 DIAGNOSIS — K21.9 GASTROESOPHAGEAL REFLUX DISEASE, ESOPHAGITIS PRESENCE NOT SPECIFIED: Primary | ICD-10-CM

## 2020-04-23 RX ORDER — OMEPRAZOLE 40 MG/1
40 CAPSULE, DELAYED RELEASE ORAL DAILY
Qty: 60 CAPSULE | Refills: 0 | Status: SHIPPED | OUTPATIENT
Start: 2020-04-23 | End: 2020-06-11

## 2020-04-23 ASSESSMENT — PAIN SCALES - GENERAL: PAINLEVEL: EXTREME PAIN (8)

## 2020-04-23 NOTE — PROGRESS NOTES
PRIMARY CARE CENTER       SUBJECTIVE:  Kian Cortez is a 65 year old female with a PMHx of diverticulosis, GERD, recent Shigella infection who presents via telephone visit with assistance of Oromo  due to abdominal pain.    Patient states that she has had worsening abdominal pain over the past two days that is accompanied by nausea, infrequent nonbloody/nonbilious vomiting. Her pain is epigastric and is burning in nature, it is worse with eating. She has found some relief with use of milk of magnesium; however, the pain continues to return. It has been refractory to pepcid. She states that she has previously used omeprazole for GERD symptoms (but stopped the medication once it resolved) and has tried to avoid acidic foods. She denies fevers, chills, dyspnea, diarrhea, blood in the stool, chest pain.    The patient is also concerned about difficulties staying asleep. She has had this concern for ~1 month for which she wakes up after 1 hour of sleep, is able to fall back asleep for a similar amount of time, then will wake again. She is unsure if her abdominal pain is related to her nighttime awakenings but denies having vomiting at night.     Medications and allergies reviewed by me today.       ROS:   7-point ROS otherwise negative except for that noted in Subjective.     CURRENT MEDICATIONS:  Medication List:   Current Outpatient Medications   Medication Sig     acetaminophen 500 MG CAPS Take 1,000 mg by mouth 3 times daily     calcium carbonate (TUMS) 500 MG chewable tablet Take 1 tablet (500 mg) by mouth 2 times daily     carboxymethylcellul-glycerin (OPTIVE) 0.5-0.9 % SOLN ophthalmic solution Place 1 drop into both eyes 4 times daily as needed for dry eyes     cetirizine (ZYRTEC) 10 MG tablet Take 1 tablet (10 mg) by mouth At Bedtime     cyclobenzaprine (FLEXERIL) 10 MG tablet Take 1 tablet (10 mg) by mouth 3 times daily as needed for muscle spasms     famotidine (PEPCID) 20 MG  tablet Take 1 tablet (20 mg) by mouth 2 times daily     Fish Oil-Cholecalciferol (FISH OIL + D3 PO) Take  by mouth daily.     ketoconazole (NIZORAL) 2 % external cream Apply topically daily To legs     omeprazole (PRILOSEC) 40 MG DR capsule Take 1 capsule (40 mg) by mouth daily     ondansetron (ZOFRAN) 4 MG tablet Take 1 tablet (4 mg) by mouth every 8 hours as needed for nausea     simethicone (MYLICON) 80 MG chewable tablet Take 1 tablet (80 mg) by mouth every 6 hours as needed for cramping     vitamin D3 (CHOLECALCIFEROL) 2000 units tablet Take 1 tablet by mouth daily     No current facility-administered medications for this visit.        OBJECTIVE:    LMP 04/19/2006    Wt Readings from Last 1 Encounters:   03/12/20 96.2 kg (212 lb)     No exam or vitals were obtain during this visit.        ASSESSMENT/PLAN:    Kian was seen today for pain and sleep problem.  Diagnoses and all orders for this visit:    Gastroesophageal reflux disease, esophagitis presence not specified  Location of pain, burning pain with eating, history of previous GERD consistent with GERD and possible PUD.   -     omeprazole (PRILOSEC) 40 MG DR capsule; Take 1 capsule (40 mg) by mouth daily  - Two month trial of omeprazole (proper use discussed). If symptoms are refractory, then consider H. Pylori testing.  - Continue milk of magnesium prn, as it helps acutely with symptoms.    Insomnia, unspecified type  - Stressed sleep hygiene, discussed that sleep may improve as abdominal concerns are treated.  -  On review of chart, patient was seen by sleep medicine and CBT was recommended. Pt should reconsider if symptoms persist.         Patient should return to clinic for f/u in 2 months (already scheduled with Dr. Hermosillo).     Questions and concerns were addressed. Kian Cortez participated in decision making and agrees with the above plan.    Heriberto Gomez MD, PhD  Internal Medicine, PGY-2  Pager: [phone]    Apr 23, 2020    Patient was   "discussed with Dr. Esteban Berumen.    ----    This patient is being evaluated via a billable telephone visit; THIS VISIT WAS INITIATED BY THE PT, as AN ALTERNATIVE TO IN PERSON VISIT .       The patient has has been notified of following:      \"This billable telephone visit will be conducted via a call between you and your physician/provider. We have found that certain health care needs can be provided without the need for a physical exam.  This service lets us provide the care you need with a short phone conversation.  If a prescription is necessary we can send it directly to your pharmacy.  If lab work is needed we can place an order for that and you can then stop by our lab to have the test done at a later time. We can also place orders for a limited number of imaging tests if they are deemed urgently necessary.     If during the course of the call the physician/provider feels a telephone visit is not appropriate, you will not be charged for this service.\"     Person spoken to: Kian Craigrafia with assistance of Oromo      Due to efforts to reduce the spread of COVID-19 in the clinic, state, nation, telephone visits are encouraged currently. Patient understands that diagnose and advice is limited by the inability to exam him/her/them face-to-face.    Time call initiated:  2:10 pm  Time call ended:  2:59 pm   Total length of call: 49 minutes (including 10 minutes of precepting time)  I Esteban Berumen MD , discussed this patient with the resident and agree with the resident s findings and plan of care as documented in the resident s note. I was present on the call with the patient for under 10 minutes      Internal Medicine  Primary Care Center   pager [phone]      "

## 2020-04-23 NOTE — TELEPHONE ENCOUNTER
[facility] Call Center    Phone Message    May a detailed message be left on voicemail: yes     Reason for Call: Symptoms or Concerns     If patient has red-flag symptoms, warm transfer to triage line    Current symptom or concern: Gastric Pain    Symptoms have been present for:  2 day(s)    Has patient previously been seen for this? No    By : NA    Date: NA    Are there any new or worsening symptoms? Yes: Worsening.  Pt would like to schedule a telephone visit ASAP with a dr.  She did not want to wait the 2 days.      Please call her back to discuss.         Action Taken: Message routed to:  [facility]    Travel Screening: Not Applicable

## 2020-04-23 NOTE — NURSING NOTE
Chief Complaint   Patient presents with     Pain     pt would like discuss gastric pain for 2 days       Frieda Galvez CMA, EMT at 1:47 PM on 4/23/2020.

## 2020-04-29 ENCOUNTER — VIRTUAL VISIT (OUTPATIENT)
Dept: INTERNAL MEDICINE | Facility: CLINIC | Age: 65
End: 2020-04-29
Payer: COMMERCIAL

## 2020-04-29 ENCOUNTER — APPOINTMENT (OUTPATIENT)
Dept: INTERPRETER SERVICES | Facility: CLINIC | Age: 65
End: 2020-04-29
Payer: COMMERCIAL

## 2020-04-29 DIAGNOSIS — M54.50 ACUTE LEFT-SIDED LOW BACK PAIN WITHOUT SCIATICA: ICD-10-CM

## 2020-04-29 DIAGNOSIS — B02.9 HERPES ZOSTER WITHOUT COMPLICATION: Primary | ICD-10-CM

## 2020-04-29 RX ORDER — GABAPENTIN 100 MG/1
100 CAPSULE ORAL 3 TIMES DAILY
Qty: 90 CAPSULE | Refills: 0 | Status: SHIPPED | OUTPATIENT
Start: 2020-04-29 | End: 2020-05-12

## 2020-04-29 RX ORDER — VALACYCLOVIR HYDROCHLORIDE 1 G/1
1000 TABLET, FILM COATED ORAL 3 TIMES DAILY
Qty: 21 TABLET | Refills: 0 | Status: SHIPPED | OUTPATIENT
Start: 2020-04-29 | End: 2020-05-12

## 2020-04-29 NOTE — NURSING NOTE
Chief Complaint   Patient presents with     Pain     Pt reports leg pain      SALMA Lopez at 1:47 PM sign on 4/29/2020

## 2020-04-29 NOTE — PROGRESS NOTES
"Kian Cortez is a 65 year old female who is being evaluated via a billable telephone visit.      The patient has been notified of following:     \"This telephone visit will be conducted via a call between you and your physician/provider. We have found that certain health care needs can be provided without the need for a physical exam.  This service lets us provide the care you need with a short phone conversation.  If a prescription is necessary we can send it directly to your pharmacy.  If lab work is needed we can place an order for that and you can then stop by our lab to have the test done at a later time.    Telephone visits are billed at different rates depending on your insurance coverage. During this emergency period, for some insurers they may be billed the same as an in-person visit.  Please reach out to your insurance provider with any questions.    If during the course of the call the physician/provider feels a telephone visit is not appropriate, you will not be charged for this service.\"    Patient has given verbal consent for Telephone visit?  Yes. Visit was conducted with an Oromo  over the phone and her daughter present during the call as well.    How would you like to obtain your AVS? Mail a copy    Subjective     Kian Cortez is a 65 year old female who presents to clinic today for the following health issues:    HPI    Leg pain--above the knee to the buttocks on the left leg. Pain has been present for \"a long time,\" initially started 10 years ago. She saw Dr. Hodges in Sports Medicine in 2017 and had an MRI completed of the low back, ultimately diagnosed with meralgia paresthetica. She worked as a  at the time, so they thought it was related to standing for long periods. Symptoms eventually improved with PT, but she continued to have numbness in the area.    Over the last 3 days, developed pain on the skin in the same area, on the upper/outer L thigh and buttocks. There is a red " "rash and small \"pimple\"-like lesion over the area. Denies any recent injuries or known triggers to exacerbate the pain. She is not working currently.    -------------------------------------    Patient Active Problem List   Diagnosis     Advanced directives, counseling/discussion     CARDIOVASCULAR SCREENING; LDL GOAL LESS THAN 130     Pseudoexfoliation syndrome, right eye     Gastroesophageal reflux disease without esophagitis     Osteopenia     Constipation, unspecified constipation type     Morbid obesity, unspecified obesity type (H)     Vertigo     BPPV (benign paroxysmal positional vertigo), unspecified laterality     Insomnia, unspecified type     Colitis     Fever     Diarrhea     Nausea with vomiting     Past Surgical History:   Procedure Laterality Date     COLONOSCOPY N/A 11/19/2019    Procedure: COLONOSCOPY;  Surgeon: Sandra Chu MD;  Location: [facility] OR     DILATION AND CURETTAGE SUCTION       EYE SURGERY      eye duct repair 10+ years  ago     NASOLACRIMAL DUCT PROBE/IRRG         Social History     Tobacco Use     Smoking status: Never Smoker     Smokeless tobacco: Never Used   Substance Use Topics     Alcohol use: No     Family History   Problem Relation Age of Onset     Other Cancer Brother      Cancer Brother      Glaucoma No family hx of      Macular Degeneration No family hx of      Diabetes No family hx of      Hypertension No family hx of      Cerebrovascular Disease No family hx of      Thyroid Disease No family hx of          Current Outpatient Medications   Medication Sig Dispense Refill     acetaminophen 500 MG CAPS Take 1,000 mg by mouth 3 times daily 250 capsule 99     calcium carbonate (TUMS) 500 MG chewable tablet Take 1 tablet (500 mg) by mouth 2 times daily 180 tablet 0     carboxymethylcellul-glycerin (OPTIVE) 0.5-0.9 % SOLN ophthalmic solution Place 1 drop into both eyes 4 times daily as needed for dry eyes 1 Bottle 11     cetirizine (ZYRTEC) 10 MG tablet Take 1 tablet (10 mg) by " mouth At Bedtime 28 tablet 0     cyclobenzaprine (FLEXERIL) 10 MG tablet Take 1 tablet (10 mg) by mouth 3 times daily as needed for muscle spasms 30 tablet 1     famotidine (PEPCID) 20 MG tablet Take 1 tablet (20 mg) by mouth 2 times daily 60 tablet 3     Fish Oil-Cholecalciferol (FISH OIL + D3 PO) Take  by mouth daily.       gabapentin (NEURONTIN) 100 MG capsule Take 1 capsule (100 mg) by mouth 3 times daily 90 capsule 0     ketoconazole (NIZORAL) 2 % external cream Apply topically daily To legs 120 g 3     omeprazole (PRILOSEC) 40 MG DR capsule Take 1 capsule (40 mg) by mouth daily 60 capsule 0     ondansetron (ZOFRAN) 4 MG tablet Take 1 tablet (4 mg) by mouth every 8 hours as needed for nausea 15 tablet 0     simethicone (MYLICON) 80 MG chewable tablet Take 1 tablet (80 mg) by mouth every 6 hours as needed for cramping 20 tablet 0     valACYclovir (VALTREX) 1000 mg tablet Take 1 tablet (1,000 mg) by mouth 3 times daily for 7 days 21 tablet 0     vitamin D3 (CHOLECALCIFEROL) 2000 units tablet Take 1 tablet by mouth daily 90 tablet 1       Reviewed and updated as needed this visit by Provider         Review of Systems   ROS COMP: Constitutional, HEENT, cardiovascular, pulmonary, gi and gu systems are negative, except as otherwise noted.       Objective   Reported vitals:  [facility] 04/19/2006    alert and no distress  PSYCH: Alert and oriented times 3; coherent speech, normal   rate and volume, able to articulate logical thoughts, able   to abstract reason, no tangential thoughts, no hallucinations   or delusions  Her affect is pleasant  RESP: No cough, no audible wheezing, able to talk in full sentences  Remainder of exam unable to be completed due to telephone visits    Diagnostic Test Results:  Labs reviewed in Epic        Assessment/Plan:  1. Acute left-sided low back pain without sciatica  Hx low back pain with radiculopathy, will refill gabapentin for nerve pain. Reviewed okay to start at night and evaluate  response, if making too sleepy during the day then can hold daytime doses. She also has a hx of meralgia paresthetica, reviewed avoiding tight-fitting garments, stay active/move when possible to avoid exacerbating symptoms.   - gabapentin (NEURONTIN) 100 MG capsule; Take 1 capsule (100 mg) by mouth 3 times daily  Dispense: 90 capsule; Refill: 0    2. Herpes zoster without complication  Given dermatomal pattern of reported rash with vesicular-type lesion, associated with recent return of nerve pain, will start empiric treatment for shingles. She would like to be seen in clinic in the next 2 days to re-evaluate. Will ask clinic coordinators to help facilitate scheduling this.   - valACYclovir (VALTREX) 1000 mg tablet; Take 1 tablet (1,000 mg) by mouth 3 times daily for 7 days  Dispense: 21 tablet; Refill: 0    Follow-up in 2 days in clinic for face-to-face visit.    Phone call duration:  29 minutes    EFRAIN Jesus CNP

## 2020-04-29 NOTE — Clinical Note
Hi, can you schedule Kian for a face-to-face visit on Friday with me in clinic for a follow-up? Thanks! Tori

## 2020-05-01 ENCOUNTER — OFFICE VISIT (OUTPATIENT)
Dept: INTERNAL MEDICINE | Facility: CLINIC | Age: 65
End: 2020-05-01
Payer: COMMERCIAL

## 2020-05-01 ENCOUNTER — TELEPHONE (OUTPATIENT)
Dept: INTERNAL MEDICINE | Facility: CLINIC | Age: 65
End: 2020-05-01

## 2020-05-01 VITALS
WEIGHT: 210.2 LBS | SYSTOLIC BLOOD PRESSURE: 123 MMHG | DIASTOLIC BLOOD PRESSURE: 86 MMHG | HEART RATE: 83 BPM | BODY MASS INDEX: 38.45 KG/M2 | OXYGEN SATURATION: 98 %

## 2020-05-01 DIAGNOSIS — B02.9 HERPES ZOSTER WITHOUT COMPLICATION: ICD-10-CM

## 2020-05-01 DIAGNOSIS — R10.13 ABDOMINAL PAIN, EPIGASTRIC: Primary | ICD-10-CM

## 2020-05-01 DIAGNOSIS — R11.0 NAUSEA: ICD-10-CM

## 2020-05-01 DIAGNOSIS — R10.13 ABDOMINAL PAIN, EPIGASTRIC: ICD-10-CM

## 2020-05-01 LAB
ALBUMIN SERPL-MCNC: 3.4 G/DL (ref 3.4–5)
ALP SERPL-CCNC: 93 U/L (ref 40–150)
ALT SERPL W P-5'-P-CCNC: 21 U/L (ref 0–50)
ANION GAP SERPL CALCULATED.3IONS-SCNC: 4 MMOL/L (ref 3–14)
AST SERPL W P-5'-P-CCNC: 21 U/L (ref 0–45)
BASOPHILS # BLD AUTO: 0.1 10E9/L (ref 0–0.2)
BASOPHILS NFR BLD AUTO: 1 %
BILIRUB SERPL-MCNC: 0.1 MG/DL (ref 0.2–1.3)
BUN SERPL-MCNC: 8 MG/DL (ref 7–30)
CALCIUM SERPL-MCNC: 9 MG/DL (ref 8.5–10.1)
CHLORIDE SERPL-SCNC: 106 MMOL/L (ref 94–109)
CO2 SERPL-SCNC: 27 MMOL/L (ref 20–32)
CREAT SERPL-MCNC: 0.62 MG/DL (ref 0.52–1.04)
DIFFERENTIAL METHOD BLD: NORMAL
EOSINOPHIL # BLD AUTO: 0.2 10E9/L (ref 0–0.7)
EOSINOPHIL NFR BLD AUTO: 3.2 %
ERYTHROCYTE [DISTWIDTH] IN BLOOD BY AUTOMATED COUNT: 13.6 % (ref 10–15)
GFR SERPL CREATININE-BSD FRML MDRD: >90 ML/MIN/{1.73_M2}
GLUCOSE SERPL-MCNC: 86 MG/DL (ref 70–99)
HCT VFR BLD AUTO: 43.5 % (ref 35–47)
HGB BLD-MCNC: 14.3 G/DL (ref 11.7–15.7)
IMM GRANULOCYTES # BLD: 0 10E9/L (ref 0–0.4)
IMM GRANULOCYTES NFR BLD: 0.2 %
LIPASE SERPL-CCNC: 113 U/L (ref 73–393)
LYMPHOCYTES # BLD AUTO: 1.2 10E9/L (ref 0.8–5.3)
LYMPHOCYTES NFR BLD AUTO: 23.8 %
MCH RBC QN AUTO: 30.7 PG (ref 26.5–33)
MCHC RBC AUTO-ENTMCNC: 32.9 G/DL (ref 31.5–36.5)
MCV RBC AUTO: 93 FL (ref 78–100)
MONOCYTES # BLD AUTO: 0.6 10E9/L (ref 0–1.3)
MONOCYTES NFR BLD AUTO: 12.6 %
NEUTROPHILS # BLD AUTO: 3 10E9/L (ref 1.6–8.3)
NEUTROPHILS NFR BLD AUTO: 59.2 %
NRBC # BLD AUTO: 0 10*3/UL
NRBC BLD AUTO-RTO: 0 /100
PLATELET # BLD AUTO: 179 10E9/L (ref 150–450)
POTASSIUM SERPL-SCNC: 4.6 MMOL/L (ref 3.4–5.3)
PROT SERPL-MCNC: 8 G/DL (ref 6.8–8.8)
RBC # BLD AUTO: 4.66 10E12/L (ref 3.8–5.2)
SODIUM SERPL-SCNC: 137 MMOL/L (ref 133–144)
WBC # BLD AUTO: 5 10E9/L (ref 4–11)

## 2020-05-01 RX ORDER — DIAPER,BRIEF,INFANT-TODD,DISP
EACH MISCELLANEOUS 2 TIMES DAILY
Qty: 110 G | Refills: 0 | Status: SHIPPED | OUTPATIENT
Start: 2020-05-01

## 2020-05-01 RX ORDER — ONDANSETRON 4 MG/1
4 TABLET, FILM COATED ORAL EVERY 6 HOURS PRN
Qty: 30 TABLET | Refills: 1 | Status: SHIPPED | OUTPATIENT
Start: 2020-05-01 | End: 2020-12-29

## 2020-05-01 RX ORDER — LIDOCAINE 50 MG/G
OINTMENT TOPICAL PRN
Qty: 150 G | Refills: 1 | Status: SHIPPED | OUTPATIENT
Start: 2020-05-01 | End: 2020-05-15

## 2020-05-01 ASSESSMENT — PAIN SCALES - GENERAL: PAINLEVEL: WORST PAIN (10)

## 2020-05-01 NOTE — NURSING NOTE
Chief Complaint   Patient presents with     Derm Problem     Pt has red rash along her left leg, small red clusters     SALMA Lopez at 1:33 PM sign on 5/1/2020

## 2020-05-01 NOTE — PATIENT INSTRUCTIONS
Patient Education     Shingles  Shingles is a viral infection caused by the same virus that causes chicken pox. Anyone who has had chicken pox may get shingles later in life. The virus stays in the body, but remains asleep (dormant). Shingles often occurs in older persons or persons with lowered immunity. But it can affect anyone at any age.  Shingles starts as a tingling patch of skin on one side of the body. Small, painful blisters may then appear. The rash rarely spreads to other parts of the body.  Exposure to shingles can't cause shingles. However, it can cause chicken pox in anyone who has not had chicken pox or has not been vaccinated. The contagious period ends when all blisters have crusted over, generally 1 to 2 weeks after the illness starts.  After the blisters heal, the affected skin may be sensitive or painful for weeks or months, gradually resolving over time. But, sometimes this can last longer and be permanent (called postherpetic neuralgia.)  Shingles vaccines are available. Vaccination can help prevent shingles or make it less painful. It is generally recommended for adults older than 50, even if you've had singles in the past. Talk with your healthcare provider about when to get vaccinated and which vaccine is best for you.  Home care    Medicines may be prescribed to help relieve pain. Take these medicines as directed. Ask your healthcare provider or pharmacist before using over-the-counter medicines for helping treat pain and itching.    In certain cases, antiviral medicines may be prescribed to reduce pain, shorten the illness, and prevent neuralgia. Take these medicines as directed.    Compresses made from a solution of cool water mixed with cornstarch or baking soda may help relieve pain and itching.     Gently wash skin daily with soap and water to help prevent infection. Be certain to rinse off all of the soap, which can be irritating.    Trim fingernails and try not to scratch. Scratching  the sores may leave scars.    Stay home from work or school until all blisters have formed a crust and you are no longer contagious.  Follow-up care  Follow up with your healthcare provider, or as directed.  When to seek medical advice    Fever of 100.4 F (38 C) or higher, or as directed by your healthcare provider    Affected skin is on the face or neck and any of the following occur:  ? Headache  ? Eye pain  ? Changes in vision  ? Sores near the eye  ? Weakness of facial muscles    Blisters occurring on new areas of the body    Pain, redness, or swelling of a joint    Signs of skin infection: colored drainage from the sores, warmth, increasing redness, fever, or increasing pain  Date Last Reviewed: 4/1/2018 2000-2019 [employer]. [address] [place] [address]. All rights reserved. This information is not intended as a substitute for professional medical care. Always follow your healthcare professional's instructions.           Complete the course of Valacyclovir (Valtrex) for the shingles. You can use the Gabapentin for nerve pain, Tylenol or Ibuprofen for pain, as well as the Lidocaine and Hydrocortisone creams.    Continue taking Omeprazole for your stomach symptoms. A refill was sent to the pharmacy for the Ondansetron (Zofran) for nausea.

## 2020-05-01 NOTE — PROGRESS NOTES
"Kian Cortez is a 65 year old female who comes in for    CC: leg pain, stomach problems  HPI:      Left upper/outer leg pain worse over the last couple days--pain on surface of skin. Rash spread. +vesicles. Started Valacyclovir 2 days ago for suspected shingles, and Gabapentin for pain.   Wants blood test done for \"GI problem.\" Feels nauseated. Taking omeprazole, helps somewhat. +epigastric pain. Worsens with eating anything--\"butter, juice, or vegetarian food.\" No vomiting. Bowel movements are regular, sometimes dry, but generally okay.   No fevers.     Other issues discussed today:     Patient Active Problem List   Diagnosis     Advanced directives, counseling/discussion     CARDIOVASCULAR SCREENING; LDL GOAL LESS THAN 130     Pseudoexfoliation syndrome, right eye     Gastroesophageal reflux disease without esophagitis     Osteopenia     Constipation, unspecified constipation type     Morbid obesity, unspecified obesity type (H)     Vertigo     BPPV (benign paroxysmal positional vertigo), unspecified laterality     Insomnia, unspecified type     Colitis     Fever     Diarrhea     Nausea with vomiting       Current Outpatient Medications   Medication Sig Dispense Refill     acetaminophen 500 MG CAPS Take 1,000 mg by mouth 3 times daily 250 capsule 99     calcium carbonate (TUMS) 500 MG chewable tablet Take 1 tablet (500 mg) by mouth 2 times daily 180 tablet 0     carboxymethylcellul-glycerin (OPTIVE) 0.5-0.9 % SOLN ophthalmic solution Place 1 drop into both eyes 4 times daily as needed for dry eyes 1 Bottle 11     cetirizine (ZYRTEC) 10 MG tablet Take 1 tablet (10 mg) by mouth At Bedtime 28 tablet 0     cyclobenzaprine (FLEXERIL) 10 MG tablet Take 1 tablet (10 mg) by mouth 3 times daily as needed for muscle spasms 30 tablet 1     famotidine (PEPCID) 20 MG tablet Take 1 tablet (20 mg) by mouth 2 times daily 60 tablet 3     Fish Oil-Cholecalciferol (FISH OIL + D3 PO) Take  by mouth daily.       gabapentin (NEURONTIN) " 100 MG capsule Take 1 capsule (100 mg) by mouth 3 times daily 90 capsule 0     ketoconazole (NIZORAL) 2 % external cream Apply topically daily To legs 120 g 3     omeprazole (PRILOSEC) 40 MG DR capsule Take 1 capsule (40 mg) by mouth daily 60 capsule 0     ondansetron (ZOFRAN) 4 MG tablet Take 1 tablet (4 mg) by mouth every 8 hours as needed for nausea 15 tablet 0     simethicone (MYLICON) 80 MG chewable tablet Take 1 tablet (80 mg) by mouth every 6 hours as needed for cramping 20 tablet 0     valACYclovir (VALTREX) 1000 mg tablet Take 1 tablet (1,000 mg) by mouth 3 times daily for 7 days 21 tablet 0     vitamin D3 (CHOLECALCIFEROL) 2000 units tablet Take 1 tablet by mouth daily 90 tablet 1         ALLERGIES: Patient has no known allergies.    PAST MEDICAL HX:   Past Medical History:   Diagnosis Date     Diverticulosis 11/2015    on CT scan     Functional dyspepsia      GERD (gastroesophageal reflux disease)      Insomnia     psychophysiologic     Osteoarthritis of right knee      Osteopenia 4/27/2015       PAST SURGICAL HX:   Past Surgical History:   Procedure Laterality Date     COLONOSCOPY N/A 11/19/2019    Procedure: COLONOSCOPY;  Surgeon: Sandra Chu MD;  Location: [facility]     DILATION AND CURETTAGE SUCTION       EYE SURGERY      eye duct repair 10+ years  ago     NASOLACRIMAL DUCT PROBE/IRRG         IMMUNIZATION HX:   Immunization History   Administered Date(s) Administered     HepA-Adult 06/13/2014     HepB-Adult 06/13/2014, 10/29/2014     Influenza (IIV3) PF 01/27/2006, 02/22/2012, 02/15/2013, 12/30/2016     Influenza Vaccine IM > 6 months Valent IIV4 10/17/2017, 02/06/2019, 10/24/2019     MMR 12/19/1997     Meningococcal (Menactra ) 01/27/2006     Poliovirus, inactivated (IPV) 01/27/2006, 11/07/2014     Rabies, Unspecified 01/27/2006, 02/03/2006, 02/14/2006     TD (ADULT, 7+) 12/19/1997     TDAP Vaccine (Adacel) 01/27/2006, 06/13/2014     TDAP Vaccine (Boostrix) 05/02/2016     Twinrix A/B 07/11/2018      Typhoid IM 01/27/2006, 07/11/2018     Yellow Fever 01/27/2006       SOCIAL HX:   Social History     Social History Narrative    Originally from [place]. Lives in [place] with her . Not working.       ROS:   CONSTITUTIONAL: no fatigue, no unexpected change in weight  SKIN: see HPI  EYES: no acute vision problems or changes  RESP: no significant cough, no shortness of breath  CV: no chest pain, no palpitations, no new or worsening peripheral edema  GI: see HPI    OBJECTIVE:  /86 (BP Location: Right arm, Patient Position: Sitting, Cuff Size: Adult Regular)   Pulse 83   Wt 95.3 kg (210 lb 3.2 oz)   LMP 04/19/2006   SpO2 98%   Breastfeeding No   BMI 38.45 kg/m     Wt Readings from Last 1 Encounters:   05/01/20 95.3 kg (210 lb 3.2 oz)     Constitutional: no distress, comfortable, pleasant, well-groomed  Eyes: anicteric, conjunctiva pink, normal extra-ocular movements   Cardiovascular: regular rate and rhythm, normal S1 and S2, no murmurs, rubs or gallops  Respiratory: clear to auscultation with good air movement bilaterally, no wheezes or crackles, non-labored  Gastrointestinal: positive bowel sounds, mild epigastric tenderness, no hepatosplenomegaly, no masses   Musculoskeletal: full range of motion, strength 5/5, no edema   Skin: no jaundice, temp normal, irregular erythematous rash with scattered vesicular lesions along L3-L4 dermatome of left leg  Psychological: appropriate mood, demonstrates intact judgment and logical thought process    ASSESSMENT/PLAN:    1. Abdominal pain, epigastric  Given persistent abdominal pain that worsens with eating fatty meal will obtain US today, as well as CMP, lipase, and CBC. Continue with Omeprazole as prescribed.  - Comprehensive metabolic panel; Future  - Lipase; Future  - US Abdomen complete; Future  - CBC with platelets differential; Future    2. Herpes zoster without complication  Recommended completing full course of Valacyclovir as prescribed. Continue  with gabapentin for nerve pain, may also use Tyelnol or Ibuprofen, or topical creams as below for pain.  - hydrocortisone (CORTAID) 1 % external ointment; Apply topically 2 times daily  Dispense: 110 g; Refill: 0  - lidocaine (XYLOCAINE) 5 % external ointment; Apply topically as needed for moderate pain  Dispense: 150 g; Refill: 1    3. Nausea  Refill provided. Stick to bland foods for now.  - ondansetron (ZOFRAN) 4 MG tablet; Take 1 tablet (4 mg) by mouth every 6 hours as needed for nausea  Dispense: 30 tablet; Refill: 1    FOLLOW UP: If not improving or if worsening    EFRAIN Jesus CNP

## 2020-05-01 NOTE — TELEPHONE ENCOUNTER
[facility] Call Center    Phone Message    May a detailed message be left on voicemail: yes     Reason for Call: Medication Question or concern regarding medication   Prescription Clarification  Name of Medication: lidocaine (XYLOCAINE) 5 % external ointment  Prescribing Provider: Julien   Pharmacy: [place], [place] - [address]    What on the order needs clarification? Norm from the pharmacy calling to get a new prescription sent over for this medication.  He states that for billing purposes, the directions need to include a maximum daily amount.  He also wanted to mention that they can only order the 35 gram tube, but will be able to get [place]assa the quantity based on the daily maximum dosage.  Please call the pharmacy back with any questions or concerns          Action Taken: Message routed to:  [facility]    Travel Screening: Not Applicable

## 2020-05-02 ENCOUNTER — ANCILLARY PROCEDURE (OUTPATIENT)
Dept: ULTRASOUND IMAGING | Facility: CLINIC | Age: 65
End: 2020-05-02
Attending: NURSE PRACTITIONER
Payer: COMMERCIAL

## 2020-05-02 DIAGNOSIS — R10.13 ABDOMINAL PAIN, EPIGASTRIC: ICD-10-CM

## 2020-05-04 ENCOUNTER — TELEPHONE (OUTPATIENT)
Dept: INTERNAL MEDICINE | Facility: CLINIC | Age: 65
End: 2020-05-04

## 2020-05-04 RX ORDER — LIDOCAINE 50 MG/G
OINTMENT TOPICAL PRN
Qty: 150 G | Refills: 0 | Status: SHIPPED | OUTPATIENT
Start: 2020-05-04 | End: 2020-06-11

## 2020-05-04 RX ORDER — LIDOCAINE 50 MG/G
OINTMENT TOPICAL PRN
Qty: 150 G | Refills: 0 | Status: SHIPPED | OUTPATIENT
Start: 2020-05-04 | End: 2020-05-04

## 2020-05-04 NOTE — TELEPHONE ENCOUNTER
I called Kian with the assistance on an  today. We reviewed normal lab and imaging results. Discussed that she can continue omeprazole, can continue to use zofran for nausea, can continue mild/blad diet. Discussed some bland food choices. Discussed that if symptoms worsen or do not improve over the next few weeks, she should follow up with clinic. Clinic number provided.    Results letter also sent per patient preference.    Shandra Victoria RN (Brasch)

## 2020-05-04 NOTE — LETTER
Patient:  Kian Cortez  :   1955  MRN:     [id number]        Ms. Kian Cortez  [address]   [place] [address]        May 4, 2020      Kian Guadalupe,     Here are the results of your abdominal ultrasound. It was normal.   Please take the omeprazole as we discussed during your appointment, and follow-up with us if your symptoms are not starting to improve or if worsening over the next few weeks.     I have reviewed your lab results from your most recent visit to the clinic. Everything is within a normal range. No changes are needed at this point.     Thank you for choosing [facility]. We appreciate the opportunity to serve you and look forward to supporting your healthcare needs in the future.   If you have any questions or concerns, please call me or my nurse at [phone].     Thank you,   EFRAIN Jesus CNP       Resulted Orders   US Abdomen complete    Narrative    EXAMINATION: US ABDOMEN COMPLETE,  2020 10:25 AM     COMPARISON: CT abdomen and pelvis without contrast 3/9/2020.    HISTORY: Epigastric abdominal pain    TECHNIQUE: The abdomen was scanned in standard fashion with  specialized ultrasound transducer(s) using both gray-scale and limited  color Doppler techniques.    FINDINGS:  Liver: The liver demonstrates slightly increased echogenicity. No  evidence of a focal hepatic mass although sonographic scanning window  somewhat limited due to adjacent ribs. The main portal vein is patent  with antegrade flow, measuring 25.4 cm/s. Main portal vein measures  0.9 cm.    Gallbladder:  There is no wall thickening, pericholecystic fluid,  positive sonographic Arreola's sign or evidence for cholelithiasis.    Bile Ducts: Both the intra- and extrahepatic biliary system are of  normal caliber.  The common bile duct measures 3 mm in diameter.    Pancreas: Visualized portions of the head and body of the pancreas are  unremarkable.     Kidneys: Both kidneys are of normal  echotexture, without mass or  hydronephrosis.   The craniocaudal dimensions are: right- 10.8 cm,  left- 10.6 cm.    Spleen: The spleen is normal in size,  measuring 11.0 cm in sagittal  dimension.    Aorta and IVC: The visualized portions of the aorta and IVC are  unremarkable. The proximal aorta measures 2.6 cm in diameter and the  IVC measures 1.8 cm in diameter.    Fluid: No evidence of ascites or pleural effusions.      Impression    IMPRESSION:   1.  Liver parenchyma may be slightly increased however no hepatic  steatosis noted on recent CT suggesting this may be technical. This  could be correlated with liver function tests if there is any concern  clinically.  2.  Normal appearing gallbladder. No biliary dilatation.    DARRYN GANNON MD       Component      Latest Ref Rng & Units 5/1/2020   WBC      4.0 - 11.0 10e9/L 5.0   RBC Count      3.8 - 5.2 10e12/L 4.66   Hemoglobin      11.7 - 15.7 g/dL 14.3   Hematocrit      35.0 - 47.0 % 43.5   MCV      78 - 100 fl 93   MCH      26.5 - 33.0 pg 30.7   MCHC      31.5 - 36.5 g/dL 32.9   RDW      10.0 - 15.0 % 13.6   Platelet Count      150 - 450 10e9/L 179   Diff Method       Automated Method   % Neutrophils      % 59.2   % Lymphocytes      % 23.8   % Monocytes      % 12.6   % Eosinophils      % 3.2   % Basophils      % 1.0   % Immature Granulocytes      % 0.2   Nucleated RBCs      0 /100 0   Absolute Neutrophil      1.6 - 8.3 10e9/L 3.0   Absolute Lymphocytes      0.8 - 5.3 10e9/L 1.2   Absolute Monocytes      0.0 - 1.3 10e9/L 0.6   Absolute Eosinophils      0.0 - 0.7 10e9/L 0.2   Absolute Basophils      0.0 - 0.2 10e9/L 0.1   Abs Immature Granulocytes      0 - 0.4 10e9/L 0.0   Absolute Nucleated RBC       0.0   Sodium      133 - 144 mmol/L 137   Potassium      3.4 - 5.3 mmol/L 4.6   Chloride      94 - 109 mmol/L 106   Carbon Dioxide      20 - 32 mmol/L 27   Anion Gap      3 - 14 mmol/L 4   Glucose      70 - 99 mg/dL 86   Urea Nitrogen      7 - 30 mg/dL 8    Creatinine      0.52 - 1.04 mg/dL 0.62   GFR Estimate      >60 mL/min/1.73:m2 >90   GFR Estimate If Black      >60 mL/min/1.73:m2 >90   Calcium      8.5 - 10.1 mg/dL 9.0   Bilirubin Total      0.2 - 1.3 mg/dL 0.1 (L)   Albumin      3.4 - 5.0 g/dL 3.4   Protein Total      6.8 - 8.8 g/dL 8.0   Alkaline Phosphatase      40 - 150 U/L 93   ALT      0 - 50 U/L 21   AST      0 - 45 U/L 21   Lipase      73 - 393 U/L 113

## 2020-05-11 ENCOUNTER — TELEPHONE (OUTPATIENT)
Dept: INTERNAL MEDICINE | Facility: CLINIC | Age: 65
End: 2020-05-11

## 2020-05-11 NOTE — TELEPHONE ENCOUNTER
[facility] Call Center    Phone Message    May a detailed message be left on voicemail: no     Reason for Call: Other: 2nd call from Pt already due to being anxious about her pain levels, Pt is expecting quick call from Shandra.      Action Taken: Message routed to:  [facility]    Travel Screening: Not Applicable

## 2020-05-11 NOTE — TELEPHONE ENCOUNTER
[facility] Call Center    Phone Message    May a detailed message be left on voicemail: yes     Reason for Call: Other:      Pt calling in asking to speak with Shandra.    Kian reports she is still having pain and Shandra asked her to call back if the pain did not go away.     Please call Kian back to discuss.         Action Taken: Message routed to:  [facility]    Travel Screening: Not Applicable

## 2020-05-11 NOTE — TELEPHONE ENCOUNTER
[facility] Call Center    Phone Message    May a detailed message be left on voicemail: yes     Reason for Call: Other: Pt's daughter is calling back to FU on message below. Please call pt ASAP. Thank you.     Action Taken: Message routed to:  [facility]    Travel Screening: Not Applicable

## 2020-05-11 NOTE — TELEPHONE ENCOUNTER
[facility] Call Center    Phone Message    May a detailed message be left on voicemail: yes     Reason for Call: Medication Request  Prescription Clarification  Name of Medication: Pain Medication  Prescribing Provider: Tori Ramirez [facility]   Pharmacy: [facility] [place], [place] - [address] [place] [address]. [place]    What on the order needs clarification?  Patient states she is in pain. Patient states this is her third time calling today. Patient's daughter would like for any nurse or any provider to give the patient a call back. Patient is having pain where she had the shingles. Patient cannot bear the pain anymore and would like medication to help with the pain. Patient cannot take Ibuprofen. Patient is taking Tylenol, but it is not helping. Patient's daughter states she has taken the maximum strength of 4000 mg in 24 hours and through out the weekend and it is still not helping. Please advise.    Action Taken: Message routed to:  [facility]    Travel Screening: Not Applicable

## 2020-05-12 ENCOUNTER — VIRTUAL VISIT (OUTPATIENT)
Dept: INTERNAL MEDICINE | Facility: CLINIC | Age: 65
End: 2020-05-12
Payer: COMMERCIAL

## 2020-05-12 ENCOUNTER — APPOINTMENT (OUTPATIENT)
Dept: INTERPRETER SERVICES | Facility: CLINIC | Age: 65
End: 2020-05-12
Payer: COMMERCIAL

## 2020-05-12 DIAGNOSIS — B02.29 OTHER POSTHERPETIC NERVOUS SYSTEM INVOLVEMENT: Primary | ICD-10-CM

## 2020-05-12 DIAGNOSIS — M54.50 ACUTE LEFT-SIDED LOW BACK PAIN WITHOUT SCIATICA: ICD-10-CM

## 2020-05-12 RX ORDER — GABAPENTIN 300 MG/1
300 CAPSULE ORAL 3 TIMES DAILY
Qty: 90 CAPSULE | Refills: 0 | Status: SHIPPED | OUTPATIENT
Start: 2020-05-12 | End: 2020-06-11

## 2020-05-12 NOTE — Clinical Note
Hi, can you schedule Kian for a video visit with me on Friday? Or if Friday doesn't work for her, a virtual visit with anyone on Thursday could also be offered. Thanks! -Tori

## 2020-05-12 NOTE — TELEPHONE ENCOUNTER
[facility] Call Center    Phone Message    May a detailed message be left on voicemail: yes     Reason for Call: Other: Pt calling back and has more questions - please return her call - Thanks     Action Taken: Message routed to:  [facility]    Travel Screening: Not Applicable

## 2020-05-12 NOTE — TELEPHONE ENCOUNTER
I called Kian with an  to follow up on phone calls from yesterday. I was not working yesterday, so I explained that I was unable to call until today. Kian reported that she did speak with a physician yesterday, and she was given a prescription (patient provided a short script for oxycodone). I asked Kian if she is still taking the gabapentin as well, though she disconnected the line before the  could translate my question.    If patient continues to have pain, recommend she follow up with an appointment.    Shandra (Jessica) JIGNA Victoria

## 2020-05-12 NOTE — NURSING NOTE
"Chief Complaint   Patient presents with     Shingles     Pt still having pain and other symptoms     Kimberly Nissen, EMT at 2:10 PM on 5/12/2020    Kian Cortez is a 65 year old female who is being evaluated via a billable video visit.      The patient has been notified of following:     \"This video visit will be conducted via a call between you and your physician/provider. We have found that certain health care needs can be provided without the need for an in-person physical exam.  This service lets us provide the care you need with a video conversation.  If a prescription is necessary we can send it directly to your pharmacy.  If lab work is needed we can place an order for that and you can then stop by our lab to have the test done at a later time.    Video visits are billed at different rates depending on your insurance coverage.  Please reach out to your insurance provider with any questions.    If during the course of the call the physician/provider feels a video visit is not appropriate, you will not be charged for this service.\"    Patient has given verbal consent for Video visit? Yes    How would you like to obtain your AVS? Mail a copy    Patient would like the video invitation sent by: TEXT: [phone]    Will anyone else be joining your video visit?           "

## 2020-05-12 NOTE — PROGRESS NOTES
"Kian Cortez is a 65 year old female who is being evaluated via a billable video visit.      The patient has been notified of following:     \"This video visit will be conducted via a call between you and your physician/provider. We have found that certain health care needs can be provided without the need for an in-person physical exam.  This service lets us provide the care you need with a video conversation.  If a prescription is necessary we can send it directly to your pharmacy.  If lab work is needed we can place an order for that and you can then stop by our lab to have the test done at a later time.    Video visits are billed at different rates depending on your insurance coverage.  Please reach out to your insurance provider with any questions.    If during the course of the call the physician/provider feels a video visit is not appropriate, you will not be charged for this service.\"    Patient has given verbal consent for Video visit? Yes    How would you like to obtain your AVS? Mail a copy    Patient would like the video invitation sent by: Text to cell phone: [phone]     Will anyone else be joining your video visit? Yes: Daughter Kenneth, sitting in same room.    Subjective     Kian Cortez is a 65 year old female who presents today via video visit for the following health issues:    HPI  ***  Gabapentin 200 mg TID, helping a little, but pain still severe.  Tried Oxycodone twice, after the second dose was vomiting and dizzy, and felt clogging in the ear, felt like she might black out, then vomited this AM. It did help with the pain, however. Otherwise Tyelnol for pain, helped a little, but still had pain, only lasted 3-4 hours, was maxing out daily dose. Took zofran afterwards.   Rash seems to be improving/crusting over  Pain on L side of body, aching/burning up to breast, down to knee.  No CP or SOB. No unilateral weakness. Feels stiff with walking.    Video Start Time: 3:30 " PM    -------------------------------------    Patient Active Problem List   Diagnosis     Advanced directives, counseling/discussion     CARDIOVASCULAR SCREENING; LDL GOAL LESS THAN 130     Pseudoexfoliation syndrome, right eye     Gastroesophageal reflux disease without esophagitis     Osteopenia     Constipation, unspecified constipation type     Morbid obesity, unspecified obesity type (H)     Vertigo     BPPV (benign paroxysmal positional vertigo), unspecified laterality     Insomnia, unspecified type     Colitis     Fever     Diarrhea     Nausea with vomiting     Past Surgical History:   Procedure Laterality Date     COLONOSCOPY N/A 11/19/2019    Procedure: COLONOSCOPY;  Surgeon: Sandra Chu MD;  Location: [facility]     DILATION AND CURETTAGE SUCTION       EYE SURGERY      eye duct repair 10+ years  ago     NASOLACRIMAL DUCT PROBE/IRRG         Social History     Tobacco Use     Smoking status: Never Smoker     Smokeless tobacco: Never Used   Substance Use Topics     Alcohol use: No     Family History   Problem Relation Age of Onset     Other Cancer Brother      Cancer Brother      Glaucoma No family hx of      Macular Degeneration No family hx of      Diabetes No family hx of      Hypertension No family hx of      Cerebrovascular Disease No family hx of      Thyroid Disease No family hx of          Current Outpatient Medications   Medication Sig Dispense Refill     acetaminophen 500 MG CAPS Take 1,000 mg by mouth 3 times daily 250 capsule 99     calcium carbonate (TUMS) 500 MG chewable tablet Take 1 tablet (500 mg) by mouth 2 times daily 180 tablet 0     carboxymethylcellul-glycerin (OPTIVE) 0.5-0.9 % SOLN ophthalmic solution Place 1 drop into both eyes 4 times daily as needed for dry eyes 1 Bottle 11     cetirizine (ZYRTEC) 10 MG tablet Take 1 tablet (10 mg) by mouth At Bedtime 28 tablet 0     cyclobenzaprine (FLEXERIL) 10 MG tablet Take 1 tablet (10 mg) by mouth 3 times daily as needed for muscle spasms 30  "tablet 1     famotidine (PEPCID) 20 MG tablet Take 1 tablet (20 mg) by mouth 2 times daily 60 tablet 3     Fish Oil-Cholecalciferol (FISH OIL + D3 PO) Take  by mouth daily.       gabapentin (NEURONTIN) 100 MG capsule Take 1 capsule (100 mg) by mouth 3 times daily 90 capsule 0     hydrocortisone (CORTAID) 1 % external ointment Apply topically 2 times daily 110 g 0     ketoconazole (NIZORAL) 2 % external cream Apply topically daily To legs 120 g 3     lidocaine (XYLOCAINE) 5 % external ointment Apply topically as needed for moderate pain (apply 5 grams over rash on left leg, not to exceed 20 grams per day. Use until rash heals) 150 g 0     lidocaine (XYLOCAINE) 5 % external ointment Apply topically as needed for moderate pain 150 g 1     omeprazole (PRILOSEC) 40 MG DR capsule Take 1 capsule (40 mg) by mouth daily 60 capsule 0     ondansetron (ZOFRAN) 4 MG tablet Take 1 tablet (4 mg) by mouth every 6 hours as needed for nausea 30 tablet 1     oxyCODONE (ROXICODONE) 5 MG tablet Take 1 tablet (5 mg) by mouth every 6 hours as needed for severe pain 6 tablet 0     simethicone (MYLICON) 80 MG chewable tablet Take 1 tablet (80 mg) by mouth every 6 hours as needed for cramping 20 tablet 0     vitamin D3 (CHOLECALCIFEROL) 2000 units tablet Take 1 tablet by mouth daily 90 tablet 1     valACYclovir (VALTREX) 1000 mg tablet Take 1 tablet (1,000 mg) by mouth 3 times daily for 7 days 21 tablet 0       Reviewed and updated as needed this visit by Provider         Review of Systems   Constitutional, HEENT, cardiovascular, pulmonary, gi and gu systems are negative, except as otherwise noted.      Objective    LMP 04/19/2006   Estimated body mass index is 38.45 kg/m  as calculated from the following:    Height as of 11/19/19: 1.575 m (5' 2\").    Weight as of 5/1/20: 95.3 kg (210 lb 3.2 oz).  Physical Exam     {video visit exam brief selected:220977::\"GENERAL: Healthy, alert and no distress\",\"EYES: Eyes grossly normal to inspection.  No " "discharge or erythema, or obvious scleral/conjunctival abnormalities.\",\"RESP: No audible wheeze, cough, or visible cyanosis.  No visible retractions or increased work of breathing.  \",\"SKIN: Visible skin clear. No significant rash, abnormal pigmentation or lesions.\",\"NEURO: Cranial nerves grossly intact.  Mentation and speech appropriate for age.\",\"PSYCH: Mentation appears normal, affect normal/bright, judgement and insight intact, normal speech and appearance well-groomed.\"}      {Diagnostic Test Results (Optional):886763::\"Diagnostic Test Results:\",\"Labs reviewed in Epic\"}        {PROVIDER CHARTING PREFERENCE:143092}      Video-Visit Details    Type of service:  Video Visit    Video End Time:3:50 PM    Originating Location (pt. Location): {video visit patient location:433615::\"Home\"}    Distant Location (provider location):  [facility]     Platform used for Video Visit: {Virtual Visit Platforms:358698::\"[employer]\"}    No follow-ups on file.       {signature options:764939}        "

## 2020-05-13 NOTE — PROGRESS NOTES
"Kian Cortez is a 65 year old female who is being evaluated via a billable video visit.      The patient has been notified of following:     \"This video visit will be conducted via a call between you and your physician/provider. We have found that certain health care needs can be provided without the need for an in-person physical exam.  This service lets us provide the care you need with a video conversation.  If a prescription is necessary we can send it directly to your pharmacy.  If lab work is needed we can place an order for that and you can then stop by our lab to have the test done at a later time.    Video visits are billed at different rates depending on your insurance coverage.  Please reach out to your insurance provider with any questions.    If during the course of the call the physician/provider feels a video visit is not appropriate, you will not be charged for this service.\"    Patient has given verbal consent for Video visit? Yes    How would you like to obtain your AVS? Mail a copy    Patient would like the video invitation sent by: Text to cell phone: [phone]     Will anyone else be joining your video visit? Yes: Daughter Kenneth, sitting in same room.    Subjective     Kian Cortez is a 65 year old female who presents today via video visit for the following health issues:    HPI    I saw Kian on 4/29 for pain and rash in L leg, started Valacyclovir for presumed shingles. She followed-up in clinic on 5/1, had persistent pain, rash consistent with shingles, encouraged to take Gabapentin for nerve pain and complete full course Valacyclovir.  Since then, the pain has persisted, had increased Gabapentin to 200 mg TID, helping a little, but pain still severe.   Called the clinic yesterday looking for help with the pain. Was prescribed 6 tablets Oxycodone 5 mg.  Tried Oxycodone twice, after the second dose was dizzy, and felt clogging in the ear, felt like she might black out. Had one episode of " vomiting. Took zofran afterwards.  The Oxycodone did help with the pain, however. Otherwise was taking Tylenol around the clock for pain, helped a little, but still had pain, only lasted 3-4 hours, was maxing out daily dose.    She does have a hx BPPV, but felt the dizziness and vomiting episode was directly related to timing of Oxycodone.  Rash seems to be improving/crusting over  Pain on L side of body, aching/burning up to breast, down to knee.  No CP or SOB. No unilateral weakness. Feels stiff with walking.    Video Start Time: 3:30 PM    -------------------------------------    Patient Active Problem List   Diagnosis     Advanced directives, counseling/discussion     CARDIOVASCULAR SCREENING; LDL GOAL LESS THAN 130     Pseudoexfoliation syndrome, right eye     Gastroesophageal reflux disease without esophagitis     Osteopenia     Constipation, unspecified constipation type     Morbid obesity, unspecified obesity type (H)     Vertigo     BPPV (benign paroxysmal positional vertigo), unspecified laterality     Insomnia, unspecified type     Colitis     Fever     Diarrhea     Nausea with vomiting     Past Surgical History:   Procedure Laterality Date     COLONOSCOPY N/A 11/19/2019    Procedure: COLONOSCOPY;  Surgeon: Sandra Chu MD;  Location: [facility]     DILATION AND CURETTAGE SUCTION       EYE SURGERY      eye duct repair 10+ years  ago     NASOLACRIMAL DUCT PROBE/IRRG         Social History     Tobacco Use     Smoking status: Never Smoker     Smokeless tobacco: Never Used   Substance Use Topics     Alcohol use: No     Family History   Problem Relation Age of Onset     Other Cancer Brother      Cancer Brother      Glaucoma No family hx of      Macular Degeneration No family hx of      Diabetes No family hx of      Hypertension No family hx of      Cerebrovascular Disease No family hx of      Thyroid Disease No family hx of          Current Outpatient Medications   Medication Sig Dispense Refill      acetaminophen 500 MG CAPS Take 1,000 mg by mouth 3 times daily 250 capsule 99     calcium carbonate (TUMS) 500 MG chewable tablet Take 1 tablet (500 mg) by mouth 2 times daily 180 tablet 0     carboxymethylcellul-glycerin (OPTIVE) 0.5-0.9 % SOLN ophthalmic solution Place 1 drop into both eyes 4 times daily as needed for dry eyes 1 Bottle 11     cetirizine (ZYRTEC) 10 MG tablet Take 1 tablet (10 mg) by mouth At Bedtime 28 tablet 0     cyclobenzaprine (FLEXERIL) 10 MG tablet Take 1 tablet (10 mg) by mouth 3 times daily as needed for muscle spasms 30 tablet 1     famotidine (PEPCID) 20 MG tablet Take 1 tablet (20 mg) by mouth 2 times daily 60 tablet 3     Fish Oil-Cholecalciferol (FISH OIL + D3 PO) Take  by mouth daily.       gabapentin (NEURONTIN) 100 MG capsule Take 1 capsule (100 mg) by mouth 3 times daily 90 capsule 0     hydrocortisone (CORTAID) 1 % external ointment Apply topically 2 times daily 110 g 0     ketoconazole (NIZORAL) 2 % external cream Apply topically daily To legs 120 g 3     lidocaine (XYLOCAINE) 5 % external ointment Apply topically as needed for moderate pain (apply 5 grams over rash on left leg, not to exceed 20 grams per day. Use until rash heals) 150 g 0     lidocaine (XYLOCAINE) 5 % external ointment Apply topically as needed for moderate pain 150 g 1     omeprazole (PRILOSEC) 40 MG DR capsule Take 1 capsule (40 mg) by mouth daily 60 capsule 0     ondansetron (ZOFRAN) 4 MG tablet Take 1 tablet (4 mg) by mouth every 6 hours as needed for nausea 30 tablet 1     oxyCODONE (ROXICODONE) 5 MG tablet Take 1 tablet (5 mg) by mouth every 6 hours as needed for severe pain 6 tablet 0     simethicone (MYLICON) 80 MG chewable tablet Take 1 tablet (80 mg) by mouth every 6 hours as needed for cramping 20 tablet 0     vitamin D3 (CHOLECALCIFEROL) 2000 units tablet Take 1 tablet by mouth daily 90 tablet 1     valACYclovir (VALTREX) 1000 mg tablet Take 1 tablet (1,000 mg) by mouth 3 times daily for 7 days 21  "tablet 0       Reviewed and updated as needed this visit by Provider         Review of Systems   Constitutional, HEENT, cardiovascular, pulmonary, gi and gu systems are negative, except as otherwise noted.      Objective    LMP 04/19/2006   Estimated body mass index is 38.45 kg/m  as calculated from the following:    Height as of 11/19/19: 1.575 m (5' 2\").    Weight as of 5/1/20: 95.3 kg (210 lb 3.2 oz).  Physical Exam     GENERAL: Healthy, alert and no distress  EYES: Eyes grossly normal to inspection.  No discharge or erythema, or obvious scleral/conjunctival abnormalities.  RESP: No audible wheeze, cough, or visible cyanosis.  No visible retractions or increased work of breathing.    SKIN: fading erythematous irregular rash along left L3-L4 dermatome  NEURO: Cranial nerves grossly intact.  Mentation and speech appropriate for age.  PSYCH: Mentation appears normal, affect normal/bright, judgement and insight intact, normal speech and appearance well-groomed.      Diagnostic Test Results:  none         Assessment & Plan     1. Acute left-sided low back pain without sciatica  2. Other postherpetic nervous system involvement  Refill provided--can increase Gabapentin to 300 mg for nerve pain. Encouraged movement/physical activity as able.   Okay to continue with Tylenol.  Discussed symptoms could also be related to BPPV as she also felt a plugging/clogged sensation in her L ear at the time of her dizziness and vomiting, but they feel it was related to the Oxycodone.  Can try taking 1/2 tablet (2.5 mg) Oxycodone instead of full tablet to see if this helps alleviate the pain without the side effects of nausea. Encouraged taking Zofran if taking Oxycodone again to try to help prevent nausea rather than waiting until after vomiting. Per chart review, appears she previously took Tramadol, may tolerate this better if she continues to have severe pain going forward.   She requests a follow-up visit in 2-3 days, will " schedule.  - gabapentin (NEURONTIN) 300 MG capsule; Take 1 capsule (300 mg) by mouth 3 times daily  Dispense: 90 capsule; Refill: 0    Video-Visit Details    Type of service:  Video Visit    Video End Time:3:50 PM    Originating Location (pt. Location): Home    Distant Location (provider location):  [facility]     Platform used for Video Visit: Anders    F/u in 2-3 days.      EFRAIN Jesus CNP

## 2020-05-15 ENCOUNTER — VIRTUAL VISIT (OUTPATIENT)
Dept: INTERNAL MEDICINE | Facility: CLINIC | Age: 65
End: 2020-05-15
Payer: COMMERCIAL

## 2020-05-15 ENCOUNTER — APPOINTMENT (OUTPATIENT)
Dept: INTERPRETER SERVICES | Facility: CLINIC | Age: 65
End: 2020-05-15
Payer: COMMERCIAL

## 2020-05-15 ENCOUNTER — TELEPHONE (OUTPATIENT)
Dept: INTERNAL MEDICINE | Facility: CLINIC | Age: 65
End: 2020-05-15

## 2020-05-15 DIAGNOSIS — B02.29 OTHER POSTHERPETIC NERVOUS SYSTEM INVOLVEMENT: Primary | ICD-10-CM

## 2020-05-15 RX ORDER — TRAMADOL HYDROCHLORIDE 50 MG/1
25-50 TABLET ORAL EVERY 6 HOURS PRN
Qty: 30 TABLET | Refills: 0 | Status: SHIPPED | OUTPATIENT
Start: 2020-05-15 | End: 2020-12-29

## 2020-05-15 ASSESSMENT — PAIN SCALES - GENERAL: PAINLEVEL: EXTREME PAIN (8)

## 2020-05-15 NOTE — NURSING NOTE
Chief Complaint   Patient presents with     Shingles     Follow up on shingles.     SAILAJA Persaud 8:20 AM  5/15/2020

## 2020-05-15 NOTE — Clinical Note
Hi, Kian is requesting a follow-up in 1 week. I'll be out of clinic, but she could see any provider. I expect that a virtual visit would be fine. Thanks! -Tori

## 2020-05-15 NOTE — PROGRESS NOTES
"Kian Cortez is a 65 year old female who is being evaluated via a billable video visit.      The patient has been notified of following:     \"This video visit will be conducted via a call between you and your physician/provider. We have found that certain health care needs can be provided without the need for an in-person physical exam.  This service lets us provide the care you need with a video conversation.  If a prescription is necessary we can send it directly to your pharmacy.  If lab work is needed we can place an order for that and you can then stop by our lab to have the test done at a later time.    Video visits are billed at different rates depending on your insurance coverage.  Please reach out to your insurance provider with any questions.    If during the course of the call the physician/provider feels a video visit is not appropriate, you will not be charged for this service.\"    Patient has given verbal consent for Video visit? Yes--no answer after multiple invitations via Mixbook, visit was done by telephone with pt's verbal consent. [place]  on the line, patient's daughter also present for the call.     How would you like to obtain your AVS? MyChart    Patient would like the video invitation sent by: Send to text to: [phone]  Will anyone else be joining your video visit? No    Subjective     Kian Cortez is a 65 year old female who presents today via video visit for the following health issues:    HPI    Follow-up shingles pain--increased Gabapentin to 300 mg TID during our virtual visit on 5/12/20. She had experienced nausea and dizziness with a 5 mg tablet Oxycodone, discussed can try taking 1/2 tablet along with Zofran to try to prevent nausea. She did try a 1/2 dose of the Oxycodone (2.5 mg) but did still feel dizzy and nauseated with this, so she stopped taking it.     Gets a little relief when taking gabapentin, but otherwise pain is still present as before, extends from L armpit " "to L knee. Rash continues to decrease, not spreading. One particular spot on the leg \"makes my body burn.\"      Video Start Time: 8:32 AM--no answer  Telephone 29 min    -------------------------------------    Patient Active Problem List   Diagnosis     Advanced directives, counseling/discussion     CARDIOVASCULAR SCREENING; LDL GOAL LESS THAN 130     Pseudoexfoliation syndrome, right eye     Gastroesophageal reflux disease without esophagitis     Osteopenia     Constipation, unspecified constipation type     Morbid obesity, unspecified obesity type (H)     Vertigo     BPPV (benign paroxysmal positional vertigo), unspecified laterality     Insomnia, unspecified type     Colitis     Fever     Diarrhea     Nausea with vomiting     Past Surgical History:   Procedure Laterality Date     COLONOSCOPY N/A 11/19/2019    Procedure: COLONOSCOPY;  Surgeon: Sandra Chu MD;  Location: [facility]     DILATION AND CURETTAGE SUCTION       EYE SURGERY      eye duct repair 10+ years  ago     NASOLACRIMAL DUCT PROBE/IRRG         Social History     Tobacco Use     Smoking status: Never Smoker     Smokeless tobacco: Never Used   Substance Use Topics     Alcohol use: No     Family History   Problem Relation Age of Onset     Other Cancer Brother      Cancer Brother      Glaucoma No family hx of      Macular Degeneration No family hx of      Diabetes No family hx of      Hypertension No family hx of      Cerebrovascular Disease No family hx of      Thyroid Disease No family hx of          Current Outpatient Medications   Medication Sig Dispense Refill     acetaminophen 500 MG CAPS Take 1,000 mg by mouth 3 times daily 250 capsule 99     calcium carbonate (TUMS) 500 MG chewable tablet Take 1 tablet (500 mg) by mouth 2 times daily 180 tablet 0     carboxymethylcellul-glycerin (OPTIVE) 0.5-0.9 % SOLN ophthalmic solution Place 1 drop into both eyes 4 times daily as needed for dry eyes 1 Bottle 11     cetirizine (ZYRTEC) 10 MG tablet Take 1 " "tablet (10 mg) by mouth At Bedtime 28 tablet 0     cyclobenzaprine (FLEXERIL) 10 MG tablet Take 1 tablet (10 mg) by mouth 3 times daily as needed for muscle spasms 30 tablet 1     famotidine (PEPCID) 20 MG tablet Take 1 tablet (20 mg) by mouth 2 times daily 60 tablet 3     Fish Oil-Cholecalciferol (FISH OIL + D3 PO) Take  by mouth daily.       gabapentin (NEURONTIN) 300 MG capsule Take 1 capsule (300 mg) by mouth 3 times daily 90 capsule 0     hydrocortisone (CORTAID) 1 % external ointment Apply topically 2 times daily 110 g 0     ketoconazole (NIZORAL) 2 % external cream Apply topically daily To legs 120 g 3     lidocaine (XYLOCAINE) 5 % external ointment Apply topically as needed for moderate pain (apply 5 grams over rash on left leg, not to exceed 20 grams per day. Use until rash heals) 150 g 0     lidocaine (XYLOCAINE) 5 % external ointment Apply topically as needed for moderate pain 150 g 1     omeprazole (PRILOSEC) 40 MG DR capsule Take 1 capsule (40 mg) by mouth daily 60 capsule 0     ondansetron (ZOFRAN) 4 MG tablet Take 1 tablet (4 mg) by mouth every 6 hours as needed for nausea 30 tablet 1     simethicone (MYLICON) 80 MG chewable tablet Take 1 tablet (80 mg) by mouth every 6 hours as needed for cramping 20 tablet 0     vitamin D3 (CHOLECALCIFEROL) 2000 units tablet Take 1 tablet by mouth daily 90 tablet 1       Reviewed and updated as needed this visit by Provider         Review of Systems   Constitutional, HEENT, cardiovascular, pulmonary, gi and gu systems are negative, except as otherwise noted.      Objective    LMP 04/19/2006   Estimated body mass index is 38.45 kg/m  as calculated from the following:    Height as of 11/19/19: 1.575 m (5' 2\").    Weight as of 5/1/20: 95.3 kg (210 lb 3.2 oz).  Physical Exam     GENERAL: Healthy, alert and no distress  RESP: No audible wheeze or cough, able to speak in full sentences.  NEURO: Cranial nerves grossly intact.  Mentation and speech appropriate for " age.  PSYCH: Mentation normal, affect normal/bright, judgement and insight intact, normal speech and appearance well-groomed.      Diagnostic Test Results:  none         Assessment & Plan     1. Other postherpetic nervous system involvement  Pt continues to have pain related to shingles rash, as well as on L side of body, without hx of injury. Improved with Gabapentin, but continues to have breakthrough pain. Per chart review, previously tolerated Tramadol. Her daughter requests as low a dose as possible, discussed can try 25-50 mg. If she continues to have dizziness and nausea, particularly separate from taking pain medication, re-assess for BPPV.   - traMADol (ULTRAM) 50 MG tablet; Take 0.5-1 tablets (25-50 mg) by mouth every 6 hours as needed for severe pain  Dispense: 30 tablet; Refill: 0       Visit Details    Type of service:  Telephone visit  Length of call: 29 minutes  Distant Location (provider location):  [facility]       Follow-up in 1 week.    EFRAIN Jesus CNP

## 2020-05-21 ENCOUNTER — VIRTUAL VISIT (OUTPATIENT)
Dept: INTERNAL MEDICINE | Facility: CLINIC | Age: 65
End: 2020-05-21
Payer: COMMERCIAL

## 2020-05-21 DIAGNOSIS — B02.29 NEURALGIA, POST-HERPETIC: Primary | ICD-10-CM

## 2020-05-21 RX ORDER — GABAPENTIN 100 MG/1
400-600 CAPSULE ORAL 3 TIMES DAILY
Qty: 54 CAPSULE | Refills: 1 | Status: SHIPPED | OUTPATIENT
Start: 2020-05-21 | End: 2020-06-11

## 2020-05-21 ASSESSMENT — PAIN SCALES - GENERAL: PAINLEVEL: MODERATE PAIN (5)

## 2020-05-21 NOTE — PROGRESS NOTES
"Virtual video visit    CC:    PCP:  Dr. Geovanny Hermosillo      HPI:  Kian Cortez is a 65 year old female with a PMHx of diverticulosis, GERD, recent Shigella infection who presents via telephone visit with assistance of Oromo      Per NP Tori Ramirez  4/29/20:  \"saw Kian on 4/29 for pain and rash in L leg, started Valacyclovir for presumed shingles. She followed-up in clinic on 5/1, had persistent pain, rash consistent with shingles, encouraged to take Gabapentin for nerve pain and complete full course Valacyclovir.  Since then, the pain has persisted, had increased Gabapentin to 200 mg TID, helping a little, but pain still severe.   Called the clinic yesterday looking for help with the pain. Was prescribed 6 tablets Oxycodone 5 mg.  Tried Oxycodone twice, after the second dose was dizzy, and felt clogging in the ear, felt like she might black out. Had one episode of vomiting. Took zofran afterwards.  The Oxycodone did help with the pain, however. Otherwise was taking Tylenol around the clock for pain, helped a little, but still had pain, only lasted 3-4 hours, was maxing out daily dose.    She does have a hx BPPV, but felt the dizziness and vomiting episode was directly related to timing of Oxycodone.  Rash seems to be improving/crusting over  Pain on L side of body, aching/burning up to breast, down to knee.  No CP or SOB. No unilateral weakness. Feels stiff with walking.    Assessment & Plan   1. Acute left-sided low back pain without sciatica  2. Other postherpetic nervous system involvement  Refill provided--can increase Gabapentin to 300 mg for nerve pain. Encouraged movement/physical activity as able.   Okay to continue with Tylenol.  Discussed symptoms could also be related to BPPV as she also felt a plugging/clogged sensation in her L ear at the time of her dizziness and vomiting, but they feel it was related to the Oxycodone.  Can try taking 1/2 tablet (2.5 mg) Oxycodone instead of full tablet " "to see if this helps alleviate the pain without the side effects of nausea. Encouraged taking Zofran if taking Oxycodone again to try to help prevent nausea rather than waiting until after vomiting. Per chart review, appears she previously took Tramadol, may tolerate this better if she continues to have severe pain going forward.   She requests a follow-up visit in 2-3 days, will schedule.  - gabapentin (NEURONTIN) 300 MG capsule; Take 1 capsule (300 mg) by mouth 3 times daily  Dispense: 90 capsule; Refill: 0\"    Per NP Tori Ramirez 5/15/20  \"Follow-up shingles pain--increased Gabapentin to 300 mg TID during our virtual visit on 5/12/20. She had experienced nausea and dizziness with a 5 mg tablet Oxycodone, discussed can try taking 1/2 tablet along with Zofran to try to prevent nausea. She did try a 1/2 dose of the Oxycodone (2.5 mg) but did still feel dizzy and nauseated with this, so she stopped taking it.   Gets a little relief when taking gabapentin, but otherwise pain is still present as before, extends from L armpit to L knee. Rash continues to decrease, not spreading. One particular spot on the leg \"makes my body burn.\"    Assessment/Plan at that time:  Other postherpetic nervous system involvement  Pt continues to have pain related to shingles rash, as well as on L side of body, without hx of injury. Improved with Gabapentin, but continues to have breakthrough pain. Per chart review, previously tolerated Tramadol. Her daughter requests as low a dose as possible, discussed can try 25-50 mg. If she continues to have dizziness and nausea, particularly separate from taking pain medication, re-assess for BPPV.   - traMADol (ULTRAM) 50 MG tablet; Take 0.5-1 tablets (25-50 mg) by mouth every 6 hours as needed for severe pain  Dispense: 30 tablet; Refill: 0    Today:  Patient complaining of \"same pain as before\".  Reviewed Tori's with patient/daughter.  Pain has receded, is now limited to shingles site.  " "Pinching type pain.  Itching.  Using hydrocortisone and lidocaine cream.  Takes tramadol 50 tid, gabapentin 300 mg tid, acetaminophen 1000 mg tid. Pain \"very severe\".  We spent time discussing the natural history of shingles and associated neuralgia, time to resolution, how pain can persist, treatment of neuralgia, expected time for medication to be fully effective, etc. I answered several questions.    Patient Active Problem List   Diagnosis     Advanced directives, counseling/discussion     CARDIOVASCULAR SCREENING; LDL GOAL LESS THAN 130     Pseudoexfoliation syndrome, right eye     Gastroesophageal reflux disease without esophagitis     Osteopenia     Constipation, unspecified constipation type     Morbid obesity, unspecified obesity type (H)     Vertigo     BPPV (benign paroxysmal positional vertigo), unspecified laterality     Insomnia, unspecified type     Colitis     Fever     Diarrhea     Nausea with vomiting     Past Medical History:   Diagnosis Date     Diverticulosis 11/2015    on CT scan     Functional dyspepsia      GERD (gastroesophageal reflux disease)      Insomnia     psychophysiologic     Osteoarthritis of right knee      Osteopenia 4/27/2015     Past Surgical History:   Procedure Laterality Date     COLONOSCOPY N/A 11/19/2019    Procedure: COLONOSCOPY;  Surgeon: Sandra Chu MD;  Location: [facility] OR     DILATION AND CURETTAGE SUCTION       EYE SURGERY      eye duct repair 10+ years  ago     NASOLACRIMAL DUCT PROBE/IRRG       Family History   Problem Relation Age of Onset     Other Cancer Brother      Cancer Brother      Glaucoma No family hx of      Macular Degeneration No family hx of      Diabetes No family hx of      Hypertension No family hx of      Cerebrovascular Disease No family hx of      Thyroid Disease No family hx of      Social History     Tobacco Use     Smoking status: Never Smoker     Smokeless tobacco: Never Used   Substance Use Topics     Alcohol use: No     Drug use: No "     Current Outpatient Medications   Medication Sig Dispense Refill     acetaminophen 500 MG CAPS Take 1,000 mg by mouth 3 times daily 250 capsule 99     calcium carbonate (TUMS) 500 MG chewable tablet Take 1 tablet (500 mg) by mouth 2 times daily 180 tablet 0     carboxymethylcellul-glycerin (OPTIVE) 0.5-0.9 % SOLN ophthalmic solution Place 1 drop into both eyes 4 times daily as needed for dry eyes 1 Bottle 11     cetirizine (ZYRTEC) 10 MG tablet Take 1 tablet (10 mg) by mouth At Bedtime 28 tablet 0     cyclobenzaprine (FLEXERIL) 10 MG tablet Take 1 tablet (10 mg) by mouth 3 times daily as needed for muscle spasms 30 tablet 1     famotidine (PEPCID) 20 MG tablet Take 1 tablet (20 mg) by mouth 2 times daily 60 tablet 3     Fish Oil-Cholecalciferol (FISH OIL + D3 PO) Take  by mouth daily.       gabapentin (NEURONTIN) 300 MG capsule Take 1 capsule (300 mg) by mouth 3 times daily 90 capsule 0     hydrocortisone (CORTAID) 1 % external ointment Apply topically 2 times daily 110 g 0     ketoconazole (NIZORAL) 2 % external cream Apply topically daily To legs 120 g 3     lidocaine (XYLOCAINE) 5 % external ointment Apply topically as needed for moderate pain (apply 5 grams over rash on left leg, not to exceed 20 grams per day. Use until rash heals) 150 g 0     omeprazole (PRILOSEC) 40 MG DR capsule Take 1 capsule (40 mg) by mouth daily 60 capsule 0     ondansetron (ZOFRAN) 4 MG tablet Take 1 tablet (4 mg) by mouth every 6 hours as needed for nausea 30 tablet 1     simethicone (MYLICON) 80 MG chewable tablet Take 1 tablet (80 mg) by mouth every 6 hours as needed for cramping 20 tablet 0     traMADol (ULTRAM) 50 MG tablet Take 0.5-1 tablets (25-50 mg) by mouth every 6 hours as needed for severe pain 30 tablet 0     vitamin D3 (CHOLECALCIFEROL) 2000 units tablet Take 1 tablet by mouth daily 90 tablet 1     No Known Allergies  BP Readings from Last 6 Encounters:   05/01/20 123/86   03/12/20 135/83   03/10/20 93/48   03/09/20  "110/78   11/19/19 100/63   10/24/19 114/79     Kian was seen today for recheck medication.    Diagnoses and all orders for this visit:    Neuralgia, post-herpetic  -     Increased gabapentin from 300 mg tid to : gabapentin (NEURONTIN) 100 MG capsule; Take 4-6 capsules (400-600 mg) by mouth 3 times daily    Follow up with Tori Rey as needed.  If severe pain persists, I advise an in person visit.      This patient is being evaluated via a billable video visit as an alternative to an in-person visit.       The patient has been notified of following:     \"This video visit will be conducted via a call between you and your physician/provider. We have found that certain health care needs can be provided without the need for an in-person physical exam.  This service lets us provide the care you need with a video conversation.  If a prescription is necessary we can send it directly to your pharmacy.  If lab work is needed we can place an order for that and you can then stop by our lab to have the test done at a later time. If during the course of the call the physician/provider feels a video visit is not appropriate, you will not be charged for this service.\"     Patient has given verbal consent for virtual video visit? Yes  Did patient initiate this virtual visit? Yes    Person spoken to:  Patient and Oromo  and daughter    This was a synchronous virtual visit  Location of patient: home  Location of physician:  Clinic  Department name:  Medicine  Mode of communication:  Video Conference via     Time video initiated:  2:02  Time video ended:  2:27  Total length of video visit: 25 min    Sade Merritt M.D.  Internal Medicine  [facility]   pager [phone]      "

## 2020-05-21 NOTE — PATIENT INSTRUCTIONS
Patient Education     Shingles  Shingles is a viral infection caused by the same virus that causes chicken pox. Anyone who has had chicken pox may get shingles later in life. The virus stays in the body, but remains asleep (dormant). Shingles often occurs in older persons or persons with lowered immunity. But it can affect anyone at any age.  Shingles starts as a tingling patch of skin on one side of the body. Small, painful blisters may then appear. The rash rarely spreads to other parts of the body.  Exposure to shingles can't cause shingles. However, it can cause chicken pox in anyone who has not had chicken pox or has not been vaccinated. The contagious period ends when all blisters have crusted over, generally 1 to 2 weeks after the illness starts.  After the blisters heal, the affected skin may be sensitive or painful for weeks or months, gradually resolving over time. But, sometimes this can last longer and be permanent (called postherpetic neuralgia.)  Shingles vaccines are available. Vaccination can help prevent shingles or make it less painful. It is generally recommended for adults older than 50, even if you've had singles in the past. Talk with your healthcare provider about when to get vaccinated and which vaccine is best for you.  Home care    Medicines may be prescribed to help relieve pain. Take these medicines as directed. Ask your healthcare provider or pharmacist before using over-the-counter medicines for helping treat pain and itching.    In certain cases, antiviral medicines may be prescribed to reduce pain, shorten the illness, and prevent neuralgia. Take these medicines as directed.    Compresses made from a solution of cool water mixed with cornstarch or baking soda may help relieve pain and itching.     Gently wash skin daily with soap and water to help prevent infection. Be certain to rinse off all of the soap, which can be irritating.    Trim fingernails and try not to scratch. Scratching  the sores may leave scars.    Stay home from work or school until all blisters have formed a crust and you are no longer contagious.  Follow-up care  Follow up with your healthcare provider, or as directed.  When to seek medical advice    Fever of 100.4 F (38 C) or higher, or as directed by your healthcare provider    Affected skin is on the face or neck and any of the following occur:  ? Headache  ? Eye pain  ? Changes in vision  ? Sores near the eye  ? Weakness of facial muscles    Blisters occurring on new areas of the body    Pain, redness, or swelling of a joint    Signs of skin infection: colored drainage from the sores, warmth, increasing redness, fever, or increasing pain  Date Last Reviewed: 4/1/2018 2000-2019 [employer] [address] [place] [address], [place] [address]. All rights reserved. This information is not intended as a substitute for professional medical care. Always follow your healthcare professional's instructions.

## 2020-05-22 ENCOUNTER — TELEPHONE (OUTPATIENT)
Dept: INTERNAL MEDICINE | Facility: CLINIC | Age: 65
End: 2020-05-22

## 2020-06-11 ENCOUNTER — VIRTUAL VISIT (OUTPATIENT)
Dept: INTERNAL MEDICINE | Facility: CLINIC | Age: 65
End: 2020-06-11
Payer: COMMERCIAL

## 2020-06-11 DIAGNOSIS — B02.29 NEURALGIA, POST-HERPETIC: ICD-10-CM

## 2020-06-11 DIAGNOSIS — M54.50 ACUTE LEFT-SIDED LOW BACK PAIN WITHOUT SCIATICA: ICD-10-CM

## 2020-06-11 DIAGNOSIS — K21.9 GASTROESOPHAGEAL REFLUX DISEASE, ESOPHAGITIS PRESENCE NOT SPECIFIED: ICD-10-CM

## 2020-06-11 DIAGNOSIS — B02.29 OTHER POSTHERPETIC NERVOUS SYSTEM INVOLVEMENT: ICD-10-CM

## 2020-06-11 DIAGNOSIS — B02.9 HERPES ZOSTER WITHOUT COMPLICATION: ICD-10-CM

## 2020-06-11 RX ORDER — GABAPENTIN 400 MG/1
400 CAPSULE ORAL
Qty: 60 CAPSULE | Refills: 4 | Status: SHIPPED | OUTPATIENT
Start: 2020-06-11

## 2020-06-11 RX ORDER — OMEPRAZOLE 40 MG/1
40 CAPSULE, DELAYED RELEASE ORAL DAILY
Qty: 60 CAPSULE | Refills: 4 | Status: SHIPPED | OUTPATIENT
Start: 2020-06-11

## 2020-06-11 RX ORDER — LIDOCAINE 50 MG/G
OINTMENT TOPICAL PRN
Qty: 150 G | Refills: 3 | Status: SHIPPED | OUTPATIENT
Start: 2020-06-11 | End: 2023-08-30

## 2020-06-11 RX ORDER — GABAPENTIN 300 MG/1
600 CAPSULE ORAL AT BEDTIME
Qty: 90 CAPSULE | Refills: 3 | Status: SHIPPED | OUTPATIENT
Start: 2020-06-11 | End: 2020-12-29

## 2020-06-11 NOTE — PROGRESS NOTES
"Kian Cortez is a 65 year old female who is being evaluated via a billable telephone visit.      The patient has been notified of following:     \"This telephone visit will be conducted via a call between you and your physician/provider. We have found that certain health care needs can be provided without the need for a physical exam.  This service lets us provide the care you need with a short phone conversation.  If a prescription is necessary we can send it directly to your pharmacy.  If lab work is needed we can place an order for that and you can then stop by our lab to have the test done at a later time.    Telephone visits are billed at different rates depending on your insurance coverage. During this emergency period, for some insurers they may be billed the same as an in-person visit.  Please reach out to your insurance provider with any questions.    If during the course of the call the physician/provider feels a telephone visit is not appropriate, you will not be charged for this service.\"    Patient has given verbal consent for Telephone visit?  Yes    What phone number would you like to be contacted at? [phone]     How would you like to obtain your AVS? Mail a copy                         [facility]       SUBJECTIVE:  Kian Cortez is a 65 year old female with a history of diverticulosis, OA of her R knee, dyspepsia, osteopenia, who presents for follow up of shigellosis and shingles. The patient notes she has had no recurrence of bloody diarrhea since finishing her antibiotic course. She had a virtual visit on 4/29 for leg pain and was started on valacyclovir for suspected shingles, and was treated for her pain with gabapentin. Her L leg pain would radiate to her L ankle. Since starting valacyclovir, her pain no longer radiates down her left leg, and her rash has also improved. However, she states she still has a pain on her left lateral thigh. Notes that she had this pain years ago when she " used to work as a  and would stand for long periods of time. She does note she had a rash over the site of the pain but is not convinced that it is neuropathic pain. The pain is sharp, intermittent. Is inquiring about further evaluation of this left thigh pain other than treatment with gabapentin. Has tried higher doses of gabapentin but this is limited by somnolence. The patient denies shooting pain, fecal or urinary incontinence, saddle anesthesia. Also denies headache, vision changes, fever, chills, rigors, cough, nausea, vomiting, dizziness, lightheadedness, chest pain, shortness of breath, abdominal pain, diarrhea, constipation, hematochezia, melena, dysuria or hematuria.     Medications and allergies reviewed by me today.     ROS:   10-point review of systems negative unless otherwise specified above.    OBJECTIVE:  No vital signs were obtained during this phone visit.    No exam was performed during this phone visit.    Laboratory results were reviewed.    Imaging results were reviewed.     ASSESSMENT/PLAN:  65 year old female with a history of diverticulosis, OA of her R knee, dyspepsia, osteopenia, who presents for follow up of shigellosis and shingles, which have improved, with L lateral thigh pain either due to post-herpetic neuralgia or a musculoskeletal etiology.    Acute left-sided low back pain without sciatica: Unable to examine patient as this was a phone visit. Likely some musculoskeletal component given pain when standing for prolonged periods of time. Reasonable to increase dose of gabapentin at night given somnolence during the day. Will refer to PT for now, but will need to evaluate in-person to assess for etiology of L thigh pain, which sounds like L hip pain or lower back pain. No red flag symptoms. Will make a decision on imaging at this time. CT A/P 3/9/2020 with minimal lower lumbar facet arthropathy and minimal degenerative changes in spine.   -     PHYSICAL THERAPY REFERRAL;  Future  -     gabapentin (NEURONTIN) 300 MG capsule; Take 2 capsules (600 mg) by mouth At Bedtime  - Follow up in clinic in-person in 1 week    Herpes zoster  Neuralgia, post-herpetic: Patient requested refills of gabapentin and lidocaine jelly. Also, as discussed above, will increase HS dose of gabapentin to 600 mg.   -     lidocaine (XYLOCAINE) 5 % external ointment; Apply topically as needed for moderate pain (apply 5 grams over rash on left leg, not to exceed 20 grams per day. Use until rash heals)  -     gabapentin (NEURONTIN) 400 MG capsule; Take 1 capsule (400 mg) by mouth 2 times daily  -     gabapentin (NEURONTIN) 300 MG capsule; Take 2 capsules (600 mg) by mouth At Bedtime    Gastroesophageal reflux disease, esophagitis presence not specified: Patient requested refill of PPI.  -     omeprazole (PRILOSEC) 40 MG DR capsule; Take 1 capsule (40 mg) by mouth daily    This was a synchronous virtual visit  Location of patient: Home  Location of physician:  Home office  Department name:  Medicine  Mode of communication:  Phone call    Time call initiated:  1435  Time call ended:  1516  Total length of phone visit: 41 minutes    The patient should return to clinic for follow up with me in 1 week.    The patient was discussed with Dr. Estephanie Otero, who agrees with the assessment and plan.    I reviewed the video visit encounter and discussed the patient with the resident and agree with the resident s assessment and plan of care as documented.    MD Geovanny Griffin MD PhD  Internal Medicine PGY-3  [facility]   Pager [phone]

## 2020-06-11 NOTE — NURSING NOTE
Chief Complaint   Patient presents with     Recheck Medication     Pt would like to discuss medications       Cadence Serrano, EMT at 12:43 PM sign on 6/11/2020

## 2020-06-11 NOTE — PATIENT INSTRUCTIONS
It was nice to speak with you on the phone today. I refilled your gabapentin, lidocaine jelly and omeprazole. You will be contacted for Physical Therapy scheduling. Let's meet again next week 6/18/20 at 0800.

## 2020-06-12 ENCOUNTER — APPOINTMENT (OUTPATIENT)
Dept: INTERPRETER SERVICES | Facility: CLINIC | Age: 65
End: 2020-06-12
Payer: COMMERCIAL

## 2020-06-18 ENCOUNTER — OFFICE VISIT (OUTPATIENT)
Dept: INTERNAL MEDICINE | Facility: CLINIC | Age: 65
End: 2020-06-18
Payer: COMMERCIAL

## 2020-06-18 VITALS
HEART RATE: 72 BPM | DIASTOLIC BLOOD PRESSURE: 77 MMHG | BODY MASS INDEX: 38.41 KG/M2 | WEIGHT: 210 LBS | OXYGEN SATURATION: 100 % | SYSTOLIC BLOOD PRESSURE: 112 MMHG

## 2020-06-18 DIAGNOSIS — M25.552 HIP PAIN, LEFT: Primary | ICD-10-CM

## 2020-06-18 RX ORDER — METHYLPREDNISOLONE 4 MG
TABLET, DOSE PACK ORAL
Qty: 21 TABLET | Refills: 0 | Status: SHIPPED | OUTPATIENT
Start: 2020-06-18 | End: 2020-12-29

## 2020-06-18 NOTE — NURSING NOTE
Chief Complaint   Patient presents with     Recheck Medication     pt here for a follow up     Kimberly Nissen, EMT at 7:57 AM on 6/18/2020

## 2020-06-18 NOTE — PATIENT INSTRUCTIONS
It was nice to see you today. Head down to the pharmacy for your prescription. Schedule an MRI of your hip. You should return to clinic in 3 months.

## 2020-06-29 ENCOUNTER — APPOINTMENT (OUTPATIENT)
Dept: INTERPRETER SERVICES | Facility: CLINIC | Age: 65
End: 2020-06-29
Payer: COMMERCIAL

## 2020-06-29 ENCOUNTER — THERAPY VISIT (OUTPATIENT)
Dept: PHYSICAL THERAPY | Facility: CLINIC | Age: 65
End: 2020-06-29
Attending: INTERNAL MEDICINE
Payer: COMMERCIAL

## 2020-06-29 DIAGNOSIS — M54.16 LUMBAR RADICULOPATHY: ICD-10-CM

## 2020-06-29 DIAGNOSIS — M54.50 ACUTE LEFT-SIDED LOW BACK PAIN WITHOUT SCIATICA: ICD-10-CM

## 2020-06-29 PROCEDURE — 97161 PT EVAL LOW COMPLEX 20 MIN: CPT | Mod: GP | Performed by: PHYSICAL THERAPIST

## 2020-06-29 PROCEDURE — 97110 THERAPEUTIC EXERCISES: CPT | Mod: GP | Performed by: PHYSICAL THERAPIST

## 2020-06-29 NOTE — PROGRESS NOTES
[facility] Initial Evaluation  Subjective:  The history is provided by the patient. The history is limited by a language barrier. A  was used.   Patient Health History  Kian Cortez being seen for left hip/leg pain.     Date of Onset: chronic history, then recovered from shingles April 2020.   Problem occurred: after shingles, but some chronic problems prior   Pain score: 5-6/10 at worst.  Health rating: fair to good.  Pertinent medical history includes: overweight.   Red flags:  None as reported by patient.  Medical allergies: none.   Surgeries include:  Other. Other surgery history details: eye surgery 15 years ago.    Current medications:  Pain medication. Other medications details: gabapentin.    Current occupation is housework.   Primary job tasks: general light housework, sitting.                  Therapist Generated HPI Evaluation  Problem details: April 2020 - pt reports longer history of left leg pain, but worsened after recovering from shingles this past spring.   When she had shingles it went all the down to the toes. Now stops at the knee and is not as intense. Used to be constant, now just intermittent. Reports a tight feeling in that entire area.   Worse with standing and walking, but numbness will occur with sitting as well as standing. Has been less active lately. .         Affected Side: left leg.    This is a chronic condition.  Condition occurred with:  Other reason (after shingles).  Where condition occurred: for unknown reasons.  Site of Pain: left hip/glute area.  Pain is described as sharp and burning and is intermittent.  Pain radiates to:  Gluteals left, thigh left and knee left. Pain is the same all the time.  Since onset symptoms are gradually improving (since shingles).  Associated symptoms:  Loss of motion/stiffness and numbness. Symptoms are exacerbated by standing, walking, other and sitting (laying on left side)  and relieved by  analgesics.  Imaging testing: none.  Past treatment: none. Improved with treatment: NA.  Barriers include:  None as reported by patient.                        Objective:        Flexibility/Screens:   Positive screens:  Lumbar                   Lumbar/SI Evaluation  ROM:    AROM Lumbar:   Flexion:          WNL - NE  Ext:                    Mod limit - NE   Side Bend:        Left:  WNL - caused pain    Right:  WNL - caused pain  Rotation:           Left:     Right:   Side Glide:        Left:     Right:                                                                            Madhavi Lumbar Evaluation    Posture:  Sitting: poor  Standing: fair            Test Movements:    EIS: During: no effect  After: no effect    Repeat EIS: During: no effect  After: better and centralizing      EIL: During: no effect  After: no effect    Repeat EIL: During: no effect  After: better        Static Tests:        Standing Erect: prolonged standing increases sx      Conclusion: derangement  Principle of Treatment:      Extension: JENNIFER and REIL    Other: future core/lumbar/glute strengthening                                       ROS    Assessment/Plan:    Patient is a 65 year old female with lumbar complaints.    Patient has the following significant findings with corresponding treatment plan.                Diagnosis 1:  Left lumbar radiculopathy    Pain -  manual therapy, self management, education, directional preference exercise and home program  Decreased ROM/flexibility - manual therapy, therapeutic exercise and home program  Decreased function - therapeutic activities and home program  Impaired posture - neuro re-education, therapeutic activities and home program    Therapy Evaluation Codes:   1) History comprised of:   Personal factors that impact the plan of care:      None.    Comorbidity factors that impact the plan of care are:      None.     Medications impacting care: Pain.  2) Examination of Body Systems comprised  of:   Body structures and functions that impact the plan of care:      Lumbar spine.   Activity limitations that impact the plan of care are:      Sitting, Standing and Walking.  3) Clinical presentation characteristics are:   Stable/Uncomplicated.  4) Decision-Making    Low complexity using standardized patient assessment instrument and/or measureable assessment of functional outcome.  Cumulative Therapy Evaluation is: Low complexity.    Previous and current functional limitations:  (See Goal Flow Sheet for this information)    Short term and Long term goals: (See Goal Flow Sheet for this information)     Communication ability:  Patient has an  for communication clarity.  Treatment Explanation - The following has been discussed with the patient:   RX ordered/plan of care  Anticipated outcomes  Possible risks and side effects  This patient would benefit from PT intervention to resume normal activities.   Rehab potential is good.    Frequency:  2 X a month, once daily  Duration:  for 2 months  Discharge Plan:  Achieve all LTG.  Independent in home treatment program.  Reach maximal therapeutic benefit.    Please refer to the daily flowsheet for treatment today, total treatment time and time spent performing 1:1 timed codes.

## 2020-06-29 NOTE — Clinical Note
Hong Ashraf, you are seeing Kian next Monday. Really nice lady, used [facility]  over the phone and then Kian was in the video and she did great with it. Seems like radicular symptoms into L thigh from the back. Started her on standing extension and pressups. See how those went but maybe get her on some core/glute/lumbar strengt next. And then I'll see her the following Monday.  Thanks!  Francesca

## 2020-06-29 NOTE — PROGRESS NOTES
"Physical Therapy Virtual Initial Visit      The patient has been notified of following:     \"This virtual visit will be conducted between you and your provider. We have found that certain health care needs can be provided without the need for physical presence.  This service lets us provide the care you need with a virtual visit.\"    Due to external, as well as internal [facility] management of the COVID-19 Virus, Kian Cortez was not seen in our clinic.  As a substitution, we implemented a virtual visit to manage this patient's condition utilizing the PTRx virtual visit platform via the patient s existing code.  The provider, Francesca Marcus, reviewed the patient's chart, PTRx prescription, and spoke with the patient to determine the following telemedicine visit is appropriate and effective for the patient's care.    The following type of visit was completed:   Video Visit:  The PTRx platform uses a synchronous HIPAA compliant video stream for this patient encounter.         Subjective:  HPI              Objective:    System  Physical Exam  General   ROS    PTRx Content from today's visit:  Exercise Name: Standing Extension - Reps: 10-15 - Sessions: 3  Exercise Name: Prone Press Ups - Reps: 10-15 - Sessions: 3          Virtual visit contact time    Time of service began: 2:37 PM  Time of service ended: 3:22 PM  Total Time for set up, visit, and documentation: 55 minutes    Payor: YAMINI / Plan: YAMINI MA / Product Type: HMO /     Procedure Code/s   Therapeutic Exercise (01743): 15 minutes  Evaluation: 25 minutes    I have reviewed the note as documented above.  This accurately captures the substance of my conversation with the patient.  Provider location: [facility] ([facility])  Patient location: home    ___________________________________________________           "

## 2020-07-06 ENCOUNTER — THERAPY VISIT (OUTPATIENT)
Dept: PHYSICAL THERAPY | Facility: CLINIC | Age: 65
End: 2020-07-06
Payer: COMMERCIAL

## 2020-07-06 DIAGNOSIS — M54.16 LUMBAR RADICULOPATHY: ICD-10-CM

## 2020-07-06 PROCEDURE — 97110 THERAPEUTIC EXERCISES: CPT | Mod: GP | Performed by: PHYSICAL THERAPIST

## 2020-07-06 NOTE — PROGRESS NOTES
"Physical Therapy Virtual Follow Up Visit      The patient has been notified of following:     \"This virtual visit will be conducted between you and your provider. We have found that certain health care needs can be provided without the need for physical presence.  This service lets us provide the care you need with a virtual visit.\"    Due to external, as well as internal [facility] management of the COVID-19 Virus, Kian Cortez was not seen in our clinic.  As a substitution, we implemented a virtual visit to manage this patient's condition utilizing the PTRx virtual visit platform via the patient s existing code.  The provider, Claude Munroe, reviewed the patient's chart, PTRx prescription, and spoke with the patient to determine the following telemedicine visit is appropriate and effective for the patient's care.    The following type of visit was completed:   Video Visit:  The PTRx platform uses a synchronous HIPAA compliant video stream for this patient encounter.        S: Kian Cortez is a 65 year old female. Connected virtually on the PTRx platform to discuss their condition/progress. They noted improvements in pain.  They noted ongoing pain or limitations with prolonged standing.  Pain is at lateral hip.   Current pain level: 2/10      O: Patient demonstrated fair understanding with exercises needed cueing     PTRx Content from today's visit:  Exercise Name: Standing Extension - Reps: 10-15 - Sessions: 3  Exercise Name: Prone Press Ups - Reps: 10-15 - Sessions: 3  Exercise Name: Bridging #1, Notes: 2 sets of 10 reps hold three seconds once a day  Exercise Name: All 4s Leg Lift, Notes: 2 sets of 10 reps hold three seconds once a day  Exercise Name: Supine Hamstring Stretch, Notes: 2 reps 20 second hold 2 times a day    A:   Patient is progressing as expected.  Response to therapy has shown an improvement in  pain level      P: Patient will continue with the exercise program assigned on their PTRx code "     Current treatment program is being advanced to more complex exercises.      Virtual visit contact time    Time of service began: 1120 AM  Time of service ended: 1150 AM  Total Time for set up, visit, and documentation: 40 minutes    Payor: YAMINI / Plan: YAMINI MA / Product Type: HMO /   .  Procedure Code/s   Therapeutic Exercise (23058): 30 minutes    I have reviewed the note as documented above.  This accurately captures the substance of my conversation with the patient.  Provider location: [place], [place] ([facility])  Patient location: home

## 2020-07-13 ENCOUNTER — APPOINTMENT (OUTPATIENT)
Dept: INTERPRETER SERVICES | Facility: CLINIC | Age: 65
End: 2020-07-13
Payer: COMMERCIAL

## 2020-07-13 ENCOUNTER — THERAPY VISIT (OUTPATIENT)
Dept: PHYSICAL THERAPY | Facility: CLINIC | Age: 65
End: 2020-07-13
Payer: COMMERCIAL

## 2020-07-13 DIAGNOSIS — M54.16 LUMBAR RADICULOPATHY: ICD-10-CM

## 2020-07-13 PROCEDURE — 97110 THERAPEUTIC EXERCISES: CPT | Mod: GP | Performed by: PHYSICAL THERAPIST

## 2020-07-15 NOTE — PROGRESS NOTES
Kian was seen today for recheck medication.    Diagnoses and all orders for this visit:    Hip pain, left  -     methylPREDNISolone (MEDROL DOSEPAK) 4 MG tablet therapy pack; Follow Package Directions  -     MRI Hip Left w/o contrast; Future

## 2020-07-21 ENCOUNTER — THERAPY VISIT (OUTPATIENT)
Dept: PHYSICAL THERAPY | Facility: CLINIC | Age: 65
End: 2020-07-21
Payer: COMMERCIAL

## 2020-07-21 DIAGNOSIS — M54.16 LUMBAR RADICULOPATHY: ICD-10-CM

## 2020-07-21 DIAGNOSIS — M54.9 BACK PAIN: Primary | ICD-10-CM

## 2020-07-21 PROCEDURE — 97110 THERAPEUTIC EXERCISES: CPT | Mod: GP | Performed by: PHYSICAL THERAPIST

## 2020-07-21 NOTE — PROGRESS NOTES
S:  Difficulties connecting with patient today initially.  Pt reports tightness at ant hip persists and walking helps.  O:  TTP: left ant hip per patient and tightness  Poor balance   Fatigues quickly with exercises in clinic  A:  PT reports overall decr in sxs since onset but continues to have pain and difficulty with HEP.  Will benefit from 1 additional appt to progress POC.  P: follow up in 1 week

## 2020-07-24 NOTE — PROGRESS NOTES
PRIMARY [facility]       SUBJECTIVE:  Kian Cortez is a 65 year old female with a history of diverticulosis, OA of her R knee, dyspepsia, osteopenia, who presents for follow up of L leg pain. The patient had a phone visit last week, at which time it was agreed that she should be seen in person in order to obtain a physical exam to evaluate her L leg pain. Notes that it started following her treatment for herpes zoster in April of this year. Mostly endorses pain in her left lateral thigh, and that she had similar pain years ago when she used to work as a  and would stand for prolonged periods of time. The pain is sharp, worse with activity, better when she sits down or lays on her right side, with associated numbness, burning and tingling. Has not had PT yet. Has tried APAP and lidocaine jelly, which have been effective; gabapentin has not been as effective. She is not convinced it is neuropathic pain. No shooting pain, no fecal or urinary incontinence, no saddle anesthesia. The patient denies headache, vision changes, fever, chills, nausea, vomiting, dizziness, lightheadedness, chest pain, shortness of breath, abdominal pain, diarrhea, constipation, hematochezia, melena, dysuria or hematuria.     Medications and allergies reviewed by me today.     ROS:   10-point review of systems negative unless otherwise specified above.     OBJECTIVE:    /77   Pulse 72   Wt 95.3 kg (210 lb)   LMP 04/19/2006   SpO2 100%   BMI 38.41 kg/m     Wt Readings from Last 1 Encounters:   06/18/20 95.3 kg (210 lb)     Nursing notes and vital were reviewed by me.    General: Pleasant woman in NAD  HEENT: AT/NC, PERRL, EOMI, arcus senilis, anicteric sclerae, conjunctivae without injection, benign oropharynx, MMM  Neck: Supple, no palpable lymphadenopathy  Cardiovascular: RRR, normal S1 S2, no m/r/g, 2+ peripheral pulses, no peripheral edema  Respiratory: LCTAB, no w/r/r, normal respiratory  effort  Abdominal: Soft, NTND, normal bowel sounds, no hepatosplenomegaly  Musculoskeletal: Normal bulk, no appreciable joint abnormalities. No midline or paraspinal tenderness in cervical, thoracic or lumbar spine. Straight leg raise negative bilaterally. Mild tenderness to palpation over left lateral thigh at level of greater trochanter. No crepitus or palpable deformity.   Neurologic: CN II-XII grossly intact, no focal deficits, AAOx4    Laboratory and imaging results were reviewed by me.     ASSESSMENT/PLAN:   65 year old female with a history of diverticulosis, OA of her R knee, dyspepsia, osteopenia, who presents for follow up of L leg pain of unclear etiology.    Left lower extremity pain: Differential includes IT band syndrome, bursitis, post-herpetic neuralgia, osteoarthritis, less likely fracture or dislocation, malignancy. The patient has tried APAP and lidocaine jelly with efficacy, yet gabapentin had no efficacy, making post-herpetic less likely and a musculoskeletal etiology more likely. Will obtain imaging with MR to ascertain underlying etiology. The patient should also follow up with PT to address the pain. Also, to treat possible underlying inflammatory condition, will prescribe medrol dosepak.   -     methylPREDNISolone (MEDROL DOSEPAK) 4 MG tablet therapy pack; Follow Package Directions  -     MRI Hip Left w/o contrast; Future    The patient should follow up in clinic in 3 months.     Geovanny Hermosillo MD PhD  Jun 18, 2020    The patient was seen and discussed with Dr. Rae Archuleta, who agrees with the assessment and plan.

## 2020-07-30 ENCOUNTER — APPOINTMENT (OUTPATIENT)
Dept: INTERPRETER SERVICES | Facility: CLINIC | Age: 65
End: 2020-07-30
Payer: COMMERCIAL

## 2020-07-31 ENCOUNTER — THERAPY VISIT (OUTPATIENT)
Dept: PHYSICAL THERAPY | Facility: CLINIC | Age: 65
End: 2020-07-31
Payer: COMMERCIAL

## 2020-07-31 DIAGNOSIS — M54.16 LUMBAR RADICULOPATHY: ICD-10-CM

## 2020-07-31 PROCEDURE — 97530 THERAPEUTIC ACTIVITIES: CPT | Mod: GP | Performed by: PHYSICAL THERAPIST

## 2020-07-31 NOTE — PROGRESS NOTES
Pt unable to connect on video platform at a different residence (daughters) and unable to connect.  PT spent time on phone-phone visit but it was challenging.  Pt reports overall pain is better in leg-occasional stiffness and pain in leg with standing/cooking.  Pt is comfortable with exercises but would like to try them a little long to make sure they continue to show improvement and then connect again with PT.  PT is in agreement with this, PT discussed per pt request timeline for follow up with MD might increase if sxs persist unchanged and pt has not achieved her goals.   Pt is in agreement and will follow up with Pauline in 2.5 weeks on a video platform for reassessment.  PT will review progress exercise, determine if an in clinic appt is more appropriate or referral back to MD prior to suggestion of three months from last visit which would be mid september.  There is also another appoint set with primary PT one week following this for continued care as needed.

## 2020-08-14 ENCOUNTER — NURSE TRIAGE (OUTPATIENT)
Dept: NURSING | Facility: CLINIC | Age: 65
End: 2020-08-14

## 2020-08-14 ENCOUNTER — APPOINTMENT (OUTPATIENT)
Dept: INTERPRETER SERVICES | Facility: CLINIC | Age: 65
End: 2020-08-14
Payer: COMMERCIAL

## 2020-08-14 NOTE — TELEPHONE ENCOUNTER
Call from Kian, is asking for appointment today for headache which started yesterday.  Temperature is 99.6.  Describes as sharp, unable to rate.  Headache radiates into shoulders.  Advised on tylenol, ibuprofen, ice, being seen in urgent care today.  Requested in person visit, does not want virtual visit.  Provided with urgent care addresses in [facility] as requested    Additional Information    Negative: Difficult to awaken or acting confused (e.g., disoriented, slurred speech)    Negative: Weakness of the face, arm or leg on one side of the body and new onset    Negative: Numbness of the face, arm or leg on one side of the body and new onset    Negative: Loss of speech or garbled speech and new onset    Negative: Passed out (i.e., fainted, collapsed and was not responding)    Negative: Sounds like a life-threatening emergency to the triager    Negative: Followed a head injury within last 3 days    Negative: Traumatic Brain Injury (TBI) is suspected    Negative: Sinus pain of forehead and yellow or green nasal discharge    Negative: Pregnant    Negative: Unable to walk without falling    Negative: Stiff neck (can't touch chin to chest)    Negative: Possibility of carbon monoxide exposure    Negative: SEVERE headache, states 'worst headache' of life    Negative: SEVERE headache, sudden onset (i.e., reaching maximum intensity within 30 seconds)    Negative: Severe pain in one eye    Negative: Loss of vision or double vision    Negative: Patient sounds very sick or weak to the triager    Negative: Fever > 103 F (39.4 C)    Negative: Fever > 100.0 F (37.8 C) and has diabetes mellitus or a weak immune system (e.g., HIV positive, cancer chemotherapy, organ transplant, splenectomy, chronic steroids)    Negative: SEVERE headache (e.g., excruciating) and has had severe headaches before    Negative: SEVERE headache and not relieved by pain meds    Negative: SEVERE headache and vomiting    Negative: SEVERE  "headache and fever    Patient wants to be seen    Answer Assessment - Initial Assessment Questions  1. LOCATION: \"Where does it hurt?\"       Entire head  2. ONSET: \"When did the headache start?\" (Minutes, hours or days)       Yesterday  3. PATTERN: \"Does the pain come and go, or has it been constant since it started?\"      Constant  4. SEVERITY: \"How bad is the pain?\" and \"What does it keep you from doing?\"  (e.g., Scale 1-10; mild, moderate, or severe)    - MILD (1-3): doesn't interfere with normal activities     - MODERATE (4-7): interferes with normal activities or awakens from sleep     - SEVERE (8-10): excruciating pain, unable to do any normal activities         Not able to rate  5. RECURRENT SYMPTOM: \"Have you ever had headaches before?\" If so, ask: \"When was the last time?\" and \"What happened that time?\"       No  6. CAUSE: \"What do you think is causing the headache?\"      Doesn't know  7. MIGRAINE: \"Have you been diagnosed with migraine headaches?\" If so, ask: \"Is this headache similar?\"       Denies  8. HEAD INJURY: \"Has there been any recent injury to the head?\"       no  9. OTHER SYMPTOMS: \"Do you have any other symptoms?\" (fever, stiff neck, eye pain, sore throat, cold symptoms)      Neck stiffness, temperature 99.6  10. PREGNANCY: \"Is there any chance you are pregnant?\" \"When was your last menstrual period?\"        N/A    Protocols used: HEADACHE-A-OH      "

## 2020-08-26 ENCOUNTER — THERAPY VISIT (OUTPATIENT)
Dept: PHYSICAL THERAPY | Facility: CLINIC | Age: 65
End: 2020-08-26
Payer: COMMERCIAL

## 2020-08-26 DIAGNOSIS — M54.16 LUMBAR RADICULOPATHY: ICD-10-CM

## 2020-08-26 PROCEDURE — 97110 THERAPEUTIC EXERCISES: CPT | Mod: GP | Performed by: PHYSICAL THERAPIST

## 2020-08-26 NOTE — PROGRESS NOTES
S:  Pt reports things overall are much better.  Some pain after walking or standing for a long time over 20 minutes.  Patient reports back pain is 4/10.  Pt reports signficant improvement since last visit.  O:  Good demo of HEP  Pain with arising from forward flexion-mild but good trunk and hip motion  SLS: poor balance  A:  Overall good change since last visit, reports consistent performance of HEP.  Pt is a good candidate for continue PT 1 time every 2 weeks for 2 additional visits.  P: follow up in 2 weeks

## 2020-12-29 ENCOUNTER — OFFICE VISIT (OUTPATIENT)
Dept: OPHTHALMOLOGY | Facility: CLINIC | Age: 65
End: 2020-12-29
Payer: COMMERCIAL

## 2020-12-29 DIAGNOSIS — H52.11 MYOPIA OF RIGHT EYE: ICD-10-CM

## 2020-12-29 DIAGNOSIS — H26.8 PSEUDOEXFOLIATION SYNDROME: ICD-10-CM

## 2020-12-29 DIAGNOSIS — H25.13 NUCLEAR SCLEROTIC CATARACT OF BOTH EYES: ICD-10-CM

## 2020-12-29 DIAGNOSIS — H52.221 REGULAR ASTIGMATISM OF RIGHT EYE: ICD-10-CM

## 2020-12-29 DIAGNOSIS — Z01.00 EXAMINATION OF EYES AND VISION: Primary | ICD-10-CM

## 2020-12-29 DIAGNOSIS — H52.4 PRESBYOPIA: ICD-10-CM

## 2020-12-29 DIAGNOSIS — H04.123 DRY EYE SYNDROME, BILATERAL: ICD-10-CM

## 2020-12-29 PROCEDURE — 92015 DETERMINE REFRACTIVE STATE: CPT | Performed by: STUDENT IN AN ORGANIZED HEALTH CARE EDUCATION/TRAINING PROGRAM

## 2020-12-29 PROCEDURE — 92014 COMPRE OPH EXAM EST PT 1/>: CPT | Performed by: STUDENT IN AN ORGANIZED HEALTH CARE EDUCATION/TRAINING PROGRAM

## 2020-12-29 ASSESSMENT — REFRACTION_WEARINGRX
OS_AXIS: 125
OS_ADD: +3.50
OD_SPHERE: +0.50
OS_SPHERE: +0.75
OS_CYLINDER: +0.75
OD_CYLINDER: +1.75
OD_ADD: +3.25
OD_AXIS: 174
SPECS_TYPE: BIFOCAL

## 2020-12-29 ASSESSMENT — CONF VISUAL FIELD
OS_NORMAL: 1
OD_NORMAL: 1

## 2020-12-29 ASSESSMENT — VISUAL ACUITY
OD_SC: 20/40
CORRECTION_TYPE: GLASSES
OS_CC+: -1
OS_SC+: -1
OD_SC+: -1
OD_CC+: -2
OS_CC: 20/50
METHOD: SNELLEN - LINEAR
OD_CC: 20/40
OS_SC: 20/40
METHOD_MR: DIM RETINOSCOPY BOTH EYES

## 2020-12-29 ASSESSMENT — REFRACTION_MANIFEST
OS_ADD: +3.00
OD_AXIS: 172
OD_CYLINDER: +1.25
OS_CYLINDER: SPHERE
OD_SPHERE: -0.25
OS_SPHERE: PLANO
OD_ADD: +3.00

## 2020-12-29 ASSESSMENT — CUP TO DISC RATIO
OS_RATIO: 0.4
OD_RATIO: 0.35

## 2020-12-29 ASSESSMENT — EXTERNAL EXAM - LEFT EYE: OS_EXAM: NORMAL

## 2020-12-29 ASSESSMENT — TONOMETRY
IOP_METHOD: APPLANATION
OS_IOP_MMHG: 14
OD_IOP_MMHG: 13

## 2020-12-29 ASSESSMENT — SLIT LAMP EXAM - LIDS
COMMENTS: NORMAL
COMMENTS: NORMAL

## 2020-12-29 ASSESSMENT — EXTERNAL EXAM - RIGHT EYE: OD_EXAM: NORMAL

## 2020-12-29 NOTE — PROGRESS NOTES
Current Eye Medications:  None.       Subjective:  Comprehensive Eye Exam.  No vision changes or concerns.  Glasses have never worked properly for distance or near.       Objective:  See Ophthalmology Exam.      Assessment:  Kian Cortez is a 65 year old female who presents with:   Encounter Diagnoses   Name Primary?     Examination of eyes and vision      Myopia of right eye      Regular astigmatism of right eye      Presbyopia        Pseudoexfoliation syndrome, right eye Intraocular pressure 13/14 and stable discs.        Nuclear sclerotic cataract of both eyes Anticipate cataract surgery next year.       Dry eye syndrome, bilateral      Plan:  Glasses prescription given - optional to update    Florentin Thakkar MD  [phone]

## 2020-12-29 NOTE — LETTER
12/29/2020         RE: Kian Cortez  [address]  [place] [address]        Dear Colleague,    Thank you for referring your patient, Kian Cortez, to the [facility]. Please see a copy of my visit note below.     Current Eye Medications:  None.       Subjective:  Comprehensive Eye Exam.  No vision changes or concerns.  Glasses have never worked properly for distance or near.       Objective:  See Ophthalmology Exam.      Assessment:  Kian Cortez is a 65 year old female who presents with:   Encounter Diagnoses   Name Primary?     Examination of eyes and vision      Myopia of right eye      Regular astigmatism of right eye      Presbyopia        Pseudoexfoliation syndrome, right eye Intraocular pressure 13/14 and stable discs.        Nuclear sclerotic cataract of both eyes Anticipate cataract surgery next year.       Dry eye syndrome, bilateral      Plan:  Glasses prescription given - optional to update    Florentin Thakkar MD  [phone]               Again, thank you for allowing me to participate in the care of your patient.        Sincerely,        Florentin Thakkar MD

## 2021-01-07 ENCOUNTER — APPOINTMENT (OUTPATIENT)
Dept: OPTOMETRY | Facility: CLINIC | Age: 66
End: 2021-01-07
Payer: COMMERCIAL

## 2021-01-07 PROCEDURE — 92341 FIT SPECTACLES BIFOCAL: CPT | Performed by: OPTOMETRIST

## 2021-04-30 PROBLEM — M54.16 LUMBAR RADICULOPATHY: Status: RESOLVED | Noted: 2020-06-29 | Resolved: 2021-04-30

## 2022-08-19 ENCOUNTER — TRANSCRIBE ORDERS (OUTPATIENT)
Dept: OTHER | Age: 67
End: 2022-08-19

## 2022-08-19 DIAGNOSIS — M54.42 ACUTE LEFT-SIDED LOW BACK PAIN WITH LEFT-SIDED SCIATICA: Primary | ICD-10-CM

## 2022-08-24 ENCOUNTER — THERAPY VISIT (OUTPATIENT)
Dept: PHYSICAL THERAPY | Facility: CLINIC | Age: 67
End: 2022-08-24
Payer: COMMERCIAL

## 2022-08-24 DIAGNOSIS — M54.16 LUMBAR RADICULOPATHY: Primary | ICD-10-CM

## 2022-08-24 PROCEDURE — 97161 PT EVAL LOW COMPLEX 20 MIN: CPT | Mod: GP

## 2022-08-24 PROCEDURE — 97110 THERAPEUTIC EXERCISES: CPT | Mod: GP

## 2022-08-24 NOTE — PROGRESS NOTES
[facility]    OUTPATIENT Physical Therapy ORTHOPEDIC EVALUATION  PLAN OF TREATMENT FOR OUTPATIENT REHABILITATION  (COMPLETE FOR INITIAL CLAIMS ONLY)  Patient's Last Name, First Name, M.I.  YOB: 1955  Kian Cortez    Provider s Name:  [facility]   Medical Record No.  [id number]   Start of Care Date:  08/24/22   Onset Date:   08/19/22 (MD order)   Type:     _X__PT   ___OT Medical Diagnosis:    Encounter Diagnosis   Name Primary?    Lumbar radiculopathy Yes        Treatment Diagnosis:  LBP with radiating pain        Goals:     08/24/22 0500   Body Part   Goals listed below are for Low Back & Left Hip   Goal #1   Goal #1 ambulation   Previous Functional Level Minutes patient could walk   Performance Level 20-30 for household and community walking   Current Functional Level Minutes patient can walk   Performance Level 10 with up to 6/10 LBP   STG Target Performance Minutes patient will be able to walk   Performance Level 10-15 with <4/10 LBP   Rationale for safe household ambulation;for safe community ambulation   Due Date 10/05/22    LTG Target Performance Minutes patient will be able to  walk   Performance Level 20   Rationale for safe household ambulation;for safe community ambulation   Due Date 11/02/22       Therapy Frequency:  1x/week  Predicted Duration of Therapy Intervention:  12 weeks (with 1 month gap for pt to travel)    Laura Jarquin PT                 I CERTIFY THE NEED FOR THESE SERVICES FURNISHED UNDER        THIS PLAN OF TREATMENT AND WHILE UNDER MY CARE .             Physician Signature               Date    X_____________________________________________________                         Certification Date From:  08/24/22   Certification Date To:  11/22/22    Referring Provider:  Pedro Ralph RN    Initial Assessment        See Epic Evaluation SOC Date:  08/24/22

## 2022-08-24 NOTE — PROGRESS NOTES
Physical Therapy Initial Evaluation  Subjective:  The history is provided by the patient. The history is limited by a language barrier. No  was used.   Patient Health History  Kian Cortez being seen for Low back & left hip pain.     Date of Onset: chronic, years ago. referral 8/19/22.   Problem occurred: Insidious/unknown    Pain is reported as 5/10 on pain scale.    Pertinent medical history includes: osteoporosis and overweight (GERD).   Red flags:  None as reported by patient.         Current medications: omeprazole.    Current occupation is None/Retired.   Primary job tasks include:  Prolonged sitting.                  Therapist Generated HPI Evaluation  Problem details: Pt reports long hx of low back/left hip pain. On and off over the years, usually gets better with some PT. At present cannot stand or walk longer than 15 minutes due to pain. Sitting and laying (sleeping) is pain-free.  Lives in 2nd floor apartment with spouse, elevator access. Has help at home 1x/day from caregiver; also goes to adult .    Sometimes both feet are achy.         Type of problem:  Lumbar.    This is a chronic condition.  Condition occurred with:  Insidious onset and degenerative joint disease.    Patient reports pain:  Lumbar spine left.  Pain is described as aching ('tired') and is constant.  Pain radiates to:  Thigh left and gluteals left. Pain is worse during the day.  Since onset symptoms are unchanged.  Symptoms are exacerbated by bending, lifting, standing and walking  and relieved by rest and analgesics (sitting, laying down, back brace).  Special tests included:  MRI (2017).  Previous treatment includes physical therapy. There was mild improvement following previous treatment.  Barriers include:  Requires assistance with ADL's and transportation.                        Objective:  Evaluation limited 2/2 time constraints and language barrier    System          Lumbar/SI Evaluation  ROM:    AROM  Lumbar:   Flexion:          Min loss - hands to mid shin; NE  Ext:                    Mod loss; + central LBP   Side Bend:        Left:  Min loss; + LBP    Right:  Min loss; NE  Rotation:           Left:     Right:   Side Glide:        Left:     Right:           Lumbar Myotomes:  normal (some L ankle PF weakness)  T12-L3 (Hip Flex):  Left: 4    Right: 4  L2-4 (Quads):  Left:  4    Right:  4  L4 (Ankle DF):  Left:  4    Right:  4  L5 (Great Toe Ext): Left: 4    Right: 4   S1 (Toe Raise):  Left: 3    Right: 3+  Lumbar DTR's:  normal            Lumbar Palpation:    Tenderness present at Left:    Gluteus Medius and Greater Trochanter                                                           General Evaluation:                      Balance:  Balance wnl general: 'tired' when balancing on LLE.  Single Leg Stance--Eyes Open:  Left: 2*/30 sec    Right: 10/30 sec                Gait:    Significant findings:  Bilateral trendelenburg/hip drop; lacking pelvis rotation, short step length                                     ROS    Assessment/Plan:    Patient is a 67 year old female with lumbar and left side hip complaints.    Patient has the following significant findings with corresponding treatment plan.                Diagnosis 1:  LBP with radiating pain  Pain -  hot/cold therapy, US, electric stimulation, manual therapy, splint/taping/bracing/orthotics, self management, education and home program  Decreased ROM/flexibility - manual therapy, therapeutic exercise, therapeutic activity and home program  Decreased strength - therapeutic exercise, therapeutic activities and home program  Impaired balance - neuro re-education, gait training, therapeutic activities, adaptive equipment/assistive device and home program  Impaired gait - gait training, assistive devices and home program  Decreased function - therapeutic activities and home program    Therapy Evaluation Codes:   1) History comprised of:   Personal factors that impact  the plan of care:      Language and Social history/culture.    Comorbidity factors that impact the plan of care are:      Overweight, Weakness and Osteoporosis.     Medications impacting care: Pain.  2) Examination of Body Systems comprised of:   Body structures and functions that impact the plan of care:      Hip, Lumbar spine and Pelvis.   Activity limitations that impact the plan of care are:      Dressing, Stairs, Standing and Walking.  3) Clinical presentation characteristics are:   Stable/Uncomplicated.  4) Decision-Making    Low complexity using standardized patient assessment instrument and/or measureable assessment of functional outcome.  Cumulative Therapy Evaluation is: Low complexity.    Previous and current functional limitations:  (See Goal Flow Sheet for this information)    Short term and Long term goals: (See Goal Flow Sheet for this information)     Communication ability:  Patient's first language is [place] but was able to clearly communicate understand verbal communication and follow directions correctly with simple-step instructions.    Treatment Explanation - The following has been discussed with the patient:   RX ordered/plan of care  Anticipated outcomes  Possible risks and side effects  This patient would benefit from PT intervention to resume normal activities.   Rehab potential is good.    Frequency:  1 X week, once daily  Duration:  for 12 weeks (with 1 month off for pt to travel)  Discharge Plan:  Achieve all LTG.  Independent in home treatment program.  Return to previous functional level by discharge.  Reach maximal therapeutic benefit.    Please refer to the daily flowsheet for treatment today, total treatment time and time spent performing 1:1 timed codes.

## 2022-09-01 ENCOUNTER — THERAPY VISIT (OUTPATIENT)
Dept: PHYSICAL THERAPY | Facility: CLINIC | Age: 67
End: 2022-09-01
Payer: COMMERCIAL

## 2022-09-01 DIAGNOSIS — M54.16 LUMBAR RADICULOPATHY: Primary | ICD-10-CM

## 2022-09-01 PROCEDURE — 97110 THERAPEUTIC EXERCISES: CPT | Mod: GP

## 2022-09-13 ENCOUNTER — THERAPY VISIT (OUTPATIENT)
Dept: PHYSICAL THERAPY | Facility: CLINIC | Age: 67
End: 2022-09-13
Payer: COMMERCIAL

## 2022-09-13 DIAGNOSIS — M54.16 LUMBAR RADICULOPATHY: Primary | ICD-10-CM

## 2022-09-13 PROCEDURE — 97110 THERAPEUTIC EXERCISES: CPT | Mod: GP

## 2022-09-13 PROCEDURE — 97112 NEUROMUSCULAR REEDUCATION: CPT | Mod: GP

## 2022-10-28 ENCOUNTER — OFFICE VISIT (OUTPATIENT)
Dept: OPHTHALMOLOGY | Facility: CLINIC | Age: 67
End: 2022-10-28
Payer: COMMERCIAL

## 2022-10-28 DIAGNOSIS — H25.13 NUCLEAR SCLEROTIC CATARACT OF BOTH EYES: ICD-10-CM

## 2022-10-28 DIAGNOSIS — H52.12 MYOPIA WITH PRESBYOPIA OF LEFT EYE: ICD-10-CM

## 2022-10-28 DIAGNOSIS — H52.4 MYOPIA OF RIGHT EYE WITH ASTIGMATISM AND PRESBYOPIA: ICD-10-CM

## 2022-10-28 DIAGNOSIS — H04.123 DRY EYE SYNDROME, BILATERAL: ICD-10-CM

## 2022-10-28 DIAGNOSIS — H26.8 PSEUDOEXFOLIATION SYNDROME: ICD-10-CM

## 2022-10-28 DIAGNOSIS — H52.11 MYOPIA OF RIGHT EYE WITH ASTIGMATISM AND PRESBYOPIA: ICD-10-CM

## 2022-10-28 DIAGNOSIS — Z01.00 EXAMINATION OF EYES AND VISION: Primary | ICD-10-CM

## 2022-10-28 DIAGNOSIS — H52.201 MYOPIA OF RIGHT EYE WITH ASTIGMATISM AND PRESBYOPIA: ICD-10-CM

## 2022-10-28 DIAGNOSIS — H52.4 MYOPIA WITH PRESBYOPIA OF LEFT EYE: ICD-10-CM

## 2022-10-28 PROCEDURE — 92014 COMPRE OPH EXAM EST PT 1/>: CPT | Performed by: STUDENT IN AN ORGANIZED HEALTH CARE EDUCATION/TRAINING PROGRAM

## 2022-10-28 PROCEDURE — 92015 DETERMINE REFRACTIVE STATE: CPT | Performed by: STUDENT IN AN ORGANIZED HEALTH CARE EDUCATION/TRAINING PROGRAM

## 2022-10-28 ASSESSMENT — REFRACTION_WEARINGRX
OS_ADD: +3.00
OD_CYLINDER: +1.25
OD_AXIS: 175
OD_ADD: +3.00
SPECS_TYPE: BIFOCAL
OS_SPHERE: PLANO
OD_SPHERE: -0.25

## 2022-10-28 ASSESSMENT — CONF VISUAL FIELD
OD_INFERIOR_TEMPORAL_RESTRICTION: 0
OS_NORMAL: 1
OD_SUPERIOR_NASAL_RESTRICTION: 0
METHOD: COUNTING FINGERS
OD_NORMAL: 1
OS_SUPERIOR_TEMPORAL_RESTRICTION: 0
OS_INFERIOR_TEMPORAL_RESTRICTION: 0
OD_INFERIOR_NASAL_RESTRICTION: 0
OS_SUPERIOR_NASAL_RESTRICTION: 0
OS_INFERIOR_NASAL_RESTRICTION: 0
OD_SUPERIOR_TEMPORAL_RESTRICTION: 0

## 2022-10-28 ASSESSMENT — VISUAL ACUITY
OD_PH_CC+: -1
CORRECTION_TYPE: GLASSES
METHOD: SNELLEN - LINEAR
OD_PH_CC: 20/40
OS_PH_CC: 20/40
OS_CC+: -1
OS_CC: 20/80
OS_SC: 20/70
OD_CC+: -1
OS_SC+: -2
OD_CC: 20/60
OD_SC: 20/150

## 2022-10-28 ASSESSMENT — REFRACTION_MANIFEST
OS_ADD: +3.00
OS_SPHERE: -0.75
OD_CYLINDER: +0.50
OD_SPHERE: -2.50
OD_ADD: +3.00
OD_AXIS: 175
OS_CYLINDER: SPHERE

## 2022-10-28 ASSESSMENT — TONOMETRY
IOP_METHOD: APPLANATION
OS_IOP_MMHG: 18
OD_IOP_MMHG: 17

## 2022-10-28 ASSESSMENT — EXTERNAL EXAM - RIGHT EYE: OD_EXAM: NORMAL

## 2022-10-28 ASSESSMENT — SLIT LAMP EXAM - LIDS
COMMENTS: NORMAL
COMMENTS: NORMAL

## 2022-10-28 ASSESSMENT — CUP TO DISC RATIO
OS_RATIO: 0.4
OD_RATIO: 0.35

## 2022-10-28 ASSESSMENT — EXTERNAL EXAM - LEFT EYE: OS_EXAM: NORMAL

## 2022-10-28 NOTE — LETTER
10/28/2022         RE: Kian Cortez  [address]  [place] [address]        Dear Colleague,    Thank you for referring your patient, Kian Cortez, to the [facility]. Please see a copy of my visit note below.     Current Eye Medications:  None     Subjective:  Complete eye exam. Vision is OK both eyes in the distance. Has hard time reading up close but can do it. Tried glasses and it didn't help, made it worse. Patient was worried glasses might make her fall. No eye pain or discomfort in either eye.       Objective:  See Ophthalmology Exam.       Assessment:  Kian Cortez is a 67 year old female who presents with:   Encounter Diagnoses   Name Primary?     Examination of eyes and vision Yes     Pseudoexfoliation syndrome, right eye Intraocular pressure 17/18 today.      Nuclear sclerotic cataract of both eyes Early visually significance both eyes.      Dry eye syndrome, bilateral      Myopia of right eye with astigmatism and presbyopia      Myopia with presbyopia of left eye        Plan:  Glasses prescription given - optional to use    Could try over the counter readers for reading as needed (around +2.25)    Florentin Thakkar MD  [phone]          Again, thank you for allowing me to participate in the care of your patient.        Sincerely,        Florentin Thakkar MD

## 2022-10-28 NOTE — PROGRESS NOTES
Current Eye Medications:  None     Subjective:  Complete eye exam. Vision is OK both eyes in the distance. Has hard time reading up close but can do it. Tried glasses and it didn't help, made it worse. Patient was worried glasses might make her fall. No eye pain or discomfort in either eye.       Objective:  See Ophthalmology Exam.       Assessment:  Kian Cortez is a 67 year old female who presents with:   Encounter Diagnoses   Name Primary?     Examination of eyes and vision Yes     Pseudoexfoliation syndrome, right eye Intraocular pressure 17/18 today.      Nuclear sclerotic cataract of both eyes Early visually significance both eyes.      Dry eye syndrome, bilateral      Myopia of right eye with astigmatism and presbyopia      Myopia with presbyopia of left eye        Plan:  Glasses prescription given - optional to use    Could try over the counter readers for reading as needed (around +2.25)    Florentin Thakkar MD  [phone]

## 2022-10-28 NOTE — PATIENT INSTRUCTIONS
Glasses prescription given - optional to use    Could try over the counter readers for reading as needed (around +2.25)    Florentin Thakkar MD  [phone]

## 2022-11-14 ENCOUNTER — THERAPY VISIT (OUTPATIENT)
Dept: PHYSICAL THERAPY | Facility: CLINIC | Age: 67
End: 2022-11-14
Payer: COMMERCIAL

## 2022-11-14 DIAGNOSIS — M54.16 LUMBAR RADICULOPATHY: Primary | ICD-10-CM

## 2022-11-14 PROCEDURE — 97110 THERAPEUTIC EXERCISES: CPT | Mod: GP

## 2022-11-14 PROCEDURE — 97530 THERAPEUTIC ACTIVITIES: CPT | Mod: GP

## 2022-11-14 PROCEDURE — 97112 NEUROMUSCULAR REEDUCATION: CPT | Mod: GP

## 2022-11-14 NOTE — PROGRESS NOTES
"PROGRESS  REPORT    Progress reporting period is from 8/24/22 to 11/14/22.       SUBJECTIVE  Pt returns after 1 month of traveling to visit family. The back was a little sore while traveling. Now it is improving. Still having some pain with standing >30 minutes (like in kitchen doing dishes), and when bending forwards to reach floor. Localizes pain central low back entire L-spine, feels \"tired.\" The left leg will be painful with fast walking. Still wearing low back brace for comfort. Kept up with HEP and is able to describe a few of the exercises. Also endorses urinary incontinence, \"i have accidents\" and feels like her bladder is weak. Also asking about pool therapy, if that would be helpful for her.    Current Pain level: 0/10.     Previous pain level was  5/10  .   Changes in function:  Yes (See Goal flowsheet attached for changes in current functional level)  Adverse reaction to treatment or activity: None    OBJECTIVE  Changes noted in objective findings:  Yes  Objective: Baseline no symptoms. FIS - WNL +slight central LBP. EIS - min loss \"feel something\" in central LB. SG - mod loss bilat +slight central LBP (very difficult to do correct motion). LAVERN - pt reports feels good. SLS ~5 sec with increased proprio challenge each side.     ASSESSMENT/PLAN  Updated problem list and treatment plan: Diagnosis 1:  LBP with radiating pain   Pain -  hot/cold therapy, US, mechanical traction, manual therapy, self management, education, directional preference exercise and home program  Decreased strength - therapeutic exercise, therapeutic activities and home program  Impaired balance - neuro re-education, gait training, therapeutic activities and home program  Decreased function - therapeutic activities and home program  Impaired posture - neuro re-education, therapeutic activities and home program  STG/LTGs have been met or progress has been made towards goals:  Yes (See Goal flow sheet completed today.)  Assessment of " Progress: The patient's condition has potential to improve.  Patient is meeting short term goals and is progressing towards long term goals.  Self Management Plans:  Patient has been instructed in a home treatment program.  Patient  has been instructed in self management of symptoms.  I have re-evaluated this patient and find that the nature, scope, duration and intensity of the therapy is appropriate for the medical condition of the patient.  Kian continues to require the following intervention to meet STG and LTG's:  PT    Recommendations:  This patient would benefit from continued therapy.     Frequency:  1 X week, once daily  Duration:  for 4 weeks    Please refer to the daily flowsheet for treatment today, total treatment time and time spent performing 1:1 timed codes.

## 2023-04-09 ENCOUNTER — TRANSFERRED RECORDS (OUTPATIENT)
Dept: MULTI SPECIALTY CLINIC | Facility: CLINIC | Age: 68
End: 2023-04-09

## 2023-04-09 LAB — RETINOPATHY: NORMAL

## 2023-05-01 NOTE — PROGRESS NOTES
Patient did not return for further treatment and no additional progress was noted.  Please refer to the progress note and goal flowsheet completed on 11/14/22  for discharge information.

## 2023-05-22 ENCOUNTER — ANCILLARY PROCEDURE (OUTPATIENT)
Dept: GENERAL RADIOLOGY | Facility: CLINIC | Age: 68
End: 2023-05-22
Attending: INTERNAL MEDICINE
Payer: COMMERCIAL

## 2023-05-22 ENCOUNTER — OFFICE VISIT (OUTPATIENT)
Dept: INTERNAL MEDICINE | Facility: CLINIC | Age: 68
End: 2023-05-22
Payer: COMMERCIAL

## 2023-05-22 VITALS
SYSTOLIC BLOOD PRESSURE: 117 MMHG | WEIGHT: 202.1 LBS | HEIGHT: 61 IN | BODY MASS INDEX: 38.16 KG/M2 | DIASTOLIC BLOOD PRESSURE: 80 MMHG | HEART RATE: 85 BPM | OXYGEN SATURATION: 98 %

## 2023-05-22 DIAGNOSIS — M54.41 CHRONIC BILATERAL LOW BACK PAIN WITH BILATERAL SCIATICA: ICD-10-CM

## 2023-05-22 DIAGNOSIS — G89.29 CHRONIC BILATERAL LOW BACK PAIN WITH BILATERAL SCIATICA: ICD-10-CM

## 2023-05-22 DIAGNOSIS — E66.812 OBESITY, CLASS II, BMI 35-39.9: ICD-10-CM

## 2023-05-22 DIAGNOSIS — M54.42 CHRONIC BILATERAL LOW BACK PAIN WITH BILATERAL SCIATICA: ICD-10-CM

## 2023-05-22 DIAGNOSIS — F51.01 PRIMARY INSOMNIA: ICD-10-CM

## 2023-05-22 DIAGNOSIS — N95.0 POST-MENOPAUSAL BLEEDING: Primary | ICD-10-CM

## 2023-05-22 PROCEDURE — 99215 OFFICE O/P EST HI 40 MIN: CPT | Performed by: INTERNAL MEDICINE

## 2023-05-22 PROCEDURE — 72100 X-RAY EXAM L-S SPINE 2/3 VWS: CPT | Performed by: STUDENT IN AN ORGANIZED HEALTH CARE EDUCATION/TRAINING PROGRAM

## 2023-05-22 RX ORDER — PHENTERMINE HYDROCHLORIDE 15 MG/1
15 CAPSULE ORAL DAILY
COMMUNITY
Start: 2023-05-18

## 2023-05-22 NOTE — NURSING NOTE
Kian Cortez is a 68 year old female patient that presents today in clinic for the following:    Chief Complaint   Patient presents with     Establish Care     Blood in urine, back pain, lower extremity discomfort     The patient's allergies and medications were reviewed as noted. A set of vitals were recorded as noted without incident. The patient does not have any other questions for the provider.    Jimmy Smith, EMT at 11:23 AM on 5/22/2023

## 2023-05-22 NOTE — PROGRESS NOTES
"  Assessment & Plan     Post-menopausal bleeding    Kian presents with urinary vs vaginal bleeding that started last week.  Exam shows blood in the vagina that appears to be coming from the cervix, so recommend further evaluation with U/S and likely OB/Gyn follow-up.  I note she had thickened endometrium on U/S in 2017 and did undergo endometrial biopsy with no concerning findings.     - US Pelvic Complete with Transvaginal; Future    Chronic bilateral low back pain with bilateral sciatica    Kian has a long history of bilateral low back pain with radiculopathy but reports more recent development of bilateral leg numbness.  Numbness does not localize on exam, she states this is just throughout both legs.  Strength is good on exam.  She reports some urinary incontinence last week but none since.  We'll recheck an x-ray today and consider MRI.  Last MRI in 2017 showed disc bulge at L5-S1 but no evidence of neural impingement.      - XR Lumbar Spine 2/3 Views; Future    Primary insomnia    Kian is being prescribed amitriptyline for insomnia from an outside clinic and this is helping.  She is transferring care here and requests a refill.     - amitriptyline (ELAVIL) 25 MG tablet; Take 1 tablet (25 mg) by mouth At Bedtime    Obesity, Class II, BMI 35-39.9    Kian was started on phentermine last month by an outside clinic with plan to trial this for 3 months to see if it effective.  If not, she reports that Wegovy may be covered.        40 minutes spent by me on the date of the encounter doing chart review, history and exam, documentation and further activities per the note       BMI:   Estimated body mass index is 37.91 kg/m  as calculated from the following:    Height as of this encounter: 1.555 m (5' 1.22\").    Weight as of this encounter: 91.7 kg (202 lb 1.6 oz).   Weight management plan: phentermine      Willie Lee MD  [facility] INTERNAL MEDICINE [facility]   Kian is a 68 year " "old, presenting for the following health issues:  Establish Care (Blood in urine, back pain, lower extremity discomfort, weight loss medication)    IGNACIO Langston reports hematuria or vaginal bleeding that started last week Wednesday, still having some bleeding today but not as bad.  Did have some spotting in her underwear.  No blood noted in the stool.  No dysuria or frequency or urgency.  She did have some incontinence earlier, but improved now.  She is having some bilateral lower abdominal pain, though improved today.  She notes chronic low back pain but this is worse since she noted the bleeding.  She is having bilateral leg pain since last week with numbness, this radiates to the feet.  Pain is only with walking, not with sitting.  No falls or injuries.  No fevers or chills. She takes Tylenol for pain and that helps.   She did PT in the past for her leg and back and that was helpful.      Kian would like to discuss medication for weight loss.  She is already taking something via prescription which was prescribed at an outside clinic ([facility]).  Outside pharmacy shows phentermine and Wegovy.  She says she needs to take the phentermine for 3 months to see if that works, and if it does not, the insurance will cover Wegovy.  [place]  shows she started this on 4/12, refilled on 5/18, and has two refills left.     She is on amitriptyline for sleep, which helps, and needs refills.           Review of Systems   Constitutional, MSK, , GI systems are negative, except as otherwise noted.      Objective    /80 (BP Location: Right arm, Patient Position: Sitting, Cuff Size: Adult Large)   Pulse 85   Ht 1.555 m (5' 1.22\")   Wt 91.7 kg (202 lb 1.6 oz)   LMP 04/19/2006   SpO2 98%   BMI 37.91 kg/m    Body mass index is 37.91 kg/m .  Physical Exam   GENERAL: healthy, alert and no distress   (female): blood presents in vagina that appears to be coming from cervix  BACK: No pain to palpation over lumbar " spine or paraspinal tenderness, has some low back pain with ROM but range overall looks okay  NEURO: Normal strength and tone in legs, she reports diffuse numbness throughout both legs, minimal Patellar reflexes

## 2023-05-24 ENCOUNTER — ANCILLARY PROCEDURE (OUTPATIENT)
Dept: ULTRASOUND IMAGING | Facility: CLINIC | Age: 68
End: 2023-05-24
Attending: INTERNAL MEDICINE
Payer: COMMERCIAL

## 2023-05-24 DIAGNOSIS — N95.0 POST-MENOPAUSAL BLEEDING: ICD-10-CM

## 2023-05-24 PROCEDURE — 76856 US EXAM PELVIC COMPLETE: CPT | Mod: GC | Performed by: RADIOLOGY

## 2023-05-24 PROCEDURE — 76830 TRANSVAGINAL US NON-OB: CPT | Mod: GC | Performed by: RADIOLOGY

## 2023-05-26 ENCOUNTER — TELEPHONE (OUTPATIENT)
Dept: OBGYN | Facility: CLINIC | Age: 68
End: 2023-05-26

## 2023-05-26 ENCOUNTER — TELEPHONE (OUTPATIENT)
Dept: INTERNAL MEDICINE | Facility: CLINIC | Age: 68
End: 2023-05-26

## 2023-05-26 DIAGNOSIS — G89.29 CHRONIC BILATERAL LOW BACK PAIN WITH BILATERAL SCIATICA: Primary | ICD-10-CM

## 2023-05-26 DIAGNOSIS — M54.42 CHRONIC BILATERAL LOW BACK PAIN WITH BILATERAL SCIATICA: Primary | ICD-10-CM

## 2023-05-26 DIAGNOSIS — M54.41 CHRONIC BILATERAL LOW BACK PAIN WITH BILATERAL SCIATICA: Primary | ICD-10-CM

## 2023-05-26 NOTE — TELEPHONE ENCOUNTER
"Pt was called on 5/24 and given Xray results. Patient called with [facility]  and spoke with patient to re-relay results for xray and ultrasound.     \"Please let Kian know that her x-ray only shows mild arthritis and disc degeneration.  There could be a herniated disc pressing on nerves causing her new leg symptoms- often this will improve on its own, so I recommend we watch this.  If symptoms don't improve in six weeks or get any worse (specifically leg weakness or new incontinence issues), she should follow-up.  We could try resuming PT in the meantime- please let me know if she'd like a referral.    Please let Kian know that her ultrasound shows thickened lining of the uterus, so I recommend seeing gynecology for likely biopsy (even if the bleeding stops).  She should call them to schedule.     If bleeding is ongoing, she can try taking ibuprofen 600mg three times per day to reduce the bleeding.\"    Pt reports that cannot take ibuprofen due to GI concerns. Pt encouraged to call OBGYN- gave number. RN answered all patients questions regarding results. Pt states physical therapy was not helpful, currently going. Pt would like to try massage therapy instead. Pt was informed that Dr. Lee would be notified of patient's request.     ED LOPEZ RN on 5/26/2023 at 12:21 PM      "

## 2023-05-26 NOTE — TELEPHONE ENCOUNTER
Referral for massage therapy placed.  I don't believe we have any specific providers in our system, so she will need to find her own provider and this may not be covered by insurance even with the referral.    Thanks,  Willie Lee MD

## 2023-05-26 NOTE — TELEPHONE ENCOUNTER
Reason for Call:  Appointment Request    Patient requesting this type of appt:  Office visit    Requested provider: Any OB    Reason patient unable to be scheduled: Not within requested timeframe    When does patient want to be seen/preferred time: 3-7 days    Comments: patient called and is wanting to be seen for vaginal bleeding sooner then end of June patient is requesting work in    Okay to leave a detailed message?: Yes at Work number on file:  [phone]    Call taken on 5/26/2023 at 12:34 PM by Cassandra Ulrich

## 2023-05-26 NOTE — TELEPHONE ENCOUNTER
[facility]    Phone Message    May a detailed message be left on voicemail: yes     Reason for Call: Requesting Results   Name/type of test: Ultrasound and Xrays, pt requesting call back to discuss the results  Date of test: 5/22 and 5/24  Was test done at a location other than [facility] (Please fill in the location if not [facility])?: No

## 2023-06-21 ENCOUNTER — OFFICE VISIT (OUTPATIENT)
Dept: OBGYN | Facility: CLINIC | Age: 68
End: 2023-06-21
Attending: STUDENT IN AN ORGANIZED HEALTH CARE EDUCATION/TRAINING PROGRAM
Payer: COMMERCIAL

## 2023-06-21 VITALS
DIASTOLIC BLOOD PRESSURE: 81 MMHG | BODY MASS INDEX: 39.18 KG/M2 | HEART RATE: 86 BPM | SYSTOLIC BLOOD PRESSURE: 124 MMHG | HEIGHT: 61 IN | WEIGHT: 207.5 LBS

## 2023-06-21 DIAGNOSIS — E66.09 CLASS 2 OBESITY DUE TO EXCESS CALORIES WITHOUT SERIOUS COMORBIDITY WITH BODY MASS INDEX (BMI) OF 39.0 TO 39.9 IN ADULT: ICD-10-CM

## 2023-06-21 DIAGNOSIS — N95.0 POSTMENOPAUSAL BLEEDING: Primary | ICD-10-CM

## 2023-06-21 DIAGNOSIS — E66.812 CLASS 2 OBESITY DUE TO EXCESS CALORIES WITHOUT SERIOUS COMORBIDITY WITH BODY MASS INDEX (BMI) OF 39.0 TO 39.9 IN ADULT: ICD-10-CM

## 2023-06-21 PROCEDURE — 88305 TISSUE EXAM BY PATHOLOGIST: CPT | Mod: TC | Performed by: STUDENT IN AN ORGANIZED HEALTH CARE EDUCATION/TRAINING PROGRAM

## 2023-06-21 PROCEDURE — G0463 HOSPITAL OUTPT CLINIC VISIT: HCPCS | Mod: 25 | Performed by: STUDENT IN AN ORGANIZED HEALTH CARE EDUCATION/TRAINING PROGRAM

## 2023-06-21 PROCEDURE — 58100 BIOPSY OF UTERUS LINING: CPT | Performed by: STUDENT IN AN ORGANIZED HEALTH CARE EDUCATION/TRAINING PROGRAM

## 2023-06-21 PROCEDURE — 88305 TISSUE EXAM BY PATHOLOGIST: CPT | Mod: 26 | Performed by: PATHOLOGY

## 2023-06-21 PROCEDURE — 99204 OFFICE O/P NEW MOD 45 MIN: CPT | Mod: 25 | Performed by: STUDENT IN AN ORGANIZED HEALTH CARE EDUCATION/TRAINING PROGRAM

## 2023-06-21 NOTE — PROGRESS NOTES
"[facility]    HPI: Kian Cortez is a 68 year old  who presents to clinic today to discuss postmenopausal bleeding. She was seen by her IM physician and found to have PMB in late May. She has an ultrasound concerning for a thickened heterogenous EMS in 2017 with an EMB with scant atrophic endometrial tissue.     She has had bleeding for 10 days in late May, 3-4 days with red blood on pad and the rest with blood noticed in the toilet.  This is her only episode of PMB with menopause at age 46. Denies abdominal/pelvic pain. Has had back pain for about 3 years.     OB History:   , SVDx7, SABx2    Gyn History:   Pap smear history: 19 NILM, HPV neg    Imaging:   Pelvic US   Images for recent US and US in 2017 personally reviewed with concern for heterogenous thickened EMS with cystic areas    FINDINGS:  The uterus measures 8.1 x 6.2 x 4.6 cm, and there is no evidence of a  focal fibroid.  Heterogeneous and enlarged endometrial stripe with  possible vascularity which measures 1.5 cm. Endometrial thickness was  1.8 cm on 3/31/2017. There is no free fluid in the pelvis.     The ovaries are not identified on this exam. No adnexal mass.                                                   IMPRESSION:   1.  Heterogeneous and enlarged endometrial stripe measuring up to 1.5  cm. Consider endometrial biopsy.  2.  Ovaries not visualized on today's exam.    PMH, PSH, Family history, Social history, medications and allergies were reviewed and updated in EMR.     Objective:   /81 (BP Location: Left arm, Patient Position: Chair)   Pulse 86   Ht 1.549 m (5' 1\")   Wt 94.1 kg (207 lb 8 oz)   LMP 2006   BMI 39.21 kg/m    General: Healthy appearing. Alert, oriented. Affect is appropriate.   Heart: Regular rate    Lungs: Breathing is unlabored.   Abdomen: Soft, nontender  Pelvic: Normal external genitalia. Moist, rugated vagina without discharge. Cervix  without lesions.    Procedure: "   Informed consent signed.  With patient in dorsolithotomy position speculum placed.  Cervix identified and swabbed with Betadine.  A tenaculum was placed on the anterior lip of the cervix.  Endometrial biopsy Pipelle passed to the uterine fundus with moderate amount of endometrial tissue returned.  All instruments removed from vagina.  No bleeding noted.    Assessment/Plan:   Kian Cortez is a 68 year old female with postmenopausal bleeding and thickened, heterogenous endometrial stripe on ultrasound.     -Reviewed concern for precancerous or cancerous changes within the endometrium.  Discussed biopsy today to potentially expedite treatment if precancerous or cancerous changes are identified.  If biopsy is benign I would still recommend hysteroscopy and D&C in the operating room given ultrasound findings.  She is agreeable to this.  Reviewed risks and benefits of surgery and case request was submitted.  -Also reviewed findings and plan with her daughter per the patient's request    Lor Kathleen MD

## 2023-06-21 NOTE — PATIENT INSTRUCTIONS
Thank you for trusting us with your care!     If you need to contact us for questions about:  Symptoms, Scheduling & Medical Questions; Non-urgent (2-3 day response) Sukhwinder message, Urgent (needing response today) [phone] (if after 3:30pm next day response)   Prescriptions: Please call your Pharmacy   Billing: [facility] [phone] or [facility]:[phone]

## 2023-06-21 NOTE — LETTER
"2023       RE: Kian Cortez  [address]  [place] [address]     Dear Colleague,    Thank you for referring your patient, Kian Cortez, to the [facility]. Please see a copy of my visit note below.    [facility]    HPI: Kian Cortez is a 68 year old  who presents to clinic today to discuss postmenopausal bleeding. She was seen by her IM physician and found to have PMB in late May. She has an ultrasound concerning for a thickened heterogenous EMS in 2017 with an EMB with scant atrophic endometrial tissue.     She has had bleeding for 10 days in late May, 3-4 days with red blood on pad and the rest with blood noticed in the toilet.  This is her only episode of PMB with menopause at age 46. Denies abdominal/pelvic pain. Has had back pain for about 3 years.     OB History:   , SVDx7, SABx2    Gyn History:   Pap smear history: 19 NILM, HPV neg    Imaging:   Pelvic US   Images for recent US and US in 2017 personally reviewed with concern for heterogenous thickened EMS with cystic areas    FINDINGS:  The uterus measures 8.1 x 6.2 x 4.6 cm, and there is no evidence of a  focal fibroid.  Heterogeneous and enlarged endometrial stripe with  possible vascularity which measures 1.5 cm. Endometrial thickness was  1.8 cm on 3/31/2017. There is no free fluid in the pelvis.     The ovaries are not identified on this exam. No adnexal mass.                                                   IMPRESSION:   1.  Heterogeneous and enlarged endometrial stripe measuring up to 1.5  cm. Consider endometrial biopsy.  2.  Ovaries not visualized on today's exam.    PMH, PSH, Family history, Social history, medications and allergies were reviewed and updated in EMR.     Objective:   /81 (BP Location: Left arm, Patient Position: Chair)   Pulse 86   Ht 1.549 m (5' 1\")   Wt 94.1 kg (207 lb " 8 oz)   LMP 04/19/2006   BMI 39.21 kg/m    General: Healthy appearing. Alert, oriented. Affect is appropriate.   Heart: Regular rate    Lungs: Breathing is unlabored.   Abdomen: Soft, nontender  Pelvic: Normal external genitalia. Moist, rugated vagina without discharge. Cervix  without lesions.    Procedure:   Informed consent signed.  With patient in dorsolithotomy position speculum placed.  Cervix identified and swabbed with Betadine.  A tenaculum was placed on the anterior lip of the cervix.  Endometrial biopsy Pipelle passed to the uterine fundus with moderate amount of endometrial tissue returned.  All instruments removed from vagina.  No bleeding noted.    Assessment/Plan:   Kian Cortez is a 68 year old female with postmenopausal bleeding and thickened, heterogenous endometrial stripe on ultrasound.     -Reviewed concern for precancerous or cancerous changes within the endometrium.  Discussed biopsy today to potentially expedite treatment if precancerous or cancerous changes are identified.  If biopsy is benign I would still recommend hysteroscopy and D&C in the operating room given ultrasound findings.  She is agreeable to this.  Reviewed risks and benefits of surgery and case request was submitted.  -Also reviewed findings and plan with her daughter per the patient's request    Lor Kathleen MD

## 2023-06-22 RX ORDER — ACETAMINOPHEN 325 MG/1
975 TABLET ORAL ONCE
Status: CANCELLED | OUTPATIENT
Start: 2023-06-22 | End: 2023-06-22

## 2023-06-23 ENCOUNTER — TELEPHONE (OUTPATIENT)
Dept: OBGYN | Facility: CLINIC | Age: 68
End: 2023-06-23
Payer: COMMERCIAL

## 2023-06-23 LAB
PATH REPORT.COMMENTS IMP SPEC: NORMAL
PATH REPORT.COMMENTS IMP SPEC: NORMAL
PATH REPORT.FINAL DX SPEC: NORMAL
PATH REPORT.GROSS SPEC: NORMAL
PATH REPORT.MICROSCOPIC SPEC OTHER STN: NORMAL
PATH REPORT.RELEVANT HX SPEC: NORMAL
PHOTO IMAGE: NORMAL

## 2023-06-23 NOTE — TELEPHONE ENCOUNTER
Called Kian with benign results from endometrial biopsy in clinic. Reiterated that I would still like to do a hysteroscopy, D&C to further evaluate endometrium given ultrasound findings and someone will call her to schedule that in the next several days.     Lor Kathleen MD

## 2023-06-26 ENCOUNTER — TELEPHONE (OUTPATIENT)
Dept: OBGYN | Facility: CLINIC | Age: 68
End: 2023-06-26
Payer: COMMERCIAL

## 2023-06-26 NOTE — TELEPHONE ENCOUNTER
Returned call to patient. Asked her what would work best for her for days, she states the sooner the better anytime after July 10th. I will work on finding a few days and call her with options.

## 2023-06-26 NOTE — TELEPHONE ENCOUNTER
[facility]    Phone Message    May a detailed message be left on voicemail: yes     Reason for Call: Pt calling to get this scheduled. Please see previous message and call pt to schedule Thank you    Action Taken: Message routed to:  Other: Whs    Travel Screening: Not Applicable

## 2023-06-27 ENCOUNTER — TELEPHONE (OUTPATIENT)
Dept: OBGYN | Facility: CLINIC | Age: 68
End: 2023-06-27
Payer: COMMERCIAL

## 2023-06-27 PROBLEM — N95.0 POSTMENOPAUSAL BLEEDING: Status: ACTIVE | Noted: 2023-06-21

## 2023-07-19 ENCOUNTER — TRANSFERRED RECORDS (OUTPATIENT)
Dept: HEALTH INFORMATION MANAGEMENT | Facility: CLINIC | Age: 68
End: 2023-07-19
Payer: COMMERCIAL

## 2023-07-20 ENCOUNTER — TELEPHONE (OUTPATIENT)
Dept: OBGYN | Facility: CLINIC | Age: 68
End: 2023-07-20
Payer: COMMERCIAL

## 2023-07-25 ENCOUNTER — ANESTHESIA EVENT (OUTPATIENT)
Dept: SURGERY | Facility: AMBULATORY SURGERY CENTER | Age: 68
End: 2023-07-25
Payer: COMMERCIAL

## 2023-07-26 ENCOUNTER — HOSPITAL ENCOUNTER (OUTPATIENT)
Facility: AMBULATORY SURGERY CENTER | Age: 68
Discharge: HOME | End: 2023-07-26
Attending: STUDENT IN AN ORGANIZED HEALTH CARE EDUCATION/TRAINING PROGRAM
Payer: COMMERCIAL

## 2023-07-26 ENCOUNTER — ANESTHESIA (OUTPATIENT)
Dept: SURGERY | Facility: AMBULATORY SURGERY CENTER | Age: 68
End: 2023-07-26
Payer: COMMERCIAL

## 2023-07-26 VITALS
HEIGHT: 62 IN | OXYGEN SATURATION: 99 % | SYSTOLIC BLOOD PRESSURE: 110 MMHG | DIASTOLIC BLOOD PRESSURE: 65 MMHG | HEART RATE: 73 BPM | WEIGHT: 204 LBS | BODY MASS INDEX: 37.54 KG/M2 | RESPIRATION RATE: 16 BRPM | TEMPERATURE: 96.7 F

## 2023-07-26 DIAGNOSIS — N95.0 POSTMENOPAUSAL BLEEDING: ICD-10-CM

## 2023-07-26 PROCEDURE — 88305 TISSUE EXAM BY PATHOLOGIST: CPT | Mod: 26 | Performed by: PATHOLOGY

## 2023-07-26 PROCEDURE — 88305 TISSUE EXAM BY PATHOLOGIST: CPT | Mod: TC | Performed by: STUDENT IN AN ORGANIZED HEALTH CARE EDUCATION/TRAINING PROGRAM

## 2023-07-26 PROCEDURE — 58558 HYSTEROSCOPY BIOPSY: CPT | Mod: GC | Performed by: STUDENT IN AN ORGANIZED HEALTH CARE EDUCATION/TRAINING PROGRAM

## 2023-07-26 PROCEDURE — 58558 HYSTEROSCOPY BIOPSY: CPT

## 2023-07-26 RX ORDER — ACETAMINOPHEN 325 MG/1
975 TABLET ORAL ONCE
Status: DISCONTINUED | OUTPATIENT
Start: 2023-07-26 | End: 2023-07-27 | Stop reason: HOSPADM

## 2023-07-26 RX ORDER — FENTANYL CITRATE 50 UG/ML
50 INJECTION, SOLUTION INTRAMUSCULAR; INTRAVENOUS EVERY 5 MIN PRN
Status: DISCONTINUED | OUTPATIENT
Start: 2023-07-26 | End: 2023-07-26 | Stop reason: HOSPADM

## 2023-07-26 RX ORDER — PROPOFOL 10 MG/ML
INJECTION, EMULSION INTRAVENOUS CONTINUOUS PRN
Status: DISCONTINUED | OUTPATIENT
Start: 2023-07-26 | End: 2023-07-26

## 2023-07-26 RX ORDER — ONDANSETRON 2 MG/ML
INJECTION INTRAMUSCULAR; INTRAVENOUS PRN
Status: DISCONTINUED | OUTPATIENT
Start: 2023-07-26 | End: 2023-07-26

## 2023-07-26 RX ORDER — PROPOFOL 10 MG/ML
INJECTION, EMULSION INTRAVENOUS PRN
Status: DISCONTINUED | OUTPATIENT
Start: 2023-07-26 | End: 2023-07-26

## 2023-07-26 RX ORDER — KETOROLAC TROMETHAMINE 30 MG/ML
15 INJECTION, SOLUTION INTRAMUSCULAR; INTRAVENOUS ONCE
Status: COMPLETED | OUTPATIENT
Start: 2023-07-26 | End: 2023-07-26

## 2023-07-26 RX ORDER — OXYCODONE HYDROCHLORIDE 5 MG/1
10 TABLET ORAL
Status: DISCONTINUED | OUTPATIENT
Start: 2023-07-26 | End: 2023-07-27 | Stop reason: HOSPADM

## 2023-07-26 RX ORDER — ACETAMINOPHEN 325 MG/1
975 TABLET ORAL ONCE
Status: COMPLETED | OUTPATIENT
Start: 2023-07-26 | End: 2023-07-26

## 2023-07-26 RX ORDER — IBUPROFEN 200 MG
600 TABLET ORAL ONCE
Status: DISCONTINUED | OUTPATIENT
Start: 2023-07-26 | End: 2023-07-27 | Stop reason: HOSPADM

## 2023-07-26 RX ORDER — HYDROMORPHONE HYDROCHLORIDE 1 MG/ML
0.4 INJECTION, SOLUTION INTRAMUSCULAR; INTRAVENOUS; SUBCUTANEOUS EVERY 5 MIN PRN
Status: DISCONTINUED | OUTPATIENT
Start: 2023-07-26 | End: 2023-07-26 | Stop reason: HOSPADM

## 2023-07-26 RX ORDER — SODIUM CHLORIDE, SODIUM LACTATE, POTASSIUM CHLORIDE, CALCIUM CHLORIDE 600; 310; 30; 20 MG/100ML; MG/100ML; MG/100ML; MG/100ML
INJECTION, SOLUTION INTRAVENOUS CONTINUOUS
Status: DISCONTINUED | OUTPATIENT
Start: 2023-07-26 | End: 2023-07-26 | Stop reason: HOSPADM

## 2023-07-26 RX ORDER — LIDOCAINE 40 MG/G
CREAM TOPICAL
Status: DISCONTINUED | OUTPATIENT
Start: 2023-07-26 | End: 2023-07-26 | Stop reason: HOSPADM

## 2023-07-26 RX ORDER — OXYCODONE HYDROCHLORIDE 5 MG/1
5 TABLET ORAL
Status: DISCONTINUED | OUTPATIENT
Start: 2023-07-26 | End: 2023-07-27 | Stop reason: HOSPADM

## 2023-07-26 RX ORDER — HYDROMORPHONE HYDROCHLORIDE 1 MG/ML
0.2 INJECTION, SOLUTION INTRAMUSCULAR; INTRAVENOUS; SUBCUTANEOUS EVERY 5 MIN PRN
Status: DISCONTINUED | OUTPATIENT
Start: 2023-07-26 | End: 2023-07-26 | Stop reason: HOSPADM

## 2023-07-26 RX ORDER — ONDANSETRON 4 MG/1
4 TABLET, ORALLY DISINTEGRATING ORAL EVERY 30 MIN PRN
Status: DISCONTINUED | OUTPATIENT
Start: 2023-07-26 | End: 2023-07-26 | Stop reason: HOSPADM

## 2023-07-26 RX ORDER — FENTANYL CITRATE 50 UG/ML
25 INJECTION, SOLUTION INTRAMUSCULAR; INTRAVENOUS EVERY 5 MIN PRN
Status: DISCONTINUED | OUTPATIENT
Start: 2023-07-26 | End: 2023-07-26 | Stop reason: HOSPADM

## 2023-07-26 RX ORDER — DEXAMETHASONE SODIUM PHOSPHATE 4 MG/ML
INJECTION, SOLUTION INTRA-ARTICULAR; INTRALESIONAL; INTRAMUSCULAR; INTRAVENOUS; SOFT TISSUE PRN
Status: DISCONTINUED | OUTPATIENT
Start: 2023-07-26 | End: 2023-07-26

## 2023-07-26 RX ORDER — FENTANYL CITRATE 50 UG/ML
INJECTION, SOLUTION INTRAMUSCULAR; INTRAVENOUS PRN
Status: DISCONTINUED | OUTPATIENT
Start: 2023-07-26 | End: 2023-07-26

## 2023-07-26 RX ORDER — SODIUM CHLORIDE, SODIUM LACTATE, POTASSIUM CHLORIDE, CALCIUM CHLORIDE 600; 310; 30; 20 MG/100ML; MG/100ML; MG/100ML; MG/100ML
INJECTION, SOLUTION INTRAVENOUS CONTINUOUS PRN
Status: DISCONTINUED | OUTPATIENT
Start: 2023-07-26 | End: 2023-07-26

## 2023-07-26 RX ORDER — ONDANSETRON 2 MG/ML
4 INJECTION INTRAMUSCULAR; INTRAVENOUS EVERY 30 MIN PRN
Status: DISCONTINUED | OUTPATIENT
Start: 2023-07-26 | End: 2023-07-26 | Stop reason: HOSPADM

## 2023-07-26 RX ORDER — ONDANSETRON 2 MG/ML
4 INJECTION INTRAMUSCULAR; INTRAVENOUS EVERY 30 MIN PRN
Status: DISCONTINUED | OUTPATIENT
Start: 2023-07-26 | End: 2023-07-27 | Stop reason: HOSPADM

## 2023-07-26 RX ORDER — LIDOCAINE HYDROCHLORIDE 10 MG/ML
INJECTION, SOLUTION INFILTRATION; PERINEURAL PRN
Status: DISCONTINUED | OUTPATIENT
Start: 2023-07-26 | End: 2023-07-26 | Stop reason: HOSPADM

## 2023-07-26 RX ORDER — ONDANSETRON 4 MG/1
4 TABLET, ORALLY DISINTEGRATING ORAL EVERY 30 MIN PRN
Status: DISCONTINUED | OUTPATIENT
Start: 2023-07-26 | End: 2023-07-27 | Stop reason: HOSPADM

## 2023-07-26 RX ORDER — ACETAMINOPHEN 325 MG/1
975 TABLET ORAL ONCE
Status: DISCONTINUED | OUTPATIENT
Start: 2023-07-26 | End: 2023-07-26 | Stop reason: HOSPADM

## 2023-07-26 RX ADMIN — Medication 100 MCG: at 11:31

## 2023-07-26 RX ADMIN — PROPOFOL 100 MCG/KG/MIN: 10 INJECTION, EMULSION INTRAVENOUS at 11:07

## 2023-07-26 RX ADMIN — SODIUM CHLORIDE, SODIUM LACTATE, POTASSIUM CHLORIDE, CALCIUM CHLORIDE: 600; 310; 30; 20 INJECTION, SOLUTION INTRAVENOUS at 10:58

## 2023-07-26 RX ADMIN — SODIUM CHLORIDE, SODIUM LACTATE, POTASSIUM CHLORIDE, CALCIUM CHLORIDE: 600; 310; 30; 20 INJECTION, SOLUTION INTRAVENOUS at 09:18

## 2023-07-26 RX ADMIN — DEXAMETHASONE SODIUM PHOSPHATE 4 MG: 4 INJECTION, SOLUTION INTRA-ARTICULAR; INTRALESIONAL; INTRAMUSCULAR; INTRAVENOUS; SOFT TISSUE at 11:13

## 2023-07-26 RX ADMIN — FENTANYL CITRATE 25 MCG: 50 INJECTION, SOLUTION INTRAMUSCULAR; INTRAVENOUS at 11:13

## 2023-07-26 RX ADMIN — ACETAMINOPHEN 975 MG: 325 TABLET ORAL at 09:17

## 2023-07-26 RX ADMIN — PROPOFOL 20 MG: 10 INJECTION, EMULSION INTRAVENOUS at 11:10

## 2023-07-26 RX ADMIN — ONDANSETRON 4 MG: 2 INJECTION INTRAMUSCULAR; INTRAVENOUS at 11:17

## 2023-07-26 RX ADMIN — KETOROLAC TROMETHAMINE 15 MG: 30 INJECTION, SOLUTION INTRAMUSCULAR; INTRAVENOUS at 12:46

## 2023-07-26 NOTE — PROGRESS NOTES
Called patient to review results from surgery showing several uterine polyps. These were removed and sent to pathology. Voicemail message left and instructed to call clinic with any questions.     Lor Kathleen MD

## 2023-07-26 NOTE — DISCHARGE INSTRUCTIONS
[facility]  Home Care Following Anesthesia  For 24 hours after surgery:  Get plenty of rest.  A responsible adult must stay with you for at least 24 hours after you leave the surgery center.  Do not drive or use heavy equipment.  If you have weakness or tingling, don't drive or use heavy equipment until this feeling goes away.   Do not drink alcohol.   Avoid strenuous or risky activities.  Ask for help when climbing stairs.  You may feel lightheaded.  IF so, sit for a few minutes before standing.  Have someone help you get up.   If you have nausea (feel sick to your stomach): Drink only clear liquids such as apple juice, ginger ale, broth or 7-Up.  Rest may also help.  Be sure to drink enough fluids.  Move to a regular diet as you feel able.   You may have a slight fever.  Call the doctor if your fever is over 100 F (37.7 C) (taken under the tongue) or lasts longer than 24 hours.  You may have a dry mouth, a sore throat, muscle aches or trouble sleeping. These should go away after 24 hours.  Do not make important or legal decisions.   It is recommended to avoid smoking.               Tips for taking pain medications  To get the best pain relief possible, remember these points:  Take pain medications as directed, before pain becomes severe.  Pain medication can upset your stomach: taking it with food may help.  Constipation is a common side effect of pain medication. Drink plenty of  fluids.  Eat foods high in fiber. Take a stool softener if recommended by your doctor or pharmacist.  Do not drink alcohol, drive or operate machinery while taking pain medications.  Ask about other ways to control pain, such as with heat, ice or relaxation.    Tylenol/Acetaminophen Consumption    If you feel your pain relief is insufficient, you may take Tylenol/Acetaminophen in addition to your narcotic pain medication.   Be careful not to exceed 4,000 mg of Tylenol/Acetaminophen in a 24 hour  period from all sources.  If you are taking extra strength Tylenol/acetaminophen (500 mg), the maximum dose is 8 tablets in 24 hours.  If you are taking regular strength acetaminophen (325 mg), the maximum dose is 12 tablets in 24 hours.  975 mg of Tylenol was given at 9:20 am next dose may be given after 3:20 pm    Call a doctor for any of the following:  Signs of infection (fever, growing tenderness at the surgery site, a large amount of drainage or bleeding, severe pain, foul-smelling drainage, redness, swelling).  It has been over 8 to 10 hours since surgery and you are still not able to urinate (pass water).  Headache for over 24 hours.  Signs of Covid-19 infection (temperature over 100 degrees, shortness of breath, cough, loss of taste/smell, generalized body aches, persistent headache, chills, sore throat, nausea/vomiting/diarrhea)  Your doctor is:  Dr. Lor Kathleen, OB/GYN: [phone]              Or dial [phone] and ask for the resident on call for:  OB/GYN  For emergency care, call the:  [facility] Emergency Department: [phone] (TTY for hearing impaired: [phone])

## 2023-07-26 NOTE — ANESTHESIA PREPROCEDURE EVALUATION
Anesthesia Pre-Procedure Evaluation    Patient: Kian Cortez   MRN: [id number] : 1955        Procedure : Procedure(s):  HYSTEROSCOPY, WITH DILATION AND CURETTAGE OF UTERUS USING MORCELLATOR          Past Medical History:   Diagnosis Date    Diverticulosis 2015    on CT scan    Functional dyspepsia     GERD (gastroesophageal reflux disease)     Insomnia     psychophysiologic    Osteoarthritis of right knee     Osteopenia 2015      Past Surgical History:   Procedure Laterality Date    COLONOSCOPY N/A 2019    Procedure: COLONOSCOPY;  Surgeon: Sandra Chu MD;  Location: [facility] OR    DILATION AND CURETTAGE SUCTION      EYE SURGERY      eye duct repair 10+ years  ago    NASOLACRIMAL DUCT PROBE/IRRG        No Known Allergies   Social History     Tobacco Use    Smoking status: Never    Smokeless tobacco: Never   Substance Use Topics    Alcohol use: No      Wt Readings from Last 1 Encounters:   23 92.5 kg (204 lb)        Anesthesia Evaluation            ROS/MED HX  ENT/Pulmonary:       Neurologic:       Cardiovascular:       METS/Exercise Tolerance:     Hematologic:       Musculoskeletal:       GI/Hepatic:     (+) GERD,                   Renal/Genitourinary: Comment: Post menopausal bleeding      Endo:       Psychiatric/Substance Use:       Infectious Disease:       Malignancy:       Other:            Physical Exam    Airway  airway exam normal           Respiratory Devices and Support         Dental       (+) Completely normal teeth      Cardiovascular   cardiovascular exam normal          Pulmonary   pulmonary exam normal                OUTSIDE LABS:  CBC:   Lab Results   Component Value Date    WBC 5.0 2020    WBC 10.0 2020    HGB 14.3 2020    HGB 13.2 2020    HCT 43.5 2020    HCT 40.0 2020     2020     2020     BMP:   Lab Results   Component Value Date     2020     2020    POTASSIUM 4.6 2020     POTASSIUM 3.6 03/12/2020    CHLORIDE 106 05/01/2020    CHLORIDE 109 03/12/2020    CO2 27 05/01/2020    CO2 26 03/12/2020    BUN 8 05/01/2020    BUN 4 (L) 03/12/2020    CR 0.62 05/01/2020    CR 0.64 03/12/2020    GLC 86 05/01/2020    GLC 78 03/12/2020     COAGS: No results found for: PTT, INR, FIBR  POC: No results found for: BGM, HCG, HCGS  HEPATIC:   Lab Results   Component Value Date    ALBUMIN 3.4 05/01/2020    PROTTOTAL 8.0 05/01/2020    ALT 21 05/01/2020    AST 21 05/01/2020    ALKPHOS 93 05/01/2020    BILITOTAL 0.1 (L) 05/01/2020     OTHER:   Lab Results   Component Value Date    LACT 1.6 03/09/2020    A1C 5.1 08/15/2019    ANA 9.0 05/01/2020    PHOS 3.3 03/18/2013    MAG 2.1 03/18/2013    LIPASE 113 05/01/2020    TSH 1.28 08/15/2019    CRP 16.0 (H) 06/23/2015    SED 28 07/21/2015       Anesthesia Plan    ASA Status:  2    NPO Status:  NPO Appropriate    Anesthesia Type: MAC.     - Reason for MAC: immobility needed   Induction: Intravenous.   Maintenance: TIVA.        Consents    Anesthesia Plan(s) and associated risks, benefits, and realistic alternatives discussed. Questions answered and patient/representative(s) expressed understanding.     - Discussed:     - Discussed with:  Patient            Postoperative Care    Pain management: IV analgesics, Multi-modal analgesia.   PONV prophylaxis: Background Propofol Infusion     Comments:                Ananda Cassidy MD

## 2023-07-26 NOTE — INTERVAL H&P NOTE
"I have reviewed the surgical (or preoperative) H&P that is linked to this encounter, and examined the patient. There are no significant changes    Clinical Conditions Present on Arrival:  Clinically Significant Risk Factors Present on Admission                  # Obesity: Estimated body mass index is 37.31 kg/m  as calculated from the following:    Height as of this encounter: 1.575 m (5' 2\").    Weight as of this encounter: 92.5 kg (204 lb).       "

## 2023-07-26 NOTE — BRIEF OP NOTE
[facility]    Brief Operative Note    Pre-operative diagnosis: Postmenopausal bleeding [N95.0]  Post-operative diagnosis Same as pre-operative diagnosis , endometrial polyps    Procedure: Procedure(s):  HYSTEROSCOPY, WITH DILATION AND CURETTAGE OF UTERUS USING MORCELLATOR  Surgeon: Surgeon(s) and Role:     * Lor Kathleen MD - Primary     * Jolnyn Baron MD - Resident - Assisting  Anesthesia: MAC   Estimated Blood Loss: 5 mL    Drains: None  Specimens:   ID Type Source Tests Collected by Time Destination   1 : Endometrial Curettings Curettings Uterus SURGICAL PATHOLOGY EXAM Lor Kathleen MD 7/26/2023 11:24 AM      Findings: EUA revealed anteverted uterus, small size, mobile with no adnexal masses. Cervix was a multiparous and was dilated to 21 fr with sequential dilators. Hysteroscopic exam revealed 3 intrauterine polyps and scattered benign calcifications. Otherwise normal endometrial and cervical canal and bilateral ostia . Fluid deficit 215 mL normal saline. Uterine wall gritty on all aspects. Tenaculum sites hemostatic at end of case.     Complications: None.  Implants: * No implants in log *        Jolynn Baron MD  Obstetrics and Gynecology, PGY-1  07/26/23 11:51 AM

## 2023-07-26 NOTE — ANESTHESIA POSTPROCEDURE EVALUATION
Patient: Kian Cortez    Procedure: Procedure(s):  HYSTEROSCOPY, WITH DILATION AND CURETTAGE OF UTERUS USING MORCELLATOR       Anesthesia Type:  MAC    Note:  Disposition: Outpatient   Postop Pain Control: Uneventful            Sign Out: Well controlled pain   PONV: No   Neuro/Psych: Uneventful            Sign Out: Acceptable/Baseline neuro status   Airway/Respiratory: Uneventful            Sign Out: Acceptable/Baseline resp. status   CV/Hemodynamics: Uneventful            Sign Out: Acceptable CV status; No obvious hypovolemia; No obvious fluid overload   Other NRE: NONE   DID A NON-ROUTINE EVENT OCCUR? No           Last vitals:  Vitals Value Taken Time   /69 07/26/23 1237   Temp 36.1  C (96.9  F) 07/26/23 1152   Pulse     Resp 14 07/26/23 1237   SpO2 99 % 07/26/23 1237       Electronically Signed By: Ananda Cassidy MD  July 26, 2023  1:22 PM

## 2023-07-26 NOTE — ANESTHESIA CARE TRANSFER NOTE
Patient: Kian Cortez    Procedure: Procedure(s):  HYSTEROSCOPY, WITH DILATION AND CURETTAGE OF UTERUS USING MORCELLATOR       Diagnosis: Postmenopausal bleeding [N95.0]  Diagnosis Additional Information: No value filed.    Anesthesia Type:   No value filed.     Note:    Oropharynx: oropharynx clear of all foreign objects  Level of Consciousness: awake  Oxygen Supplementation: room air    Independent Airway: airway patency satisfactory and stable  Dentition: dentition unchanged  Vital Signs Stable: post-procedure vital signs reviewed and stable  Report to RN Given: handoff report given  Patient transferred to: Phase II  Comments: VSS and WNL, comfortable, no PONV, report to Cassandra RN  Handoff Report: Identifed the Patient, Identified the Reponsible Provider, Reviewed the pertinent medical history, Discussed the surgical course, Reviewed Intra-OP anesthesia mangement and issues during anesthesia, Set expectations for post-procedure period and Allowed opportunity for questions and acknowledgement of understanding      Vitals:  Vitals Value Taken Time   BP     Temp     Pulse     Resp     SpO2         Electronically Signed By: EFRAIN Drummond CRNA  July 26, 2023  11:53 AM

## 2023-07-26 NOTE — OP NOTE
[facility]   Operative Note    Date of service: 2023    Preoperative diagnosis:    Post menopausal bleeding  Thickened endometrium   Class 2 Obesity    Postoperative diagnosis:    Same as above  2.    Endometrial polyps     Procedure:  EUA, hysteroscopy with endometrial sampling and polypectomy using Myosure device    Surgeon:  Dr. Kathleen    Assistant:   Jolynn Baron MD PGY1    Anesthesia:  MAC + local  EBL:  5mL  IVF:  600mL  Fluid deficit:  215 mL    Specimens: Endometrial curettings    Complications: None    Findings:    Exam under anesthesia revealed evidence of prior infibulation, otherwise normal external genitalia. Mobile anteverted uterus, adnexa not easily palpable due to adiposity.  Speculum exam revealed normal postmenopausal cervix with no lesions.   Hysteroscopy revealed 3 polyps, scattered calcifications, otherwise normal uterine cavity, and normal tubal ostia visualized on the right, unable to visualize on the left.       Indications:  Kian Cortez is a 68 year old  who presented to clinic with post menopausal bleeding and was found to have a thickened endometrial stripe up to 1.5 cm. An endometrial biopsy in clinic revealed atrophic endometrium. However, due to persistent thickened endometrium on ultrasound, sampling with hysteroscopy was recommended. Risks, benefits, and alternatives to the procedure were discussed with the patient who elected to proceed.  All questions were answered and an informed consent was obtained.    Procedure:  The patient was taken to the operating room where she underwent MAC anesthesia without difficulty.  She was placed in a dorsal lithotomy position using yellow-fin stirrups.  The patient was examined with the above noted findings and then prepped and draped in the usual sterile fashion.  A medium graves speculum was inserted into the vagina. 1 cc 1% plain lidocaine was injected into the cervix at 12 o'clock position. A tenaculum was placed on the anterior cervical  lip.  A paracervical block was administered with an additional 19 cc 1% plain lidocaine at the 4 and 8 o'clock positions. The endocervical canal was serially dilated to 21 fr using Diana dilators. The hysteroscope was inserted without difficulty with the above findings noted.  The Myosure was used to morcellate three polyps, two located on the posterior wall of the uterus and one located anteriorly. The myosure was then used to sample the endometrium circumferentially. All instruments were then removed. The tenaculum was removed from the cervix and the puncture sites noted to be hemostatic with pressure, and there were no vaginal tears. The patient was repositioned to the supine position.  The patient tolerated the procedure well and was taken to the recovery room in stable condition.  Dr. Kathleen was scrubbed and present for the entire procedure.    Jolynn Baron MD  Obstetrics and Gynecology, PGY-1  07/26/23 11:52 AM    I participated in all aspects of Kian Cortez's case with Dr. Baron on 7/26/2023 and agree with the operative details in this note with edits by me.     Lor Kathleen MD

## 2023-07-27 ENCOUNTER — TELEPHONE (OUTPATIENT)
Dept: OBGYN | Facility: CLINIC | Age: 68
End: 2023-07-27
Payer: COMMERCIAL

## 2023-07-27 LAB
PATH REPORT.COMMENTS IMP SPEC: NORMAL
PATH REPORT.FINAL DX SPEC: NORMAL
PATH REPORT.GROSS SPEC: NORMAL
PATH REPORT.MICROSCOPIC SPEC OTHER STN: NORMAL
PATH REPORT.RELEVANT HX SPEC: NORMAL
PHOTO IMAGE: NORMAL

## 2023-07-27 NOTE — TELEPHONE ENCOUNTER
Pt calling back, stated the Dr wanted to speak with her post procedure.   Please call pt Thank you

## 2023-07-27 NOTE — TELEPHONE ENCOUNTER
[facility] Call Center    Phone Message    May a detailed message be left on voicemail: yes     Reason for Call: Other: PT states she received a call from clinic regarding her recent surgery. Writer doesn't see any documentation. Pt is wanting a call back, please call pt.      Action Taken: Message routed to:  Other: [facility]    Travel Screening: Not Applicable

## 2023-07-28 ENCOUNTER — TELEPHONE (OUTPATIENT)
Dept: OBGYN | Facility: CLINIC | Age: 68
End: 2023-07-28
Payer: COMMERCIAL

## 2023-07-28 PROCEDURE — 99207 PR NO BILLABLE SERVICE THIS VISIT: CPT | Performed by: STUDENT IN AN ORGANIZED HEALTH CARE EDUCATION/TRAINING PROGRAM

## 2023-07-28 NOTE — TELEPHONE ENCOUNTER
Called patient with results of pathology. Will switch follow-up visit on 8/16/23 to telephone unless she has concerns in which case she will come to clinic.     Lor Kathleen MD

## 2023-08-02 ENCOUNTER — HOSPITAL ENCOUNTER (EMERGENCY)
Facility: CLINIC | Age: 68
Discharge: HOME | End: 2023-08-02
Attending: EMERGENCY MEDICINE | Admitting: EMERGENCY MEDICINE
Payer: COMMERCIAL

## 2023-08-02 ENCOUNTER — APPOINTMENT (OUTPATIENT)
Dept: CT IMAGING | Facility: CLINIC | Age: 68
End: 2023-08-02
Attending: EMERGENCY MEDICINE
Payer: COMMERCIAL

## 2023-08-02 VITALS
SYSTOLIC BLOOD PRESSURE: 92 MMHG | OXYGEN SATURATION: 97 % | TEMPERATURE: 98.4 F | HEART RATE: 87 BPM | RESPIRATION RATE: 18 BRPM | DIASTOLIC BLOOD PRESSURE: 52 MMHG

## 2023-08-02 DIAGNOSIS — R51.9 ACUTE NONINTRACTABLE HEADACHE, UNSPECIFIED HEADACHE TYPE: ICD-10-CM

## 2023-08-02 LAB
ALBUMIN SERPL BCG-MCNC: 3.8 G/DL (ref 3.5–5.2)
ALP SERPL-CCNC: 69 U/L (ref 35–104)
ALT SERPL W P-5'-P-CCNC: 11 U/L (ref 0–50)
ANION GAP SERPL CALCULATED.3IONS-SCNC: 9 MMOL/L (ref 7–15)
AST SERPL W P-5'-P-CCNC: 33 U/L (ref 0–45)
BASOPHILS # BLD AUTO: 0.1 10E3/UL (ref 0–0.2)
BASOPHILS NFR BLD AUTO: 1 %
BILIRUB SERPL-MCNC: 0.3 MG/DL
BUN SERPL-MCNC: 8.2 MG/DL (ref 8–23)
CALCIUM SERPL-MCNC: 8.4 MG/DL (ref 8.8–10.2)
CHLORIDE SERPL-SCNC: 102 MMOL/L (ref 98–107)
CREAT SERPL-MCNC: 0.7 MG/DL (ref 0.51–0.95)
DEPRECATED HCO3 PLAS-SCNC: 24 MMOL/L (ref 22–29)
EOSINOPHIL # BLD AUTO: 0.1 10E3/UL (ref 0–0.7)
EOSINOPHIL NFR BLD AUTO: 1 %
ERYTHROCYTE [DISTWIDTH] IN BLOOD BY AUTOMATED COUNT: 13.7 % (ref 10–15)
GFR SERPL CREATININE-BSD FRML MDRD: >90 ML/MIN/1.73M2
GLUCOSE SERPL-MCNC: 93 MG/DL (ref 70–99)
HCT VFR BLD AUTO: 40.9 % (ref 35–47)
HGB BLD-MCNC: 13.2 G/DL (ref 11.7–15.7)
HOLD SPECIMEN: NORMAL
HOLD SPECIMEN: NORMAL
IMM GRANULOCYTES # BLD: 0.1 10E3/UL
IMM GRANULOCYTES NFR BLD: 1 %
LYMPHOCYTES # BLD AUTO: 1.4 10E3/UL (ref 0.8–5.3)
LYMPHOCYTES NFR BLD AUTO: 19 %
MCH RBC QN AUTO: 30.2 PG (ref 26.5–33)
MCHC RBC AUTO-ENTMCNC: 32.3 G/DL (ref 31.5–36.5)
MCV RBC AUTO: 94 FL (ref 78–100)
MONOCYTES # BLD AUTO: 1 10E3/UL (ref 0–1.3)
MONOCYTES NFR BLD AUTO: 14 %
NEUTROPHILS # BLD AUTO: 4.7 10E3/UL (ref 1.6–8.3)
NEUTROPHILS NFR BLD AUTO: 64 %
NRBC # BLD AUTO: 0 10E3/UL
NRBC BLD AUTO-RTO: 0 /100
PLATELET # BLD AUTO: 173 10E3/UL (ref 150–450)
POTASSIUM SERPL-SCNC: 4.2 MMOL/L (ref 3.4–5.3)
PROT SERPL-MCNC: 7.1 G/DL (ref 6.4–8.3)
RBC # BLD AUTO: 4.37 10E6/UL (ref 3.8–5.2)
SODIUM SERPL-SCNC: 135 MMOL/L (ref 136–145)
WBC # BLD AUTO: 7.3 10E3/UL (ref 4–11)

## 2023-08-02 PROCEDURE — 250N000011 HC RX IP 250 OP 636: Mod: JZ | Performed by: EMERGENCY MEDICINE

## 2023-08-02 PROCEDURE — 80053 COMPREHEN METABOLIC PANEL: CPT | Performed by: EMERGENCY MEDICINE

## 2023-08-02 PROCEDURE — 70450 CT HEAD/BRAIN W/O DYE: CPT

## 2023-08-02 PROCEDURE — 99285 EMERGENCY DEPT VISIT HI MDM: CPT | Mod: 25 | Performed by: EMERGENCY MEDICINE

## 2023-08-02 PROCEDURE — 85025 COMPLETE CBC W/AUTO DIFF WBC: CPT | Performed by: EMERGENCY MEDICINE

## 2023-08-02 PROCEDURE — 96361 HYDRATE IV INFUSION ADD-ON: CPT | Performed by: EMERGENCY MEDICINE

## 2023-08-02 PROCEDURE — 96374 THER/PROPH/DIAG INJ IV PUSH: CPT | Performed by: EMERGENCY MEDICINE

## 2023-08-02 PROCEDURE — 99284 EMERGENCY DEPT VISIT MOD MDM: CPT | Performed by: EMERGENCY MEDICINE

## 2023-08-02 PROCEDURE — 36415 COLL VENOUS BLD VENIPUNCTURE: CPT | Performed by: EMERGENCY MEDICINE

## 2023-08-02 PROCEDURE — 70450 CT HEAD/BRAIN W/O DYE: CPT | Mod: 26 | Performed by: RADIOLOGY

## 2023-08-02 PROCEDURE — 258N000003 HC RX IP 258 OP 636: Performed by: EMERGENCY MEDICINE

## 2023-08-02 PROCEDURE — 96375 TX/PRO/DX INJ NEW DRUG ADDON: CPT | Performed by: EMERGENCY MEDICINE

## 2023-08-02 RX ORDER — METOCLOPRAMIDE HYDROCHLORIDE 5 MG/ML
5 INJECTION INTRAMUSCULAR; INTRAVENOUS ONCE
Status: COMPLETED | OUTPATIENT
Start: 2023-08-02 | End: 2023-08-02

## 2023-08-02 RX ORDER — KETOROLAC TROMETHAMINE 15 MG/ML
15 INJECTION, SOLUTION INTRAMUSCULAR; INTRAVENOUS ONCE
Status: COMPLETED | OUTPATIENT
Start: 2023-08-02 | End: 2023-08-02

## 2023-08-02 RX ORDER — DIPHENHYDRAMINE HYDROCHLORIDE 50 MG/ML
25 INJECTION INTRAMUSCULAR; INTRAVENOUS ONCE
Status: COMPLETED | OUTPATIENT
Start: 2023-08-02 | End: 2023-08-02

## 2023-08-02 RX ADMIN — KETOROLAC TROMETHAMINE 15 MG: 15 INJECTION INTRAMUSCULAR; INTRAVENOUS at 01:11

## 2023-08-02 RX ADMIN — SODIUM CHLORIDE 1000 ML: 9 INJECTION, SOLUTION INTRAVENOUS at 01:15

## 2023-08-02 RX ADMIN — METOCLOPRAMIDE 5 MG: 5 INJECTION, SOLUTION INTRAMUSCULAR; INTRAVENOUS at 01:12

## 2023-08-02 RX ADMIN — DIPHENHYDRAMINE HYDROCHLORIDE 25 MG: 50 INJECTION, SOLUTION INTRAMUSCULAR; INTRAVENOUS at 01:08

## 2023-08-02 ASSESSMENT — ACTIVITIES OF DAILY LIVING (ADL)
ADLS_ACUITY_SCORE: 35
ADLS_ACUITY_SCORE: 35

## 2023-08-02 NOTE — ED PROVIDER NOTES
ED Provider Note  [facility]      History     Chief Complaint   Patient presents with    Nausea & Vomiting    Headache     HPI  Kian Cortez is a 68 year old female who has medical history of GERD, BPPV, vertigo, constipation presenting with headache and some nausea.  She denies vomiting.  Denies black or blood in her vomit.  She has been having normal bowel movements.  Denies abdominal pain, dysuria or hematuria.  Headache is been over 24 hours and gradual getting worse.  She is tried some Tylenol and tramadol.  Denies visual changes, numbness tingling or weakness.  She is not on blood thinners.  She has had no trauma or falls.  No neck pain or stiffness.  She reports the pain is all over including her forehead, the back of her head, the jaw.  Does not hurt to eat or chew.    Past Medical History  Past Medical History:   Diagnosis Date    Diverticulosis 11/2015    on CT scan    Functional dyspepsia     GERD (gastroesophageal reflux disease)     Insomnia     psychophysiologic    Osteoarthritis of right knee     Osteopenia 4/27/2015     Past Surgical History:   Procedure Laterality Date    COLONOSCOPY N/A 11/19/2019    Procedure: COLONOSCOPY;  Surgeon: Sandra Chu MD;  Location:  OR    DILATION AND CURETTAGE SUCTION      DILATION AND CURETTAGE, OPERATIVE HYSTEROSCOPY WITH MORCELLATOR, COMBINED N/A 7/26/2023    Procedure: HYSTEROSCOPY, WITH DILATION AND CURETTAGE OF UTERUS USING MORCELLATOR;  Surgeon: Lor Kathleen MD;  [place] [facility] [place]    EYE SURGERY      eye duct repair 10+ years  ago    NASOLACRIMAL DUCT PROBE/IRRG       acetaminophen 500 MG CAPS  amitriptyline (ELAVIL) 25 MG tablet  calcium carbonate (TUMS) 500 MG chewable tablet  carboxymethylcellul-glycerin (OPTIVE) 0.5-0.9 % SOLN ophthalmic solution  Fish Oil-Cholecalciferol (FISH OIL + D3 PO)  gabapentin (NEURONTIN) 400 MG capsule  hydrocortisone (CORTAID) 1 % external ointment  ketoconazole (NIZORAL) 2 % external  cream  lidocaine (XYLOCAINE) 5 % external ointment  melatonin 5 MG tablet  omeprazole (PRILOSEC) 40 MG DR capsule  phentermine (ADIPEX-P) 15 MG capsule  vitamin D3 (CHOLECALCIFEROL) 2000 units tablet      No Known Allergies  Family History  Family History   Problem Relation Age of Onset    Other Cancer Brother     Cancer Brother     Glaucoma No family hx of     Macular Degeneration No family hx of     Diabetes No family hx of     Hypertension No family hx of     Cerebrovascular Disease No family hx of     Thyroid Disease No family hx of      Social History   Social History     Tobacco Use    Smoking status: Never    Smokeless tobacco: Never   Substance Use Topics    Alcohol use: No    Drug use: No         A medically appropriate review of systems was performed with pertinent positives and negatives noted in the HPI, and all other systems negative.    Physical Exam      Physical Exam  Physical Exam   Constitutional: oriented to person, place, and time. appears well-developed and well-nourished.   HENT:   Head: Normocephalic and atraumatic.  No tenderness over the temporal area.  Temporal arteries pulses present and symmetric.  Pupils 3 mm and reactive bilaterally.  Neck: Normal range of motion.  No nuchal rigidity.  Pulmonary/Chest: Effort normal. No respiratory distress.   Cardiac: No murmurs, rubs, gallops. RRR.  Abdominal: Abdomen soft, nontender, nondistended. No rebound tenderness.  MSK: Long bones without deformity or evidence of trauma  Neurological: alert and oriented to person, place, and time.  Stable gait.  No focal defects.  Sensation gross intact light touch over all extremities.  Cranial nerves II to XII are normal.  , bicep, tricep, lower leg and hip strength out of 5 and symmetric.  Skin: Skin is warm and dry.   Psychiatric:  normal mood and affect.  behavior is normal. Thought content normal.       ED Course, Procedures, & Data      Procedures       Results for orders placed or performed during  the hospital encounter of 08/02/23   Head CT w/o contrast     Status: None    Narrative    EXAM: CT HEAD W/O CONTRAST  LOCATION: [facility]  DATE: 8/2/2023    INDICATION: headache, new onset  COMPARISON: None.  TECHNIQUE: Routine CT Head without IV contrast. Multiplanar reformats. Dose reduction techniques were used.    FINDINGS:  INTRACRANIAL CONTENTS: No intracranial hemorrhage, extraaxial collection, or mass effect.  No CT evidence of acute infarct. Normal parenchymal attenuation. Mild generalized volume loss. No hydrocephalus.     VISUALIZED ORBITS/SINUSES/MASTOIDS: No intraorbital abnormality. Mild mucosal thickening scattered about the paranasal sinuses. No middle ear or mastoid effusion.    BONES/SOFT TISSUES: No acute abnormality.      Impression    IMPRESSION:  1.  No acute intracranial process.   Comprehensive metabolic panel     Status: Abnormal   Result Value Ref Range    Sodium 135 (L) 136 - 145 mmol/L    Potassium 4.2 3.4 - 5.3 mmol/L    Chloride 102 98 - 107 mmol/L    Carbon Dioxide (CO2) 24 22 - 29 mmol/L    Anion Gap 9 7 - 15 mmol/L    Urea Nitrogen 8.2 8.0 - 23.0 mg/dL    Creatinine 0.70 0.51 - 0.95 mg/dL    Calcium 8.4 (L) 8.8 - 10.2 mg/dL    Glucose 93 70 - 99 mg/dL    Alkaline Phosphatase 69 35 - 104 U/L    AST 33 0 - 45 U/L    ALT 11 0 - 50 U/L    Protein Total 7.1 6.4 - 8.3 g/dL    Albumin 3.8 3.5 - 5.2 g/dL    Bilirubin Total 0.3 <=1.2 mg/dL    GFR Estimate >90 >60 mL/min/1.73m2   CBC with platelets and differential     Status: None   Result Value Ref Range    WBC Count 7.3 4.0 - 11.0 10e3/uL    RBC Count 4.37 3.80 - 5.20 10e6/uL    Hemoglobin 13.2 11.7 - 15.7 g/dL    Hematocrit 40.9 35.0 - 47.0 %    MCV 94 78 - 100 fL    MCH 30.2 26.5 - 33.0 pg    MCHC 32.3 31.5 - 36.5 g/dL    RDW 13.7 10.0 - 15.0 %    Platelet Count 173 150 - 450 10e3/uL    % Neutrophils 64 %    % Lymphocytes 19 %    % Monocytes 14 %    % Eosinophils 1 %    % Basophils 1 %    %  Immature Granulocytes 1 %    NRBCs per 100 WBC 0 <1 /100    Absolute Neutrophils 4.7 1.6 - 8.3 10e3/uL    Absolute Lymphocytes 1.4 0.8 - 5.3 10e3/uL    Absolute Monocytes 1.0 0.0 - 1.3 10e3/uL    Absolute Eosinophils 0.1 0.0 - 0.7 10e3/uL    Absolute Basophils 0.1 0.0 - 0.2 10e3/uL    Absolute Immature Granulocytes 0.1 <=0.4 10e3/uL    Absolute NRBCs 0.0 10e3/uL   Extra Tube ([place] Draw)     Status: None    Narrative    The following orders were created for panel order Extra Tube ([place] Draw).  Procedure                               Abnormality         Status                     ---------                               -----------         ------                     Extra Blue Top Tube[[id number]]                              Final result               Extra Red Top Tube[[id number]]                               Final result                 Please view results for these tests on the individual orders.   Extra Blue Top Tube     Status: None   Result Value Ref Range    Hold Specimen JIC    Extra Red Top Tube     Status: None   Result Value Ref Range    Hold Specimen JIC    CBC with platelets differential     Status: None    Narrative    The following orders were created for panel order CBC with platelets differential.  Procedure                               Abnormality         Status                     ---------                               -----------         ------                     CBC with platelets and d...[[id number]]                      Final result                 Please view results for these tests on the individual orders.     Medications   metoclopramide (REGLAN) injection 5 mg (has no administration in time range)   diphenhydrAMINE (BENADRYL) injection 25 mg (has no administration in time range)   ketorolac (TORADOL) injection 15 mg (has no administration in time range)   0.9% sodium chloride BOLUS (has no administration in time range)     Labs Ordered and Resulted from Time of ED Arrival to Time of  ED Departure - No data to display  No orders to display          Critical care was not performed.     Medical Decision Making  The patient's presentation was of moderate complexity (an undiagnosed new problem with uncertain diagnosis).    The patient's evaluation involved:  ordering and/or review of 3+ test(s) in this encounter (see separate area of note for details)  independent interpretation of testing performed by another health professional (CT head without acute bleed on my interpretation)    The patient's management necessitated only low risk treatment.    Assessment & Plan    MDM  Patient presenting with headache.  She has no signs of meningitis encephalitis tonight.  Labs here are reassuring.  Very low sufficient for temporal arteritis without jaw claudication, temporal tenderness and good and palpable temporal pulses.  No signs of intracranial hemorrhage.  No recent trauma.  Low suspicion for subarachnoid hemorrhage as this is a gradual headache onset and she has complete resolution of symptoms.  She otherwise appears quite well with a normal neurologic exam.  Very low suspicion for vascular dissection, venous sinus thrombosis or other acute intracranial process.  She is given return precautions and will follow up with her primary care provider if she has any further concerns.    I have reviewed the nursing notes. I have reviewed the findings, diagnosis, plan and need for follow up with the patient.    New Prescriptions    No medications on file       Final diagnoses:   Acute nonintractable headache, unspecified headache type       Pato Gonzalez  [facility] EMERGENCY DEPARTMENT  8/2/2023     Pato Gonzalez MD  08/02/23 2057

## 2023-08-02 NOTE — DISCHARGE INSTRUCTIONS
Please make an appointment to follow up with Your Primary Care Provider in 3-5 days if you have any concerns.    Return to the emergency department if you develop worsening headaches, fevers, vomiting or if you have any further concerns    If Kian has discomfort from fever or other pain, she can have:  Acetaminophen (Tylenol) every 6 hours as needed. Her dose is:    2 regular strength tabs (650 mg)                                     (43.2+ kg/96+ lb)    NOTE: If your acetaminophen (Tylenol) came with a dropper marked with 0.4 and 0.8 ml, call us ([phone]) or check with your doctor about the dose before using it.

## 2023-08-02 NOTE — ED TRIAGE NOTES
BIBA w/ c/o nausea and headache starting Monday night. States she took tramadol and ibuprofen w/o relief.      Triage Assessment       Row Name 08/02/23 0057       Triage Assessment (Adult)    Airway WDL WDL       Respiratory WDL    Respiratory WDL WDL       Skin Circulation/Temperature WDL    Skin Circulation/Temperature WDL WDL       Cardiac WDL    Cardiac WDL WDL       Peripheral/Neurovascular WDL    Peripheral Neurovascular WDL WDL       Cognitive/Neuro/Behavioral WDL    Cognitive/Neuro/Behavioral WDL WDL

## 2023-08-12 NOTE — PROGRESS NOTES
[facility]    This visit was done via telephone.    Reason for visit: post-op visit    HPI: Kian Cortez is a 68 year old  who was called for a telephone postoperative visit following a EUA, hysteroscopy with endometrial sampling and polypectomy using Myosure device on  for a thickened endometrium with postmenopausal bleeding.  Since surgery she had bleeding for a few days afterwards, none since.  Denies cramping.  States that she does notice urinary incontinence that has worsened postoperatively and is wondering about options.  Denies dysuria, hematuria, urinary frequency. Discussed options that urogynecologist may be able to provide and she is interested in the referral.    Final Diagnosis   Endometrium, curettage:  - Endometrial polyp(s) with foci of metaplasia     Assessment and plan:  -Normal postoperative recovery  -Operative findings and pathology reviewed.    -Discussed that if she has any postmenopausal bleeding in the future it is important that she represent to gynecology  -Referral to urogynecology placed    Lor Kathleen MD

## 2023-08-16 ENCOUNTER — VIRTUAL VISIT (OUTPATIENT)
Dept: OBGYN | Facility: CLINIC | Age: 68
End: 2023-08-16
Attending: STUDENT IN AN ORGANIZED HEALTH CARE EDUCATION/TRAINING PROGRAM
Payer: COMMERCIAL

## 2023-08-16 DIAGNOSIS — R32 URINARY INCONTINENCE, UNSPECIFIED TYPE: Primary | ICD-10-CM

## 2023-08-16 PROCEDURE — 99441 PR PHYSICIAN TELEPHONE EVALUATION 5-10 MIN: CPT | Mod: 93 | Performed by: STUDENT IN AN ORGANIZED HEALTH CARE EDUCATION/TRAINING PROGRAM

## 2023-08-16 NOTE — LETTER
2023       RE: Kian Cortez  [address]  [place] [address]     Dear Colleague,    Thank you for referring your patient, Kian Cortez, to the [facility] at [facility]. Please see a copy of my visit note below.    [facility]    This visit was done via telephone.    Reason for visit: post-op visit    HPI: Kian Cortez is a 68 year old  who was called for a telephone postoperative visit following a EUA, hysteroscopy with endometrial sampling and polypectomy using Myosure device on  for a thickened endometrium with postmenopausal bleeding.  Since surgery she had bleeding for a few days afterwards, none since.  Denies cramping.  States that she does notice urinary incontinence that has worsened postoperatively and is wondering about options.  Denies dysuria, hematuria, urinary frequency. Discussed options that urogynecologist may be able to provide and she is interested in the referral.    Final Diagnosis   Endometrium, curettage:  - Endometrial polyp(s) with foci of metaplasia     Assessment and plan:  -Normal postoperative recovery  -Operative findings and pathology reviewed.    -Discussed that if she has any postmenopausal bleeding in the future it is important that she represent to gynecology  -Referral to urogynecology placed    Lor Kathleen MD

## 2023-08-30 ENCOUNTER — OFFICE VISIT (OUTPATIENT)
Dept: UROLOGY | Facility: CLINIC | Age: 68
End: 2023-08-30
Attending: OBSTETRICS & GYNECOLOGY
Payer: COMMERCIAL

## 2023-08-30 VITALS
DIASTOLIC BLOOD PRESSURE: 63 MMHG | SYSTOLIC BLOOD PRESSURE: 106 MMHG | WEIGHT: 210 LBS | HEART RATE: 73 BPM | HEIGHT: 62 IN | BODY MASS INDEX: 38.64 KG/M2

## 2023-08-30 DIAGNOSIS — N39.41 URGE INCONTINENCE: Primary | ICD-10-CM

## 2023-08-30 DIAGNOSIS — R32 URINARY INCONTINENCE, UNSPECIFIED TYPE: ICD-10-CM

## 2023-08-30 LAB
ALBUMIN UR-MCNC: NEGATIVE MG/DL
APPEARANCE UR: CLEAR
BILIRUB UR QL STRIP: NEGATIVE
COLOR UR AUTO: YELLOW
GLUCOSE UR STRIP-MCNC: NEGATIVE MG/DL
HGB UR QL STRIP: NEGATIVE
KETONES UR STRIP-MCNC: NEGATIVE MG/DL
LEUKOCYTE ESTERASE UR QL STRIP: NEGATIVE
NITRATE UR QL: NEGATIVE
PH UR STRIP: 6.5 [PH] (ref 5–8)
SP GR UR STRIP: 1.01 (ref 1–1.03)
UROBILINOGEN UR STRIP-ACNC: 0.2 E.U./DL

## 2023-08-30 PROCEDURE — 81003 URINALYSIS AUTO W/O SCOPE: CPT | Performed by: OBSTETRICS & GYNECOLOGY

## 2023-08-30 PROCEDURE — G0463 HOSPITAL OUTPT CLINIC VISIT: HCPCS | Performed by: OBSTETRICS & GYNECOLOGY

## 2023-08-30 PROCEDURE — 99214 OFFICE O/P EST MOD 30 MIN: CPT | Performed by: OBSTETRICS & GYNECOLOGY

## 2023-08-30 RX ORDER — SOLIFENACIN SUCCINATE 5 MG/1
5 TABLET, FILM COATED ORAL DAILY
Qty: 90 TABLET | Refills: 3 | Status: SHIPPED | OUTPATIENT
Start: 2023-08-30 | End: 2024-04-25

## 2023-08-30 NOTE — PROGRESS NOTES
Chief Complaint   Patient presents with    Establish Care     C/O incontinence    Tawana SonaJUANITA christie     August 30, 2023    Referring Provider: Lor Kathleen MD  [address] [place] [address]    Primary Care Provider: Willie Lee    CC: urge incontinence    HPI:  Kian Cortez is a 68 year old female who presents for evaluation of her pelvic floor symptoms.  She experiences urgency and urge incontinence every time she goes to empty her bladder. She has tried PFPT in the past without improvement.    Prolapse:  Do you feel a vaginal bulge? no                                      Pressure? no   Do you have to place your fingers in the vagina or in the rectum to have a bowel movement? no  Impact to quality of life? -     Stress Incontinence:  Do you leak urine with cough, sneeze, exercise? no  How often do you leak with cough, sneeze, exercise?  -  How much do you usually leak? -   Do you wear a pad? - If so; -  Impact to quality of life? -    Urge Incontinence:  Do you often get sudden urges to urinate? yes  How often do have urges? daily  If so, do you leak with these urges? yes  How much do you usually leak? More than drops  Impact to quality of life? moderate    Voids/day:??  Nocturia: 2  Fluid intake: varies  Caffeine: -      Past Medical History:   Diagnosis Date    Diverticulosis 11/2015    on CT scan    Functional dyspepsia     GERD (gastroesophageal reflux disease)     Insomnia     psychophysiologic    Osteoarthritis of right knee     Osteopenia 4/27/2015       Past Surgical History:   Procedure Laterality Date    COLONOSCOPY N/A 11/19/2019    Procedure: COLONOSCOPY;  Surgeon: Sandra Chu MD;  [facility]    DILATION AND CURETTAGE SUCTION      DILATION AND CURETTAGE, OPERATIVE HYSTEROSCOPY WITH MORCELLATOR, COMBINED N/A 7/26/2023    Procedure: HYSTEROSCOPY, WITH DILATION AND CURETTAGE OF UTERUS USING MORCELLATOR;  Surgeon: Lor Kathleen MD;  [place] [facility] [place]    EYE  SURGERY      eye duct repair 10+ years  ago    NASOLACRIMAL DUCT PROBE/IRRG         Social History     Socioeconomic History    Marital status:      Spouse name: Not on file    Number of children: 7    Years of education: Not on file    Highest education level: Not on file   Occupational History    Not on file   Tobacco Use    Smoking status: Never    Smokeless tobacco: Never   Substance and Sexual Activity    Alcohol use: No    Drug use: No    Sexual activity: Yes     Partners: Male   Other Topics Concern    Parent/sibling w/ CABG, MI or angioplasty before 65F 55M? No   Social History Narrative    Originally from [place]. Lives in [place] with her . Not working.     Social Determinants of Health     Financial Resource Strain: Not on file   Food Insecurity: Not on file   Transportation Needs: Not on file   Physical Activity: Not on file   Stress: Not on file   Social Connections: Not on file   Intimate Partner Violence: Not on file   Housing Stability: Not on file       Family History   Problem Relation Age of Onset    Other Cancer Brother     Cancer Brother     Glaucoma No family hx of     Macular Degeneration No family hx of     Diabetes No family hx of     Hypertension No family hx of     Cerebrovascular Disease No family hx of     Thyroid Disease No family hx of        ROS    No Known Allergies    Current Outpatient Medications   Medication    acetaminophen 500 MG CAPS    amitriptyline (ELAVIL) 25 MG tablet    calcium carbonate (TUMS) 500 MG chewable tablet    carboxymethylcellul-glycerin (OPTIVE) 0.5-0.9 % SOLN ophthalmic solution    Fish Oil-Cholecalciferol (FISH OIL + D3 PO)    gabapentin (NEURONTIN) 400 MG capsule    hydrocortisone (CORTAID) 1 % external ointment    ketoconazole (NIZORAL) 2 % external cream    omeprazole (PRILOSEC) 40 MG DR capsule    phentermine (ADIPEX-P) 15 MG capsule    vitamin D3 (CHOLECALCIFEROL) 2000 units tablet     No current facility-administered medications for  "this visit.       /63   Pulse 73   Ht 1.575 m (5' 2\")   Wt 95.3 kg (210 lb)   LMP 04/19/2006   BMI 38.41 kg/m   Patient's last menstrual period was 04/19/2006. Body mass index is 38.41 kg/m .  Ms. Cortez is alert, comfortable in no acute distress, non-labored breathing.     A/P: Kian Cortez is a 68 year old F with UUI    Start vesicare and refer to PFPT. F/U with me in 3 months.    I spent a total of 30 minutes with  Ms. Cortez  on the date of the encounter in chart review, face to face patient visit, review of tests, documentation and/or discussion with other providers about the issues documented above.    Aleksandr Zepeda MD  Professor, OB/GYN  Urogynecologist  CC  Patient Care Team:  Willie Lee MD as PCP - General (Internal Medicine)  Pili Kendrick APRN CNP as Nurse Practitioner (Nurse Practitioner)  Tan Hodges MD as MD (Family Practice)  Magaly Hernandez MD as MD (Internal Medicine)  Tammie Valero OD (Optometry)  Tori Ramirez APRN CNP as Nurse Practitioner (Nurse Practitioner - Family)  Leo Mensah MD as MD (Dermatology)  Florentin Thakkar MD as Assigned Surgical Provider  Mariella Love APRN CNM as Certified Nurse Midwife (OB/Gyn)  Willie Lee MD as Assigned PCP  Haroon Kathleen MD as Assigned OBGYN Provider  HAROON KATHLEEN              "

## 2023-08-30 NOTE — LETTER
8/30/2023       RE: Kian Cortez  [address]  [place] [address]     Dear Colleague,    Thank you for referring your patient, Kian Cortez, to the [facility] [place] at [facility]. Please see a copy of my visit note below.    Chief Complaint   Patient presents with    Establish Care     C/O incontinence    Tawana Magallanes LPN     August 30, 2023    Referring Provider: Lor Kathleen MD  [address] [place] [id number]    Primary Care Provider: Willie Lee    CC: urge incontinence    HPI:  Kian Cortez is a 68 year old female who presents for evaluation of her pelvic floor symptoms.  She experiences urgency and urge incontinence every time she goes to empty her bladder. She has tried PFPT in the past without improvement.    Prolapse:  Do you feel a vaginal bulge? no                                      Pressure? no   Do you have to place your fingers in the vagina or in the rectum to have a bowel movement? no  Impact to quality of life? -     Stress Incontinence:  Do you leak urine with cough, sneeze, exercise? no  How often do you leak with cough, sneeze, exercise?  -  How much do you usually leak? -   Do you wear a pad? - If so; -  Impact to quality of life? -    Urge Incontinence:  Do you often get sudden urges to urinate? yes  How often do have urges? daily  If so, do you leak with these urges? yes  How much do you usually leak? More than drops  Impact to quality of life? moderate    Voids/day:??  Nocturia: 2  Fluid intake: varies  Caffeine: -      Past Medical History:   Diagnosis Date    Diverticulosis 11/2015    on CT scan    Functional dyspepsia     GERD (gastroesophageal reflux disease)     Insomnia     psychophysiologic    Osteoarthritis of right knee     Osteopenia 4/27/2015       Past Surgical History:   Procedure Laterality Date    COLONOSCOPY N/A 11/19/2019    Procedure: COLONOSCOPY;  Surgeon:  Sandra Chu MD;  [facility]    DILATION AND CURETTAGE SUCTION      DILATION AND CURETTAGE, OPERATIVE HYSTEROSCOPY WITH MORCELLATOR, COMBINED N/A 7/26/2023    Procedure: HYSTEROSCOPY, WITH DILATION AND CURETTAGE OF UTERUS USING MORCELLATOR;  Surgeon: Lor Kathleen MD;  Location: [facility] OR    EYE SURGERY      eye duct repair 10+ years  ago    NASOLACRIMAL DUCT PROBE/IRRG         Social History     Socioeconomic History    Marital status:      Spouse name: Not on file    Number of children: 7    Years of education: Not on file    Highest education level: Not on file   Occupational History    Not on file   Tobacco Use    Smoking status: Never    Smokeless tobacco: Never   Substance and Sexual Activity    Alcohol use: No    Drug use: No    Sexual activity: Yes     Partners: Male   Other Topics Concern    Parent/sibling w/ CABG, MI or angioplasty before 65F 55M? No   Social History Narrative    Originally from [place] [facility] [place]. Lives in [place] with her . Not working.     Social Determinants of Health     Financial Resource Strain: Not on file   Food Insecurity: Not on file   Transportation Needs: Not on file   Physical Activity: Not on file   Stress: Not on file   Social Connections: Not on file   Intimate Partner Violence: Not on file   Housing Stability: Not on file       Family History   Problem Relation Age of Onset    Other Cancer Brother     Cancer Brother     Glaucoma No family hx of     Macular Degeneration No family hx of     Diabetes No family hx of     Hypertension No family hx of     Cerebrovascular Disease No family hx of     Thyroid Disease No family hx of        ROS    No Known Allergies    Current Outpatient Medications   Medication    acetaminophen 500 MG CAPS    amitriptyline (ELAVIL) 25 MG tablet    calcium carbonate (TUMS) 500 MG chewable tablet    carboxymethylcellul-glycerin (OPTIVE) 0.5-0.9 % SOLN ophthalmic solution    Fish Oil-Cholecalciferol (FISH OIL + D3 PO)     "gabapentin (NEURONTIN) 400 MG capsule    hydrocortisone (CORTAID) 1 % external ointment    ketoconazole (NIZORAL) 2 % external cream    omeprazole (PRILOSEC) 40 MG DR capsule    phentermine (ADIPEX-P) 15 MG capsule    vitamin D3 (CHOLECALCIFEROL) 2000 units tablet     No current facility-administered medications for this visit.       /63   Pulse 73   Ht 1.575 m (5' 2\")   Wt 95.3 kg (210 lb)   LMP 04/19/2006   BMI 38.41 kg/m   Patient's last menstrual period was 04/19/2006. Body mass index is 38.41 kg/m .  Ms. Cortez is alert, comfortable in no acute distress, non-labored breathing.     A/P: Kian Cortez is a 68 year old F with UUI    Start vesicare and refer to PFPT. F/U with me in 3 months.    I spent a total of 30 minutes with  Ms. Cortez  on the date of the encounter in chart review, face to face patient visit, review of tests, documentation and/or discussion with other providers about the issues documented above.    Aleksandr Zepeda MD  Professor, OB/GYN  Urogynecologist  CC  Patient Care Team:  Willie Lee MD as PCP - General (Internal Medicine)  Pili Kendrick APRN CNP as Nurse Practitioner (Nurse Practitioner)  Tan Hodges MD as MD (Family Practice)  Magaly Hernandez MD as MD (Internal Medicine)  Tammie Valero OD (Optometry)  Tori Ramirez APRN CNP as Nurse Practitioner (Nurse Practitioner - Family)  Leo Mensah MD as MD (Dermatology)  Florentin Thakkar MD as Assigned Surgical Provider  Mariella Love APRN CNM as Certified Nurse Midwife (OB/Gyn)  Willie Lee MD as Assigned PCP  Haroon Kathleen MD as Assigned OBGYN Provider  HAROON KATHLEEN      "

## 2023-08-30 NOTE — PATIENT INSTRUCTIONS
Thank you for trusting us with your care!     If you need to contact us for questions about:  Symptoms, Scheduling & Medical Questions; Non-urgent (2-3 day response) Sukhwinder message, Urgent (needing response today) [phone] (if after 3:30pm next day response)   Prescriptions: Please call your Pharmacy   Billing: [facility] [phone] or [facility]:[phone]

## 2023-10-06 NOTE — TELEPHONE ENCOUNTER
"I called Kian to follow up. Kian reports she is not feeling well. When asked to elaborate, she reports the rash site is painful, stating \"it's unbearable.\" She did take oxycodone and found this helpful with the pain, though she reported this caused GI upset, vomiting. Kian put her daughter on the line to assist with providing history.    Kian's daughter reported that patient continues to have at the rash site, radiating up to he left breast and down to her left knee. The pain is intermittently sharp. She does continue to take gabapentin, and actually increased this dose on her own from 100 mg TID to 200 mg TID. Kian does not notice any difference with the dose change, and she is unsure if the gabapentin has been helpful at all with pain control.     Kian and her daughter report that the rash site has improved, though the pain lingers. Kian's daughter stated that the rash has completed scabbed over.    Recommend virtual follow up, to which Kian and her daughter agree. They would like to try for a virtual visit, and they will download the appropriate arianna. We discussed taking oxycodone PRN for severe pain breakthrough, taking with food to help prevent GI upset. We discussed continuing to take tylenol. Kian and her daughter voiced understanding. Appointment scheduled for today.     Shandra Victoria RN (Brasch)  " Laura Vora is a 71-year-old male who present emergency department concern for back pain. Patient states he has a history of a recent fracture. Patient was sent to rehab and discharged from rehab yesterday. Since being home patient is unable to take care of himself. Patient has his sister at home who also cannot care for him any longer. Patient was declining at home and called EMS today to present to emergency department patient's back pain is unchanged and chronic patient denies any new weakness patient denies saddle anesthesia paresthesia, patient denies urinary retention or incontinence patient denies any additional trauma. The history is provided by the patient, medical records and the EMS personnel. Review of Systems   Constitutional:  Negative for chills, diaphoresis, fatigue and fever. Eyes:  Negative for photophobia and visual disturbance. Respiratory:  Negative for cough, chest tightness and shortness of breath. Cardiovascular:  Negative for chest pain, palpitations and leg swelling. Gastrointestinal:  Negative for abdominal distention, abdominal pain, diarrhea, nausea and vomiting. Genitourinary:  Negative for dysuria. Musculoskeletal:  Positive for back pain. Negative for neck pain and neck stiffness. Skin:  Negative for pallor and rash. Neurological:  Negative for headaches. Psychiatric/Behavioral:  Negative for confusion. Physical Exam  Vitals and nursing note reviewed. Constitutional:       General: He is not in acute distress. Appearance: Normal appearance. He is not ill-appearing. HENT:      Head: Normocephalic and atraumatic. Eyes:      General: No scleral icterus. Conjunctiva/sclera: Conjunctivae normal.      Pupils: Pupils are equal, round, and reactive to light. Cardiovascular:      Rate and Rhythm: Regular rhythm. Tachycardia present. Pulmonary:      Effort: Pulmonary effort is normal.      Breath sounds: Normal breath sounds.

## 2023-10-26 ENCOUNTER — THERAPY VISIT (OUTPATIENT)
Dept: PHYSICAL THERAPY | Facility: CLINIC | Age: 68
End: 2023-10-26
Attending: OBSTETRICS & GYNECOLOGY
Payer: COMMERCIAL

## 2023-10-26 DIAGNOSIS — M62.89 PELVIC FLOOR DYSFUNCTION: Primary | ICD-10-CM

## 2023-10-26 PROCEDURE — 97161 PT EVAL LOW COMPLEX 20 MIN: CPT | Mod: GP | Performed by: PHYSICAL THERAPIST

## 2023-10-26 PROCEDURE — 97530 THERAPEUTIC ACTIVITIES: CPT | Mod: GP | Performed by: PHYSICAL THERAPIST

## 2023-10-26 NOTE — PROGRESS NOTES
PHYSICAL THERAPY EVALUATION  Type of Visit: Evaluation    See electronic medical record for Abuse and Falls Screening details. On phone  during the evaluation.    Subjective       Presenting condition or subjective complaint: urge urinary incontinence  Date of onset: 10/26/23    Relevant medical history: Arthritis; Bladder or bowel problems; Migraines or headaches; Severe dizziness     Living Environment  Social support: With family members   Type of home: Apartment/condo   Help at home: Home management tasks (cooking, cleaning)  Employment: No      Patient goals for therapy:      She presents today with urgency and sometime she will leak when undressing. She drinks about 32oz of water a day and about 1-2 cups of coffee a day. Kian will wake up 2x at night. Sometimes she will leak urine with a full bladder when she coughs or sneezes.     She denies any vaginal pressure/heaviness, postvoid dribbling.     Pain assessment: Pain denied     Objective      PELVIC EVALUATION  ADDITIONAL HISTORY:  Sex assigned at birth:  female  Gender identity:    female  Pronouns:    her/she    Bladder History:  Triggers for feeling of inability to wait to go to the bathroom:     seeing the toilet  Gets up at night to urinate:    2x  Fluid intake per day:      32oz water 1-2 cups of coffee  Activities causing urine leak:    cough/sneeze with full bladder, undressing to urinate on toilet  Amount of urine typically leaked:    Pads used to help with leaking:      no    Bowel History:  Frequency of bowel movement:  1x a day  Consistency of stool:    type 4  Other bowel issues:  no    Sexual Function History:  Sexual orientation:      Sexually active:    Lubrication used:      Pelvic pain:      Pain or difficulty with orgasms/erection/ejaculation:      State of menopause:    Hormone medications:             Discussed reason for referral regarding pelvic health needs and external/internal pelvic floor muscle examination with  patient/guardian.  Opportunity provided to ask questions and verbal consent for assessment and intervention was given.    POSTURE: WNL    PELVIC EXAM-DEFERRED UNTIL NEXT SESSION DUE TO TIME RESTRICTIONS      Assessment & Plan   CLINICAL IMPRESSIONS  Medical Diagnosis: urge urinary incontinence    Treatment Diagnosis: pelvic floor dysfunciton   Impression/Assessment: Patient is a 68 year old female with urge urinary incontinence complaints.  The following significant findings have been identified: Impaired muscle performance and Decreased activity tolerance. These impairments interfere with their ability to perform self care tasks, work tasks, recreational activities, household chores, driving , household mobility, and community mobility as compared to previous level of function.     Clinical Decision Making (Complexity):  Clinical Presentation: Stable/Uncomplicated  Clinical Presentation Rationale: based on medical and personal factors listed in PT evaluation  Clinical Decision Making (Complexity): Low complexity    PLAN OF CARE  Treatment Interventions:  Modalities: Biofeedback, Cryotherapy, Dry Needling, E-stim, Hot Pack  Interventions: Gait Training, Manual Therapy, Neuromuscular Re-education, Therapeutic Activity, Therapeutic Exercise, Self-Care/Home Management    Long Term Goals            Frequency of Treatment: 1x a week for the first 4 weeks followed by 2x a month for the following 8 weeks  Duration of Treatment: 12 weeks    Education Assessment:   Learner/Method: Patient;No Barriers to Learning    Risks and benefits of evaluation/treatment have been explained.   Patient/Family/caregiver agrees with Plan of Care.     Evaluation Time:     PT Eval, Low Complexity Minutes (89474): 15     Signing Clinician: Pauline Fernandez PT      [facility]                                                                                   OUTPATIENT PHYSICAL THERAPY      PLAN OF TREATMENT FOR  OUTPATIENT REHABILITATION   Patient's Last Name, First Name, M.IBhargavi  Kian Cortez YOB: 1955   Provider's Name   [facility]   Medical Record No.  [id number]     Onset Date: 10/26/23  Start of Care Date: 10/26/23     Medical Diagnosis:  urge urinary incontinence      PT Treatment Diagnosis:  pelvic floor dysfunciton Plan of Treatment  Frequency/Duration: 1x a week for the first 4 weeks followed by 2x a month for the following 8 weeks/ 12 weeks    Certification date from 10/26/23 to 01/18/24         See note for plan of treatment details and functional goals     Pauline Fernandez, PT                         I CERTIFY THE NEED FOR THESE SERVICES FURNISHED UNDER        THIS PLAN OF TREATMENT AND WHILE UNDER MY CARE     (Physician attestation of this document indicates review and certification of the therapy plan).                Referring Provider:  Aleksandr Zepeda      Initial Assessment  See Epic Evaluation- Start of Care Date: 10/26/23

## 2023-10-27 PROBLEM — M62.89 PELVIC FLOOR DYSFUNCTION: Status: ACTIVE | Noted: 2023-10-27

## 2023-11-02 ENCOUNTER — THERAPY VISIT (OUTPATIENT)
Dept: PHYSICAL THERAPY | Facility: CLINIC | Age: 68
End: 2023-11-02
Attending: OBSTETRICS & GYNECOLOGY
Payer: COMMERCIAL

## 2023-11-02 DIAGNOSIS — M62.89 PELVIC FLOOR DYSFUNCTION: Primary | ICD-10-CM

## 2023-11-02 PROCEDURE — 97530 THERAPEUTIC ACTIVITIES: CPT | Mod: GP | Performed by: PHYSICAL THERAPIST

## 2023-11-02 PROCEDURE — 97112 NEUROMUSCULAR REEDUCATION: CPT | Mod: GP | Performed by: PHYSICAL THERAPIST

## 2023-11-08 NOTE — PROGRESS NOTES
Kian Cortez is a 62 year old female who comes in for    CC: L hip pain  HPI:  Ms. Cortez presents to clinic for ongoing L hip pain, had been going to PT. Has been doing the exercises at home but has not made any improvement. Has been taking Naprosyn 220 mg, every day, BID. Cannot sleep on the L side d/t pain  Burning sensation around the whole R side. Hip is hard to move up and downstairs. Feels fatigued. Originally started >1 year ago, hurting from opening refrigerator over and over at work, repeated cold air exposure. Hard to walk on a regular basis because this increases pain.     Does have intermittent Low back pain after standing for long periods of time, improves with sitting down.  Does have reflux--is taking Omeprazole for this.     Other issues discussed today:     Patient Active Problem List   Diagnosis     Advanced directives, counseling/discussion     CARDIOVASCULAR SCREENING; LDL GOAL LESS THAN 130     Pseudoexfoliation syndrome, right eye     GERD (gastroesophageal reflux disease)     Osteopenia     Constipation, unspecified constipation type     Morbid obesity, unspecified obesity type (H)     Vertigo     BPPV (benign paroxysmal positional vertigo), unspecified laterality       Current Outpatient Prescriptions   Medication Sig Dispense Refill     naproxen sodium (ANAPROX) 220 MG tablet Take 1 tablet (220 mg) by mouth 2 times daily (with meals) 60 tablet 1     melatonin 3 MG CAPS Take 3 mg by mouth At Bedtime 60 capsule 1     polyethylene glycol (MIRALAX) powder Take 17 g (1 capful) by mouth daily 500 g 1     meclizine (ANTIVERT) 12.5 MG tablet Take 4 tablets (50 mg) by mouth 4 times daily as needed for dizziness 30 tablet 0     calcium carbonate (TUMS) 500 MG chewable tablet Take 1 tablet (500 mg) by mouth 2 times daily 180 tablet 0     omeprazole (PRILOSEC) 20 MG CR capsule Take 1 capsule (20 mg) by mouth daily 60 capsule 1     diclofenac (VOLTAREN) 1 % GEL topical gel Apply 4 grams to knees four  times daily using enclosed dosing card. 100 g 1     CANE, ANY MATERIAL Use with ambulation 1 Device 0     Cholecalciferol (VITAMIN D) 1000 UNITS capsule Take 1 capsule by mouth daily.       Fish Oil-Cholecalciferol (FISH OIL + D3 PO) Take  by mouth daily.           ALLERGIES: Review of patient's allergies indicates no known allergies.    PAST MEDICAL HX:   Past Medical History   Diagnosis Date     Osteoarthritis of right knee      Osteopenia 4/27/2015       PAST SURGICAL HX:   Past Surgical History   Procedure Laterality Date     Eye surgery       eye duct repair 10+ years  ago       IMMUNIZATION HX:   Immunization History   Administered Date(s) Administered     IPV 01/27/2006     Influenza (IIV3) 01/27/2006, 02/22/2012, 02/15/2013, 12/30/2016     MMR 12/19/1997     Meningococcal (Menomune ) 01/27/2006     TD (ADULT, 7+) 12/19/1997, 01/27/2006     TDAP (BOOSTRIX AGES 10-64) 05/02/2016     Typhoid IM 01/27/2006     Yellow Fever 01/27/2006       SOCIAL HX:   Social History     Social History Narrative    Originally from [facility].       ROS:   4-point ROS reviewed and was negative except otherwise noted in HPI, including constitutional, HEENT, GI, MSK.  OBJECTIVE:  /83 (BP Location: Right arm, Patient Position: Chair, Cuff Size: Adult Large)  Pulse 79  Resp 18  Breastfeeding? No   Wt Readings from Last 1 Encounters:   01/03/17 91.8 kg (202 lb 4.8 oz)     Constitutional: no distress, comfortable, pleasant, well-groomed  Cardiovascular: regular rate and rhythm, normal S1 and S2, no murmurs, rubs or gallops  Respiratory: clear to auscultation with good air movement bilaterally, no wheezes or crackles, non-labored  Musculoskeletal: full range of motion, strength 5/5, no edema, tender to palpation over L hip and upper lateral thigh  Skin: no concerning lesions or rash, no jaundice, temp normal   Neurological: normal strength and sensation, 2+ patellar reflexes, gait is steady with intact balance, speech is clear, no  tremor       ASSESSMENT/PLAN:    1. Chronic left hip pain  2. Morbid obesity due to excess calories (H)  Ongoing pain despite physical therapy and daily NSAID use. Recommended icing as needed. Xray negative in 2016; no past or recent history of injury or trauma. Will refer to ortho discuss additional treatment options and possible joint injection if appropriate. Did discuss that joint pain may improve with weight loss. Pt finds walking painful, recommended making dietary changes and non-walking exercises to help promote weight loss. Will refer for weight management program.  - ORTHOPEDICS ADULT REFERRAL  - WEIGHT MANAGEMENT/ [facility] LIFESTYLE PROGRAM REFERRAL    FOLLOW UP: If not improving or if worsening, or as needed.    EFRAIN Jesus CNP     Complex Repair And Melolabial Flap Text: The defect edges were debeveled with a #15 scalpel blade.  The primary defect was closed partially with a complex linear closure.  Given the location of the remaining defect, shape of the defect and the proximity to free margins a melolabial flap was deemed most appropriate for complete closure of the defect.  Using a sterile surgical marker, an appropriate advancement flap was drawn incorporating the defect and placing the expected incisions within the relaxed skin tension lines where possible.    The area thus outlined was incised deep to adipose tissue with a #15 scalpel blade.  The skin margins were undermined to an appropriate distance in all directions utilizing iris scissors.

## 2024-04-09 ENCOUNTER — OFFICE VISIT (OUTPATIENT)
Dept: INTERNAL MEDICINE | Facility: CLINIC | Age: 69
End: 2024-04-09
Payer: COMMERCIAL

## 2024-04-09 ENCOUNTER — ANCILLARY PROCEDURE (OUTPATIENT)
Dept: GENERAL RADIOLOGY | Facility: CLINIC | Age: 69
End: 2024-04-09
Payer: COMMERCIAL

## 2024-04-09 VITALS
DIASTOLIC BLOOD PRESSURE: 81 MMHG | OXYGEN SATURATION: 99 % | HEART RATE: 64 BPM | BODY MASS INDEX: 37.09 KG/M2 | WEIGHT: 202.8 LBS | SYSTOLIC BLOOD PRESSURE: 128 MMHG

## 2024-04-09 DIAGNOSIS — S16.1XXA STRAIN OF NECK MUSCLE, INITIAL ENCOUNTER: ICD-10-CM

## 2024-04-09 DIAGNOSIS — R52 PAIN: Primary | ICD-10-CM

## 2024-04-09 DIAGNOSIS — R52 PAIN: ICD-10-CM

## 2024-04-09 PROCEDURE — 99214 OFFICE O/P EST MOD 30 MIN: CPT | Mod: GC

## 2024-04-09 PROCEDURE — 71046 X-RAY EXAM CHEST 2 VIEWS: CPT | Mod: GC | Performed by: RADIOLOGY

## 2024-04-09 RX ORDER — RESPIRATORY SYNCYTIAL VIRUS VACCINE 120MCG/0.5
0.5 KIT INTRAMUSCULAR ONCE
Qty: 1 EACH | Refills: 0 | Status: CANCELLED | OUTPATIENT
Start: 2024-04-09 | End: 2024-04-09

## 2024-04-09 ASSESSMENT — PATIENT HEALTH QUESTIONNAIRE - PHQ9
SUM OF ALL RESPONSES TO PHQ QUESTIONS 1-9: 4
10. IF YOU CHECKED OFF ANY PROBLEMS, HOW DIFFICULT HAVE THESE PROBLEMS MADE IT FOR YOU TO DO YOUR WORK, TAKE CARE OF THINGS AT HOME, OR GET ALONG WITH OTHER PEOPLE: SOMEWHAT DIFFICULT
SUM OF ALL RESPONSES TO PHQ QUESTIONS 1-9: 4

## 2024-04-09 NOTE — PROGRESS NOTES
"Assessment & Plan     Pain in Supraclavicular [place]  Patient reports sharp pain located in the R supraclavicular region that is triggered with shoulder extension and abduction by  degrees. This started 3 days ago (~4/7/2024) with no identifiable trauma, but she tells she noticed this probably while knitting. Additionally she reports weakness and feeling colder on right arm occasionally. She was taking acetaminophen 2x daily with no improvement. She did not try any hot/cold packs. She avoid NSAIDs due to hx of ?GERD/gastritis. She reports no chest pain or dyspnea. VS stable. On exam, radial pulse+, no axillary LAP, and apical lung sound present. No previous imaging of cervical or thoracic region including CXR on chart. Suspect this was a muscle strain. Will provide symptomatic management for now, rule out any significant anatomic abnormalities (e.g., cervical rib) with CXR, but may consider further investigation if symptoms persist.  - Recommended hot application to the area  - diclofenac (VOLTAREN) 1 % topical gel; Apply 4 g topically 4 times daily  - Can take acetaminophen up to 3 g/day  - XR Chest 2 Views; Future  - Orthopedic  Referral; Future      BMI  Estimated body mass index is 37.09 kg/m  as calculated from the following:    Height as of 8/30/23: 1.575 m (5' 2\").    Weight as of this encounter: 92 kg (202 lb 12.8 oz).       No follow-ups on file.    Attending Addendum:  Patient seen and examined with resident in clinic today.  Pertinent portions of history and exam were independently verified by myself.  I agree with the exam and plan as outlined above with the following modifications: none.  Manuela Gimenez MD  Internal Medicine      Subjective   Kian is a 69 year old, presenting for the following health issues:  Follow Up (Pain on right side of neck, pain radiates down arm onset about three days ago/)      4/9/2024     1:59 PM   Additional Questions   Roomed by MR     Via the Health " Maintenance questionnaire, the patient has reported the following services have been completed -Eye Exam, this information has been sent to the abstraction team.  History of Present Illness       Back Pain:  She presents for follow up of back pain. Patient's back pain is a chronic problem.  Location of back pain:  Right lower back and left lower back  Description of back pain: sharp  Back pain spreads: nowhere    Since patient first noticed back pain, pain is: unchanged  Does back pain interfere with her job:  Not applicable       Reason for visit:  Acute  Symptom onset:  1-3 days ago  Symptom intensity:  Moderate  Symptom progression:  Staying the same    She eats 0-1 servings of fruits and vegetables daily.She consumes 0 sweetened beverage(s) daily.She exercises with enough effort to increase her heart rate 10 to 19 minutes per day.  She exercises with enough effort to increase her heart rate 5 days per week.   She is taking medications regularly.         Objective    /81 (BP Location: Right arm, Patient Position: Sitting, Cuff Size: Adult Large)   Pulse 64   Wt 92 kg (202 lb 12.8 oz)   LMP 04/19/2006   SpO2 99%   BMI 37.09 kg/m    Body mass index is 37.09 kg/m .  Physical Exam   General appearance: In no apparent distress  HEENT: Neck supple, sclera anicteric  CV: S1 and S2 well heard without any added sounds, radial pulses present on both sides  Resp: Normal respiratory effort, clear to ausculation bilaterally without any added sounds including apical lungs  Extr: No fullness in supraclavicular triangle, tender with deep palpation, having grimace with shoulder extension and abduction by  degrees, no axillary LAP  Skin: Warm and dry  Neuro: Alert and oriented x4, appeared non-focal        I saw the patient and discussed the plan with the attending physician, Dr. Gimenez.    Dewayne Townsend  Resident Physician PGY-1  Department of Medicine  [facility]    Signed Electronically by: Dewayne  MD Kristian

## 2024-04-09 NOTE — PATIENT INSTRUCTIONS
Apply hot to the area 4-6 times a day.    Apply diclofenac gel to the area 4-6 times a day.    You can take Tylenol up to 3 grams a day. For example, if you are taking 500mg tablets, you can take 1 tablet every 4 hours.    We will do chest x-ray, and will let you know about the results.    We placed a referral to sports medicine for further evaluation and management.

## 2024-04-11 NOTE — PROGRESS NOTES
The patient was contacted and informed about the results of the chest X-ray, which revealed no abnormalities, such as a cervical rib, that could account for her symptoms. However, it is worth noting that the patient reported absence of any issues suggestive of volume overload, such as (exertional) dyspnea or orthopnea, during the visit, and her respiratory examination was entirely normal. Therefore, there are currently no concerns from a clinical perspective regarding pulmonary edema. The writer's impression was that the patient's blood pressure was well-controlled without requiring any medication therapy, but this assessment was based solely on the writer's limited information from the recent visit. Further evaluation by the primary care provider may be beneficial in this regard.    Dewayne Townsend  Resident Physician PGY-1  Department of Medicine  [facility]

## 2024-04-16 DIAGNOSIS — M25.511 PAIN IN JOINT OF RIGHT SHOULDER: Primary | ICD-10-CM

## 2024-04-21 NOTE — TELEPHONE ENCOUNTER
DIAGNOSIS: Shoulder pain / Alp, MD Dewayne / [facility] / images 4/9 / scheduled sports 1st per prioity then spine for neck  Please use iPad or phones [phone] to reach  and follow prompts     APPOINTMENT DATE: 4.23.24   NOTES STATUS DETAILS   OFFICE NOTE from referring provider Internal 4.9.24  Alp  IM   MEDICATION LIST Internal    XRAYS (IMAGES & REPORTS) In process *sched* for 4.23.24  XR Shoulder Right

## 2024-04-23 ENCOUNTER — TELEPHONE (OUTPATIENT)
Dept: ORTHOPEDICS | Facility: CLINIC | Age: 69
End: 2024-04-23

## 2024-04-23 ENCOUNTER — ANCILLARY PROCEDURE (OUTPATIENT)
Dept: GENERAL RADIOLOGY | Facility: CLINIC | Age: 69
End: 2024-04-23
Attending: FAMILY MEDICINE
Payer: COMMERCIAL

## 2024-04-23 ENCOUNTER — PRE VISIT (OUTPATIENT)
Dept: ORTHOPEDICS | Facility: CLINIC | Age: 69
End: 2024-04-23

## 2024-04-23 ENCOUNTER — OFFICE VISIT (OUTPATIENT)
Dept: ORTHOPEDICS | Facility: CLINIC | Age: 69
End: 2024-04-23
Payer: COMMERCIAL

## 2024-04-23 DIAGNOSIS — M25.511 PAIN IN JOINT OF RIGHT SHOULDER: ICD-10-CM

## 2024-04-23 DIAGNOSIS — M75.81 ROTATOR CUFF TENDINITIS, RIGHT: Primary | ICD-10-CM

## 2024-04-23 PROCEDURE — 99204 OFFICE O/P NEW MOD 45 MIN: CPT | Performed by: FAMILY MEDICINE

## 2024-04-23 PROCEDURE — 73030 X-RAY EXAM OF SHOULDER: CPT | Mod: RT | Performed by: RADIOLOGY

## 2024-04-23 RX ORDER — METHYLPREDNISOLONE 4 MG
TABLET, DOSE PACK ORAL
Qty: 21 TABLET | Refills: 0 | Status: SHIPPED | OUTPATIENT
Start: 2024-04-23

## 2024-04-23 NOTE — TELEPHONE ENCOUNTER
I spoke with the call center and let them know the medication was sent but is in a prior authorization period for her insurance to approve it. Recommended that the patient ask the pharmacy to notify her when the medication is ready.     Payal, ATC

## 2024-04-23 NOTE — TELEPHONE ENCOUNTER
Pt went to her pharmacy, but the pharmacy told the pt they do not have the script for her medication.    Pt was seen by DO Medardo, TODAY.    Pt is currently at the pharmacy.    Pt's pharmacy:    [address]    Please send script through, if approved.    Please CALL pt to discuss and follow up. Thank you.

## 2024-04-23 NOTE — PATIENT INSTRUCTIONS
Rotator Cuff Injury     WHAT IS A ROTATOR CUFF INJURY?    A rotator cuff injury is irritation of or damage to the group of tendons and muscles that hold your shoulder joint together. Tendons are strong bands of tissue that attach muscle to bone. You use the muscles and tendons in your shoulder joint to move your shoulder and raise your arm over your head.        WHAT IS THE CAUSE?    A rotator cuff injury can be caused by:    Overuse of your shoulder in a sport or work activity that involves repetitive overhead movement of your shoulder, like swimming, baseball (mainly pitching), football, tennis, painting, plastering, or housework.  A sudden activity that twists your shoulder or tears your tendon, such as using your arm to break a fall, falling onto your arm, or lifting a heavy object  You may be at higher risk for a rotator cuff injury if you have poor head and shoulder posture, especially if you are older.    WHAT ARE THE SYMPTOMS?    Symptoms may include:    Pain and weakness in your arm and shoulder  Loss of shoulder movement, especially when you try to raise your arm overhead    HOW IS IT DIAGNOSED?    Your healthcare provider will ask about your symptoms, activities, and medical history and examine you. You may have X-rays or other scans or procedures, such as:    An ultrasound, which uses sound waves to show pictures of your shoulder joint  An MRI, which uses a strong magnetic field and radio waves to show detailed pictures of your shoulder joint  An arthrogram, which is an X-ray or MRI taken after a dye is injected into your joint to outline its shape  Arthroscopy, which is a type of surgery done with a small scope inserted into your joint so your provider can look directly at your joint    HOW IS IT TREATED?    You will need to change or stop doing the activities that cause pain until your injury has healed. For example, avoid strenuous activity or any overhead motion that causes pain. Also, try to make  sure that you are practicing good posture and are not slouching forward.    Your healthcare provider may recommend stretching and strengthening exercises to help you heal.    If you have a bad tear, you may need to have it repaired with surgery. After surgery, your treatment plan will include physical therapy to strengthen your shoulder as it heals.    The pain often gets better within a few weeks with self-care, but some injuries may take several months or longer to heal. It s important to follow all of your healthcare provider s instructions.    HOW CAN I TAKE CARE OF MYSELF?    To keep swelling down and help relieve pain:    Put an ice pack, gel pack, or package of frozen vegetables wrapped in a cloth on the area every 3 to 4 hours for up to 20 minutes at a time.  Take nonprescription pain medicine, such as acetaminophen, ibuprofen, or naproxen. Nonsteroidal anti-inflammatory medicines (NSAIDs), such as ibuprofen and naproxen, may cause stomach bleeding and other problems. These risks increase with age. Read the label and take as directed. Unless recommended by your healthcare provider, you should not take this medicine for more than 10 days.  Moist heat may help relieve pain, relax your muscles, and make it easier to move your arm and shoulder. Put moist heat on the injured area for 10 to 15 minutes before you do warm-up and stretching exercises. Moist heat includes heat patches or moist heating pads that you can buy at most drugstores, a warm wet washcloth, or a hot shower. To prevent burns to your skin, follow directions on the package and do not lie on any type of hot pad. Don t use heat if you have swelling.    Follow your healthcare provider's instructions, including any exercises recommended by your provider. Ask your provider:    How and when you will hear your test results  How long it will take to recover  What activities you should avoid, including how much you can lift, and when you can return to your  normal activities  How to take care of yourself at home  What symptoms or problems you should watch for and what to do if you have them  Make sure you know when you should come back for a checkup.    HOW CAN I HELP PREVENT A ROTATOR CUFF INJURY?    Warm-up exercises and stretching before activities can help prevent injuries. For example, do exercises that strengthen your shoulder muscles. If your arm or shoulder hurts after exercise, putting ice on it may help keep it from getting injured.    Follow safety rules and use any protective equipment recommended for your work or sport.    Avoid activities that cause pain. For example, avoid lifting heavy objects over your head.    Developed by [employer].  Published by [employer].  Copyright  2014 [employer] and/or one of its subsidiaries. All rights reserved.    Rotator Cuff Exercises    Isometric shoulder external rotation:  a doorway with your elbow bent 90 degrees and the back of the wrist on your injured side pressed against the door frame. Try to press your hand outward into the door frame. Hold for 5 seconds. Do 2 sets of 15.    Isometric shoulder internal rotation:  a doorway with your elbow bent 90 degrees and the front of the wrist on your injured side pressed against the door frame. Try to press your palm into the door frame. Hold for 5 seconds. Do 2 sets of 15.  Wand exercise, flexion: Stand upright and hold a stick in both hands, palms down. Stretch your arms by lifting them over your head, keeping your arms straight. Hold for 5 seconds and return to the starting position. Repeat 10 times.    Wand exercise, extension: Stand upright and hold a stick in both hands behind your back. Move the stick away from your back. Hold this position for 5 seconds. Relax and return to the starting position. Repeat 10 times.  Wand exercise, external rotation: Lie on your back and hold a stick in both hands, palms up. Your upper arms should be  resting on the floor with your elbows at your sides and bent 90 degrees. Use your uninjured arm to push your injured arm out away from your body. Keep the elbow of your injured arm at your side while it is being pushed. Hold the stretch for 5 seconds. Repeat 10 times.    Wand exercise, shoulder abduction and adduction: Stand and hold a stick with both hands, palms facing away from your body. Rest the stick against the front of your thighs. Use your uninjured arm to push your injured arm out to the side and up as high as possible. Keep your arms straight. Hold for 5 seconds. Repeat 10 times.    Resisted shoulder external rotation: Stand sideways next to a door with your injured arm farther from the door. Tie a knot in the end of the tubing and shut the knot in the door at waist level (or use cable weight machine at gym). Hold the other end of the tubing with the hand of your injured arm. Rest the hand of your injured arm across your stomach. Keeping your elbow in at your side, rotate your arm outward and away from your waist. Slowly return your arm to the starting position. Make sure you keep your elbow bent 90 degrees and your forearm parallel to the floor. Repeat 10 times. Build up to 2 sets of 15.    Resisted shoulder internal rotation: Stand sideways next to a door with your injured arm closest to the door. Tie a knot in the end of the tubing and shut the knot in the door at waist level (or use cable weight machine at gym). Hold the other end of the tubing with the hand of your injured arm. Bend the elbow of your injured arm 90 degrees. Keeping your elbow in at your side, rotate your forearm across your body and then slowly back to the starting position. Make sure you keep your forearm parallel to the floor. Do 2 sets of 8 to 12.    Scaption: Stand with your arms at your sides and with your elbows straight. Slowly raise your arms to eye level. As you raise your arms, spread them apart so that they are only  "slightly in front of your body (at about a 30-degree angle to the front of your body). Point your thumbs toward the ceiling. Hold for 2 seconds and lower your arms slowly. Do 2 sets of 15. Progress to holding a soup can or light weight when you are doing the exercise and increase the weight as the exercise gets easier.    Side-lying external rotation: Lie on your uninjured side with your injured arm at your side and your elbow bent 90 degrees. Keeping your elbow against your side, raise your forearm toward the ceiling and hold for 2 seconds. Slowly lower your arm. Do 2 sets of 15. You can start doing this exercise holding a soup can or light weight and gradually increase the weight as long as there is no pain.    Horizontal abduction: Lie on your stomach on a table or the edge of a bed with the arm on your injured side hanging down over the edge. Raise your arm out to the side, with your thumb pointed toward the ceiling, until your arm is parallel to the floor. Hold for 2 seconds and then lower it slowly. Start this exercise with no weight. As you get stronger, add a light weight or hold a soup can. Do 2 sets of 15.    Push-up with a plus: Begin on the floor on your hands and knees. Keep your hands a shoulder width apart and lift your feet off the floor. Arch your back as high as possible and round your shoulders (this is the \"plus\" part or the exercise). Bend your elbows and lower your body to the floor. Return to the starting position and arch your back again. Do 2 sets of 15.          "

## 2024-04-23 NOTE — LETTER
4/23/2024      RE: Kian Cortez  [address]  [place] [address]     Dear Colleague,    Thank you for referring your patient, Kian Cortez, to the [facility] [place]. Please see a copy of my visit note below.    CHIEF COMPLAINT:  Pain of the Right Shoulder       HISTORY OF PRESENT ILLNESS  Ms. Cortez is a pleasant 69 year old year old female who presents to clinic today with right shoulder pain.  Kian explains that she has had right shoulder pain for 2 weeks without injury. Pain is worse with pull/push, overhead activities. Denies any paresthesias. She is right hand dominant. She has not been to physical therapy or received any injections into the right shoulder.  Possibly related to Yoga but cannot recall specific incident as it came on insidiously.    Onset: gradual  Location: right shoulder  Quality:  aching  Duration: 2 weeks   Severity: 6/10 at worst  Timing:intermittent episodes   Modifying factors:  resting and non-use makes it better, movement and use makes it worse  Associated signs & symptoms: pain  Previous similar pain: No  Treatments to date:Tylenol     Additional history: as documented    Review of Systems:  Have you recently had a a fever, chills, weight loss? No  Do you have any vision problems? No  Do you have any chest pain or edema? No  Do you have any shortness of breath or wheezing?  No  Do you have stomach problems? No  Do you have any numbness or focal weakness? No  Do you have diabetes? No  Do you have problems with bleeding or clotting? No  Do you have an rashes or other skin lesions? No    MEDICAL HISTORY  Patient Active Problem List   Diagnosis     Advanced directives, counseling/discussion     CARDIOVASCULAR SCREENING; LDL GOAL LESS THAN 130     Pseudoexfoliation syndrome, right eye     Gastroesophageal reflux disease without esophagitis     Osteopenia     Constipation, unspecified constipation type     Morbid obesity, unspecified  obesity type (H)     Vertigo     BPPV (benign paroxysmal positional vertigo), unspecified laterality     Insomnia, unspecified type     Colitis     Fever     Diarrhea     Nausea with vomiting     Postmenopausal bleeding     Pelvic floor dysfunction       Current Outpatient Medications   Medication Sig Dispense Refill     acetaminophen 500 MG CAPS Take 1,000 mg by mouth 3 times daily 250 capsule 99     amitriptyline (ELAVIL) 25 MG tablet Take 1 tablet (25 mg) by mouth At Bedtime 90 tablet 3     calcium carbonate (TUMS) 500 MG chewable tablet Take 1 tablet (500 mg) by mouth 2 times daily 180 tablet 0     carboxymethylcellul-glycerin (OPTIVE) 0.5-0.9 % SOLN ophthalmic solution Place 1 drop into both eyes 4 times daily as needed for dry eyes 1 Bottle 11     diclofenac (VOLTAREN) 1 % topical gel Apply 4 g topically 4 times daily 350 g 3     Fish Oil-Cholecalciferol (FISH OIL + D3 PO) Take  by mouth daily.       gabapentin (NEURONTIN) 400 MG capsule Take 1 capsule (400 mg) by mouth 2 times daily 60 capsule 4     hydrocortisone (CORTAID) 1 % external ointment Apply topically 2 times daily 110 g 0     ketoconazole (NIZORAL) 2 % external cream Apply topically daily To legs 120 g 3     omeprazole (PRILOSEC) 40 MG DR capsule Take 1 capsule (40 mg) by mouth daily 60 capsule 4     phentermine (ADIPEX-P) 15 MG capsule Take 15 mg by mouth daily       solifenacin (VESICARE) 5 MG tablet Take 1 tablet (5 mg) by mouth daily 90 tablet 3     vitamin D3 (CHOLECALCIFEROL) 2000 units tablet Take 1 tablet by mouth daily 90 tablet 1       No Known Allergies    Family History   Problem Relation Age of Onset     Other Cancer Brother      Cancer Brother      Glaucoma No family hx of      Macular Degeneration No family hx of      Diabetes No family hx of      Hypertension No family hx of      Cerebrovascular Disease No family hx of      Thyroid Disease No family hx of        Additional medical/Social/Surgical histories reviewed in EPIC and  updated as appropriate.       PHYSICAL EXAM  LMP 04/19/2006     General  - normal appearance, in no obvious distress  Musculoskeletal - Right shoulder  - inspection: normal bone and joint alignment, no obvious deformity, no scapular winging, no AC step-off  - palpation: mildly tender RC insertion, normal clavicle, non-tender AC  - ROM:  painful but full flexion and abduction at end range.  IR mild restrictions, ER full symmetric.  - strength: 5/5  strength, 5/5 in all shoulder planes  - special tests:  (-) Speed's  (+) Neer  (+) Hawkin's  (+) Deann's  (-) [place]'s  (-) apprehension  (-) subscap lift-off  Neuro  - no sensory or motor deficit, grossly normal coordination, normal muscle tone  Skin  - no ecchymosis, erythema, warmth, or induration, no obvious rash  Psych  - interactive, appropriate, normal mood and affect     IMAGING : XR shoulder right 4 views. Final results and radiologist's interpretation, available in the [facility] health record. Images were reviewed with the patient/family members in the office today. My personal interpretation of the performed imaging is no acute osseous abnormality or significant degenerative changes of joint.    ASSESSMENT & PLAN  Ms. Cortez is a 69 year old year old female who presents to clinic today with acute right shoulder pain over the past two weeks without inciting event or trauma, perhaps loosely related to Yoga.    Insidious nature, robust strength preserved and no significant motion loss reassuring for intact cuff, absence of early frozen shoulder.  Consistent with cuff tendinitis.    Diagnosis:   Acute pain of right shoulder  Rotator cuff tendinitis of right shoulder    -Discussed reassuring xrays today and no evidence for DJD  -Start medrol pack to reduce inflammation of cuff, bursa  -Gentle stretching x 1 week then increase to strengthening as tolerated  -Avoid provocative positions in Yoga  -Consideration for PT, injection, or other if persisting at 4-6 weeks from  now. Reevaluation in my office would be recommended at this time.    It was a pleasure seeing Kian today.    Ezequiel Batres DO, CAQSM  Primary Care Sports Medicine

## 2024-04-24 ENCOUNTER — OFFICE VISIT (OUTPATIENT)
Dept: UROLOGY | Facility: CLINIC | Age: 69
End: 2024-04-24
Attending: OBSTETRICS & GYNECOLOGY
Payer: COMMERCIAL

## 2024-04-24 VITALS
SYSTOLIC BLOOD PRESSURE: 115 MMHG | HEART RATE: 65 BPM | BODY MASS INDEX: 36.95 KG/M2 | DIASTOLIC BLOOD PRESSURE: 79 MMHG | WEIGHT: 202 LBS

## 2024-04-24 DIAGNOSIS — N39.41 URGE INCONTINENCE: ICD-10-CM

## 2024-04-24 DIAGNOSIS — N95.2 VAGINAL ATROPHY: Primary | ICD-10-CM

## 2024-04-24 PROCEDURE — 99213 OFFICE O/P EST LOW 20 MIN: CPT | Performed by: OBSTETRICS & GYNECOLOGY

## 2024-04-24 PROCEDURE — G0463 HOSPITAL OUTPT CLINIC VISIT: HCPCS | Performed by: OBSTETRICS & GYNECOLOGY

## 2024-04-24 RX ORDER — ESTRADIOL 0.1 MG/G
1 CREAM VAGINAL
Qty: 42.5 G | Refills: 3 | Status: SHIPPED | OUTPATIENT
Start: 2024-04-24 | End: 2024-08-28

## 2024-04-24 ASSESSMENT — PAIN SCALES - GENERAL: PAINLEVEL: NO PAIN (0)

## 2024-04-24 NOTE — NURSING NOTE
Vesicare-  ten  mg  works better 5 mg did not help    wondering if she can do 15 mg ., went to PT twice  did not help

## 2024-04-24 NOTE — LETTER
4/24/2024       RE: Kian Cortez  [address]  [place] [address]     Dear Colleague,    Thank you for referring your patient, Kian Cortez, to the [facility] [place] at [facility]. Please see a copy of my visit note below.    April 25, 2024    Return visit    Patient returns today for F/U after starting on Vesicare at her last visit. Since her last visit she has feels that she is some what improved, but would like to be better.    /79   Pulse 65   Wt 91.6 kg (202 lb)   LMP 04/19/2006   BMI 36.95 kg/m    She is comfortable, in no distress, non-labored breathing.      A/P: 69 year old F with Urge incontinence, vaginal atrophy    Increase vesicare to 10md every day. Start Estrace for vaginal atrophy    I spent a total of 20 minutes with  Ms. Langston  on the date of the encounter in chart review, face to face patient visit, review of tests, documentation and/or discussion with other providers about the issues documented above.    Radha Schreiber MD  Professor, OB/GYN  Urogynecologist    CC  Patient Care Team:  Willie Lee MD as PCP - General (Internal Medicine)  Pili Kendrick APRN CNP as Nurse Practitioner (Nurse Practitioner)  Tan Hodges MD as MD (Family Practice)  Magaly Hernandez MD as MD (Internal Medicine)  Tammie Valero OD (Optometry)  Tori Ramirez APRN CNP as Nurse Practitioner (Nurse Practitioner - Family)  Leo Mensah MD as MD (Dermatology)  Florentin Thakkar MD as Assigned Surgical Provider  Mariella Love APRN CNM as Certified Nurse Midwife (OB/Gyn)  Willie Lee MD as Assigned PCP  Lor Kathleen MD as Assigned OBGYN Provider  Radha Schreiber MD as MD (OB/Gyn)  RADHA SCHREIBER

## 2024-04-25 RX ORDER — SOLIFENACIN SUCCINATE 5 MG/1
10 TABLET, FILM COATED ORAL DAILY
Qty: 90 TABLET | Refills: 3 | Status: SHIPPED | OUTPATIENT
Start: 2024-04-25 | End: 2024-08-28

## 2024-04-25 NOTE — PROGRESS NOTES
April 25, 2024    Return visit    Patient returns today for F/U after starting on Vesicare at her last visit. Since her last visit she has feels that she is some what improved, but would like to be better.    /79   Pulse 65   Wt 91.6 kg (202 lb)   LMP 04/19/2006   BMI 36.95 kg/m    She is comfortable, in no distress, non-labored breathing.      A/P: 69 year old F with Urge incontinence, vaginal atrophy    Increase vesicare to 10md every day. Start Estrace for vaginal atrophy    I spent a total of 20 minutes with  Ms. Langston  on the date of the encounter in chart review, face to face patient visit, review of tests, documentation and/or discussion with other providers about the issues documented above.    Radha Schreiber MD  Professor, OB/GYN  Urogynecologist    CC  Patient Care Team:  Willie Lee MD as PCP - General (Internal Medicine)  Pili Kendrick APRN CNP as Nurse Practitioner (Nurse Practitioner)  Tan Hodges MD as MD (Family Practice)  Magaly Hernandez MD as MD (Internal Medicine)  Tammie Valero OD (Optometry)  Tori Ramirez APRN CNP as Nurse Practitioner (Nurse Practitioner - Family)  Leo Mensah MD as MD (Dermatology)  Florentin Thakkar MD as Assigned Surgical Provider  Mariella Love APRN CNM as Certified Nurse Midwife (OB/Gyn)  Willie Lee MD as Assigned PCP  Lor Kathleen MD as Assigned OBGYN Provider  Radha Schreiber MD as MD (OB/Gyn)  RADHA SCHREIBER

## 2024-07-10 ENCOUNTER — OFFICE VISIT (OUTPATIENT)
Dept: OPHTHALMOLOGY | Facility: CLINIC | Age: 69
End: 2024-07-10
Payer: COMMERCIAL

## 2024-07-10 DIAGNOSIS — H26.8 PSEUDOEXFOLIATION SYNDROME: ICD-10-CM

## 2024-07-10 DIAGNOSIS — H25.13 NUCLEAR SCLEROTIC CATARACT OF BOTH EYES: ICD-10-CM

## 2024-07-10 DIAGNOSIS — H52.222 REGULAR ASTIGMATISM OF LEFT EYE: ICD-10-CM

## 2024-07-10 DIAGNOSIS — H52.4 PRESBYOPIA OF BOTH EYES: ICD-10-CM

## 2024-07-10 DIAGNOSIS — H52.13 MYOPIA OF BOTH EYES: ICD-10-CM

## 2024-07-10 DIAGNOSIS — H04.123 DRY EYE SYNDROME, BILATERAL: ICD-10-CM

## 2024-07-10 DIAGNOSIS — Z01.00 EXAMINATION OF EYES AND VISION: Primary | ICD-10-CM

## 2024-07-10 PROCEDURE — 92015 DETERMINE REFRACTIVE STATE: CPT | Performed by: STUDENT IN AN ORGANIZED HEALTH CARE EDUCATION/TRAINING PROGRAM

## 2024-07-10 PROCEDURE — 92014 COMPRE OPH EXAM EST PT 1/>: CPT | Performed by: STUDENT IN AN ORGANIZED HEALTH CARE EDUCATION/TRAINING PROGRAM

## 2024-07-10 ASSESSMENT — REFRACTION_MANIFEST
OS_SPHERE: -1.50
OD_CYLINDER: +0.75
OD_SPHERE: -2.25
OD_ADD: +3.00
OS_ADD: +3.00
OD_AXIS: 165
OS_CYLINDER: SPHERE

## 2024-07-10 ASSESSMENT — CONF VISUAL FIELD
OS_INFERIOR_TEMPORAL_RESTRICTION: 0
OD_SUPERIOR_TEMPORAL_RESTRICTION: 0
OS_INFERIOR_NASAL_RESTRICTION: 0
OD_NORMAL: 1
OS_NORMAL: 1
OS_SUPERIOR_TEMPORAL_RESTRICTION: 0
OS_SUPERIOR_NASAL_RESTRICTION: 0
METHOD: COUNTING FINGERS
OD_SUPERIOR_NASAL_RESTRICTION: 0
OD_INFERIOR_NASAL_RESTRICTION: 0
OD_INFERIOR_TEMPORAL_RESTRICTION: 0

## 2024-07-10 ASSESSMENT — VISUAL ACUITY
OD_SC: 20/200
METHOD: SNELLEN - LINEAR
OS_PH_SC: 20/40
OS_SC: 20/150
OS_PH_SC+: -2
OS_CC: J16

## 2024-07-10 ASSESSMENT — TONOMETRY
OS_IOP_MMHG: 16
IOP_METHOD: TONOPEN
OD_IOP_MMHG: 12

## 2024-07-10 ASSESSMENT — CUP TO DISC RATIO
OS_RATIO: 0.4
OD_RATIO: 0.35

## 2024-07-10 ASSESSMENT — SLIT LAMP EXAM - LIDS
COMMENTS: NORMAL
COMMENTS: NORMAL

## 2024-07-10 ASSESSMENT — EXTERNAL EXAM - LEFT EYE: OS_EXAM: NORMAL

## 2024-07-10 ASSESSMENT — REFRACTION_WEARINGRX
OD_SPHERE: +2.50
OS_SPHERE: +2.50
OS_CYLINDER: SPHERE
SPECS_TYPE: SVL
OD_CYLINDER: SPHERE

## 2024-07-10 ASSESSMENT — EXTERNAL EXAM - RIGHT EYE: OD_EXAM: NORMAL

## 2024-07-10 NOTE — PROGRESS NOTES
Current Eye Medications:  None     Subjective:  Annual eye health exam. Patient is noticing a decrease in her distance vision. She has not been wearing glasses and says they bother her. Patient wears OTC readers + 2.50  which seems to work well for her. No floaters or flashing lights and no ocular pain or discomfort. She has no other ocular concerns today.      Objective:  See Ophthalmology Exam.      Assessment:  Kian Cortez is a 69 year old female who presents with:   Encounter Diagnoses   Name Primary?    Examination of eyes and vision     Pseudoexfoliation syndrome, right eye Intraocular pressure 12/16 today. Monitor.      Nuclear sclerotic cataract of both eyes Visually significant cataracts both eyes. Pseudoexfoliation right eye. Dil 4.5 right eye and 6 left eye. No obvious donesis right eye. Trypan. Ring. 45 min. Oromo  in OR (speaks English fairly well, but want her to be comfortable in the OR when under sedation). Anticipate targeting mostly distance both eyes.     Dry eye syndrome, bilateral     Myopia of both eyes     Regular astigmatism of left eye     Presbyopia of both eyes      Visually significant cataract that is interfering with daily activities of living. Plan for cataract extraction and intraocular lens implant right eye, then left eye.  Risks, benefits, complications, and alternatives discussed with patient including possibility of limitations from coexistent eye disease and loss of vision. Target refraction and lens options discussed.  Patient understands and wishes to proceed with surgery.     Plan:  Offered cataract surgery of the right eye then left eye at [facility]      Surgery Date(s): November 04, 2024 for your right eye   & November 18, 2024 for your left eye        Our surgery schedulers will mail your appointments to you     Florentin Thakkar MD  [phone]

## 2024-07-10 NOTE — LETTER
7/10/2024      Kian Cortez  [address]  [place] [address]      Dear Colleague,    Thank you for referring your patient, Kian Cortez, to the [facility]. Please see a copy of my visit note below.     Current Eye Medications:  None     Subjective:  Annual eye health exam. Patient is noticing a decrease in her distance vision. She has not been wearing glasses and says they bother her. Patient wears OTC readers + 2.50  which seems to work well for her. No floaters or flashing lights and no ocular pain or discomfort. She has no other ocular concerns today.      Objective:  See Ophthalmology Exam.      Assessment:  Kian Cortez is a 69 year old female who presents with:   Encounter Diagnoses   Name Primary?     Examination of eyes and vision      Pseudoexfoliation syndrome, right eye Intraocular pressure 12/16 today. Monitor.       Nuclear sclerotic cataract of both eyes Visually significant cataracts both eyes. Pseudoexfoliation right eye. Dil 4.5 right eye and 6 left eye. No obvious donesis right eye. Trypan. Ring. 45 min. Oromo  in OR (speaks English fairly well, but want her to be comfortable in the OR when under sedation). Anticipate targeting mostly distance both eyes.      Dry eye syndrome, bilateral      Myopia of both eyes      Regular astigmatism of left eye      Presbyopia of both eyes      Visually significant cataract that is interfering with daily activities of living. Plan for cataract extraction and intraocular lens implant right eye, then left eye.  Risks, benefits, complications, and alternatives discussed with patient including possibility of limitations from coexistent eye disease and loss of vision. Target refraction and lens options discussed.  Patient understands and wishes to proceed with surgery.     Plan:  Offered cataract surgery of the right eye then left eye at [facility]      Surgery Date(s): November 04, 2024 for your right eye    & November 18, 2024 for your left eye        Our surgery schedulers will mail your appointments to you     Florentin Thakkar MD  [phone]      Again, thank you for allowing me to participate in the care of your patient.        Sincerely,        Florentin Thakkar MD

## 2024-07-10 NOTE — PATIENT INSTRUCTIONS
?Offered cataract surgery of the right eye then left eye at [facility]      Surgery Date(s): November 04, 2024 for your right eye   & November 18, 2024 for your left eye        Our surgery schedulers will mail your appointments to you     Florentin Thakkar MD  [phone]

## 2024-08-28 ENCOUNTER — OFFICE VISIT (OUTPATIENT)
Dept: UROLOGY | Facility: CLINIC | Age: 69
End: 2024-08-28
Attending: OBSTETRICS & GYNECOLOGY
Payer: COMMERCIAL

## 2024-08-28 VITALS
WEIGHT: 203.9 LBS | DIASTOLIC BLOOD PRESSURE: 77 MMHG | HEIGHT: 62 IN | HEART RATE: 75 BPM | SYSTOLIC BLOOD PRESSURE: 108 MMHG | BODY MASS INDEX: 37.52 KG/M2

## 2024-08-28 DIAGNOSIS — N95.2 VAGINAL ATROPHY: ICD-10-CM

## 2024-08-28 DIAGNOSIS — N39.41 URGE INCONTINENCE: ICD-10-CM

## 2024-08-28 PROCEDURE — 99213 OFFICE O/P EST LOW 20 MIN: CPT | Performed by: OBSTETRICS & GYNECOLOGY

## 2024-08-28 PROCEDURE — G0463 HOSPITAL OUTPT CLINIC VISIT: HCPCS | Performed by: OBSTETRICS & GYNECOLOGY

## 2024-08-28 RX ORDER — SOLIFENACIN SUCCINATE 5 MG/1
10 TABLET, FILM COATED ORAL DAILY
Qty: 90 TABLET | Refills: 3 | Status: SHIPPED | OUTPATIENT
Start: 2024-08-28

## 2024-08-28 RX ORDER — ESTRADIOL 0.1 MG/G
1 CREAM VAGINAL
Qty: 42.5 G | Refills: 3 | Status: SHIPPED | OUTPATIENT
Start: 2024-08-28

## 2024-08-28 NOTE — PATIENT INSTRUCTIONS
Thank you for trusting us with your care!   Please be aware, if you are on Mychart, you may see your results prior to your providers review. If labs are abnormal, we will call or message you on Daily [facility] with a follow up plan.    If you need to contact us for questions about:  Symptoms, Scheduling & Medical Questions; Non-urgent (2-3 day response) mobicanvas message, Urgent (needing response today) [phone] (if after 3:30pm next day response)   Prescriptions: Please call your Pharmacy   Billing: [facility] [phone] or [facility]:[phone]

## 2024-08-28 NOTE — LETTER
"8/28/2024       RE: Kian Cortez  [address]  [place] [address]     Dear Colleague,    Thank you for referring your patient, Kian Cortez, to the [facility] [place] at [facility]. Please see a copy of my visit note below.    August 28, 2024    Return visit    Patient returns today for F/U for urinary urgency and urge incontinence and vaginal atrophy. Since her last visit she has done much better and is requesting refills of her meds.    /77   Pulse 75   Ht 1.575 m (5' 2\")   Wt 92.5 kg (203 lb 14.4 oz)   LMP 04/19/2006   BMI 37.29 kg/m    She is comfortable, in no distress, non-labored breathing.     A/P: 69 year old F with urinary urge incontinence and vaginal atrophy    Refill vesicare and estrace cream    I spent a total of 20 minutes with  Ms. Cortez  on the date of the encounter in chart review, face to face patient visit, review of tests, documentation and/or discussion with other providers about the issues documented above.    Radha Schreiber MD  Professor, OB/GYN  Urogynecologist    CC  Patient Care Team:  Willie Lee MD as PCP - General (Internal Medicine)  Pili Kendrick APRN CNP as Nurse Practitioner (Nurse Practitioner)  Tan Hodges MD as MD (Family Practice)  Magaly Hernandez MD as MD (Internal Medicine)  Tammie Valero OD (Optometry)  Tori Ramirez APRN CNP as Nurse Practitioner (Nurse Practitioner - Family)  Leo Mensah MD as MD (Dermatology)  Mariella Love APRN CNM as Certified Nurse Midwife (OB/Gyn)  Willie Lee MD as Assigned PCP  Radha Schreiber MD as MD (OB/Gyn)  Radha Schreiber MD as Assigned OBGYN Provider  Ezequiel Batres DO as Assigned Musculoskeletal Provider  Florentin Thakkar MD as Assigned Surgical Provider  RADHA SCHREIBER      Again, thank you for allowing me to participate in the care of your patient.      Sincerely,    Radha COTE" MD Katelyn

## 2024-08-28 NOTE — NURSING NOTE
Chief Complaint   Patient presents with    Follow Up     Urge incontinence and vaginal atropy

## 2024-08-28 NOTE — PROGRESS NOTES
"August 28, 2024    Return visit    Patient returns today for F/U for urinary urgency and urge incontinence and vaginal atrophy. Since her last visit she has done much better and is requesting refills of her meds.    /77   Pulse 75   Ht 1.575 m (5' 2\")   Wt 92.5 kg (203 lb 14.4 oz)   LMP 04/19/2006   BMI 37.29 kg/m    She is comfortable, in no distress, non-labored breathing.     A/P: 69 year old F with urinary urge incontinence and vaginal atrophy    Refill vesicare and estrace cream    I spent a total of 20 minutes with  Ms. Cortez  on the date of the encounter in chart review, face to face patient visit, review of tests, documentation and/or discussion with other providers about the issues documented above.    Radha Schreiber MD  Professor, OB/GYN  Urogynecologist    CC  Patient Care Team:  Willie Lee MD as PCP - General (Internal Medicine)  Pili Kendrick APRN CNP as Nurse Practitioner (Nurse Practitioner)  Tan Hodges MD as MD (Family Practice)  Magaly Hernandez MD as MD (Internal Medicine)  Tammie Valero OD (Optometry)  Tori Ramirez APRN CNP as Nurse Practitioner (Nurse Practitioner - Family)  Leo Mensah MD as MD (Dermatology)  Mariella Love APRN CNM as Certified Nurse Midwife (OB/Gyn)  Willie Lee MD as Assigned PCP  Radha Schreiber MD as MD (OB/Gyn)  Radha Schreiber MD as Assigned OBGYN Provider  Ezequiel Batres DO as Assigned Musculoskeletal Provider  Florentin Thakkar MD as Assigned Surgical Provider  RADHA SCHREIBER    "

## 2024-08-30 ENCOUNTER — PREP FOR PROCEDURE (OUTPATIENT)
Dept: OPHTHALMOLOGY | Facility: CLINIC | Age: 69
End: 2024-08-30
Payer: COMMERCIAL

## 2024-08-30 DIAGNOSIS — H25.811 COMBINED FORM OF AGE-RELATED CATARACT, RIGHT EYE: Primary | ICD-10-CM

## 2024-08-30 DIAGNOSIS — H25.812 COMBINED FORM OF AGE-RELATED CATARACT, LEFT EYE: ICD-10-CM

## 2024-10-29 ENCOUNTER — LAB (OUTPATIENT)
Dept: LAB | Facility: CLINIC | Age: 69
End: 2024-10-29
Payer: COMMERCIAL

## 2024-10-29 ENCOUNTER — OFFICE VISIT (OUTPATIENT)
Dept: INTERNAL MEDICINE | Facility: CLINIC | Age: 69
End: 2024-10-29
Payer: COMMERCIAL

## 2024-10-29 ENCOUNTER — OFFICE VISIT (OUTPATIENT)
Dept: OPHTHALMOLOGY | Facility: CLINIC | Age: 69
End: 2024-10-29
Payer: COMMERCIAL

## 2024-10-29 VITALS
DIASTOLIC BLOOD PRESSURE: 82 MMHG | HEIGHT: 62 IN | BODY MASS INDEX: 36.64 KG/M2 | WEIGHT: 199.1 LBS | TEMPERATURE: 98 F | RESPIRATION RATE: 16 BRPM | HEART RATE: 96 BPM | OXYGEN SATURATION: 96 % | SYSTOLIC BLOOD PRESSURE: 122 MMHG

## 2024-10-29 DIAGNOSIS — Z12.31 VISIT FOR SCREENING MAMMOGRAM: ICD-10-CM

## 2024-10-29 DIAGNOSIS — A04.8 H. PYLORI INFECTION: ICD-10-CM

## 2024-10-29 DIAGNOSIS — H25.812 COMBINED FORM OF AGE-RELATED CATARACT, LEFT EYE: ICD-10-CM

## 2024-10-29 DIAGNOSIS — Z01.818 PREOP GENERAL PHYSICAL EXAM: Primary | ICD-10-CM

## 2024-10-29 DIAGNOSIS — M85.80 OSTEOPENIA, UNSPECIFIED LOCATION: ICD-10-CM

## 2024-10-29 DIAGNOSIS — H25.811 COMBINED FORM OF AGE-RELATED CATARACT, RIGHT EYE: ICD-10-CM

## 2024-10-29 DIAGNOSIS — H26.8 PSEUDOEXFOLIATION SYNDROME: ICD-10-CM

## 2024-10-29 DIAGNOSIS — Z13.220 LIPID SCREENING: ICD-10-CM

## 2024-10-29 DIAGNOSIS — Z78.0 POST-MENOPAUSAL: ICD-10-CM

## 2024-10-29 DIAGNOSIS — H25.811 COMBINED FORM OF AGE-RELATED CATARACT, RIGHT EYE: Primary | ICD-10-CM

## 2024-10-29 LAB
CHOLEST SERPL-MCNC: 164 MG/DL
FASTING STATUS PATIENT QL REPORTED: NO
HDLC SERPL-MCNC: 61 MG/DL
LDLC SERPL CALC-MCNC: 84 MG/DL
NONHDLC SERPL-MCNC: 103 MG/DL
TRIGL SERPL-MCNC: 96 MG/DL

## 2024-10-29 PROCEDURE — 92012 INTRM OPH EXAM EST PATIENT: CPT | Performed by: STUDENT IN AN ORGANIZED HEALTH CARE EDUCATION/TRAINING PROGRAM

## 2024-10-29 PROCEDURE — 36415 COLL VENOUS BLD VENIPUNCTURE: CPT | Performed by: PATHOLOGY

## 2024-10-29 PROCEDURE — 99214 OFFICE O/P EST MOD 30 MIN: CPT | Performed by: INTERNAL MEDICINE

## 2024-10-29 PROCEDURE — 80061 LIPID PANEL: CPT | Performed by: PATHOLOGY

## 2024-10-29 RX ORDER — MOXIFLOXACIN 5 MG/ML
1 SOLUTION/ DROPS OPHTHALMIC 4 TIMES DAILY
Qty: 3 ML | Refills: 0 | Status: SHIPPED | OUTPATIENT
Start: 2024-10-29 | End: 2024-10-29

## 2024-10-29 RX ORDER — ACETAMINOPHEN 500 MG
500-1000 TABLET ORAL EVERY 6 HOURS PRN
COMMUNITY
End: 2024-10-29

## 2024-10-29 RX ORDER — CLARITHROMYCIN 500 MG/1
TABLET ORAL
COMMUNITY
Start: 2024-10-24 | End: 2024-11-07

## 2024-10-29 RX ORDER — AMOXICILLIN 500 MG/1
CAPSULE ORAL
COMMUNITY
Start: 2024-10-24 | End: 2024-11-07

## 2024-10-29 RX ORDER — PREDNISOLONE ACETATE 10 MG/ML
1 SUSPENSION/ DROPS OPHTHALMIC 4 TIMES DAILY
Qty: 5 ML | Refills: 0 | Status: SHIPPED | OUTPATIENT
Start: 2024-10-29

## 2024-10-29 RX ORDER — KETOROLAC TROMETHAMINE 5 MG/ML
1 SOLUTION OPHTHALMIC 4 TIMES DAILY
Qty: 5 ML | Refills: 0 | Status: SHIPPED | OUTPATIENT
Start: 2024-10-29 | End: 2024-10-29

## 2024-10-29 ASSESSMENT — VISUAL ACUITY
OD_SC: 20/300
OD_PH_SC: 20/100
OS_SC: 20/150
OS_PH_SC: 20/50
METHOD: SNELLEN - LINEAR
OS_PH_SC+: -2

## 2024-10-29 ASSESSMENT — EXTERNAL EXAM - RIGHT EYE: OD_EXAM: NORMAL

## 2024-10-29 ASSESSMENT — SLIT LAMP EXAM - LIDS
COMMENTS: NORMAL
COMMENTS: NORMAL

## 2024-10-29 ASSESSMENT — EXTERNAL EXAM - LEFT EYE: OS_EXAM: NORMAL

## 2024-10-29 NOTE — PROGRESS NOTES
Current Eye Medications:  none     Subjective:  pre-op for KPE/IOL right eye 11/4/24 and left eye 11/18/24 - reports gradual decline in distance vision.     Pre-op with Dr. Lee later today.     Patient has not used eye drops before, but thinks that she can use eye drops with no issues.      Objective:  See Ophthalmology Exam.      Assessment:  Kian Cortez is a 69 year old female who presents with:   Encounter Diagnoses   Name Primary?    Combined form of age-related cataract, right eye Cataract pre-op right eye. Pseudoexfoliation right eye. Dil 4.5 right eye and 6 left eye. No obvious donesis right eye. Trypan. Ring. 45 min. Oromo  in OR (speaks English fairly well, but want her to be comfortable in the OR when under sedation). Target mostly distance both eyes. 3d.    Pseudoexfoliation syndrome, right eye        Plan:  PRE-OP CATARACT INSTRUCTIONS    ** 3 eye drops from the pharmacy at least 3 days before surgery.    *Use the following drops in the right eye 4 times on the day before surgery and once the morning of surgery:                                   Vigamox or Ofloxacin (tan cap)  Ketorolac (gray cap)  Prednisolone (white or pink cap)    *If taking more than one drop, wait five minutes between drops.    *No solid food or drink after midnight on the morning of surgery. Any medications you would normally take in the morning, you can take AFTER surgery.    Florentin Thakkar MD  [phone]

## 2024-10-29 NOTE — PATIENT INSTRUCTIONS
PRE-OP CATARACT INSTRUCTIONS    ** 3 eye drops from the pharmacy at least 3 days before surgery.    *Use the following drops in the right eye 4 times on the day before surgery and once the morning of surgery:                                   Vigamox or Ofloxacin (tan cap)  Ketorolac (gray cap)  Prednisolone (white or pink cap)    *If taking more than one drop, wait five minutes between drops.    *No solid food or drink after midnight on the morning of surgery. Any medications you would normally take in the morning, you can take AFTER surgery.    Florentin Thakkar MD  [phone]

## 2024-10-29 NOTE — PATIENT INSTRUCTIONS
Mammogram and DEXA bone density scan were ordered.  Please call [phone] to schedule.    You can get the following vaccines either in clinic or the pharmacy: COVID, Pneumonia , and Flu     How to Take Your Medication Before Surgery  Preoperative Medication Instructions   Antiplatelet or Anticoagulation Medication Instructions   - Patient is on no antiplatelet or anticoagulation medications.    Additional Medication Instructions  Take all scheduled medications on the day of surgery       Patient Education   Preparing for Your Surgery  For Adults  Getting started  In most cases, a nurse will call to review your health history and instructions. They will give you an arrival time based on your scheduled surgery time. Please be ready to share:  Your doctor's clinic name and phone number  Your medical, surgical, and anesthesia history  A list of allergies and sensitivities  A list of medicines, including herbal treatments and over-the-counter drugs  Whether the patient has a legal guardian (ask how to send us the papers in advance)  Note: You may not receive a call if you were seen at our PAC (Preoperative Assessment Center).  Please tell us if you're pregnant--or if there's any chance you might be pregnant. Some surgeries may injure a fetus (unborn baby), so they require a pregnancy test. Surgeries that are safe for a fetus don't always need a test, and you can choose whether to have one.   Preparing for surgery  Within 10 to 30 days of surgery: Have a pre-op exam (sometimes called an H&P, or History and Physical). This can be done at a clinic or pre-operative center.  If you're having a , you may not need this exam. Talk to your care team.  At your pre-op exam, talk to your care team about all medicines you take. (This includes CBD oil and any drugs, such as THC, marijuana, and other forms of cannabis.) If you need to stop any medicine before surgery, ask when to start taking it again.  This is for your  safety. Many medicines and drugs can make you bleed too much during surgery. Some change how well surgery (anesthesia) drugs work.  Call your insurance company to let them know you're having surgery. (If you don't have insurance, call [phone].)  Call your clinic if there's any change in your health. This includes a scrape or scratch near the surgery site, or any signs of a cold (sore throat, runny nose, cough, rash, fever).  Eating and drinking guidelines  For your safety: Unless your surgeon tells you otherwise, follow the guidelines below.  Eat and drink as normal until 8 hours before you arrive for surgery. After that, no food or milk. You can spit out gum when you arrive.  Drink clear liquids until 2 hours before you arrive. These are liquids you can see through, like water, Gatorade, and Propel Water. They also include plain black coffee and tea (no cream or milk).  No alcohol for 24 hours before you arrive. The night before surgery, stop any drinks that contain THC.  If your care team tells you to take medicine on the morning of surgery, it's okay to take it with a sip of water. No other medicines or drugs are allowed (including CBD oil)--follow your care team's instructions.  If you have questions the day of surgery, call your hospital or surgery center.   Preventing infection  Shower or bathe the night before and the morning of surgery. Follow the instructions your clinic gave you. (If no instructions, use regular soap.)  Don't shave or clip hair near your surgery site. We'll remove the hair if needed.  Don't smoke or vape the morning of surgery. No chewing tobacco for 6 hours before you arrive. A nicotine patch is okay. You may spit out nicotine gum when you arrive.  For some surgeries, the surgeon will tell you to fully quit smoking and nicotine.  We will make every effort to keep you safe from infection. We will:  Clean our hands often with soap and water (or an alcohol-based hand rub).  Clean the  skin at your surgery site with a special soap that kills germs.  Give you a special gown to keep you warm. (Cold raises the risk of infection.)  Wear hair covers, masks, gowns, and gloves during surgery.  Give antibiotic medicine, if prescribed. Not all surgeries need this medicine.  What to bring on the day of surgery  Photo ID and insurance card  Copy of your health care directive, if you have one  Glasses and hearing aids (bring cases)  You can't wear contacts during surgery  Inhaler and eye drops, if you use them (tell us about these when you arrive)  CPAP machine or breathing device, if you use them  A few personal items, if spending the night  If you have . . .  A pacemaker, ICD (cardiac defibrillator), or other implant: Bring the ID card.  An implanted stimulator: Bring the remote control.  A legal guardian: Bring a copy of the certified (court-stamped) guardianship papers.  Please remove any jewelry, including body piercings. Leave jewelry and other valuables at home.  If you're going home the day of surgery  You must have a responsible adult drive you home. They should stay with you overnight as well.  If you don't have someone to stay with you, and you aren't safe to go home alone, we may keep you overnight. Insurance often won't pay for this.  After surgery  If it's hard to control your pain or you need more pain medicine, please call your surgeon's office.  Questions?   If you have any questions for your care team, list them here:   ____________________________________________________________________________________________________________________________________________________________________________________________________________________________________________________________  For informational purposes only. Not to replace the advice of your health care provider. Copyright   2003, 2019 [facility]. All rights reserved. Clinically reviewed by Jerome Massey MD. [employer] 360729 -  REV 08/24.

## 2024-10-29 NOTE — PROGRESS NOTES
Preoperative Evaluation  [facility] INTERNAL MEDICINE [address]  4TH FLOOR  [facility] [address]  Phone: [phone]  Fax: [phone]  Primary Provider: Willie Lee MD  Pre-op Performing Provider: Willie Lee MD  Oct 29, 2024               10/29/2024   Surgical Information   What procedure is being done? RIGHT PHACOEMULSIFICATION, CATARACT, WITH INTRAOCULAR LENS IMPLANT on 11/4/2024 at 8:15AM and LEFT PHACOEMULSIFICATION, CATARACT, WITH INTRAOCULAR LENS IMPLANT on 11/18/2024 at 7:30AM    Facility or Hospital where procedure/surgery will be performed: [facility]    Who is doing the procedure / surgery? Florentin Thakkar MD    Date of surgery / procedure: 11/4/2024 adn 11/18/2024    Time of surgery / procedure: 8:15AM on 11/4 and 7:30AM on 11/18/2024    Where do you plan to recover after surgery? at home with family        Patient-reported     Fax number for surgical facility: Note does not need to be faxed, will be available electronically in Epic.    Assessment & Plan     The proposed surgical procedure is considered LOW risk.    Preop general physical exam  and  Combined form of age-related cataract, left eye  and  Combined form of age-related cataract, right eye    Kian is scheduled for cataract surgery.  She has had a mild cough over the past month but no other infectious symptoms, so I wonder if this may be secondary to reflux as she is currently being treated for H pylori.  Her lungs are clear on exam and vitals are normal, so she is okay to proceed with surgery.     Post-menopausal  Osteopenia    - DEXA HIP/PELVIS/SPINE - Future; Future    Visit for screening mammogram    - MA Screening Bilateral w/ Felix; Future    Lipid screening    - Lipid panel reflex to direct LDL Non-fasting; Future    H. pylori infection    About one week into a two week treatment course of amoxicillin, clarithromycin, and omeprazole as prescribed by an outside clinic.         Preoperative  Medication Instructions  Antiplatelet or Anticoagulation Medication Instructions   - Patient is on no antiplatelet or anticoagulation medications.    Additional Medication Instructions  Take all scheduled medications on the day of surgery    Recommendation  Approval given to proceed with proposed procedure, without further diagnostic evaluation.    Defer vax until post-op    Subjective   Kian is a 69 year old, presenting for the following:  Pre-Op Exam (RIGHT PHACOEMULSIFICATION, CATARACT, WITH INTRAOCULAR LENS IMPLANT on 11/4/2024 at 8:15AM and LEFT PHACOEMULSIFICATION, CATARACT, WITH INTRAOCULAR LENS IMPLANT on 11/18/2024 at 7:30AM with Florentin Thakkar MD at  OR)    HPI related to upcoming procedure:  Kian is scheduled to have R cataract surgery on 11/4 and left on 11/18.  She is otherwise feeling some mild cough as noted below and is on treatment for H pylori for less than a week, on two week course and is feeling much better already.      She reports she is not taking any medications beside the H pylori treatment.      Kian reports ongoing issues with back pain.  She is using a back brace and using Tylenol and prn prescription medication that she doesn't recall the name of and is prescribed at an outside clinic- advised her to let us know once she figures out what this is.  Uses the prn prescription med rarely, hasn't used it in three months.           10/29/2024   Pre-Op Questionnaire   Have you ever had a heart attack or stroke? No    Have you ever had surgery on your heart or blood vessels, such as a stent placement, a coronary artery bypass, or surgery on an artery in your head, neck, heart, or legs? No    Do you have chest pain with activity? No    Do you have a history of heart failure? No    Do you currently have a cold, bronchitis or symptoms of other infection? (!) YES - cough as below.  No fever, chills, rhinorrhea, sore throat, ear pain, congestion    Do you have a cough, shortness of breath,  or wheezing? (!) YES- slight cough for the past month, sometimes coughs up mucus, no blood.  No dyspnea or wheezing    Do you or anyone in your family have previous history of blood clots? No    Do you or does anyone in your family have a serious bleeding problem such as prolonged bleeding following surgeries or cuts? No    Have you ever had problems with anemia or been told to take iron pills? No    Have you had any abnormal blood loss such as black, tarry or bloody stools, or abnormal vaginal bleeding? (!) YES- vaginal bleeding in the past, not currently.  Had hysteroscopy last year    Have you ever had a blood transfusion? No    Are you willing to have a blood transfusion if it is medically needed before, during, or after your surgery? Yes    Have you or any of your relatives ever had problems with anesthesia? No    Do you have sleep apnea, excessive snoring or daytime drowsiness? No    Do you have any artifical heart valves or other implanted medical devices like a pacemaker, defibrillator, or continuous glucose monitor? No    Do you have artificial joints? No    Are you allergic to latex? No        Patient-reported       Preoperative Review of    reviewed - no record of controlled substances prescribed.      Patient Active Problem List    Diagnosis Date Noted    Combined form of age-related cataract, right eye 08/30/2024     Priority: Medium    Combined form of age-related cataract, left eye 08/30/2024     Priority: Medium    Pelvic floor dysfunction 10/27/2023     Priority: Medium    Postmenopausal bleeding 06/21/2023     Priority: Medium    Fever 03/10/2020     Priority: Medium    Diarrhea 03/10/2020     Priority: Medium    Nausea with vomiting 03/10/2020     Priority: Medium    Colitis 10/12/2019     Priority: Medium    Insomnia, unspecified type 01/05/2018     Priority: Medium    BPPV (benign paroxysmal positional vertigo), unspecified laterality 01/10/2017     Priority: Medium    Vertigo 12/30/2016      Priority: Medium    Constipation, unspecified constipation type 05/02/2016     Priority: Medium    Morbid obesity, unspecified obesity type (H) 05/02/2016     Priority: Medium    Osteopenia 04/27/2015     Priority: Medium    Gastroesophageal reflux disease without esophagitis 10/08/2013     Priority: Medium    Pseudoexfoliation syndrome, right eye 07/16/2013     Priority: Medium    CARDIOVASCULAR SCREENING; LDL GOAL LESS THAN 130 06/12/2013     Priority: Medium      Past Medical History:   Diagnosis Date    Diverticulosis 11/2015    on CT scan    Functional dyspepsia     GERD (gastroesophageal reflux disease)     Insomnia     psychophysiologic    Osteoarthritis of right knee     Osteopenia 4/27/2015     Past Surgical History:   Procedure Laterality Date    COLONOSCOPY N/A 11/19/2019    Procedure: COLONOSCOPY;  Surgeon: Sandra Chu MD;  Location: [facility] OR    DILATION AND CURETTAGE SUCTION      DILATION AND CURETTAGE, OPERATIVE HYSTEROSCOPY WITH MORCELLATOR, COMBINED N/A 7/26/2023    Procedure: HYSTEROSCOPY, WITH DILATION AND CURETTAGE OF UTERUS USING MORCELLATOR;  Surgeon: Lor Kathleen MD;  Location: [facility] OR    EYE SURGERY      eye duct repair 10+ years  ago    NASOLACRIMAL DUCT PROBE/IRRG       Current Outpatient Medications   Medication Sig Dispense Refill    acetaminophen 500 MG CAPS Take 1,000 mg by mouth 3 times daily 250 capsule 99    amoxicillin (AMOXIL) 500 MG capsule 2 capsules Orally every 12 hrs for 14 days      clarithromycin (BIAXIN) 500 MG tablet 1 tablet Orally every 12 hrs for 14 days      omeprazole (PRILOSEC) 40 MG DR capsule Take 1 capsule (40 mg) by mouth daily (Patient taking differently: Take 20 mg by mouth 2 times daily.) 60 capsule 4    amitriptyline (ELAVIL) 25 MG tablet Take 1 tablet (25 mg) by mouth At Bedtime (Patient not taking: Reported on 10/29/2024) 90 tablet 3    calcium carbonate (TUMS) 500 MG chewable tablet Take 1 tablet (500 mg) by mouth 2 times daily (Patient not  taking: Reported on 10/29/2024) 180 tablet 0    carboxymethylcellul-glycerin (OPTIVE) 0.5-0.9 % SOLN ophthalmic solution Place 1 drop into both eyes 4 times daily as needed for dry eyes (Patient not taking: Reported on 10/29/2024) 1 Bottle 11    diclofenac (VOLTAREN) 1 % topical gel Apply 4 g topically 4 times daily (Patient not taking: Reported on 10/29/2024) 350 g 3    estradiol (ESTRACE) 0.1 MG/GM vaginal cream Place 1 g vaginally three times a week. (Patient not taking: Reported on 10/29/2024) 42.5 g 3    Fish Oil-Cholecalciferol (FISH OIL + D3 PO) Take  by mouth daily. (Patient not taking: Reported on 10/29/2024)      gabapentin (NEURONTIN) 400 MG capsule Take 1 capsule (400 mg) by mouth 2 times daily (Patient not taking: Reported on 10/29/2024) 60 capsule 4    hydrocortisone (CORTAID) 1 % external ointment Apply topically 2 times daily (Patient not taking: Reported on 10/29/2024) 110 g 0    ketoconazole (NIZORAL) 2 % external cream Apply topically daily To legs (Patient not taking: Reported on 10/29/2024) 120 g 3    ketorolac (ACULAR) 0.5 % ophthalmic solution Place 1 drop into the right eye 4 times daily. (Patient not taking: Reported on 10/29/2024) 5 mL 0    moxifloxacin (VIGAMOX) 0.5 % ophthalmic solution Place 1 drop into the right eye 4 times daily. (Patient not taking: Reported on 10/29/2024) 3 mL 0    phentermine (ADIPEX-P) 15 MG capsule Take 15 mg by mouth daily (Patient not taking: Reported on 10/29/2024)      prednisoLONE acetate (PRED FORTE) 1 % ophthalmic suspension Place 1 drop into the right eye 4 times daily. (Patient not taking: Reported on 10/29/2024) 5 mL 0    solifenacin (VESICARE) 5 MG tablet Take 2 tablets (10 mg) by mouth daily. (Patient not taking: Reported on 10/29/2024) 90 tablet 3    vitamin D3 (CHOLECALCIFEROL) 2000 units tablet Take 1 tablet by mouth daily (Patient not taking: Reported on 10/29/2024) 90 tablet 1       No Known Allergies     Social History     Tobacco Use    Smoking  "status: Never    Smokeless tobacco: Never   Substance Use Topics    Alcohol use: No     History   Drug Use No           10 point ROS of systems including Constitutional, Eyes, Respiratory, Cardiovascular, Gastroenterology, Genitourinary, Integumentary, Muscularskeletal, Psychiatric were all negative except for pertinent positives noted in my HPI.     Objective    /82 (BP Location: Right arm, Patient Position: Sitting, Cuff Size: Adult Regular)   Pulse 96   Temp 98  F (36.7  C)   Resp 16   Ht 1.575 m (5' 2.01\")   Wt 90.3 kg (199 lb 1.6 oz)   LMP 04/19/2006   SpO2 96%   BMI 36.41 kg/m     Estimated body mass index is 36.41 kg/m  as calculated from the following:    Height as of this encounter: 1.575 m (5' 2.01\").    Weight as of this encounter: 90.3 kg (199 lb 1.6 oz).  Physical Exam      GENERAL APPEARANCE: healthy, alert and no distress     HENT: normal ear canals and TMs, nose and mouth without ulcers or lesions     NECK: no adenopathy, no asymmetry, masses, or scars     RESP: lungs clear to auscultation - no rales, rhonchi or wheezes     CV: regular rates and rhythm, normal S1 S2, no S3 or S4 and no murmur, click or rub -     ABDOMEN:  soft, nontender, no HSM or masses and bowel sounds normal     MS: extremities normal- no gross deformities noted, no evidence of inflammation in joints       NEURO: mentation intact and speech normal     PSYCH: mentation appears normal. and affect normal/bright     LYMPHATICS: No cervical or supraclavicular nodes     No results for input(s): \"HGB\", \"PLT\", \"INR\", \"NA\", \"POTASSIUM\", \"CR\", \"A1C\" in the last 8760 hours.     Diagnostics  No labs were ordered during this visit.   No EKG required for low risk surgery (cataract, skin procedure, breast biopsy, etc).    Revised Cardiac Risk Index (RCRI)  The patient has the following serious cardiovascular risks for perioperative complications:   - No serious cardiac risks = 0 points     RCRI Interpretation: 0 points: Class I " (very low risk - 0.4% complication rate)         Signed Electronically by: Willie Lee MD  A copy of this evaluation report is provided to the requesting physician.

## 2024-10-29 NOTE — LETTER
10/29/2024      Kian Cortez  [address]  [place] [address]      Dear Colleague,    Thank you for referring your patient, Kian Cortez, to the [facility]. Please see a copy of my visit note below.     Current Eye Medications:  none     Subjective:  pre-op for KPE/IOL right eye 11/4/24 and left eye 11/18/24 - reports gradual decline in distance vision.     Pre-op with Dr. Lee later today.     Patient has not used eye drops before, but thinks that she can use eye drops with no issues.      Objective:  See Ophthalmology Exam.      Assessment:  Kian Cortez is a 69 year old female who presents with:   Encounter Diagnoses   Name Primary?     Combined form of age-related cataract, right eye Cataract pre-op right eye. Pseudoexfoliation right eye. Dil 4.5 right eye and 6 left eye. No obvious donesis right eye. Trypan. Ring. 45 min. [place]  in OR (speaks English fairly well, but want her to be comfortable in the OR when under sedation). Target mostly distance both eyes. 3d.     Pseudoexfoliation syndrome, right eye        Plan:  PRE-OP CATARACT INSTRUCTIONS    ** 3 eye drops from the pharmacy at least 3 days before surgery.    *Use the following drops in the right eye 4 times on the day before surgery and once the morning of surgery:                                   Vigamox or Ofloxacin (tan cap)  Ketorolac (gray cap)  Prednisolone (white or pink cap)    *If taking more than one drop, wait five minutes between drops.    *No solid food or drink after midnight on the morning of surgery. Any medications you would normally take in the morning, you can take AFTER surgery.    Florentin Thakkar MD  [phone]        Again, thank you for allowing me to participate in the care of your patient.        Sincerely,        Florentin Thakkar MD

## 2024-10-29 NOTE — PROGRESS NOTES
Preoperative Evaluation  [facility] INTERNAL MEDICINE [address]  [address] [facility] [address]  Phone: [phone]  Fax: [phone]  Primary Provider: Willie Lee MD  Pre-op Performing Provider: Willie Lee MD  Oct 29, 2024   {Provider  Link to PREOP SmartSet  REQUIRED to apply standard patient instructions and medication directions to the AVS :178512}        10/29/2024   Surgical Information   What procedure is being done? RIGHT PHACOEMULSIFICATION, CATARACT, WITH INTRAOCULAR LENS IMPLANT on 11/4/2024 at 8:15AM and LEFT PHACOEMULSIFICATION, CATARACT, WITH INTRAOCULAR LENS IMPLANT on 11/18/2024 at 7:30AM    Facility or Hospital where procedure/surgery will be performed: [facility] OR    Who is doing the procedure / surgery? Florentin Thakkar MD    Date of surgery / procedure: 11/4/2024 adn 11/18/2024    Time of surgery / procedure: 8:15AM on 11/4 and 7:30AM on 11/18/2024    Where do you plan to recover after surgery? at home with family        Patient-reported     Fax number for surgical facility: Note does not need to be faxed, will be available electronically in Epic.    {Provider Charting Preference for Preop :046967}    Jacqueline Langston is a 69 year old, presenting for the following:  Pre-Op Exam (RIGHT PHACOEMULSIFICATION, CATARACT, WITH INTRAOCULAR LENS IMPLANT on 11/4/2024 at 8:15AM and LEFT PHACOEMULSIFICATION, CATARACT, WITH INTRAOCULAR LENS IMPLANT on 11/18/2024 at 7:30AM with Florentin Thakkar MD at  OR)          10/29/2024     3:34 PM   Additional Questions   Roomed by Fabby DANIELLE   Accompanied by N/A     HPI related to upcoming procedure: ***        10/29/2024   Pre-Op Questionnaire   Have you ever had a heart attack or stroke? No    Have you ever had surgery on your heart or blood vessels, such as a stent placement, a coronary artery bypass, or surgery on an artery in your head, neck, heart, or legs? No    Do you have chest pain with activity? No     Do you have a history of heart failure? No    Do you currently have a cold, bronchitis or symptoms of other infection? (!) YES ***    Do you have a cough, shortness of breath, or wheezing? (!) YES ***    Do you or anyone in your family have previous history of blood clots? No    Do you or does anyone in your family have a serious bleeding problem such as prolonged bleeding following surgeries or cuts? No    Have you ever had problems with anemia or been told to take iron pills? No    Have you had any abnormal blood loss such as black, tarry or bloody stools, or abnormal vaginal bleeding? (!) YES ***    Have you ever had a blood transfusion? No    Are you willing to have a blood transfusion if it is medically needed before, during, or after your surgery? Yes    Have you or any of your relatives ever had problems with anesthesia? No    Do you have sleep apnea, excessive snoring or daytime drowsiness? No    Do you have any artifical heart valves or other implanted medical devices like a pacemaker, defibrillator, or continuous glucose monitor? No    Do you have artificial joints? No    Are you allergic to latex? No        Patient-reported     Health Care Directive  Patient does not have a Health Care Directive: {ADVANCE_DIRECTIVE_STATUS:779161}    Preoperative Review of   {Mnpmpreport:121060}  {Review MNPMP for all patients [facility]:710374}    {Chronic problem details (Optional) :062050}    Patient Active Problem List    Diagnosis Date Noted    Combined form of age-related cataract, right eye 08/30/2024     Priority: Medium    Combined form of age-related cataract, left eye 08/30/2024     Priority: Medium    Pelvic floor dysfunction 10/27/2023     Priority: Medium    Postmenopausal bleeding 06/21/2023     Priority: Medium    Fever 03/10/2020     Priority: Medium    Diarrhea 03/10/2020     Priority: Medium    Nausea with vomiting 03/10/2020     Priority: Medium    Colitis 10/12/2019     Priority:  Medium    Insomnia, unspecified type 01/05/2018     Priority: Medium    BPPV (benign paroxysmal positional vertigo), unspecified laterality 01/10/2017     Priority: Medium    Vertigo 12/30/2016     Priority: Medium    Constipation, unspecified constipation type 05/02/2016     Priority: Medium    Morbid obesity, unspecified obesity type (H) 05/02/2016     Priority: Medium    Osteopenia 04/27/2015     Priority: Medium    Gastroesophageal reflux disease without esophagitis 10/08/2013     Priority: Medium    Pseudoexfoliation syndrome, right eye 07/16/2013     Priority: Medium    CARDIOVASCULAR SCREENING; LDL GOAL LESS THAN 130 06/12/2013     Priority: Medium      Past Medical History:   Diagnosis Date    Diverticulosis 11/2015    on CT scan    Functional dyspepsia     GERD (gastroesophageal reflux disease)     Insomnia     psychophysiologic    Osteoarthritis of right knee     Osteopenia 4/27/2015     Past Surgical History:   Procedure Laterality Date    COLONOSCOPY N/A 11/19/2019    Procedure: COLONOSCOPY;  Surgeon: Sandra Chu MD;  Location: [facility] OR    DILATION AND CURETTAGE SUCTION      DILATION AND CURETTAGE, OPERATIVE HYSTEROSCOPY WITH MORCELLATOR, COMBINED N/A 7/26/2023    Procedure: HYSTEROSCOPY, WITH DILATION AND CURETTAGE OF UTERUS USING MORCELLATOR;  Surgeon: Lor Kathleen MD;  [place] [facility] [place]    EYE SURGERY      eye duct repair 10+ years  ago    NASOLACRIMAL DUCT PROBE/IRRG       Current Outpatient Medications   Medication Sig Dispense Refill    acetaminophen 500 MG CAPS Take 1,000 mg by mouth 3 times daily 250 capsule 99    amoxicillin (AMOXIL) 500 MG capsule 2 capsules Orally every 12 hrs for 14 days      clarithromycin (BIAXIN) 500 MG tablet 1 tablet Orally every 12 hrs for 14 days      omeprazole (PRILOSEC) 40 MG DR capsule Take 1 capsule (40 mg) by mouth daily (Patient taking differently: Take 20 mg by mouth 2 times daily.) 60 capsule 4    amitriptyline (ELAVIL) 25 MG tablet Take 1 tablet  (25 mg) by mouth At Bedtime (Patient not taking: Reported on 10/29/2024) 90 tablet 3    calcium carbonate (TUMS) 500 MG chewable tablet Take 1 tablet (500 mg) by mouth 2 times daily (Patient not taking: Reported on 10/29/2024) 180 tablet 0    carboxymethylcellul-glycerin (OPTIVE) 0.5-0.9 % SOLN ophthalmic solution Place 1 drop into both eyes 4 times daily as needed for dry eyes (Patient not taking: Reported on 10/29/2024) 1 Bottle 11    diclofenac (VOLTAREN) 1 % topical gel Apply 4 g topically 4 times daily (Patient not taking: Reported on 10/29/2024) 350 g 3    estradiol (ESTRACE) 0.1 MG/GM vaginal cream Place 1 g vaginally three times a week. (Patient not taking: Reported on 10/29/2024) 42.5 g 3    Fish Oil-Cholecalciferol (FISH OIL + D3 PO) Take  by mouth daily. (Patient not taking: Reported on 10/29/2024)      gabapentin (NEURONTIN) 400 MG capsule Take 1 capsule (400 mg) by mouth 2 times daily (Patient not taking: Reported on 10/29/2024) 60 capsule 4    hydrocortisone (CORTAID) 1 % external ointment Apply topically 2 times daily (Patient not taking: Reported on 10/29/2024) 110 g 0    ketoconazole (NIZORAL) 2 % external cream Apply topically daily To legs (Patient not taking: Reported on 10/29/2024) 120 g 3    ketorolac (ACULAR) 0.5 % ophthalmic solution Place 1 drop into the right eye 4 times daily. (Patient not taking: Reported on 10/29/2024) 5 mL 0    moxifloxacin (VIGAMOX) 0.5 % ophthalmic solution Place 1 drop into the right eye 4 times daily. (Patient not taking: Reported on 10/29/2024) 3 mL 0    phentermine (ADIPEX-P) 15 MG capsule Take 15 mg by mouth daily (Patient not taking: Reported on 10/29/2024)      prednisoLONE acetate (PRED FORTE) 1 % ophthalmic suspension Place 1 drop into the right eye 4 times daily. (Patient not taking: Reported on 10/29/2024) 5 mL 0    solifenacin (VESICARE) 5 MG tablet Take 2 tablets (10 mg) by mouth daily. (Patient not taking: Reported on 10/29/2024) 90 tablet 3    vitamin  "D3 (CHOLECALCIFEROL) 2000 units tablet Take 1 tablet by mouth daily (Patient not taking: Reported on 10/29/2024) 90 tablet 1       No Known Allergies     Social History     Tobacco Use    Smoking status: Never    Smokeless tobacco: Never   Substance Use Topics    Alcohol use: No     {FAMILY HISTORY (Optional):492461761}  History   Drug Use No           {ROS Picklists (Optional):884985}    Objective    /82 (BP Location: Right arm, Patient Position: Sitting, Cuff Size: Adult Regular)   Pulse 96   Temp 98  F (36.7  C)   Resp 16   Ht 1.575 m (5' 2.01\")   Wt 90.3 kg (199 lb 1.6 oz)   LMP 04/19/2006   SpO2 96%   BMI 36.41 kg/m     Estimated body mass index is 36.41 kg/m  as calculated from the following:    Height as of this encounter: 1.575 m (5' 2.01\").    Weight as of this encounter: 90.3 kg (199 lb 1.6 oz).  "

## 2024-10-30 ENCOUNTER — TELEPHONE (OUTPATIENT)
Dept: OPHTHALMOLOGY | Facility: CLINIC | Age: 69
End: 2024-10-30
Payer: COMMERCIAL

## 2024-10-30 DIAGNOSIS — H25.811 COMBINED FORM OF AGE-RELATED CATARACT, RIGHT EYE: Primary | ICD-10-CM

## 2024-10-30 RX ORDER — MOXIFLOXACIN 5 MG/ML
1 SOLUTION/ DROPS OPHTHALMIC 4 TIMES DAILY
Qty: 3 ML | Refills: 0 | Status: SHIPPED | OUTPATIENT
Start: 2024-10-30

## 2024-10-30 RX ORDER — KETOROLAC TROMETHAMINE 5 MG/ML
1 SOLUTION OPHTHALMIC 4 TIMES DAILY
Qty: 5 ML | Refills: 0 | Status: SHIPPED | OUTPATIENT
Start: 2024-10-30

## 2024-10-30 NOTE — TELEPHONE ENCOUNTER
[facility] Call Center    Phone Message    May a detailed message be left on voicemail: yes     Reason for Call: Medication Refill Request    Has the patient contacted the pharmacy for the refill? Yes   Name of medication being requested: Pharmacy accidentally deleted Rx's for Ketorolac and Moxifloxacin; please resend.  Provider who prescribed the medication: Dr. hTakkar  Pharmacy: [facility]/pharmacy #[id number] [facility] [address]  Date medication is needed: ASAP    Action Taken: Message routed to:  Other: [facility]    Travel Screening: Not Applicable     Date of Service:

## 2024-11-01 ENCOUNTER — ANESTHESIA EVENT (OUTPATIENT)
Dept: SURGERY | Facility: AMBULATORY SURGERY CENTER | Age: 69
End: 2024-11-01
Payer: COMMERCIAL

## 2024-11-04 ENCOUNTER — ANESTHESIA (OUTPATIENT)
Dept: SURGERY | Facility: AMBULATORY SURGERY CENTER | Age: 69
End: 2024-11-04
Payer: COMMERCIAL

## 2024-11-04 ENCOUNTER — VIRTUAL VISIT (OUTPATIENT)
Dept: INTERPRETER SERVICES | Facility: CLINIC | Age: 69
End: 2024-11-04

## 2024-11-04 ENCOUNTER — HOSPITAL ENCOUNTER (OUTPATIENT)
Facility: AMBULATORY SURGERY CENTER | Age: 69
Discharge: HOME | End: 2024-11-04
Attending: STUDENT IN AN ORGANIZED HEALTH CARE EDUCATION/TRAINING PROGRAM | Admitting: STUDENT IN AN ORGANIZED HEALTH CARE EDUCATION/TRAINING PROGRAM
Payer: COMMERCIAL

## 2024-11-04 VITALS
BODY MASS INDEX: 36.4 KG/M2 | OXYGEN SATURATION: 100 % | RESPIRATION RATE: 16 BRPM | WEIGHT: 199.08 LBS | HEART RATE: 69 BPM | TEMPERATURE: 96.6 F | SYSTOLIC BLOOD PRESSURE: 107 MMHG | DIASTOLIC BLOOD PRESSURE: 62 MMHG

## 2024-11-04 DIAGNOSIS — H25.811 COMBINED FORM OF AGE-RELATED CATARACT, RIGHT EYE: Primary | ICD-10-CM

## 2024-11-04 PROCEDURE — 66982 XCAPSL CTRC RMVL CPLX WO ECP: CPT | Performed by: NURSE ANESTHETIST, CERTIFIED REGISTERED

## 2024-11-04 PROCEDURE — 66982 XCAPSL CTRC RMVL CPLX WO ECP: CPT | Performed by: ANESTHESIOLOGY

## 2024-11-04 PROCEDURE — G8907 PT DOC NO EVENTS ON DISCHARG: HCPCS

## 2024-11-04 PROCEDURE — 66982 XCAPSL CTRC RMVL CPLX WO ECP: CPT | Mod: RT

## 2024-11-04 PROCEDURE — G8918 PT W/O PREOP ORDER IV AB PRO: HCPCS

## 2024-11-04 PROCEDURE — 66982 XCAPSL CTRC RMVL CPLX WO ECP: CPT | Mod: RT | Performed by: STUDENT IN AN ORGANIZED HEALTH CARE EDUCATION/TRAINING PROGRAM

## 2024-11-04 DEVICE — TECNIS 1-PC CLEAR MONO 6.0MM 23.0D
Type: IMPLANTABLE DEVICE | Site: EYE | Status: FUNCTIONAL
Brand: TECNIS IOL

## 2024-11-04 RX ORDER — OXYCODONE HYDROCHLORIDE 5 MG/1
5 TABLET ORAL
Status: DISCONTINUED | OUTPATIENT
Start: 2024-11-04 | End: 2024-11-05 | Stop reason: HOSPADM

## 2024-11-04 RX ORDER — NALOXONE HYDROCHLORIDE 0.4 MG/ML
0.1 INJECTION, SOLUTION INTRAMUSCULAR; INTRAVENOUS; SUBCUTANEOUS
Status: DISCONTINUED | OUTPATIENT
Start: 2024-11-04 | End: 2024-11-05 | Stop reason: HOSPADM

## 2024-11-04 RX ORDER — ONDANSETRON 4 MG/1
4 TABLET, ORALLY DISINTEGRATING ORAL EVERY 30 MIN PRN
Status: DISCONTINUED | OUTPATIENT
Start: 2024-11-04 | End: 2024-11-05 | Stop reason: HOSPADM

## 2024-11-04 RX ORDER — FENTANYL CITRATE 50 UG/ML
50 INJECTION, SOLUTION INTRAMUSCULAR; INTRAVENOUS EVERY 5 MIN PRN
Status: DISCONTINUED | OUTPATIENT
Start: 2024-11-04 | End: 2024-11-05 | Stop reason: HOSPADM

## 2024-11-04 RX ORDER — POVIDONE-IODINE 5 %
SOLUTION, NON-ORAL OPHTHALMIC (EYE) PRN
Status: DISCONTINUED | OUTPATIENT
Start: 2024-11-04 | End: 2024-11-04 | Stop reason: HOSPADM

## 2024-11-04 RX ORDER — DEXAMETHASONE SODIUM PHOSPHATE 4 MG/ML
4 INJECTION, SOLUTION INTRA-ARTICULAR; INTRALESIONAL; INTRAMUSCULAR; INTRAVENOUS; SOFT TISSUE
Status: DISCONTINUED | OUTPATIENT
Start: 2024-11-04 | End: 2024-11-05 | Stop reason: HOSPADM

## 2024-11-04 RX ORDER — OXYCODONE HYDROCHLORIDE 5 MG/1
10 TABLET ORAL
Status: DISCONTINUED | OUTPATIENT
Start: 2024-11-04 | End: 2024-11-05 | Stop reason: HOSPADM

## 2024-11-04 RX ORDER — SODIUM CHLORIDE, SODIUM LACTATE, POTASSIUM CHLORIDE, CALCIUM CHLORIDE 600; 310; 30; 20 MG/100ML; MG/100ML; MG/100ML; MG/100ML
INJECTION, SOLUTION INTRAVENOUS CONTINUOUS
Status: DISCONTINUED | OUTPATIENT
Start: 2024-11-04 | End: 2024-11-05 | Stop reason: HOSPADM

## 2024-11-04 RX ORDER — ONDANSETRON 2 MG/ML
4 INJECTION INTRAMUSCULAR; INTRAVENOUS EVERY 30 MIN PRN
Status: DISCONTINUED | OUTPATIENT
Start: 2024-11-04 | End: 2024-11-05 | Stop reason: HOSPADM

## 2024-11-04 RX ORDER — FENTANYL CITRATE 50 UG/ML
25 INJECTION, SOLUTION INTRAMUSCULAR; INTRAVENOUS EVERY 5 MIN PRN
Status: DISCONTINUED | OUTPATIENT
Start: 2024-11-04 | End: 2024-11-05 | Stop reason: HOSPADM

## 2024-11-04 RX ORDER — MOXIFLOXACIN IN NACL,ISO-OS/PF 0.3MG/0.3
SYRINGE (ML) INTRAOCULAR PRN
Status: DISCONTINUED | OUTPATIENT
Start: 2024-11-04 | End: 2024-11-04 | Stop reason: HOSPADM

## 2024-11-04 RX ORDER — PROPARACAINE HYDROCHLORIDE 5 MG/ML
1 SOLUTION/ DROPS OPHTHALMIC ONCE
Status: COMPLETED | OUTPATIENT
Start: 2024-11-04 | End: 2024-11-04

## 2024-11-04 RX ORDER — FENTANYL CITRATE 50 UG/ML
25 INJECTION, SOLUTION INTRAMUSCULAR; INTRAVENOUS
Status: DISCONTINUED | OUTPATIENT
Start: 2024-11-04 | End: 2024-11-05 | Stop reason: HOSPADM

## 2024-11-04 RX ORDER — TETRACAINE HYDROCHLORIDE 5 MG/ML
SOLUTION OPHTHALMIC PRN
Status: DISCONTINUED | OUTPATIENT
Start: 2024-11-04 | End: 2024-11-04 | Stop reason: HOSPADM

## 2024-11-04 RX ORDER — ACETAMINOPHEN 325 MG/1
975 TABLET ORAL ONCE
Status: COMPLETED | OUTPATIENT
Start: 2024-11-04 | End: 2024-11-04

## 2024-11-04 RX ORDER — FENTANYL CITRATE 50 UG/ML
INJECTION, SOLUTION INTRAMUSCULAR; INTRAVENOUS PRN
Status: DISCONTINUED | OUTPATIENT
Start: 2024-11-04 | End: 2024-11-04

## 2024-11-04 RX ORDER — LIDOCAINE 40 MG/G
CREAM TOPICAL
Status: DISCONTINUED | OUTPATIENT
Start: 2024-11-04 | End: 2024-11-05 | Stop reason: HOSPADM

## 2024-11-04 RX ORDER — CYCLOPENTOLAT/TROPIC/PHENYLEPH 1%-1%-2.5%
1 DROPS (EA) OPHTHALMIC (EYE)
Status: COMPLETED | OUTPATIENT
Start: 2024-11-04 | End: 2024-11-04

## 2024-11-04 RX ADMIN — SODIUM CHLORIDE, SODIUM LACTATE, POTASSIUM CHLORIDE, CALCIUM CHLORIDE: 600; 310; 30; 20 INJECTION, SOLUTION INTRAVENOUS at 07:58

## 2024-11-04 RX ADMIN — PROPARACAINE HYDROCHLORIDE 1 DROP: 5 SOLUTION/ DROPS OPHTHALMIC at 06:56

## 2024-11-04 RX ADMIN — Medication 1 DROP: at 07:10

## 2024-11-04 RX ADMIN — Medication 1 DROP: at 06:56

## 2024-11-04 RX ADMIN — FENTANYL CITRATE 25 MCG: 50 INJECTION, SOLUTION INTRAMUSCULAR; INTRAVENOUS at 08:03

## 2024-11-04 RX ADMIN — Medication 1 DROP: at 07:17

## 2024-11-04 RX ADMIN — ACETAMINOPHEN 975 MG: 325 TABLET ORAL at 07:10

## 2024-11-04 NOTE — OP NOTE
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 Review of intraocular lens calculations, both eyes   2. Phacoemulsification and extraction of lens  right eye   3. Intraocular lens implantation right eye  Anesthesia: MAC/topical  Complications: None  EBL: <1cc    Kian Cortez suffers from a visually significant cataract of the right eye. This has caused problems with distance and reading vision, including glare. After discussing the risks, benefits, and alternatives, the patient wishes to proceed with cataract surgery.    The patient was identified in the pre-op area where the right eye was marked. The patient was then brought to the operating room where a time out was called, identifying the patient, the procedure, and the correct site. Tetracaine drops were applied to the operative eye. The operative eye was then prepped and draped in the usual sterile ophthalmic fashion. An eyelid speculum was placed into the operative eye. Additional tetracaine drops were applied. A paracentesis was made superior with a side port blade. 1% preservative-free lidocaine and epinephrine was injected into the anterior chamber. Due to obscured red reflex, trypan blue was irrigated into the anterior chamber to enhance capsular visualization.  This was irrigated from the anterior chamber after 60 seconds with balanced salt solution.   Endocoat was injected to deepen the anterior chamber. A 2.4mm clear corneal wound was created with a keratome blade temporally. Viscoelastic was injected under the pupillary margin, and a 6.25mm Malyugin ring was injected into the anterior chamber and positioned onto the iris.  A continuous curvilinear capsulorrhexis was started with a bent cystitome and completed with Utrada  forceps. Hydrodissection of the lens nucleus was performed with BSS on a cannula. The lens nucleus was rotated. Phacoemulsification of the lens nucleus was accomplished in a phacoemulsification stop and chop technique. Remaining cortex was removed with irrigation and aspiration. The lens capsule was noted to be intact. Healon was used to inflate the capsular bag.  The lens was injected into the capsular bag. The Malyugin ring was teased off the iris and removed from the eye.   Remaining viscoelastic was removed with irrigation and aspiration. The inferior edge of the wound had a persistent slow leak, so a single 10-0 nylon suture was placed to reapproximate the wound. The wounds were checked and found to be watertight after hydration.  Intracameral moxifloxacin was injected into the anterior chamber. The eyelid speculum was removed. The patient tolerated the procedure well and was in stable condition on the way to the recovery area.     Florentin Thakkar MD

## 2024-11-04 NOTE — ANESTHESIA POSTPROCEDURE EVALUATION
Patient: Kian Cortez    Procedure: Procedure(s):  RIGHT COMPLEX PHACOEMULSIFICATION, CATARACT, WITH INTRAOCULAR LENS IMPLANT       Anesthesia Type:  MAC    Note:  Disposition: Outpatient   Postop Pain Control: Uneventful            Sign Out: Well controlled pain   PONV: No   Neuro/Psych: Uneventful            Sign Out: Acceptable/Baseline neuro status   Airway/Respiratory: Uneventful            Sign Out: Acceptable/Baseline resp. status   CV/Hemodynamics: Uneventful            Sign Out: Acceptable CV status; No obvious hypovolemia; No obvious fluid overload   Other NRE: NONE   DID A NON-ROUTINE EVENT OCCUR? No           Last vitals:  Vitals Value Taken Time   /62 11/04/24 0900   Temp 96.6  F (35.9  C) 11/04/24 0850   Pulse 69 11/04/24 0850   Resp 16 11/04/24 0850   SpO2 100 % 11/04/24 0900       Electronically Signed By: Noreen Melton MD  November 4, 2024  9:47 AM

## 2024-11-04 NOTE — DISCHARGE INSTRUCTIONS
You had 975 mg of Tylenol at 7 AM. You may repeat this after 1 PM. Maximum amount of Tylenol/Acetaminophen in a 24 hour period is 4,000 mg.      CATARACT SURGERY POST-OP INSTRUCTIONS  Dr. Florentin Thakkar  [phone]      Use all three eye drops today, including   - Vigamox (tan top)   - Ketolorac (grey top)   - Prednisolone (white or pink top)    You should get 3 doses in today and 4 doses daily starting tomorrow.  Wait a few minutes in between putting each drop in.    Keep the eye shield taped in place unless putting drops in. We will remove it for you in the office tomorrow.    Light sensitivity may be noticed. Sunglasses may be worn for comfort.    Do not rub the operated eye.    Keep the operated eye dry. You may wash your hair, bathe or shower, but keep the operated eye closed while doing so.     No swimming, hot tub, or sauna for 2 weeks.    No make up around eye for 5 days.    No bending at the waist or lifting more than 10 pounds for one week.    May take Tylenol (per directions on bottle) for mild pain.    Call the office at [phone] and ask to speak to the on-call ophthalmologist  if any of the following should occur:  Any sudden vision changes  Nausea or severe headache  Increase in pain not controlled  Or signs of infection (pus, increasing redness or tenderness)

## 2024-11-04 NOTE — ANESTHESIA CARE TRANSFER NOTE
Patient: Kian Cortez    Procedure: Procedure(s):  RIGHT COMPLEX PHACOEMULSIFICATION, CATARACT, WITH INTRAOCULAR LENS IMPLANT       Diagnosis: Combined form of age-related cataract, right eye [H25.811]  Diagnosis Additional Information: No value filed.    Anesthesia Type:   MAC     Note:    Oropharynx: oropharynx clear of all foreign objects and spontaneously breathing  Level of Consciousness: awake  Oxygen Supplementation: room air    Independent Airway: airway patency satisfactory and stable  Dentition: dentition unchanged  Vital Signs Stable: post-procedure vital signs reviewed and stable  Report to RN Given: handoff report given  Patient transferred to: Phase II    Handoff Report: Identifed the Patient, Identified the Reponsible Provider, Reviewed the pertinent medical history, Discussed the surgical course, Reviewed Intra-OP anesthesia mangement and issues during anesthesia, Set expectations for post-procedure period and Allowed opportunity for questions and acknowledgement of understanding    Vitals:  Vitals Value Taken Time   BP     Temp     Pulse     Resp     SpO2         Electronically Signed By: EFRAIN Hernandez CRNA  November 4, 2024  8:46 AM

## 2024-11-04 NOTE — ANESTHESIA PREPROCEDURE EVALUATION
Anesthesia Pre-Procedure Evaluation    Patient: Kian Cortez   MRN: [id number] : 1955        Procedure : Procedure(s):  RIGHT PHACOEMULSIFICATION, CATARACT, WITH INTRAOCULAR LENS IMPLANT          Past Medical History:   Diagnosis Date     Diverticulosis 2015    on CT scan     Functional dyspepsia      GERD (gastroesophageal reflux disease)      Insomnia     psychophysiologic     Osteoarthritis of right knee      Osteopenia 2015      Past Surgical History:   Procedure Laterality Date     COLONOSCOPY N/A 2019    Procedure: COLONOSCOPY;  Surgeon: Sandra Chu MD;  Location: [facility] OR     DILATION AND CURETTAGE SUCTION       DILATION AND CURETTAGE, OPERATIVE HYSTEROSCOPY WITH MORCELLATOR, COMBINED N/A 2023    Procedure: HYSTEROSCOPY, WITH DILATION AND CURETTAGE OF UTERUS USING MORCELLATOR;  Surgeon: Lor Kathleen MD;  Location: [facility] OR     EYE SURGERY      eye duct repair 10+ years  ago     NASOLACRIMAL DUCT PROBE/IRRG        No Known Allergies   Social History     Tobacco Use     Smoking status: Never     Smokeless tobacco: Never   Substance Use Topics     Alcohol use: No      Wt Readings from Last 1 Encounters:   10/29/24 90.3 kg (199 lb 1.6 oz)        Anesthesia Evaluation   Pt has had prior anesthetic. Type: General.    No history of anesthetic complications       ROS/MED HX  ENT/Pulmonary:  - neg pulmonary ROS     Neurologic: Comment: BPPV  Cataract      Cardiovascular:  - neg cardiovascular ROS     METS/Exercise Tolerance: 3 - Able to walk 1-2 blocks without stopping    Hematologic:  - neg hematologic  ROS     Musculoskeletal: Comment: CBP  (+)  arthritis,             GI/Hepatic:     (+) GERD, Asymptomatic on medication,                  Renal/Genitourinary:  - neg Renal ROS     Endo:     (+)               Obesity,       Psychiatric/Substance Use:  - neg psychiatric ROS     Infectious Disease:       Malignancy:  - neg malignancy ROS     Other:      (+)  , H/O Chronic Pain,    "      Physical Exam    Airway        Mallampati: III   TM distance: > 3 FB   Neck ROM: full   Mouth opening: > 3 cm    Respiratory Devices and Support         Dental       (+) Modest Abnormalities - crowns, retainers, 1 or 2 missing teeth      Cardiovascular   cardiovascular exam normal       Rhythm and rate: regular and normal     Pulmonary   pulmonary exam normal        breath sounds clear to auscultation       OUTSIDE LABS:  CBC:   Lab Results   Component Value Date    WBC 7.3 08/02/2023    WBC 5.0 05/01/2020    HGB 13.2 08/02/2023    HGB 14.3 05/01/2020    HCT 40.9 08/02/2023    HCT 43.5 05/01/2020     08/02/2023     05/01/2020     BMP:   Lab Results   Component Value Date     (L) 08/02/2023     05/01/2020    POTASSIUM 4.2 08/02/2023    POTASSIUM 4.6 05/01/2020    CHLORIDE 102 08/02/2023    CHLORIDE 106 05/01/2020    CO2 24 08/02/2023    CO2 27 05/01/2020    BUN 8.2 08/02/2023    BUN 8 05/01/2020    CR 0.70 08/02/2023    CR 0.62 05/01/2020    GLC 93 08/02/2023    GLC 86 05/01/2020     COAGS: No results found for: \"PTT\", \"INR\", \"FIBR\"  POC: No results found for: \"BGM\", \"HCG\", \"HCGS\"  HEPATIC:   Lab Results   Component Value Date    ALBUMIN 3.8 08/02/2023    PROTTOTAL 7.1 08/02/2023    ALT 11 08/02/2023    AST 33 08/02/2023    ALKPHOS 69 08/02/2023    BILITOTAL 0.3 08/02/2023     OTHER:   Lab Results   Component Value Date    LACT 1.6 03/09/2020    A1C 5.1 08/15/2019    ANA 8.4 (L) 08/02/2023    PHOS 3.3 03/18/2013    MAG 2.1 03/18/2013    LIPASE 113 05/01/2020    TSH 1.28 08/15/2019    CRP 16.0 (H) 06/23/2015    SED 28 07/21/2015       Anesthesia Plan    ASA Status:  2    NPO Status:  NPO Appropriate    Anesthesia Type: MAC.     - Reason for MAC: immobility needed              Consents    Anesthesia Plan(s) and associated risks, benefits, and realistic alternatives discussed. Questions answered and patient/representative(s) expressed understanding.     - Discussed:     - Discussed with:  " "Patient,             Postoperative Care    Pain management: Multi-modal analgesia.   PONV prophylaxis: Ondansetron (or other 5HT-3)     Comments:               Noreen Melton MD    I have reviewed the pertinent notes and labs in the chart from the past 30 days and (re)examined the patient.  Any updates or changes from those notes are reflected in this note.                       # Obesity: Estimated body mass index is 36.41 kg/m  as calculated from the following:    Height as of 10/29/24: 1.575 m (5' 2.01\").    Weight as of 10/29/24: 90.3 kg (199 lb 1.6 oz).             "

## 2024-11-05 ENCOUNTER — OFFICE VISIT (OUTPATIENT)
Dept: OPHTHALMOLOGY | Facility: CLINIC | Age: 69
End: 2024-11-05
Payer: COMMERCIAL

## 2024-11-05 DIAGNOSIS — H26.8 PSEUDOEXFOLIATION SYNDROME: ICD-10-CM

## 2024-11-05 DIAGNOSIS — Z96.1 PSEUDOPHAKIA: Primary | ICD-10-CM

## 2024-11-05 PROCEDURE — 99024 POSTOP FOLLOW-UP VISIT: CPT | Performed by: STUDENT IN AN ORGANIZED HEALTH CARE EDUCATION/TRAINING PROGRAM

## 2024-11-05 ASSESSMENT — EXTERNAL EXAM - LEFT EYE: OS_EXAM: NORMAL

## 2024-11-05 ASSESSMENT — SLIT LAMP EXAM - LIDS
COMMENTS: NORMAL
COMMENTS: NORMAL

## 2024-11-05 ASSESSMENT — EXTERNAL EXAM - RIGHT EYE: OD_EXAM: NORMAL

## 2024-11-05 ASSESSMENT — TONOMETRY
OD_IOP_MMHG: 15
IOP_METHOD: APPLANATION

## 2024-11-05 ASSESSMENT — VISUAL ACUITY
METHOD: SNELLEN - LINEAR
OD_SC: 20/30

## 2024-11-05 NOTE — LETTER
11/5/2024      Kian Cortez  [address]  [place] [address]      Dear Colleague,    Thank you for referring your patient, Kian Cortez, to the [facility]. Please see a copy of my visit note below.     Current Eye Medications:  prednisolone, ketorolac, moxifloxacin - right eye QID      Subjective:  1 day post-op KPE/IOL right eye - vision is better! No pain or discomfort.     Objective:  See Ophthalmology Exam.       Assessment:  Kian Cortez is a 69 year old female who presents with:   Encounter Diagnoses   Name Primary?     Pseudophakia - Right Eye POD1 s/p CE/IOL right eye - doing well.      Pseudoexfoliation syndrome, right eye        Plan:  POST-OP CATARACT INSTRUCTIONS    *   Use the following drop(s) in the RIGHT EYE four times a day until the bottle(s) run out:        moxifloxacin or ofloxacin (tan top), ketorolac (grey top), and prednisolone (white or pink top)     *   Wear eye shield over the RIGHT EYE when sleeping for one week (until Monday, November 11th). Do not rub the operated eye.     *   No bending or lifting more than 10 pounds for one week (until Monday, November 11th).    *   Keep water out of eye for two weeks.    *   OK to resume aspirin and/or other blood thinners if you stopped.     *   If your vision worsens, eye becomes increasingly red, or becomes painful, call [phone].     Florentin Thakkar M.D.             Again, thank you for allowing me to participate in the care of your patient.        Sincerely,        Florentin Thakkar MD

## 2024-11-05 NOTE — PATIENT INSTRUCTIONS
POST-OP CATARACT INSTRUCTIONS    *   Use the following drop(s) in the RIGHT EYE four times a day until the bottle(s) run out:        moxifloxacin or ofloxacin (tan top), ketorolac (grey top), and prednisolone (white or pink top)     *   Wear eye shield over the RIGHT EYE when sleeping for one week (until Monday, November 11th). Do not rub the operated eye.     *   No bending or lifting more than 10 pounds for one week (until Monday, November 11th).    *   Keep water out of eye for two weeks.    *   OK to resume aspirin and/or other blood thinners if you stopped.     *   If your vision worsens, eye becomes increasingly red, or becomes painful, call [phone].     Florentin Thakkar M.D.

## 2024-11-12 ENCOUNTER — OFFICE VISIT (OUTPATIENT)
Dept: OPHTHALMOLOGY | Facility: CLINIC | Age: 69
End: 2024-11-12
Payer: COMMERCIAL

## 2024-11-12 DIAGNOSIS — H25.812 COMBINED FORM OF AGE-RELATED CATARACT, LEFT EYE: ICD-10-CM

## 2024-11-12 DIAGNOSIS — Z96.1 PSEUDOPHAKIA: Primary | ICD-10-CM

## 2024-11-12 DIAGNOSIS — H26.8 PSEUDOEXFOLIATION SYNDROME: ICD-10-CM

## 2024-11-12 PROCEDURE — 99024 POSTOP FOLLOW-UP VISIT: CPT | Performed by: STUDENT IN AN ORGANIZED HEALTH CARE EDUCATION/TRAINING PROGRAM

## 2024-11-12 RX ORDER — PREDNISOLONE ACETATE 10 MG/ML
1 SUSPENSION/ DROPS OPHTHALMIC 4 TIMES DAILY
Qty: 5 ML | Refills: 0 | Status: SHIPPED | OUTPATIENT
Start: 2024-11-12

## 2024-11-12 RX ORDER — KETOROLAC TROMETHAMINE 5 MG/ML
1 SOLUTION OPHTHALMIC 4 TIMES DAILY
Qty: 5 ML | Refills: 0 | Status: SHIPPED | OUTPATIENT
Start: 2024-11-12

## 2024-11-12 RX ORDER — MOXIFLOXACIN 5 MG/ML
1 SOLUTION/ DROPS OPHTHALMIC 4 TIMES DAILY
Qty: 3 ML | Refills: 0 | Status: SHIPPED | OUTPATIENT
Start: 2024-11-12

## 2024-11-12 ASSESSMENT — REFRACTION_MANIFEST
OD_CYLINDER: +0.50
OD_AXIS: 160
OD_SPHERE: +0.25
OD_ADD: +2.75

## 2024-11-12 ASSESSMENT — TONOMETRY
IOP_METHOD: APPLANATION
OD_IOP_MMHG: 14

## 2024-11-12 ASSESSMENT — EXTERNAL EXAM - RIGHT EYE: OD_EXAM: NORMAL

## 2024-11-12 ASSESSMENT — SLIT LAMP EXAM - LIDS
COMMENTS: NORMAL
COMMENTS: NORMAL

## 2024-11-12 ASSESSMENT — EXTERNAL EXAM - LEFT EYE: OS_EXAM: NORMAL

## 2024-11-12 ASSESSMENT — VISUAL ACUITY
OD_SC: 20/25
METHOD: SNELLEN - LINEAR
OD_SC+: -2

## 2024-11-12 NOTE — PROGRESS NOTES
Current Eye Medications:  prednisolone, moxifloxacin and ketorolac - right eye QID     Subjective:  post-op KPE/IOL right eye - vision at distance is good, trouble with near. No pain right eye.    Has upcoming KPE/IOL left eye 11/18/24.     Objective:  See Ophthalmology Exam.      Assessment:  Kian Cortez is a 69 year old female who presents with:   Encounter Diagnoses   Name Primary?    Pseudophakia - Right Eye POW1 s/p CE/IOL right eye - doing well .      Pseudoexfoliation syndrome, right eye     Combined form of age-related cataract, left eye Cataract pre-op left eye. Pseudoexfoliation right eye. Dil 4.5 right eye and 6 left eye. No obvious donesis right eye. Trypan. Ring. 45 min. [place]romo  in OR (speaks English fairly well, but want her to be comfortable in the OR when under sedation). Target mostly distance both eyes. 3d.        Plan:  POST-OP CATARACT INSTRUCTIONS for the RIGHT EYE:    *   Use the following eye drop(s) four times a day until the bottle runs out:   moxifloxacin or ofloxacin (tan top), ketorolac (grey top), and prednisolone (white or pink top)     *   Stop wearing eye shield if still using.    *   No eye rubbing. May resume heavy lifting.    *   If your vision worsens, eye becomes increasingly red, or becomes painful, call [phone].      PRE-OP CATARACT INSTRUCTIONS for the LEFT EYE:    * 3 eye drops from the pharmacy at least 3 days before surgery.    *Use the drops in the left eye 4 times on the day before surgery and once the morning of surgery:                                   Vigamox (tan cap)   Ketorolac (aguirre cap)   Prednisolone (white or pink cap)    *If taking more than one drop, wait five minutes between drops.    *No solid food or drink after midnight on the morning of surgery. Any medications you would normally take in the morning, you can take AFTER surgery.    Florentin Thakkar MD  [phone]

## 2024-11-12 NOTE — PATIENT INSTRUCTIONS
POST-OP CATARACT INSTRUCTIONS for the RIGHT EYE:    *   Use the following eye drop(s) four times a day until the bottle runs out:   moxifloxacin or ofloxacin (tan top), ketorolac (grey top), and prednisolone (white or pink top)     *   Stop wearing eye shield if still using.    *   No eye rubbing. May resume heavy lifting.    *   If your vision worsens, eye becomes increasingly red, or becomes painful, call [phone].      PRE-OP CATARACT INSTRUCTIONS for the LEFT EYE:    * 3 eye drops from the pharmacy at least 3 days before surgery.    *Use the drops in the left eye 4 times on the day before surgery and once the morning of surgery:                                   Vigamox (tan cap)   Ketorolac (aguirre cap)   Prednisolone (white or pink cap)    *If taking more than one drop, wait five minutes between drops.    *No solid food or drink after midnight on the morning of surgery. Any medications you would normally take in the morning, you can take AFTER surgery.    Florentin Thakkar MD  [phone]

## 2024-11-12 NOTE — LETTER
11/12/2024      Kian Cortez  [address]  [place] [address]      Dear Colleague,    Thank you for referring your patient, Kian Cortez, to the [facility]. Please see a copy of my visit note below.     Current Eye Medications:  prednisolone, moxifloxacin and ketorolac - right eye QID     Subjective:  post-op KPE/IOL right eye - vision at distance is good, trouble with near. No pain right eye.    Has upcoming KPE/IOL left eye 11/18/24.     Objective:  See Ophthalmology Exam.      Assessment:  Kian Cortez is a 69 year old female who presents with:   Encounter Diagnoses   Name Primary?     Pseudophakia - Right Eye POW1 s/p CE/IOL right eye - doing well .       Pseudoexfoliation syndrome, right eye      Combined form of age-related cataract, left eye Cataract pre-op left eye. Pseudoexfoliation right eye. Dil 4.5 right eye and 6 left eye. No obvious donesis right eye. Trypan. Ring. 45 min. [place]romo  in OR (speaks English fairly well, but want her to be comfortable in the OR when under sedation). Target mostly distance both eyes. 3d.        Plan:  POST-OP CATARACT INSTRUCTIONS for the RIGHT EYE:    *   Use the following eye drop(s) four times a day until the bottle runs out:   moxifloxacin or ofloxacin (tan top), ketorolac (grey top), and prednisolone (white or pink top)     *   Stop wearing eye shield if still using.    *   No eye rubbing. May resume heavy lifting.    *   If your vision worsens, eye becomes increasingly red, or becomes painful, call [phone].      PRE-OP CATARACT INSTRUCTIONS for the LEFT EYE:    * 3 eye drops from the pharmacy at least 3 days before surgery.    *Use the drops in the left eye 4 times on the day before surgery and once the morning of surgery:                                   Vigamox (tan cap)   Ketorolac (aguirre cap)   Prednisolone (white or pink cap)    *If taking more than one drop, wait five minutes between drops.    *No solid  food or drink after midnight on the morning of surgery. Any medications you would normally take in the morning, you can take AFTER surgery.    Florentin Thakkar MD  [phone]        Again, thank you for allowing me to participate in the care of your patient.        Sincerely,        Floerntin Thakkar MD

## 2024-11-15 ENCOUNTER — ANESTHESIA EVENT (OUTPATIENT)
Dept: SURGERY | Facility: AMBULATORY SURGERY CENTER | Age: 69
End: 2024-11-15
Payer: COMMERCIAL

## 2024-11-15 RX ORDER — DEXAMETHASONE SODIUM PHOSPHATE 4 MG/ML
4 INJECTION, SOLUTION INTRA-ARTICULAR; INTRALESIONAL; INTRAMUSCULAR; INTRAVENOUS; SOFT TISSUE
Status: CANCELLED | OUTPATIENT
Start: 2024-11-15

## 2024-11-15 RX ORDER — NALOXONE HYDROCHLORIDE 0.4 MG/ML
0.1 INJECTION, SOLUTION INTRAMUSCULAR; INTRAVENOUS; SUBCUTANEOUS
Status: CANCELLED | OUTPATIENT
Start: 2024-11-15

## 2024-11-15 RX ORDER — OXYCODONE HYDROCHLORIDE 5 MG/1
10 TABLET ORAL
Status: CANCELLED | OUTPATIENT
Start: 2024-11-15

## 2024-11-15 RX ORDER — OXYCODONE HYDROCHLORIDE 5 MG/1
5 TABLET ORAL
Status: CANCELLED | OUTPATIENT
Start: 2024-11-15

## 2024-11-15 RX ORDER — ONDANSETRON 2 MG/ML
4 INJECTION INTRAMUSCULAR; INTRAVENOUS EVERY 30 MIN PRN
Status: CANCELLED | OUTPATIENT
Start: 2024-11-15

## 2024-11-15 RX ORDER — ONDANSETRON 4 MG/1
4 TABLET, ORALLY DISINTEGRATING ORAL EVERY 30 MIN PRN
Status: CANCELLED | OUTPATIENT
Start: 2024-11-15

## 2024-11-18 ENCOUNTER — ANESTHESIA (OUTPATIENT)
Dept: SURGERY | Facility: AMBULATORY SURGERY CENTER | Age: 69
End: 2024-11-18
Payer: COMMERCIAL

## 2024-11-18 ENCOUNTER — HOSPITAL ENCOUNTER (OUTPATIENT)
Facility: AMBULATORY SURGERY CENTER | Age: 69
Discharge: HOME | End: 2024-11-18
Attending: STUDENT IN AN ORGANIZED HEALTH CARE EDUCATION/TRAINING PROGRAM | Admitting: STUDENT IN AN ORGANIZED HEALTH CARE EDUCATION/TRAINING PROGRAM
Payer: COMMERCIAL

## 2024-11-18 VITALS
BODY MASS INDEX: 36.4 KG/M2 | WEIGHT: 199.08 LBS | HEART RATE: 63 BPM | RESPIRATION RATE: 18 BRPM | SYSTOLIC BLOOD PRESSURE: 123 MMHG | DIASTOLIC BLOOD PRESSURE: 65 MMHG | OXYGEN SATURATION: 98 % | TEMPERATURE: 97.3 F

## 2024-11-18 DIAGNOSIS — H25.812 COMBINED FORM OF AGE-RELATED CATARACT, LEFT EYE: Primary | ICD-10-CM

## 2024-11-18 PROCEDURE — G8907 PT DOC NO EVENTS ON DISCHARG: HCPCS

## 2024-11-18 PROCEDURE — 66984 XCAPSL CTRC RMVL W/O ECP: CPT | Mod: LT

## 2024-11-18 PROCEDURE — G8918 PT W/O PREOP ORDER IV AB PRO: HCPCS

## 2024-11-18 PROCEDURE — 66984 XCAPSL CTRC RMVL W/O ECP: CPT | Mod: 79 | Performed by: STUDENT IN AN ORGANIZED HEALTH CARE EDUCATION/TRAINING PROGRAM

## 2024-11-18 DEVICE — TECNIS 1-PC CLEAR MONO 6.0MM 23.0D
Type: IMPLANTABLE DEVICE | Site: EYE | Status: FUNCTIONAL
Brand: TECNIS IOL

## 2024-11-18 RX ORDER — SEMAGLUTIDE 1 MG/.5ML
1 INJECTION, SOLUTION SUBCUTANEOUS WEEKLY
COMMUNITY
Start: 2024-09-11

## 2024-11-18 RX ORDER — MOXIFLOXACIN IN NACL,ISO-OS/PF 0.3MG/0.3
SYRINGE (ML) INTRAOCULAR PRN
Status: DISCONTINUED | OUTPATIENT
Start: 2024-11-18 | End: 2024-11-18 | Stop reason: HOSPADM

## 2024-11-18 RX ORDER — TETRACAINE HYDROCHLORIDE 5 MG/ML
SOLUTION OPHTHALMIC PRN
Status: DISCONTINUED | OUTPATIENT
Start: 2024-11-18 | End: 2024-11-18 | Stop reason: HOSPADM

## 2024-11-18 RX ORDER — PANTOPRAZOLE SODIUM 40 MG/1
40 TABLET, DELAYED RELEASE ORAL DAILY
COMMUNITY
Start: 2024-08-18

## 2024-11-18 RX ORDER — ACETAMINOPHEN 325 MG/1
975 TABLET ORAL ONCE
Status: COMPLETED | OUTPATIENT
Start: 2024-11-18 | End: 2024-11-18

## 2024-11-18 RX ORDER — CYCLOPENTOLAT/TROPIC/PHENYLEPH 1%-1%-2.5%
1 DROPS (EA) OPHTHALMIC (EYE)
Status: COMPLETED | OUTPATIENT
Start: 2024-11-18 | End: 2024-11-18

## 2024-11-18 RX ORDER — CALCIUM CARBONATE/VITAMIN D3 500MG-5MCG
1 TABLET ORAL
COMMUNITY
Start: 2024-01-21

## 2024-11-18 RX ORDER — PROPARACAINE HYDROCHLORIDE 5 MG/ML
1 SOLUTION/ DROPS OPHTHALMIC ONCE
Status: COMPLETED | OUTPATIENT
Start: 2024-11-18 | End: 2024-11-18

## 2024-11-18 RX ORDER — SOLIFENACIN SUCCINATE 10 MG/1
10 TABLET, FILM COATED ORAL DAILY
COMMUNITY
Start: 2024-03-28 | End: 2025-02-06

## 2024-11-18 RX ORDER — POVIDONE-IODINE 5 %
SOLUTION, NON-ORAL OPHTHALMIC (EYE) PRN
Status: DISCONTINUED | OUTPATIENT
Start: 2024-11-18 | End: 2024-11-18 | Stop reason: HOSPADM

## 2024-11-18 RX ORDER — OMEGA-3/DHA/EPA/FISH OIL 60 MG-90MG
1 CAPSULE ORAL DAILY
COMMUNITY
End: 2025-02-06

## 2024-11-18 RX ORDER — FENTANYL CITRATE 50 UG/ML
INJECTION, SOLUTION INTRAMUSCULAR; INTRAVENOUS PRN
Status: DISCONTINUED | OUTPATIENT
Start: 2024-11-18 | End: 2024-11-18

## 2024-11-18 RX ORDER — SODIUM CHLORIDE, SODIUM LACTATE, POTASSIUM CHLORIDE, CALCIUM CHLORIDE 600; 310; 30; 20 MG/100ML; MG/100ML; MG/100ML; MG/100ML
INJECTION, SOLUTION INTRAVENOUS CONTINUOUS
Status: DISCONTINUED | OUTPATIENT
Start: 2024-11-18 | End: 2024-11-19 | Stop reason: HOSPADM

## 2024-11-18 RX ORDER — LIDOCAINE 40 MG/G
CREAM TOPICAL
Status: DISCONTINUED | OUTPATIENT
Start: 2024-11-18 | End: 2024-11-19 | Stop reason: HOSPADM

## 2024-11-18 RX ADMIN — FENTANYL CITRATE 25 MCG: 50 INJECTION, SOLUTION INTRAMUSCULAR; INTRAVENOUS at 07:23

## 2024-11-18 RX ADMIN — Medication 1 DROP: at 06:25

## 2024-11-18 RX ADMIN — Medication 1 DROP: at 06:30

## 2024-11-18 RX ADMIN — PROPARACAINE HYDROCHLORIDE 1 DROP: 5 SOLUTION/ DROPS OPHTHALMIC at 06:24

## 2024-11-18 RX ADMIN — ACETAMINOPHEN 975 MG: 325 TABLET ORAL at 06:24

## 2024-11-18 RX ADMIN — Medication 1 DROP: at 06:35

## 2024-11-18 NOTE — DISCHARGE INSTRUCTIONS
CATARACT SURGERY POST-OP INSTRUCTIONS  Dr. Florentin Thakkar  [phone]      Use all three eye drops today, including   - Vigamox (tan top)   - Ketolorac (grey top)   - Prednisolone (white or pink top)    You should get 3 doses in today and 4 doses daily starting tomorrow.  Wait a few minutes in between putting each drop in.    Keep the eye shield taped in place unless putting drops in. We will remove it for you in the office tomorrow.    Light sensitivity may be noticed. Sunglasses may be worn for comfort.    Do not rub the operated eye.    Keep the operated eye dry. You may wash your hair, bathe or shower, but keep the operated eye closed while doing so.     No swimming, hot tub, or sauna for 2 weeks.    No make up around eye for 5 days.    No bending at the waist or lifting more than 10 pounds for one week.    May take Tylenol (per directions on bottle) for mild pain.    Call the office at [phone] and ask to speak to the on-call ophthalmologist  if any of the following should occur:  Any sudden vision changes  Nausea or severe headache  Increase in pain not controlled  Or signs of infection (pus, increasing redness or tenderness)        While you were at the hospital today you received 975 mg Tylenol at 6:30 am. Maximum daily dosage is 4000 mg.

## 2024-11-18 NOTE — ANESTHESIA PREPROCEDURE EVALUATION
Anesthesia Pre-Procedure Evaluation    Patient: Kian Cortez   MRN: [id number] : 1955        Procedure : Procedure(s):  LEFT PHACOEMULSIFICATION, CATARACT, WITH INTRAOCULAR LENS IMPLANT          Past Medical History:   Diagnosis Date    Diverticulosis 2015    on CT scan    Functional dyspepsia     GERD (gastroesophageal reflux disease)     Insomnia     psychophysiologic    Osteoarthritis of right knee     Osteopenia 2015      Past Surgical History:   Procedure Laterality Date    COLONOSCOPY N/A 2019    Procedure: COLONOSCOPY;  Surgeon: Sandra Chu MD;  Location: [facility] OR    DILATION AND CURETTAGE SUCTION      DILATION AND CURETTAGE, OPERATIVE HYSTEROSCOPY WITH MORCELLATOR, COMBINED N/A 2023    Procedure: HYSTEROSCOPY, WITH DILATION AND CURETTAGE OF UTERUS USING MORCELLATOR;  Surgeon: Lor Kathleen MD;  Location: [facility] [place] [place]    EYE SURGERY      eye duct repair 10+ years  ago    NASOLACRIMAL DUCT PROBE/IRRG      PHACOEMULSIFICATION CLEAR CORNEA WITH STANDARD INTRAOCULAR LENS IMPLANT Right 2024    Procedure: RIGHT COMPLEX PHACOEMULSIFICATION, CATARACT, WITH INTRAOCULAR LENS IMPLANT;  Surgeon: Florentin Thakkar MD;  Location: [facility] OR      No Known Allergies   Social History     Tobacco Use    Smoking status: Never    Smokeless tobacco: Never   Substance Use Topics    Alcohol use: No      Wt Readings from Last 1 Encounters:   24 90.3 kg (199 lb 1.2 oz)        Anesthesia Evaluation   Pt has had prior anesthetic. Type: General.    No history of anesthetic complications       ROS/MED HX  ENT/Pulmonary:  - neg pulmonary ROS     Neurologic: Comment: BPPV  Cataract      Cardiovascular:  - neg cardiovascular ROS     METS/Exercise Tolerance: 3 - Able to walk 1-2 blocks without stopping    Hematologic:  - neg hematologic  ROS     Musculoskeletal: Comment: CBP  (+)  arthritis,             GI/Hepatic:     (+) GERD, Asymptomatic on medication,                  Renal/Genitourinary:  - neg  "Renal ROS     Endo:     (+)               Obesity,       Psychiatric/Substance Use:  - neg psychiatric ROS     Infectious Disease:       Malignancy:  - neg malignancy ROS     Other:      (+)  , H/O Chronic Pain,         Physical Exam    Airway  airway exam normal           Respiratory Devices and Support         Dental     Comment: Chipped front tooth    (+) Minor Abnormalities - some fillings, tiny chips      Cardiovascular   cardiovascular exam normal          Pulmonary   pulmonary exam normal                OUTSIDE LABS:  CBC:   Lab Results   Component Value Date    WBC 7.3 08/02/2023    WBC 5.0 05/01/2020    HGB 13.2 08/02/2023    HGB 14.3 05/01/2020    HCT 40.9 08/02/2023    HCT 43.5 05/01/2020     08/02/2023     05/01/2020     BMP:   Lab Results   Component Value Date     (L) 08/02/2023     05/01/2020    POTASSIUM 4.2 08/02/2023    POTASSIUM 4.6 05/01/2020    CHLORIDE 102 08/02/2023    CHLORIDE 106 05/01/2020    CO2 24 08/02/2023    CO2 27 05/01/2020    BUN 8.2 08/02/2023    BUN 8 05/01/2020    CR 0.70 08/02/2023    CR 0.62 05/01/2020    GLC 93 08/02/2023    GLC 86 05/01/2020     COAGS: No results found for: \"PTT\", \"INR\", \"FIBR\"  POC: No results found for: \"BGM\", \"HCG\", \"HCGS\"  HEPATIC:   Lab Results   Component Value Date    ALBUMIN 3.8 08/02/2023    PROTTOTAL 7.1 08/02/2023    ALT 11 08/02/2023    AST 33 08/02/2023    ALKPHOS 69 08/02/2023    BILITOTAL 0.3 08/02/2023     OTHER:   Lab Results   Component Value Date    LACT 1.6 03/09/2020    A1C 5.1 08/15/2019    ANA 8.4 (L) 08/02/2023    PHOS 3.3 03/18/2013    MAG 2.1 03/18/2013    LIPASE 113 05/01/2020    TSH 1.28 08/15/2019    CRP 16.0 (H) 06/23/2015    SED 28 07/21/2015       Anesthesia Plan    ASA Status:  2    NPO Status:  NPO Appropriate    Anesthesia Type: MAC.     - Reason for MAC: straight local not clinically adequate   Induction: Intravenous.   Maintenance: TIVA.        Consents    Anesthesia Plan(s) and associated risks, " "benefits, and realistic alternatives discussed. Questions answered and patient/representative(s) expressed understanding.     - Discussed: Risks, Benefits and Alternatives for BOTH SEDATION and the PROCEDURE were discussed     - Discussed with:  Patient,             Postoperative Care    Pain management: IV analgesics, Oral pain medications, Multi-modal analgesia.   PONV prophylaxis: Ondansetron (or other 5HT-3), Dexamethasone or Solumedrol     Comments:               James Conklin MD    I have reviewed the pertinent notes and labs in the chart from the past 30 days and (re)examined the patient.  Any updates or changes from those notes are reflected in this note.                         # Obesity: Estimated body mass index is 36.4 kg/m  as calculated from the following:    Height as of 10/29/24: 1.575 m (5' 2.01\").    Weight as of 11/4/24: 90.3 kg (199 lb 1.2 oz).             "

## 2024-11-18 NOTE — ANESTHESIA POSTPROCEDURE EVALUATION
Patient: Kian Cortez    Procedure: Procedure(s):  LEFT PHACOEMULSIFICATION, CATARACT, WITH INTRAOCULAR LENS IMPLANT       Anesthesia Type:  MAC    Note:  Disposition: Outpatient   Postop Pain Control: Uneventful            Sign Out: Well controlled pain   PONV: No   Neuro/Psych: Uneventful            Sign Out: Acceptable/Baseline neuro status   Airway/Respiratory: Uneventful            Sign Out: Acceptable/Baseline resp. status   CV/Hemodynamics: Uneventful            Sign Out: Acceptable CV status; No obvious hypovolemia; No obvious fluid overload   Other NRE: NONE   DID A NON-ROUTINE EVENT OCCUR?            Last vitals:  Vitals Value Taken Time   /65 11/18/24 0817   Temp 97.3  F (36.3  C) 11/18/24 0817   Pulse 63 11/18/24 0751   Resp 18 11/18/24 0817   SpO2 98 % 11/18/24 0817       Electronically Signed By: James Conklin MD  November 18, 2024  2:44 PM

## 2024-11-18 NOTE — ANESTHESIA CARE TRANSFER NOTE
Patient: Kian Cortez    Procedure: Procedure(s):  LEFT PHACOEMULSIFICATION, CATARACT, WITH INTRAOCULAR LENS IMPLANT       Diagnosis: Combined form of age-related cataract, left eye [H25.812]  Diagnosis Additional Information: No value filed.    Anesthesia Type:   MAC     Note:    Oropharynx: oropharynx clear of all foreign objects and spontaneously breathing  Level of Consciousness: awake  Oxygen Supplementation: room air    Independent Airway: airway patency satisfactory and stable  Dentition: dentition unchanged  Vital Signs Stable: post-procedure vital signs reviewed and stable  Report to RN Given: handoff report given  Patient transferred to: Phase II    Handoff Report: Identifed the Patient, Identified the Reponsible Provider, Reviewed the pertinent medical history, Discussed the surgical course, Reviewed Intra-OP anesthesia mangement and issues during anesthesia, Set expectations for post-procedure period and Allowed opportunity for questions and acknowledgement of understanding    Vitals:  Vitals Value Taken Time   BP     Temp     Pulse     Resp     SpO2         Electronically Signed By: EFRAIN Hernandez CRNA  November 18, 2024  7:49 AM

## 2024-11-18 NOTE — OP NOTE
PreOp Diagnosis: Visually significant nuclear sclerotic cataract left eye  PostOp Diagnosis: Same  Surgeon: Florentin Thakkar MD  Implant: Tecnis [id number] +23.0D   Procedures:   1. Review of intraocular lens calculations, both eyes   2. Phacoemulsification and extraction of lens  left eye   3. Intraocular lens implantation left eye  Anesthesia: MAC/topical  Complications: None  EBL: <1cc    Kian Cortez suffers from a visually significant cataract of the left eye. This has caused problems with distance and reading vision, including glare. After discussing the risks, benefits, and alternatives, the patient wishes to proceed with cataract surgery.    The patient was identified in the pre-op area where the left eye was marked. The patient was then brought to the operating room where a time out was called, identifying the patient, the procedure, and the correct site. Tetracaine drops were applied to the operative eye. The operative eye was then prepped and draped in the usual sterile ophthalmic fashion. An eyelid speculum was placed into the operative eye. Additional tetracaine drops were applied. A paracentesis was made inferior with a side port blade. 1% preservative-free lidocaine and epinephrine was injected into the anterior chamber. Due to obscured red reflex, trypan blue was irrigated into the anterior chamber to enhance capsular visualization.  This was irrigated from the anterior chamber after 60 seconds with balanced salt solution.   Endocoat was injected to deepen the anterior chamber. A 2.4mm clear corneal wound was created with a keratome blade temporally. A continuous curvilinear capsulorrhexis was started with a bent cystitome and completed with Utrada forceps. Hydrodissection of the lens nucleus was performed with BSS on a cannula. The lens nucleus was rotated. Phacoemulsification of the lens nucleus was accomplished in a phacoemulsification stop and chop technique. Remaining cortex was removed with  irrigation and aspiration. The lens capsule was noted to be intact. Healon was used to inflate the capsular bag.  The lens was injected into the capsular bag. Remaining viscoelastic was removed with irrigation and aspiration. The wounds were checked and found to be watertight after hydration.  Intracameral moxifloxacin was injected into the anterior chamber. The eyelid speculum was removed. The patient tolerated the procedure well and was in stable condition on the way to the recovery area.     Florentin Thakkar MD

## 2024-11-19 ENCOUNTER — TELEPHONE (OUTPATIENT)
Dept: OPHTHALMOLOGY | Facility: CLINIC | Age: 69
End: 2024-11-19

## 2024-11-19 ENCOUNTER — OFFICE VISIT (OUTPATIENT)
Dept: OPHTHALMOLOGY | Facility: CLINIC | Age: 69
End: 2024-11-19
Payer: COMMERCIAL

## 2024-11-19 DIAGNOSIS — Z96.1 PSEUDOPHAKIA: Primary | ICD-10-CM

## 2024-11-19 DIAGNOSIS — H26.8 PSEUDOEXFOLIATION SYNDROME: ICD-10-CM

## 2024-11-19 PROCEDURE — 99024 POSTOP FOLLOW-UP VISIT: CPT | Performed by: STUDENT IN AN ORGANIZED HEALTH CARE EDUCATION/TRAINING PROGRAM

## 2024-11-19 RX ORDER — OFLOXACIN 3 MG/ML
1 SOLUTION/ DROPS OPHTHALMIC 4 TIMES DAILY
Qty: 5 ML | Refills: 0 | Status: SHIPPED | OUTPATIENT
Start: 2024-11-19

## 2024-11-19 RX ORDER — PREDNISOLONE ACETATE 10 MG/ML
1 SUSPENSION/ DROPS OPHTHALMIC 4 TIMES DAILY
Qty: 5 ML | Refills: 0 | Status: CANCELLED | OUTPATIENT
Start: 2024-11-19

## 2024-11-19 ASSESSMENT — TONOMETRY
OS_IOP_MMHG: 15
IOP_METHOD: APPLANATION
OD_IOP_MMHG: 15

## 2024-11-19 ASSESSMENT — VISUAL ACUITY
OD_SC: 20/40
OS_PH_SC: 20/40
OS_SC: 20/60
METHOD: SNELLEN - LINEAR

## 2024-11-19 ASSESSMENT — SLIT LAMP EXAM - LIDS
COMMENTS: NORMAL
COMMENTS: NORMAL

## 2024-11-19 ASSESSMENT — EXTERNAL EXAM - LEFT EYE: OS_EXAM: NORMAL

## 2024-11-19 ASSESSMENT — EXTERNAL EXAM - RIGHT EYE: OD_EXAM: NORMAL

## 2024-11-19 NOTE — PATIENT INSTRUCTIONS
POST-OP CATARACT INSTRUCTIONS    *   Use the following drop(s) in the LEFT EYE four times a day until the bottle(s) run out:        moxifloxacin or ofloxacin (tan top), ketorolac (grey top), and prednisolone (white or pink top)     *   Use the following drop(s) in the RIGHT EYE four times a day for 3 days:        moxifloxacin or ofloxacin (tan top)    *   Use the following drop(s) in the RIGHT EYE four times a day until the bottle(s) run out:        prednisolone (white or pink top) and ketorolac (grey top)     *   Wear eye shield over the LEFT EYE when sleeping for one week (until Monday, November 25th). Do not rub the operated eye.     *   No bending or lifting more than 10 pounds for one week (until Monday, November 25th).    *   Keep water out of eye for two weeks.    *   OK to resume aspirin and/or other blood thinners if you stopped.     *   If your vision worsens, eye becomes increasingly red, or becomes painful, call [phone].     Florentin Thakkar M.D.

## 2024-11-19 NOTE — LETTER
11/19/2024      Kian Cortez  [address]  [place] [address]      Dear Colleague,    Thank you for referring your patient, Kian Cortez, to the [facility]. Please see a copy of my visit note below.     Current Eye Medications:   moxifloxacin or ofloxacin (tan top), ketorolac (grey top), and prednisolone (white or pink top) four times a day left eye.      Subjective:  Po1, KPE/IOL left eye 11/18/24. Patient slept OK last night. Vision is clear right eye. Vision is blurry left eye. No eye pain or discomfort in either eye.       Objective:  See Ophthalmology Exam.       Assessment:  Kian Cortez is a 69 year old female who presents with:   Encounter Diagnoses   Name Primary?     Pseudophakia - Both Eyes POD1 s/p CE/IOL left eye - doing well.     POW2 s/p CE/IOL right eye - suture removed at slit lamp under proparacaine anesthetic - no complications - drop of moxifloxacin given in office. Drops as below.     Pseudoexfoliation syndrome, right eye       Plan:  POST-OP CATARACT INSTRUCTIONS    *   Use the following drop(s) in the LEFT EYE four times a day until the bottle(s) run out:        moxifloxacin or ofloxacin (tan top), ketorolac (grey top), and prednisolone (white or pink top)     *   Use the following drop(s) in the RIGHT EYE four times a day for 3 days:        moxifloxacin or ofloxacin (tan top)    *   Use the following drop(s) in the RIGHT EYE four times a day until the bottle(s) run out:        prednisolone (white or pink top) and ketorolac (grey top)     *   Wear eye shield over the LEFT EYE when sleeping for one week (until Monday, November 25th). Do not rub the operated eye.     *   No bending or lifting more than 10 pounds for one week (until Monday, November 25th).    *   Keep water out of eye for two weeks.    *   OK to resume aspirin and/or other blood thinners if you stopped.     *   If your vision worsens, eye becomes increasingly red, or becomes painful, call  [phone].     Florentin Thakkar M.D.          Again, thank you for allowing me to participate in the care of your patient.        Sincerely,        Florentin Thakkar MD

## 2024-11-19 NOTE — TELEPHONE ENCOUNTER
[facility] Call Center    Phone Message    May a detailed message be left on voicemail: yes     Reason for Call: Other: Patient is at the pharmacy and they did not receive the Rx.  Please resend.  Thank you.      Action Taken: Message routed to:  [facility] ([facility]): Ophthalmology    Travel Screening: Not Applicable     Date of Service:

## 2024-11-19 NOTE — PROGRESS NOTES
Current Eye Medications:   moxifloxacin or ofloxacin (tan top), ketorolac (grey top), and prednisolone (white or pink top) four times a day left eye.      Subjective:  Po1, KPE/IOL left eye 11/18/24. Patient slept OK last night. Vision is clear right eye. Vision is blurry left eye. No eye pain or discomfort in either eye.       Objective:  See Ophthalmology Exam.       Assessment:  Kian Cortez is a 69 year old female who presents with:   Encounter Diagnoses   Name Primary?    Pseudophakia - Both Eyes POD1 s/p CE/IOL left eye - doing well.     POW2 s/p CE/IOL right eye - suture removed at slit lamp under proparacaine anesthetic - no complications - drop of moxifloxacin given in office. Drops as below.    Pseudoexfoliation syndrome, right eye       Plan:  POST-OP CATARACT INSTRUCTIONS    *   Use the following drop(s) in the LEFT EYE four times a day until the bottle(s) run out:        moxifloxacin or ofloxacin (tan top), ketorolac (grey top), and prednisolone (white or pink top)     *   Use the following drop(s) in the RIGHT EYE four times a day for 3 days:        moxifloxacin or ofloxacin (tan top)    *   Use the following drop(s) in the RIGHT EYE four times a day until the bottle(s) run out:        prednisolone (white or pink top) and ketorolac (grey top)     *   Wear eye shield over the LEFT EYE when sleeping for one week (until Monday, November 25th). Do not rub the operated eye.     *   No bending or lifting more than 10 pounds for one week (until Monday, November 25th).    *   Keep water out of eye for two weeks.    *   OK to resume aspirin and/or other blood thinners if you stopped.     *   If your vision worsens, eye becomes increasingly red, or becomes painful, call [phone].     Florentin Thakkar M.D.

## 2024-12-04 ENCOUNTER — OFFICE VISIT (OUTPATIENT)
Dept: OPHTHALMOLOGY | Facility: CLINIC | Age: 69
End: 2024-12-04
Payer: COMMERCIAL

## 2024-12-04 DIAGNOSIS — H04.123 DRY EYE SYNDROME, BILATERAL: ICD-10-CM

## 2024-12-04 DIAGNOSIS — Z96.1 PSEUDOPHAKIA: Primary | ICD-10-CM

## 2024-12-04 DIAGNOSIS — H26.8 PSEUDOEXFOLIATION SYNDROME: ICD-10-CM

## 2024-12-04 RX ORDER — FAMOTIDINE 20 MG/1
TABLET, FILM COATED ORAL
COMMUNITY
Start: 2023-10-25

## 2024-12-04 RX ORDER — OYSTER SHELL CALCIUM WITH VITAMIN D 500; 200 MG/1; [IU]/1
TABLET, FILM COATED ORAL
COMMUNITY

## 2024-12-04 ASSESSMENT — VISUAL ACUITY
OD_SC+: -1
OD_SC: 20/25
OS_SC: 20/20
METHOD: SNELLEN - LINEAR
OS_SC+: -1

## 2024-12-04 ASSESSMENT — REFRACTION_MANIFEST
OD_CYLINDER: +0.75
OS_CYLINDER: +0.50
METHOD_AUTOREFRACTION: 1
OD_AXIS: 165
OS_AXIS: 156
OD_SPHERE: PLANO
OD_AXIS: 165
OS_SPHERE: PLANO
OS_ADD: +2.75
OS_SPHERE: PLANO
OD_CYLINDER: +0.75
OS_AXIS: 155
OD_ADD: +2.75
OS_CYLINDER: +0.75
OD_SPHERE: PLANO

## 2024-12-04 ASSESSMENT — SLIT LAMP EXAM - LIDS
COMMENTS: NORMAL
COMMENTS: NORMAL

## 2024-12-04 ASSESSMENT — EXTERNAL EXAM - RIGHT EYE: OD_EXAM: NORMAL

## 2024-12-04 ASSESSMENT — TONOMETRY
IOP_METHOD: APPLANATION
OS_IOP_MMHG: 18
OD_IOP_MMHG: 14

## 2024-12-04 ASSESSMENT — CUP TO DISC RATIO
OD_RATIO: 0.35
OS_RATIO: 0.4

## 2024-12-04 ASSESSMENT — EXTERNAL EXAM - LEFT EYE: OS_EXAM: NORMAL

## 2024-12-04 NOTE — LETTER
"12/4/2024      Kian Cortez  [address]  [place] [address]      Dear Colleague,    Thank you for referring your patient, Kian Cortez, to the [facility]. Please see a copy of my visit note below.     Current Eye Medications:  Prednisolone and Ketorolac left eye four times per day. Completed drop regimen for right eye.      Subjective:  Final cataract post-op both eyes. Patient says her distance vision has improved since surgery. She continues to struggle with near. She denies floaters or flashing lights. She does mention her right eye seems more dry since she finished her drops. Patient has no other ocular concerns today.      Objective:  See Ophthalmology Exam.       Assessment:  Kian Cortez is a 69 year old female who presents with:   Encounter Diagnoses   Name Primary?     Pseudophakia - Both Eyes Final MR both eyes - both doing well.        Pseudoexfoliation syndrome, right eye Intraocular pressure 14/18 today. Monitor.     Dry eye syndrome, bilateral        Plan:  Continue prednisolone (white or pink top) eyedrops and ketorolac (grey top) four times a day  in the left eye until the bottles run out    Use artificial tears up to four times a day (Refresh Optive, Systane Balance, or TheraTears. Avoid generic artificial tears or \"get the red out\" drops).      Glasses prescription given - have new glasses made or can use over the counter readers (around +3.00)    Florentin Thakkar MD  [phone]     Again, thank you for allowing me to participate in the care of your patient.        Sincerely,        Florentin Thakkar MD  "

## 2024-12-04 NOTE — PATIENT INSTRUCTIONS
"Continue prednisolone (white or pink top) eyedrops and ketorolac (grey top) four times a day  in the left eye until the bottles run out    Use artificial tears up to four times a day (Refresh Optive, Systane Balance, or TheraTears. Avoid generic artificial tears or \"get the red out\" drops).      Glasses prescription given - have new glasses made or can use over the counter readers (around +3.00)    Florentin Thakkar MD  [phone]   "

## 2024-12-04 NOTE — PROGRESS NOTES
" Current Eye Medications:  Prednisolone and Ketorolac left eye four times per day. Completed drop regimen for right eye.      Subjective:  Final cataract post-op both eyes. Patient says her distance vision has improved since surgery. She continues to struggle with near. She denies floaters or flashing lights. She does mention her right eye seems more dry since she finished her drops. Patient has no other ocular concerns today.      Objective:  See Ophthalmology Exam.       Assessment:  Kian Cortez is a 69 year old female who presents with:   Encounter Diagnoses   Name Primary?    Pseudophakia - Both Eyes Final MR both eyes - both doing well.       Pseudoexfoliation syndrome, right eye Intraocular pressure 14/18 today. Monitor.    Dry eye syndrome, bilateral        Plan:  Continue prednisolone (white or pink top) eyedrops and ketorolac (grey top) four times a day  in the left eye until the bottles run out    Use artificial tears up to four times a day (Refresh Optive, Systane Balance, or TheraTears. Avoid generic artificial tears or \"get the red out\" drops).      Glasses prescription given - have new glasses made or can use over the counter readers (around +3.00)    Florentin Thakkar MD  [phone]     "

## 2024-12-05 ENCOUNTER — ANCILLARY PROCEDURE (OUTPATIENT)
Dept: MAMMOGRAPHY | Facility: CLINIC | Age: 69
End: 2024-12-05
Attending: INTERNAL MEDICINE
Payer: COMMERCIAL

## 2024-12-05 ENCOUNTER — ANCILLARY PROCEDURE (OUTPATIENT)
Dept: BONE DENSITY | Facility: CLINIC | Age: 69
End: 2024-12-05
Attending: INTERNAL MEDICINE
Payer: COMMERCIAL

## 2024-12-05 DIAGNOSIS — Z78.0 POST-MENOPAUSAL: ICD-10-CM

## 2024-12-05 DIAGNOSIS — M85.80 OSTEOPENIA, UNSPECIFIED LOCATION: ICD-10-CM

## 2024-12-05 DIAGNOSIS — Z12.31 VISIT FOR SCREENING MAMMOGRAM: ICD-10-CM

## 2024-12-05 PROCEDURE — T1013 SIGN LANG/ORAL INTERPRETER: HCPCS

## 2024-12-05 PROCEDURE — 77080 DXA BONE DENSITY AXIAL: CPT | Performed by: INTERNAL MEDICINE

## 2024-12-05 PROCEDURE — 77067 SCR MAMMO BI INCL CAD: CPT | Performed by: RADIOLOGY

## 2024-12-05 PROCEDURE — 77063 BREAST TOMOSYNTHESIS BI: CPT | Performed by: RADIOLOGY

## 2024-12-09 ENCOUNTER — THERAPY VISIT (OUTPATIENT)
Dept: PHYSICAL THERAPY | Facility: CLINIC | Age: 69
End: 2024-12-09
Payer: COMMERCIAL

## 2024-12-09 ENCOUNTER — TRANSCRIBE ORDERS (OUTPATIENT)
Dept: OTHER | Age: 69
End: 2024-12-09

## 2024-12-09 DIAGNOSIS — M54.16 LUMBAR RADICULOPATHY: Primary | ICD-10-CM

## 2024-12-09 PROCEDURE — 97161 PT EVAL LOW COMPLEX 20 MIN: CPT | Mod: GP

## 2024-12-09 PROCEDURE — 97110 THERAPEUTIC EXERCISES: CPT | Mod: GP

## 2024-12-09 NOTE — PROGRESS NOTES
PHYSICAL THERAPY EVALUATION  Type of Visit: Evaluation              Subjective     Patient reports low back pain of a chronic nature and more recent (two months) onset of left leg pain associated with it. The pain gets much worse with certain activities, such as picking up objects from the floor. She reports that she wants to get back to normal life, and to move without pain. She notes that, once the pain starts, it is so intense that she feels like she can't move. She uses a supportive belt/band that helps her move with less pain.        Presenting condition or subjective complaint: (Patient-Rptd) Lower back  Date of onset: 10/09/24    Relevant medical history:     Dates & types of surgery:      Prior diagnostic imaging/testing results: (Patient-Rptd) MRI; CT scan; X-ray     Prior therapy history for the same diagnosis, illness or injury: (Patient-Rptd) No      Prior Level of Function  Patient reports difficulty with transfers including in and out of bed and in and out of a car. She reports avoidance of bending due to variable, but often very intense, pain. She reports that she is afraid to push and pull objects as well.    Living Environment  Social support: (Patient-Rptd) With a significant other or spouse   Type of home: (Patient-Rptd) Apartment/condo   Stairs to enter the home: (Patient-Rptd) No       Ramp: (Patient-Rptd) No   Stairs inside the home: (Patient-Rptd) No       Help at home: (Patient-Rptd) Self Cares (home health aide/personal care attendant, family, etc)  Equipment owned:       Employment:      Hobbies/Interests:      Patient goals for therapy: (Patient-Rptd) Continue daily activities with out any pain       Objective   LUMBAR SPINE EVALUATION  PAIN: Pain Level at Rest: 3/10  Pain Level with Use: 10/10  Pain Location: lumbar spine and hip  Pain Quality: Aching and Sharp  Pain Frequency: constant or with intense exacerbations  Pain is Worst: no notable difference between day and night, though  patient notes that on pain medication, nighttime is somewhat better  Pain is Exacerbated By: bending, lifting  Pain is Relieved By: NSAIDs, otc medications, and stretch  Pain Progression: Worsened    POSTURE: Sitting Posture: Forward head, Lordosis decreased    GAIT:   Weightbearing Status: WBAT  Assistive Device(s): None    ROM: grossly limited hip PROM including     MYOTOMES:    Left Right   T12-L3 (Hip Flexion) 5- 5-   L2-4 (Quads)  5 5   L4 (Ankle DF) 5 5   L5 (Great Toe Ext) 5 5   S1 (Toe Raise) 5 5     LUMBAR/HIP Special Tests:   SLR Positive Positive   Slump Negative  Negative       Patient demonstrates mild directional preference for extension.    PALPATION: WFL    Assessment & Plan   CLINICAL IMPRESSIONS  Medical Diagnosis: Lumbar radiculopathy    Treatment Diagnosis: Low back pain, radiculopathy of left side   Impression/Assessment: Patient is a 69 year old female with low back pain with left leg pain complaints.  The following significant findings have been identified: Pain and Decreased activity tolerance. These impairments interfere with their ability to perform self care tasks, household chores, household mobility, and community mobility as compared to previous level of function.     Clinical Decision Making (Complexity):  Clinical Presentation: Stable/Uncomplicated  Clinical Presentation Rationale: based on medical and personal factors listed in PT evaluation  Clinical Decision Making (Complexity): Low complexity    PLAN OF CARE  Treatment Interventions:  Modalities: Cryotherapy, Hot Pack  Interventions: Gait Training, Manual Therapy, Neuromuscular Re-education, Therapeutic Activity, Therapeutic Exercise, Self-Care/Home Management    Long Term Goals     PT Goal 1  Goal Identifier: Activity tolerance  Goal Description: Patient will demonstrate ability to  objects from floor with pain <4/10 prior to discharge.  Rationale: to maximize safety and independence with performance of ADLs and functional  tasks  Target Date: 02/17/25      Frequency of Treatment: 1x/week  Duration of Treatment: 10 weeks    Education Assessment:   Learner/Method: Patient;Listening;Reading;Demonstration;Pictures/Video  Education Comments:  required (Oromo).    Risks and benefits of evaluation/treatment have been explained.   Patient/Family/caregiver agrees with Plan of Care.     Evaluation Time:     PT Eval, Low Complexity Minutes (53217): 30   Present: Yes: Language: Oromo, ID Number/Identifier: [id number]     Signing Clinician: Heron Singh PT        [facility]                                                                                   OUTPATIENT PHYSICAL THERAPY      PLAN OF TREATMENT FOR OUTPATIENT REHABILITATION   Patient's Last Name, First Name, Kian Hayes YOB: 1955   Provider's Name   [facility]   Medical Record No.  [id number]     Onset Date: 10/09/24  Start of Care Date: 12/09/24     Medical Diagnosis:  Lumbar radiculopathy      PT Treatment Diagnosis:  Low back pain, radiculopathy of left side Plan of Treatment  Frequency/Duration: 1x/week/ 10 weeks    Certification date from 12/09/24 to 02/17/25         See note for plan of treatment details and functional goals     Heron Singh, DENNISE                         I CERTIFY THE NEED FOR THESE SERVICES FURNISHED UNDER        THIS PLAN OF TREATMENT AND WHILE UNDER MY CARE .             Physician Signature               Date    X_____________________________________________________                  Referring Provider:  Yina Felix NP    Initial Assessment  See Epic Evaluation- Start of Care Date: 12/09/24

## 2024-12-10 ENCOUNTER — APPOINTMENT (OUTPATIENT)
Dept: OPTOMETRY | Facility: CLINIC | Age: 69
End: 2024-12-10
Payer: COMMERCIAL

## 2024-12-10 PROCEDURE — 92341 FIT SPECTACLES BIFOCAL: CPT | Performed by: STUDENT IN AN ORGANIZED HEALTH CARE EDUCATION/TRAINING PROGRAM

## 2024-12-17 ENCOUNTER — THERAPY VISIT (OUTPATIENT)
Dept: PHYSICAL THERAPY | Facility: CLINIC | Age: 69
End: 2024-12-17
Payer: COMMERCIAL

## 2024-12-17 DIAGNOSIS — M54.16 LUMBAR RADICULOPATHY: Primary | ICD-10-CM

## 2024-12-17 PROCEDURE — 97530 THERAPEUTIC ACTIVITIES: CPT | Mod: GP

## 2024-12-17 PROCEDURE — 97110 THERAPEUTIC EXERCISES: CPT | Mod: GP

## 2025-01-07 ENCOUNTER — THERAPY VISIT (OUTPATIENT)
Dept: PHYSICAL THERAPY | Facility: CLINIC | Age: 70
End: 2025-01-07
Payer: COMMERCIAL

## 2025-01-07 DIAGNOSIS — M54.16 LUMBAR RADICULOPATHY: Primary | ICD-10-CM

## 2025-01-07 PROCEDURE — 97110 THERAPEUTIC EXERCISES: CPT | Mod: GP

## 2025-01-14 ENCOUNTER — THERAPY VISIT (OUTPATIENT)
Dept: PHYSICAL THERAPY | Facility: CLINIC | Age: 70
End: 2025-01-14
Payer: COMMERCIAL

## 2025-01-14 DIAGNOSIS — M54.16 LUMBAR RADICULOPATHY: Primary | ICD-10-CM

## 2025-01-14 PROCEDURE — 97530 THERAPEUTIC ACTIVITIES: CPT | Mod: GP

## 2025-01-14 PROCEDURE — 97110 THERAPEUTIC EXERCISES: CPT | Mod: GP

## 2025-01-28 ENCOUNTER — THERAPY VISIT (OUTPATIENT)
Dept: PHYSICAL THERAPY | Facility: CLINIC | Age: 70
End: 2025-01-28
Payer: COMMERCIAL

## 2025-01-28 DIAGNOSIS — M54.16 LUMBAR RADICULOPATHY: Primary | ICD-10-CM

## 2025-01-28 PROCEDURE — 97110 THERAPEUTIC EXERCISES: CPT | Mod: GP

## 2025-03-12 ENCOUNTER — OFFICE VISIT (OUTPATIENT)
Dept: UROLOGY | Facility: CLINIC | Age: 70
End: 2025-03-12
Attending: OBSTETRICS & GYNECOLOGY
Payer: COMMERCIAL

## 2025-03-12 VITALS
BODY MASS INDEX: 35.66 KG/M2 | DIASTOLIC BLOOD PRESSURE: 82 MMHG | SYSTOLIC BLOOD PRESSURE: 131 MMHG | HEART RATE: 85 BPM | WEIGHT: 195 LBS

## 2025-03-12 DIAGNOSIS — N95.2 VAGINAL ATROPHY: ICD-10-CM

## 2025-03-12 DIAGNOSIS — N39.41 URGE INCONTINENCE: Primary | ICD-10-CM

## 2025-03-12 PROCEDURE — 99213 OFFICE O/P EST LOW 20 MIN: CPT | Performed by: OBSTETRICS & GYNECOLOGY

## 2025-03-12 PROCEDURE — 3079F DIAST BP 80-89 MM HG: CPT | Performed by: OBSTETRICS & GYNECOLOGY

## 2025-03-12 PROCEDURE — G0463 HOSPITAL OUTPT CLINIC VISIT: HCPCS | Performed by: OBSTETRICS & GYNECOLOGY

## 2025-03-12 PROCEDURE — 3075F SYST BP GE 130 - 139MM HG: CPT | Performed by: OBSTETRICS & GYNECOLOGY

## 2025-03-12 PROCEDURE — 1126F AMNT PAIN NOTED NONE PRSNT: CPT | Performed by: OBSTETRICS & GYNECOLOGY

## 2025-03-12 RX ORDER — ESTRADIOL 0.1 MG/G
1 CREAM VAGINAL
Qty: 42.5 G | Refills: 3 | Status: SHIPPED | OUTPATIENT
Start: 2025-03-12

## 2025-03-12 RX ORDER — SOLIFENACIN SUCCINATE 10 MG/1
10 TABLET, FILM COATED ORAL DAILY
COMMUNITY
End: 2025-03-12

## 2025-03-12 RX ORDER — SOLIFENACIN SUCCINATE 10 MG/1
10 TABLET, FILM COATED ORAL DAILY
Qty: 90 TABLET | Refills: 3 | Status: SHIPPED | OUTPATIENT
Start: 2025-03-12

## 2025-03-12 ASSESSMENT — PAIN SCALES - GENERAL: PAINLEVEL_OUTOF10: NO PAIN (0)

## 2025-03-12 NOTE — LETTER
3/12/2025       RE: Kian Cortez  [address]  [place] [address]     Dear Colleague,    Thank you for referring your patient, Kian Cortez, to the [facility] [place] [facility]. Please see a copy of my visit note below.    March 12, 2025    Return visit    Patient returns today for F/U for her OAB and vaginal atrophy. Since her last visit she reports that her symptoms are well controlled with vesicare and estrace cream .    /82   Pulse 85   Wt 88.5 kg (195 lb)   LMP 04/19/2006   BMI 35.66 kg/m    She is comfortable, in no distress, non-labored breathing.      A/P: 70 year old F with OAB and vaginal atrophy    Refill estrace and myrbetriq    I spent a total of 20 minutes with  Kian  on the date of the encounter in chart review, face to face patient visit, review of tests, documentation and/or discussion with other providers about the issues documented above.    Aleksandr Zepeda MD  Professor, OB/GYN  Urogynecologist    CC  Patient Care Team:  Willie Lee MD as PCP - General (Internal Medicine)  Pili Kendrick APRN CNP as Nurse Practitioner (Nurse Practitioner)  Tan Hodges MD as MD (Family Practice)  Magaly Hernandez MD as MD (Internal Medicine)  Tammie Valero OD (Optometry)  Tori Ramirez APRN CNP as Nurse Practitioner (Nurse Practitioner - Family)  Leo Mensah MD as MD (Dermatology)  Mariella Love APRN CNM as Certified Nurse Midwife (OB/Gyn)  Willie Lee MD as Assigned PCP  Aleksandr Zepeda MD as MD (OB/Gyn)  Aleksandr Zepdea MD as Assigned OBGYN Provider  Ezequiel Batres DO as Assigned Musculoskeletal Provider  Florentin Thakkar MD as Assigned Surgical Provider  SELF, REFERRED      Again, thank you for allowing me to participate in the care of your patient.      Sincerely,    Aleksandr Zepeda MD

## 2025-03-12 NOTE — PROGRESS NOTES
March 12, 2025    Return visit    Patient returns today for F/U for her OAB and vaginal atrophy. Since her last visit she reports that her symptoms are well controlled with vesicare and estrace cream .    /82   Pulse 85   Wt 88.5 kg (195 lb)   LMP 04/19/2006   BMI 35.66 kg/m    She is comfortable, in no distress, non-labored breathing.      A/P: 70 year old F with OAB and vaginal atrophy    Refill estrace and myrbetriq    I spent a total of 20 minutes with  Kian  on the date of the encounter in chart review, face to face patient visit, review of tests, documentation and/or discussion with other providers about the issues documented above.    Aleksandr Zepeda MD  Professor, OB/GYN  Urogynecologist    CC  Patient Care Team:  Willie Lee MD as PCP - General (Internal Medicine)  Pili Kendrick APRN CNP as Nurse Practitioner (Nurse Practitioner)  Tan Hodges MD as MD (Family Practice)  Magaly Hernandez MD as MD (Internal Medicine)  Tammie Valero OD (Optometry)  Tori Ramirez APRN CNP as Nurse Practitioner (Nurse Practitioner - Family)  Leo Mensah MD as MD (Dermatology)  Mariella Love APRN CNM as Certified Nurse Midwife (OB/Gyn)  Willie Lee MD as Assigned PCP  Aleksandr Zepeda MD as MD (OB/Gyn)  Aleksandr Zepeda MD as Assigned OBGYN Provider  Ezequiel Batres DO as Assigned Musculoskeletal Provider  Florentin Tahkkar MD as Assigned Surgical Provider  SELF, REFERRED

## 2025-03-31 PROBLEM — M54.16 LUMBAR RADICULOPATHY: Status: RESOLVED | Noted: 2020-06-29 | Resolved: 2025-03-31

## 2025-06-25 ENCOUNTER — TELEPHONE (OUTPATIENT)
Dept: UROLOGY | Facility: CLINIC | Age: 70
End: 2025-06-25
Payer: COMMERCIAL

## 2025-06-25 DIAGNOSIS — N95.2 VAGINAL ATROPHY: ICD-10-CM

## 2025-06-25 RX ORDER — ESTRADIOL 0.1 MG/G
1 CREAM VAGINAL
Qty: 42.5 G | Refills: 3 | Status: CANCELLED | OUTPATIENT
Start: 2025-06-25

## 2025-06-25 NOTE — TELEPHONE ENCOUNTER
Prescription refill request received via phone call from patient for medication: Estradiol     Patient's preferred pharmacy documented in chart: [id number] - [place], [place] - [address] AT [facility] [address]     Last Written Prescription Date:  3/12/2025  Last Fill Quantity: 42.5g  # refills: 3   Last office visit: 3/12/2025     Prescribing provider: Aleksandr Pearson MD (Uro/gyn)  Future Office Visit Scheduled:   Future Appointments 6/25/2025 - 12/22/2025      None          Next appointment is due: 3/2026    Per RN protocol, refill is denied because patient should have refills on file.     Relayed detailed message via Oromo  that patient should have refills on file and to contact pharmacy.

## 2025-06-25 NOTE — TELEPHONE ENCOUNTER
[facility] Call Center    Phone Message    May a detailed message be left on voicemail: yes     Reason for Call: Medication Refill Request    Has the patient contacted the pharmacy for the refill? Yes   Name of medication being requested: estradiol (ESTRACE) 0.1 MG/GM vaginal cream [65490]  Provider who prescribed the medication: Dr. Zepeda   Pharmacy: [facility] [address] [address] [facility]  Date medication is needed: ASAP       Action Taken: Other: uro    Travel Screening: Not Applicable     Date of Service:

## 2025-08-26 ENCOUNTER — TRANSFERRED RECORDS (OUTPATIENT)
Dept: HEALTH INFORMATION MANAGEMENT | Facility: CLINIC | Age: 70
End: 2025-08-26
Payer: COMMERCIAL

## 2025-08-26 ENCOUNTER — MEDICAL CORRESPONDENCE (OUTPATIENT)
Dept: HEALTH INFORMATION MANAGEMENT | Facility: CLINIC | Age: 70
End: 2025-08-26
Payer: COMMERCIAL

## 2025-08-27 ENCOUNTER — TRANSCRIBE ORDERS (OUTPATIENT)
Dept: OTHER | Age: 70
End: 2025-08-27

## 2025-08-27 DIAGNOSIS — K21.9 GASTROESOPHAGEAL REFLUX DISEASE WITHOUT ESOPHAGITIS: Primary | ICD-10-CM

## 2025-09-02 ENCOUNTER — OFFICE VISIT (OUTPATIENT)
Dept: GASTROENTEROLOGY | Facility: CLINIC | Age: 70
End: 2025-09-02
Attending: INTERNAL MEDICINE
Payer: COMMERCIAL

## 2025-09-02 VITALS
OXYGEN SATURATION: 100 % | BODY MASS INDEX: 37.11 KG/M2 | HEART RATE: 82 BPM | HEIGHT: 60 IN | SYSTOLIC BLOOD PRESSURE: 144 MMHG | WEIGHT: 189 LBS | DIASTOLIC BLOOD PRESSURE: 87 MMHG

## 2025-09-02 DIAGNOSIS — K21.9 GASTROESOPHAGEAL REFLUX DISEASE, UNSPECIFIED WHETHER ESOPHAGITIS PRESENT: Primary | ICD-10-CM

## 2025-09-02 PROCEDURE — 3079F DIAST BP 80-89 MM HG: CPT | Performed by: PHYSICIAN ASSISTANT

## 2025-09-02 PROCEDURE — 99203 OFFICE O/P NEW LOW 30 MIN: CPT | Performed by: PHYSICIAN ASSISTANT

## 2025-09-02 PROCEDURE — 1125F AMNT PAIN NOTED PAIN PRSNT: CPT | Performed by: PHYSICIAN ASSISTANT

## 2025-09-02 PROCEDURE — 3077F SYST BP >= 140 MM HG: CPT | Performed by: PHYSICIAN ASSISTANT

## 2025-09-02 RX ORDER — TRAMADOL HYDROCHLORIDE 50 MG/1
50 TABLET ORAL 3 TIMES DAILY PRN
COMMUNITY

## 2025-09-02 RX ORDER — FAMOTIDINE 40 MG/1
40 TABLET, FILM COATED ORAL 2 TIMES DAILY
Qty: 180 TABLET | Refills: 3 | Status: SHIPPED | OUTPATIENT
Start: 2025-09-02

## 2025-09-02 RX ORDER — GUAIFENESIN 600 MG/1
600 TABLET, EXTENDED RELEASE ORAL EVERY 12 HOURS
COMMUNITY

## 2025-09-02 ASSESSMENT — PAIN SCALES - GENERAL: PAINLEVEL_OUTOF10: SEVERE PAIN (7)

## (undated) DEVICE — TUBING SYS AQUILEX BLUE INFLOW AQL-110 YLW OUTFLOW AQL-111

## (undated) DEVICE — STRAP KNEE/BODY 31143004

## (undated) DEVICE — EYE PACK CUSTOM CATARACT AS12127-01

## (undated) DEVICE — PAD CHUX UNDERPAD 30X36" P3036C

## (undated) DEVICE — LINEN TOWEL PACK X5 5464

## (undated) DEVICE — DRAPE UNDER BUTTOCK 8483

## (undated) DEVICE — Device

## (undated) DEVICE — CATH INTERMITTENT CLEAN-CATH 14FR 16" VINYL LF 421714

## (undated) DEVICE — SUCTION MANIFOLD NEPTUNE 2 SYS 1 PORT 702-025-000

## (undated) DEVICE — SOL NACL 0.9% IRRIG 3000ML BAG 2B7477

## (undated) DEVICE — TUBING SYS AQUILEX BLUE INFLOW AQL-110

## (undated) DEVICE — SPECIMEN CONTAINER 3OZ W/FORMALIN 59901

## (undated) DEVICE — SOL NACL 0.9% IRRIG 500ML BOTTLE 2F7123

## (undated) DEVICE — JELLY LUBRICATING SURGILUBE 2OZ TUBE

## (undated) DEVICE — GOWN IMPERVIOUS 2XL BLUE

## (undated) DEVICE — TUBING SUCTION 12"X1/4" N612

## (undated) DEVICE — DEVICE TISSUE REMOVAL HYSTEROSCOPIC MYOSURE LITE 30-401LITE

## (undated) DEVICE — GLOVE BIOGEL PI MICRO INDICATOR UNDERGLOVE SZ 7.0 48970

## (undated) DEVICE — SOLIDIFIER (USE FOR UP TO 1500CC) MSOLID1500

## (undated) DEVICE — GLOVE BIOGEL PI MICRO SZ 6.5 48565

## (undated) DEVICE — PREP CHLORHEXIDINE 4% 4OZ (HIBICLENS) 57504

## (undated) DEVICE — SEAL SET MYOSURE ROD LENS SCOPE SINGLE USE 40-902

## (undated) DEVICE — SOL WATER IRRIG 1000ML BOTTLE 2F7114

## (undated) DEVICE — SOL WATER IRRIG 500ML BOTTLE 2F7113

## (undated) RX ORDER — LIDOCAINE HYDROCHLORIDE 10 MG/ML
INJECTION, SOLUTION EPIDURAL; INFILTRATION; INTRACAUDAL; PERINEURAL
Status: DISPENSED
Start: 2023-07-26

## (undated) RX ORDER — KETOROLAC TROMETHAMINE 30 MG/ML
INJECTION, SOLUTION INTRAMUSCULAR; INTRAVENOUS
Status: DISPENSED
Start: 2023-07-26

## (undated) RX ORDER — DEXAMETHASONE SODIUM PHOSPHATE 4 MG/ML
INJECTION, SOLUTION INTRA-ARTICULAR; INTRALESIONAL; INTRAMUSCULAR; INTRAVENOUS; SOFT TISSUE
Status: DISPENSED
Start: 2023-07-26

## (undated) RX ORDER — FENTANYL CITRATE 50 UG/ML
INJECTION, SOLUTION INTRAMUSCULAR; INTRAVENOUS
Status: DISPENSED
Start: 2019-11-19

## (undated) RX ORDER — PROPOFOL 10 MG/ML
INJECTION, EMULSION INTRAVENOUS
Status: DISPENSED
Start: 2023-07-26

## (undated) RX ORDER — ACETAMINOPHEN 325 MG/1
TABLET ORAL
Status: DISPENSED
Start: 2024-11-04

## (undated) RX ORDER — FENTANYL CITRATE 50 UG/ML
INJECTION, SOLUTION INTRAMUSCULAR; INTRAVENOUS
Status: DISPENSED
Start: 2024-11-04

## (undated) RX ORDER — ONDANSETRON 2 MG/ML
INJECTION INTRAMUSCULAR; INTRAVENOUS
Status: DISPENSED
Start: 2023-07-26

## (undated) RX ORDER — POVIDONE-IODINE 5 %
SOLUTION, NON-ORAL OPHTHALMIC (EYE)
Status: DISPENSED
Start: 2024-11-18

## (undated) RX ORDER — ACETAMINOPHEN 325 MG/1
TABLET ORAL
Status: DISPENSED
Start: 2024-11-18

## (undated) RX ORDER — TETRACAINE HYDROCHLORIDE 5 MG/ML
SOLUTION OPHTHALMIC
Status: DISPENSED
Start: 2024-11-18

## (undated) RX ORDER — FENTANYL CITRATE 50 UG/ML
INJECTION, SOLUTION INTRAMUSCULAR; INTRAVENOUS
Status: DISPENSED
Start: 2024-11-18

## (undated) RX ORDER — EPINEPHRINE 1 MG/ML
INJECTION, SOLUTION, CONCENTRATE INTRAVENOUS
Status: DISPENSED
Start: 2024-11-18

## (undated) RX ORDER — FENTANYL CITRATE 50 UG/ML
INJECTION, SOLUTION INTRAMUSCULAR; INTRAVENOUS
Status: DISPENSED
Start: 2023-07-26

## (undated) RX ORDER — MOXIFLOXACIN IN NACL,ISO-OS/PF 0.3MG/0.3
SYRINGE (ML) INTRAOCULAR
Status: DISPENSED
Start: 2024-11-18

## (undated) RX ORDER — BALANCED SALT SOLUTION 6.4; .75; .48; .3; 3.9; 1.7 MG/ML; MG/ML; MG/ML; MG/ML; MG/ML; MG/ML
SOLUTION OPHTHALMIC
Status: DISPENSED
Start: 2024-11-18

## (undated) RX ORDER — ACETAMINOPHEN 325 MG/1
TABLET ORAL
Status: DISPENSED
Start: 2023-07-26